# Patient Record
Sex: FEMALE | Race: WHITE | NOT HISPANIC OR LATINO | Employment: OTHER | URBAN - METROPOLITAN AREA
[De-identification: names, ages, dates, MRNs, and addresses within clinical notes are randomized per-mention and may not be internally consistent; named-entity substitution may affect disease eponyms.]

---

## 2017-01-10 RX ORDER — HYDROCHLOROTHIAZIDE 25 MG/1
TABLET ORAL
Qty: 30 TABLET | Refills: 5 | Status: SHIPPED | OUTPATIENT
Start: 2017-01-10 | End: 2018-01-11 | Stop reason: SDUPTHER

## 2017-01-23 RX ORDER — SIMVASTATIN 40 MG
TABLET ORAL
Qty: 30 TABLET | Refills: 5 | Status: SHIPPED | OUTPATIENT
Start: 2017-01-23 | End: 2017-07-24 | Stop reason: SDUPTHER

## 2017-02-14 ENCOUNTER — HOSPITAL ENCOUNTER (OUTPATIENT)
Dept: GENERAL RADIOLOGY | Facility: HOSPITAL | Age: 72
Discharge: HOME | End: 2017-02-14
Attending: INTERNAL MEDICINE | Admitting: INTERNAL MEDICINE

## 2017-02-14 ENCOUNTER — TELEPHONE (OUTPATIENT)
Dept: GASTROENTEROLOGY | Facility: CLINIC | Age: 72
End: 2017-02-14

## 2017-02-14 ENCOUNTER — TRANSCRIBE ORDERS (OUTPATIENT)
Dept: GASTROENTEROLOGY | Facility: CLINIC | Age: 72
End: 2017-02-14

## 2017-02-14 ENCOUNTER — OUTSIDE FACILITY SERVICE (OUTPATIENT)
Dept: GASTROENTEROLOGY | Facility: CLINIC | Age: 72
End: 2017-02-14

## 2017-02-14 DIAGNOSIS — Z12.11 SCREENING FOR COLON CANCER: Primary | ICD-10-CM

## 2017-02-14 DIAGNOSIS — Z85.42 HISTORY OF ENDOMETRIAL CANCER: ICD-10-CM

## 2017-02-14 DIAGNOSIS — Z12.11 SCREENING FOR COLON CANCER: ICD-10-CM

## 2017-02-14 PROCEDURE — 74270 X-RAY XM COLON 1CNTRST STD: CPT

## 2017-02-14 PROCEDURE — G0105 COLORECTAL SCRN; HI RISK IND: HCPCS | Performed by: INTERNAL MEDICINE

## 2017-02-14 NOTE — TELEPHONE ENCOUNTER
I spoke with Mrs. Sage about the results of the barium enema. There was evidence for diverticulosis. The patient likely has some adhesions in the pelvis that precluded complete exam with the colonoscope. I discussed this with Mrs. Sage.

## 2017-02-14 NOTE — TELEPHONE ENCOUNTER
Patient had incomplete colon, needs Barium Enema. Could only get to the sigmoid due to strictures.

## 2017-03-06 ENCOUNTER — CLINICAL SUPPORT (OUTPATIENT)
Dept: GYNECOLOGIC ONCOLOGY | Facility: CLINIC | Age: 72
End: 2017-03-06

## 2017-03-06 VITALS
WEIGHT: 127 LBS | RESPIRATION RATE: 16 BRPM | BODY MASS INDEX: 21.8 KG/M2 | HEART RATE: 58 BPM | OXYGEN SATURATION: 90 % | TEMPERATURE: 97.6 F | DIASTOLIC BLOOD PRESSURE: 67 MMHG | SYSTOLIC BLOOD PRESSURE: 145 MMHG

## 2017-03-06 DIAGNOSIS — C54.1 ENDOMETRIAL CANCER (HCC): Primary | ICD-10-CM

## 2017-03-06 PROCEDURE — 99214 OFFICE O/P EST MOD 30 MIN: CPT | Performed by: NURSE PRACTITIONER

## 2017-03-06 NOTE — PROGRESS NOTES
GYN ONCOLOGY CANCER SURVEILLANCE FOLLOW-UP    Cindy Sage  [id number]  1945    Chief Complaint: Follow-up (survivorship endometrial cancer)        History of present illness:  Cindy Sage is a 72 y.o. year old female who is here today for ongoing surveillance of Endometrial Cancer, see Cancer History, and review of her Survivorship Care Plan. She reports she is feeling very well today and has no complaints. She denies vaginal bleeding, pelvic pain, and changes in bowel or bladder function. She reports her fatigue is improving and only has mild residual neuropathy in bilateral toes that is tolerable. She is using her vaginal dilator occasionally without any problems. She is feeling well recovered from her treatments, pleased with her outcomes at this time, and reports she has been very pleased with all of her care here.           Cancer History:      Endometrial cancer    3/26/2016 Imaging    CT in ER for acute onset postmenopausal bleeding revealed mass in the lower uterine segment. Gyn Oncology called to evaluate.      3/29/2016 Biopsy    Endometrial biopsy in office, pathology revealed complex atypical hyperplasia with stromal breakdown.         4/1/2016 Initial Diagnosis    Endometrial cancer      4/19/2016 Surgery    RATLH, BSO, LND to several millimeters of residual disease.       5/20/2016 - 11/15/2016 Chemotherapy    Carboplatin AUC 6, Paclitaxel 175 mg/m2 x 6 cycles in sandwich fashion with radiation in between cycles #3 and #4       - 9/2/2016 Radiation    Radiation completed. Pelvis and periaortic nodes received a total of 45 Gy in 25 fractions during sandwich therapy. This was followed by vaginal brachytherapy: upper vagina treated utilizing cylinder and high dose rate iridium-192 to 18 Gy in 3 fractions.       11/21/2016 Imaging    Post-treatment PET/CT negative for disease         Past Medical History   Diagnosis Date   • Elevated cholesterol    • Endometrial cancer 04/2016     Stage  IIIC2    • GERD (gastroesophageal reflux disease)    • HTN (hypertension)    • Stroke 02/2016     No residual deficits.  Takes .       Past Surgical History   Procedure Laterality Date   • Appendectomy       Ex lap   • Foot surgery     • Tonsillectomy     • Total laparoscopic hysterectomy salpingo oophorectomy Bilateral 04/19/2016     RATLH, BSO, LND       MEDICATIONS: The current medication list was reviewed and reconciled.     Allergies:  is allergic to strawberry extract.    Family History   Problem Relation Age of Onset   • Arthritis Mother    • Breast cancer Mother    • Osteoporosis Mother    • Ovarian cancer Neg Hx        Last imaging study was negative post-treatment PET on 11/21/2016.   Tumor marker:  Component      Latest Ref Rng 5/20/2016 6/10/2016 6/29/2016 9/14/2016 10/5/2016         0.0 - 30.2 U/mL 37.7 18.9 14.0 11.1 9.3     Component      Latest Ref Rng 10/26/2016 11/30/2016         0.0 - 30.2 U/mL 11.3 11.6       Review of Systems   Constitutional: Negative for appetite change, chills, fatigue, fever and unexpected weight change.   Respiratory: Negative for cough, shortness of breath and wheezing.    Cardiovascular: Negative for chest pain, palpitations and leg swelling.   Gastrointestinal: Negative for abdominal distention, abdominal pain, blood in stool, constipation, diarrhea, nausea and vomiting.   Endocrine: Negative.    Genitourinary: Negative for dyspareunia, dysuria, frequency, genital sores, hematuria, pelvic pain, urgency, vaginal bleeding, vaginal discharge and vaginal pain.   Musculoskeletal: Negative for arthralgias, gait problem and joint swelling.   Neurological: Negative for dizziness, seizures, syncope, weakness, light-headedness, numbness and headaches.   Hematological: Negative for adenopathy.   Psychiatric/Behavioral: Negative.        Physical Exam  General Appearance:  alert, cooperative, no apparent distress, appears stated age and normal weight    Neurologic/Psychiatric: A&O x 3, gait steady, appropriate affect   HEENT:  Normocephalic, without obvious abnormality, mucous membranes moist   Neck: Supple, symmetrical, trachea midline, no adenopathy;  No thyromegaly, masses, or tenderness   Back:   Symmetric, no curvature, ROM normal, no CVA tenderness   Lungs:   Clear to auscultation bilaterally; respirations regular, even, and unlabored bilaterally   Heart:  Regular rate and rhythm, no murmurs appreciated   Breasts:  deferred   Abdomen:   Soft, non-tender, non-distended and no organomegaly   Lymph nodes: No cervical, supraclavicular, inguinal or axillary adenopathy noted   Extremities: Normal, atraumatic; no clubbing, cyanosis, or edema    Pelvic: External Genitalia  atrophic, without lesions  Vagina  is pale, atrophic.  and radiation change noted  Vaginal Cuff  Female Vaginal Cuff: smooth, intact, without visible lesions and radiation change visible, mildly friable  Uterus  surgically absent and no palpable masses  Ovaries  surgically absent bilaterallly  Parametria  smooth  Rectovaginal  Female rectovaginal: confirms no masses or bleeding     ECOG Performance Status: 0 - Asymptomatic    Procedure Note:  No notes on file      Assessment and Plan:  Cindy was seen today for follow-up.    Diagnoses and all orders for this visit:    Endometrial cancer      There is no evidence of disease upon today's exam. She is understanding to call with any changes in pelvic symptoms or general GYN concerns.     The patient and I have reviewed her Survivorship Care Plan in detail. We discussed her diagnosis, pathology, histology, all treatments, and ongoing surveillance recommendations. All questions were answered to her satisfaction. She is in agreement with our plan for ongoing surveillance as outlined in her plan. This includes, but is not limited to Ca-125 levels every 6 months, q3 month surveillance visits in clinic with Dr. Dominguez for the first 2 years, and repeat  imaging studies for any suspected recurrences. A copy of this document was provided to her at the completion of our visit. A copy has also been sent to her PCP.     This was a 30 minute visit with 20 minutes spent in direct face-to-face discussion of her Survivorship Care Plan.     Return in about 3 months (around 6/6/2017) for ongoing cancer surveillance with Dr. Dominguez.      CHAPARRO Hand        Note: Speech recognition transcription software was used to dictate portions of this document.  An attempt at proofreading has been made though minor errors in transcription may still be present.  Please do not hesitate to call our office with any questions.

## 2017-05-22 RX ORDER — ERGOCALCIFEROL 1.25 MG/1
CAPSULE ORAL
Qty: 30 CAPSULE | Refills: 0 | Status: SHIPPED | OUTPATIENT
Start: 2017-05-22 | End: 2018-04-12 | Stop reason: SDUPTHER

## 2017-05-24 ENCOUNTER — LAB (OUTPATIENT)
Dept: LAB | Facility: HOSPITAL | Age: 72
End: 2017-05-24

## 2017-05-24 ENCOUNTER — OFFICE VISIT (OUTPATIENT)
Dept: GYNECOLOGIC ONCOLOGY | Facility: CLINIC | Age: 72
End: 2017-05-24

## 2017-05-24 VITALS
WEIGHT: 133 LBS | HEART RATE: 62 BPM | OXYGEN SATURATION: 99 % | SYSTOLIC BLOOD PRESSURE: 140 MMHG | DIASTOLIC BLOOD PRESSURE: 67 MMHG | RESPIRATION RATE: 20 BRPM | TEMPERATURE: 97.6 F | BODY MASS INDEX: 22.83 KG/M2

## 2017-05-24 DIAGNOSIS — C54.1 ENDOMETRIAL CANCER (HCC): Primary | ICD-10-CM

## 2017-05-24 DIAGNOSIS — C54.1 ENDOMETRIAL CANCER (HCC): ICD-10-CM

## 2017-05-24 LAB — CANCER AG125 SERPL QL: 8.3 U/ML (ref 0–30.2)

## 2017-05-24 PROCEDURE — 99213 OFFICE O/P EST LOW 20 MIN: CPT | Performed by: OBSTETRICS & GYNECOLOGY

## 2017-05-24 PROCEDURE — 36415 COLL VENOUS BLD VENIPUNCTURE: CPT

## 2017-05-24 PROCEDURE — 86304 IMMUNOASSAY TUMOR CA 125: CPT | Performed by: OBSTETRICS & GYNECOLOGY

## 2017-07-24 RX ORDER — SIMVASTATIN 40 MG
TABLET ORAL
Qty: 30 TABLET | Refills: 5 | Status: SHIPPED | OUTPATIENT
Start: 2017-07-24 | End: 2018-01-22 | Stop reason: SDUPTHER

## 2017-08-25 ENCOUNTER — OFFICE VISIT (OUTPATIENT)
Dept: GYNECOLOGIC ONCOLOGY | Facility: CLINIC | Age: 72
End: 2017-08-25

## 2017-08-25 VITALS
DIASTOLIC BLOOD PRESSURE: 67 MMHG | SYSTOLIC BLOOD PRESSURE: 156 MMHG | OXYGEN SATURATION: 97 % | HEART RATE: 68 BPM | RESPIRATION RATE: 14 BRPM | WEIGHT: 137 LBS | BODY MASS INDEX: 23.52 KG/M2 | TEMPERATURE: 98.2 F

## 2017-08-25 DIAGNOSIS — C54.1 ENDOMETRIAL CANCER (HCC): Primary | ICD-10-CM

## 2017-08-25 PROCEDURE — 99213 OFFICE O/P EST LOW 20 MIN: CPT | Performed by: OBSTETRICS & GYNECOLOGY

## 2017-08-25 NOTE — PROGRESS NOTES
Cindy HOSKINS Chu  [id number]  1945  Subjective     Reason for visit:  Endometrial cancer surveillance    History of present illness:  The patient is a 72 y.o. 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  Her treatment history is outlined below.  She presents today for surveillance visit.    Overall she has done well since her last visit.  She denies abdominal pain and bloating.  No vaginal bleeding or discharge.  Denies nausea and vomiting.  Denies chest pain and SOB.  No fevers or chills.  She denies bowel or bladder dysfunction.  Her weight and appetite have been stable.  Her energy is stable.  No neuropathy.  No changes in her skin.       She is having a small issue with depth perception when she walks down her porch steps.  This can trigger her small amount of dizziness.  There is no railing and she takes care to not have falls.    Treatment History:   1.  After pre-operative clearance with Neurology due to history of stroke, in April 2016 she underwent a robotic-assisted total laparoscopic hysterectomy, bilateral salpingo-oophrectomy, bilateral pelvic lymph node dissection, bilateral periaortic lymph node dissection, and tumor debulking.    2.  Intraoperatively her uterus was replaced by tumor and the uterine fundus actually fairly easily detached from the cervix and the lower uterine segment secondary to replacement of the endometrium by fragmented tumor.  There was also visible tumor at the right uterine artery pedicle, right parametria and right paravesical tissues to an extent that even a radical dissection in this area would not result in full resection.  As a result there was at least several millimeters of visible residual plaque in this area.  Additionally, at the time of surgery, during the left pelvic lymph node dissection, there was noted to be a moderate amount of dense tissue encircling the left obturator nerve and a pad like fashion which covered the nerve to an  extent that it could not be visualized.    3.  Final pathology showed a mixed epithelial neoplasm with the predominant component being grade 2 endometrioid adenocarcinoma, with less than 1% papillary serous carcinoma (this was a change the original report stated that the majority of her tumor was high-grade).  Nonetheless, the myometrium was completely infiltrated by tumor and the tumor extended contiguously into the parasalpingeal soft tissues.  Tumor was present on the serosal surface of the ovary.  Furthermore the endocervix and endocervical lymphatics were extensively involved.  There were 2/6 right pelvic lymph nodes, 1/4 left pelvic lymph nodes, 1/1 right periaortic lymph nodes, and 0/1 left periaortic lymph nodes sampled in this thin patient.  Washings were positive for malignancy.    4.  We met for postoperative pathology discussion and I recommended sandwich therapy.  Her and her family desired a second opinion by Dr. Rojas at the [facility].  He concurred with my recommendation and therefore the patient returned to me for treatment.    5.  Prior to surgery she had not had any imaging of the chest performed and therefore had a PET/CT performed in May 2016 to assess her disease distribution.  In short, there were multiple enlarged lymph nodes in the left and right pelvic sidewalls however these had very minimal SUV uptake in this recently postoperative patient status post lymph node dissection.  Additionally there was a small focus of soft tissue in the dependent pelvis that was concerning for residual disease however nothing was apparent grossly at the time of surgery and this was likely due to the setting of a postoperative abdomen.    6.  She had her first cycle of carboplatin dosed at an AUC of 6 and paclitaxel dosed at 175 mg/m2 on 5/20/16.  CA-125 at the start of therapy was 37 however this was in the postoperative setting.  She then completed external beam radiation and  vaginal cylinder placement with Dr. Kerr completing in September 2016.  Her final cycle of chemo was on 10/26/16.  7.  Treatment was complicated by neutropenia requiring neulasta, minimal grade 2 anemia requiring transfusion, and minimal steroid induced hyperglycemia.  8. Post-treatment PET in November 2016 showed her to be completely without evidence of disease  9.  Completed survivorship counseling in 3/2017    OBGYN History: NSVDx2    Past Medical History:   Diagnosis Date   • Elevated cholesterol    • Endometrial cancer 04/2016    Stage IIIC2    • GERD (gastroesophageal reflux disease)    • HTN (hypertension)    • Stroke 02/2016    No residual deficits.  Takes .     Past Surgical History:   Procedure Laterality Date   • APPENDECTOMY      Ex lap   • FOOT SURGERY     • TONSILLECTOMY     • TOTAL LAPAROSCOPIC HYSTERECTOMY SALPINGO OOPHORECTOMY Bilateral 04/19/2016    RATLH, BSO, LND     MEDICATIONS: The current medication list was reviewed with the patient and updated in the EMR this date per the Medical Assistant. Medication dosages and frequencies were confirmed to be accurate.      Allergies:  is allergic to strawberry extract.    Social History: She lives in [place] with her .  They care for their 21-year-old grandson who is affected by Down syndrome.  She denies use of tobacco, alcohol, and illicit drugs.     Family History:  Her mother suffered from breast cancer in her mid-60s and about 5 years later this became bilateral.  Denies a history of colon, endometrial, ovarian, and prostate cancer.  No history of melanoma.    Health Maintenance:    1. Mammogram - 12/2016  2. Colonoscopy - 2017    Review of Systems:  CONSTITUTIONAL:  Weight has been stable, no excessive fatigue.  No fever, chills, night sweats.  PSYCHIATRIC: No history of anxiety, depression. No bipolar disorder or insomnia.  EYES: Vision is unchanged.  She wears glasses.  No photophobia.  EARS, NOSE, MOUTH, AND THROAT: Hearing  normal. No swallowing difficulties.  No sore throat.  ENDOCRINE: No history of diabetes or thyroid disease.  LYMPHATIC: No swelling.  No enlarged lymph nodes  RESPIRATORY: No shortness of breath, cough, asthma or wheezing.  No hemoptysis.  CARDIOVASCULAR: No angina, orthopnea, or edema.  +HTN.  +hyperlipidemia.  GASTROINTESTINAL: No constipation, diarrhea, or melena.  +Reflux.  No nausea, vomiting.  GENITOURINARY: No dysuria, hematuria, urgency, or frequency.  NEUROLOGIC: +h/o stroke, no current numbness, weakness, motor disturbance, syncope, seizure, or headaches.  MUSCULOSKELETAL: No joint pain.  No muscle weakness.  INTEGUMENTARY: No new skin lesions.  GYNECOLOGIC: Per history of present illness.  HEMATOLOGIC: No bleeding problems.  Denies easy bruising.    Objective      Physical Exam  Vitals:    08/25/17 1035   BP: 156/67   Pulse: 68   Resp: 14   Temp: 98.2 °F (36.8 °C)   SpO2: 97%     Body mass index is 23.52 kg/(m^2).    Wt Readings from Last 3 Encounters:   08/25/17 137 lb (62.1 kg)   05/24/17 133 lb (60.3 kg)   03/06/17 127 lb (57.6 kg)     ECOG PS 0    GENERAL: Alert, well-appearing female in no apparent distress.  She appears her stated age.  She is here by herself today.  HEENT: Sclera anicteric, normal eyelids. Head normocephalic, atraumatic. Mucus membranes moist.  Normal dentition.    GASTROINTESTINAL:  Soft, non-tender, non-distended, no rebound or guarding, no masses, or hernias.  No HSM.  Incisions - well-healed laparoscopic incisions  MUSCULOSKELETAL:  Normal gait and station.  FROMx4.  No digital cyanosis.    SKIN:  Warm, dry, well-perfused.  All visible areas intact.  No rashes, lesions, ulcers.  PSYCHIATRIC: AO x3, with appropriate affect, normal thought processes  LYMPHATICS:  No cervical or inguinal adenopathy noted.  PELVIC exam:  Normal external female genitalia without lesions or skin changes.  Urethra midline.  Vagina without lesions and is very minimally foreshortened.  No visible  lesions.  On bimanual exam the vagina is smooth.  No palpable abdominal masses. Rectovaginal exam confirmatory. The pelvic sidewalls are smooth, the cul de sac is clear, the rectovaginal septum is supple.    Diagnostic Data:    Lab Results   Component Value Date     8.3 05/24/2017     11.6 11/30/2016     11.3 10/26/2016     9.3 10/05/2016     11.1 09/14/2016         Assessment/Plan  This is a 72 y.o. woman with stage IIIC2 grade 2 mixed histology endometrial cancer s/p adjuvant therapy and here for surveillance    1.  Endometrial cancer:   -No evidence of disease  -She understands the typical recommendation for high risk endometrial cancer surveillance (stage III/IV, serous or clear cell) are visits every 3 months for the first two years, every 6 months up until 5 years, and annually thereafter.  -We again discussed need to monitor symptoms including any vaginal bleeding or discharge, abdominal pain, bloating, changes in bowel or bladder habits.  She knows to be particularly aware of symptoms that occur several times a week for at least several weeks.    - today at her next visit (not today).  Aim for every 6 months initially    2.  Follow-up:    -RTC in 3 months  -Offered ACP today         Electronically Signed by: Jess Dominguez MD  Date: 8/25/2017      Time:  11:08 AM

## 2017-11-29 ENCOUNTER — OFFICE VISIT (OUTPATIENT)
Dept: GYNECOLOGIC ONCOLOGY | Facility: CLINIC | Age: 72
End: 2017-11-29

## 2017-11-29 VITALS
HEART RATE: 55 BPM | DIASTOLIC BLOOD PRESSURE: 70 MMHG | SYSTOLIC BLOOD PRESSURE: 166 MMHG | OXYGEN SATURATION: 94 % | RESPIRATION RATE: 16 BRPM | TEMPERATURE: 96.3 F | WEIGHT: 141 LBS | BODY MASS INDEX: 24.2 KG/M2

## 2017-11-29 DIAGNOSIS — C54.1 ENDOMETRIAL CANCER (HCC): Primary | ICD-10-CM

## 2017-11-29 PROCEDURE — 99213 OFFICE O/P EST LOW 20 MIN: CPT | Performed by: OBSTETRICS & GYNECOLOGY

## 2017-11-29 NOTE — PROGRESS NOTES
Cindy HOSKINS [place]  [id number]  1945  Subjective     Reason for visit:  Endometrial cancer surveillance    History of present illness:  The patient is a 72 y.o. 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  Her treatment history is outlined below.  She presents today for surveillance visit.    Overall she has done well since her last visit.  She denies abdominal pain and bloating.  No vaginal bleeding or discharge.  Denies nausea and vomiting.  Denies chest pain and SOB.  No fevers or chills.  She denies bowel or bladder dysfunction.  Her appetite is good.  Her energy is stable.  No neuropathy.  No changes in her skin.   The dizziness resolved.    Treatment History:   1.  After pre-operative clearance with Neurology due to history of stroke, in April 2016 she underwent a robotic-assisted total laparoscopic hysterectomy, bilateral salpingo-oophrectomy, bilateral pelvic lymph node dissection, bilateral periaortic lymph node dissection, and tumor debulking.  2.  Intraoperatively her uterus was replaced by tumor and the uterine fundus actually fairly easily detached from the cervix and the lower uterine segment secondary to replacement of the endometrium by fragmented tumor.  There was also visible tumor at the right uterine artery pedicle, right parametria and right paravesical tissues to an extent that even a radical dissection in this area would not result in full resection.  As a result there was at least several millimeters of visible residual plaque in this area.  Additionally, at the time of surgery, during the left pelvic lymph node dissection, there was noted to be a moderate amount of dense tissue encircling the left obturator nerve and a pad like fashion which covered the nerve to an extent that it could not be visualized.    3.  Final pathology showed a mixed epithelial neoplasm with the predominant component being grade 2 endometrioid adenocarcinoma, with less than 1% papillary  serous carcinoma (this was a change the original report stated that the majority of her tumor was high-grade).  Nonetheless, the myometrium was completely infiltrated by tumor and the tumor extended contiguously into the parasalpingeal soft tissues.  Tumor was present on the serosal surface of the ovary.  Furthermore the endocervix and endocervical lymphatics were extensively involved.  There were 2/6 right pelvic lymph nodes, 1/4 left pelvic lymph nodes, 1/1 right periaortic lymph nodes, and 0/1 left periaortic lymph nodes sampled in this thin patient.  Washings were positive for malignancy.    4.  We met for postoperative pathology discussion and I recommended sandwich therapy.  Her and her family desired a second opinion by Dr. Rojas at the [facility].  He concurred with my recommendation and therefore the patient returned to me for treatment.    5.  Prior to surgery she had not had any imaging of the chest performed and therefore had a PET/CT performed in May 2016 to assess her disease distribution.  In short, there were multiple enlarged lymph nodes in the left and right pelvic sidewalls however these had very minimal SUV uptake in this recently postoperative patient status post lymph node dissection.  Additionally there was a small focus of soft tissue in the dependent pelvis that was concerning for residual disease however nothing was apparent grossly at the time of surgery and this was likely due to the setting of a postoperative abdomen.    6.  She had her first cycle of carboplatin dosed at an AUC of 6 and paclitaxel dosed at 175 mg/m2 on 5/20/16.  CA-125 at the start of therapy was 37 however this was in the postoperative setting.  She then completed external beam radiation and vaginal cylinder placement with Dr. Kerr completing in September 2016.  Her final cycle of chemo was on 10/26/16.  7.  Treatment was complicated by neutropenia requiring neulasta, minimal grade 2 anemia  requiring transfusion, and minimal steroid induced hyperglycemia.  8. Post-treatment PET in November 2016 showed her to be completely without evidence of disease  9.  Completed survivorship counseling in 3/2017    OBGYN History: NSVDx2    Past Medical History:   Diagnosis Date   • Elevated cholesterol    • Endometrial cancer 04/2016    Stage IIIC2    • GERD (gastroesophageal reflux disease)    • HTN (hypertension)    • Stroke 02/2016    No residual deficits.  Takes .     Past Surgical History:   Procedure Laterality Date   • APPENDECTOMY      Ex lap   • FOOT SURGERY     • TONSILLECTOMY     • TOTAL LAPAROSCOPIC HYSTERECTOMY SALPINGO OOPHORECTOMY Bilateral 04/19/2016    RATLH, BSO, LND     MEDICATIONS: The current medication list was reviewed with the patient and updated in the EMR this date per the Medical Assistant. Medication dosages and frequencies were confirmed to be accurate.      Allergies:  is allergic to strawberry extract.    Social History: She lives in [place] with her .  They care for their 21-year-old grandson who is affected by Down syndrome.  She denies use of tobacco, alcohol, and illicit drugs.     Family History:  Her mother suffered from breast cancer in her mid-60s and about 5 years later this became bilateral.  Denies a history of colon, endometrial, ovarian, and prostate cancer.  No history of melanoma.    Health Maintenance:    1. Mammogram - 12/2016  2. Colonoscopy - 2017    Review of Systems:  CONSTITUTIONAL:  Weight has been stable, no excessive fatigue.  No fever, chills, night sweats.  PSYCHIATRIC: No history of anxiety, depression. No bipolar disorder or insomnia.  EYES: Vision is unchanged.  She wears glasses.  No photophobia.  EARS, NOSE, MOUTH, AND THROAT: Hearing normal. No swallowing difficulties.  No sore throat.  ENDOCRINE: No history of diabetes or thyroid disease.  LYMPHATIC: No swelling.  No enlarged lymph nodes  RESPIRATORY: No shortness of breath, cough,  asthma or wheezing.  No hemoptysis.  CARDIOVASCULAR: No angina, orthopnea, or edema.  +HTN.  +hyperlipidemia.  GASTROINTESTINAL: No constipation, diarrhea, or melena.  +Reflux.  No nausea, vomiting.  GENITOURINARY: No dysuria, hematuria, urgency, or frequency.  NEUROLOGIC: +h/o stroke, no current numbness, weakness, motor disturbance, syncope, seizure, or headaches.  MUSCULOSKELETAL: No joint pain.  No muscle weakness.  INTEGUMENTARY: No new skin lesions.  GYNECOLOGIC: Per history of present illness.  HEMATOLOGIC: No bleeding problems.  Denies easy bruising.    Objective      Physical Exam  Vitals:    11/29/17 0851   BP: 166/70   Pulse: 55   Resp: 16   Temp: 96.3 °F (35.7 °C)   SpO2: 94%     Body mass index is 24.2 kg/(m^2).    Wt Readings from Last 3 Encounters:   11/29/17 141 lb (64 kg)   08/25/17 137 lb (62.1 kg)   05/24/17 133 lb (60.3 kg)     ECOG PS 0    GENERAL: Alert, well-appearing female in no apparent distress.  She appears her stated age.  She is here by herself today.  HEENT: Sclera anicteric, normal eyelids. Head normocephalic, atraumatic. Mucus membranes moist.  Normal dentition.    CARDIOVASCULAR: Normal rate, regular rhythm, no murmurs, rubs, or gallops.  Extremities symmetric.  No peripheral edema.  RESPIRATORY: Clear to auscultation bilaterally, normal respiratory effort  GASTROINTESTINAL:  Soft, non-tender, non-distended, no rebound or guarding, no masses, or hernias.  No HSM.  Incisions - well-healed laparoscopic incisions  MUSCULOSKELETAL:  Normal gait and station.  FROMx4.  No digital cyanosis.    SKIN:  Warm, dry, well-perfused.  All visible areas intact.  No rashes, lesions, ulcers.  PSYCHIATRIC: AO x3, with appropriate affect, normal thought processes  LYMPHATICS:  No cervical or inguinal adenopathy noted.  PELVIC exam:  Normal external female genitalia without lesions or skin changes.  Urethra midline.  Vagina without lesions and is very minimally foreshortened.  Moderately atrophic and  friable.  No visible lesions.  On bimanual exam the vagina is smooth.  No palpable abdominal masses. Rectovaginal exam confirmatory. The pelvic sidewalls are smooth, the cul de sac is clear, the rectovaginal septum is supple.      Diagnostic Data:    Lab Results   Component Value Date     8.3 05/24/2017         Assessment/Plan  This is a 72 y.o. woman with stage IIIC2 grade 2 mixed histology endometrial cancer s/p adjuvant therapy and here for surveillance    1.  Endometrial cancer:   -No evidence of disease  -She understands the typical recommendation for high risk endometrial cancer surveillance (stage III/IV, serous or clear cell) are visits every 3 months for the first two years, every 6 months up until 5 years, and annually thereafter.  -We again discussed need to monitor symptoms including any vaginal bleeding or discharge, abdominal pain, bloating, changes in bowel or bladder habits.  She knows to be particularly aware of symptoms that occur several times a week for at least several weeks.    -No symptoms, normal exam.  Consider  annually.    2.    -Will schedule mammogram    3.  Follow-up:    -RTC in 3 months to see APRN         Electronically Signed by: Jess Dominguez MD  Date: 11/29/2017      Time:  9:10 AM

## 2017-12-26 ENCOUNTER — TRANSCRIBE ORDERS (OUTPATIENT)
Dept: ADMINISTRATIVE | Facility: HOSPITAL | Age: 72
End: 2017-12-26

## 2017-12-26 DIAGNOSIS — Z12.31 VISIT FOR SCREENING MAMMOGRAM: Primary | ICD-10-CM

## 2018-01-11 RX ORDER — HYDROCHLOROTHIAZIDE 25 MG/1
TABLET ORAL
Qty: 30 TABLET | Refills: 5 | Status: SHIPPED | OUTPATIENT
Start: 2018-01-11 | End: 2019-01-21 | Stop reason: SDUPTHER

## 2018-01-22 RX ORDER — SIMVASTATIN 40 MG
TABLET ORAL
Qty: 30 TABLET | Refills: 5 | Status: SHIPPED | OUTPATIENT
Start: 2018-01-22 | End: 2018-07-25 | Stop reason: SDUPTHER

## 2018-02-27 ENCOUNTER — OFFICE VISIT (OUTPATIENT)
Dept: GYNECOLOGIC ONCOLOGY | Facility: CLINIC | Age: 73
End: 2018-02-27

## 2018-02-27 VITALS
TEMPERATURE: 98.6 F | SYSTOLIC BLOOD PRESSURE: 167 MMHG | HEART RATE: 70 BPM | DIASTOLIC BLOOD PRESSURE: 69 MMHG | OXYGEN SATURATION: 97 % | RESPIRATION RATE: 16 BRPM | WEIGHT: 139 LBS | BODY MASS INDEX: 23.86 KG/M2

## 2018-02-27 DIAGNOSIS — C54.1 ENDOMETRIAL CANCER (HCC): Primary | ICD-10-CM

## 2018-02-27 PROCEDURE — 99213 OFFICE O/P EST LOW 20 MIN: CPT | Performed by: NURSE PRACTITIONER

## 2018-02-27 NOTE — PROGRESS NOTES
GYN ONCOLOGY CANCER SURVEILLANCE FOLLOW-UP    Cindy Sage  [id number]  1945    Chief Complaint: Follow-up (no complaints)        History of present illness:  Cindy Sage is a 73 y.o. year old female who is here today for ongoing surveillance of Endometrial Cancer, see Cancer History. She reports she is feeling very well today and has no complaints. She denies vaginal bleeding, pelvic pain, and changes in bowel or bladder function. She is now 1.5 years from completion of treatment, very happy with her outcomes, and thankful for her care thus far with Dr. Doimnguez.           Cancer History:      Endometrial cancer    3/26/2016 Imaging     CT in ER for acute onset postmenopausal bleeding revealed mass in the lower uterine segment. Gyn Oncology called to evaluate.         3/29/2016 Biopsy     Endometrial biopsy in office, pathology revealed complex atypical hyperplasia with stromal breakdown.            4/1/2016 Initial Diagnosis     Endometrial cancer         4/19/2016 Surgery     RATLH, BSO, LND to several millimeters of residual disease.          5/20/2016 - 11/15/2016 Chemotherapy     Carboplatin AUC 6, Paclitaxel 175 mg/m2 x 6 cycles in sandwich fashion with radiation in between cycles #3 and #4          - 9/2/2016 Radiation     Radiation completed. Pelvis and periaortic nodes received a total of 45 Gy in 25 fractions during sandwich therapy. This was followed by vaginal brachytherapy: upper vagina treated utilizing cylinder and high dose rate iridium-192 to 18 Gy in 3 fractions.          11/21/2016 Imaging     Post-treatment PET/CT negative for disease            Past Medical History:   Diagnosis Date   • Elevated cholesterol    • Endometrial cancer 04/2016    Stage IIIC2    • GERD (gastroesophageal reflux disease)    • HTN (hypertension)    • Stroke 02/2016    No residual deficits.  Takes .       Past Surgical History:   Procedure Laterality Date   • APPENDECTOMY      Ex lap   • FOOT  SURGERY     • TONSILLECTOMY     • TOTAL LAPAROSCOPIC HYSTERECTOMY SALPINGO OOPHORECTOMY Bilateral 04/19/2016    RATLH, BSO, LND       MEDICATIONS: The current medication list was reviewed and reconciled.     Allergies:  is allergic to strawberry extract.    Family History   Problem Relation Age of Onset   • Arthritis Mother    • Breast cancer Mother    • Osteoporosis Mother    • Ovarian cancer Neg Hx        Last imaging study was post-treatment PET 11/21/2016  Tumor marker:  Lab Results   Component Value Date     8.3 05/24/2017       Review of Systems   Constitutional: Negative for appetite change, chills, fatigue, fever and unexpected weight change.   Respiratory: Negative for cough, shortness of breath and wheezing.    Cardiovascular: Negative for chest pain, palpitations and leg swelling.   Gastrointestinal: Negative for abdominal distention, abdominal pain, blood in stool, constipation, diarrhea, nausea and vomiting.   Endocrine: Negative.    Genitourinary: Negative for dyspareunia, dysuria, frequency, genital sores, hematuria, pelvic pain, urgency, vaginal bleeding, vaginal discharge and vaginal pain.   Musculoskeletal: Negative for arthralgias, gait problem and joint swelling.   Neurological: Negative for dizziness, seizures, syncope, weakness, light-headedness, numbness and headaches.   Hematological: Negative for adenopathy.   Psychiatric/Behavioral: Negative.        Physical Exam  Vital Signs: /69  Pulse 70  Temp 98.6 °F (37 °C) (Temporal Artery )   Resp 16  Wt 63 kg (139 lb)  SpO2 97%  BMI 23.86 kg/m2   General Appearance:  alert, cooperative, no apparent distress, appears stated age and normal weight   Neurologic/Psychiatric: A&O x 3, gait steady, appropriate affect   HEENT:  Normocephalic, without obvious abnormality, mucous membranes moist   Neck: Supple, symmetrical, trachea midline, no adenopathy;  No thyromegaly, masses, or tenderness   Back:   Symmetric, no curvature, ROM normal, no  CVA tenderness   Lungs:   Clear to auscultation bilaterally; respirations regular, even, and unlabored bilaterally   Heart:  Regular rate and rhythm, no murmurs appreciated   Breasts:  deferred   Abdomen:   Soft, non-tender, non-distended and no organomegaly   Lymph nodes: No cervical, supraclavicular, inguinal or axillary adenopathy noted   Extremities: Normal, atraumatic; no clubbing, cyanosis, or edema    Pelvic: External Genitalia  atrophic, without lesions  Vagina  is pale, atrophic.   Vaginal Cuff  Female Vaginal Cuff: smooth, intact and without visible lesions  Uterus  surgically absent and no palpable masses  Ovaries  surgically absent bilaterallly  Parametria  smooth  Rectovaginal  Female rectovaginal: confirms no masses or bleeding and Hemoccult negative     ECOG Performance Status: 0 - Asymptomatic    Procedure Note:  No notes on file      Assessment and Plan:  Cindy was seen today for follow-up.    Diagnoses and all orders for this visit:    Endometrial cancer        There is no evidence of disease upon today's exam.  She is understanding of her surveillance plan for q3 month visits until the 2 year melvina, then every 6 months if doing well. Per her previous plan with Dr. Dominguez, we will check CA-125 annually or PRN any new symptoms or concerns. This will be due with her next visit in 3 months. She is understanding to call with any changes in pelvic symptoms or general GYN concerns.      Return in about 3 months (around 5/27/2018) for ongoing cancer surveillance.      CHAPARRO Hand        Note: Speech recognition transcription software was used to dictate portions of this document.  An attempt at proofreading has been made though minor errors in transcription may still be present.  Please do not hesitate to call our office with any questions.

## 2018-03-08 ENCOUNTER — HOSPITAL ENCOUNTER (OUTPATIENT)
Dept: MAMMOGRAPHY | Facility: HOSPITAL | Age: 73
Discharge: HOME | End: 2018-03-08
Attending: OBSTETRICS & GYNECOLOGY | Admitting: OBSTETRICS & GYNECOLOGY

## 2018-03-08 DIAGNOSIS — Z12.31 VISIT FOR SCREENING MAMMOGRAM: ICD-10-CM

## 2018-03-08 PROCEDURE — 77067 SCR MAMMO BI INCL CAD: CPT | Performed by: RADIOLOGY

## 2018-03-08 PROCEDURE — 77063 BREAST TOMOSYNTHESIS BI: CPT | Performed by: RADIOLOGY

## 2018-03-08 PROCEDURE — 77067 SCR MAMMO BI INCL CAD: CPT

## 2018-03-08 PROCEDURE — 77063 BREAST TOMOSYNTHESIS BI: CPT

## 2018-04-02 ENCOUNTER — TRANSCRIBE ORDERS (OUTPATIENT)
Dept: MAMMOGRAPHY | Facility: HOSPITAL | Age: 73
End: 2018-04-02

## 2018-04-02 ENCOUNTER — HOSPITAL ENCOUNTER (OUTPATIENT)
Dept: MAMMOGRAPHY | Facility: HOSPITAL | Age: 73
Discharge: HOME | End: 2018-04-02
Admitting: OBSTETRICS & GYNECOLOGY

## 2018-04-02 DIAGNOSIS — R92.8 ABNORMAL MAMMOGRAM: ICD-10-CM

## 2018-04-02 DIAGNOSIS — R92.8 ABNORMAL MAMMOGRAM: Primary | ICD-10-CM

## 2018-04-02 PROCEDURE — 77065 DX MAMMO INCL CAD UNI: CPT | Performed by: RADIOLOGY

## 2018-04-02 PROCEDURE — 77065 DX MAMMO INCL CAD UNI: CPT

## 2018-04-02 PROCEDURE — G0279 TOMOSYNTHESIS, MAMMO: HCPCS

## 2018-04-02 PROCEDURE — G0279 TOMOSYNTHESIS, MAMMO: HCPCS | Performed by: RADIOLOGY

## 2018-04-12 RX ORDER — ERGOCALCIFEROL 1.25 MG/1
50000 CAPSULE ORAL WEEKLY
Qty: 12 CAPSULE | Refills: 1 | Status: SHIPPED | OUTPATIENT
Start: 2018-04-12 | End: 2020-06-24

## 2018-06-14 ENCOUNTER — OFFICE VISIT (OUTPATIENT)
Dept: GYNECOLOGIC ONCOLOGY | Facility: CLINIC | Age: 73
End: 2018-06-14

## 2018-06-14 ENCOUNTER — APPOINTMENT (OUTPATIENT)
Dept: LAB | Facility: HOSPITAL | Age: 73
End: 2018-06-14

## 2018-06-14 VITALS
DIASTOLIC BLOOD PRESSURE: 69 MMHG | HEART RATE: 69 BPM | BODY MASS INDEX: 23.52 KG/M2 | TEMPERATURE: 98.1 F | WEIGHT: 137 LBS | RESPIRATION RATE: 16 BRPM | SYSTOLIC BLOOD PRESSURE: 171 MMHG

## 2018-06-14 DIAGNOSIS — C54.1 ENDOMETRIAL CANCER (HCC): Primary | ICD-10-CM

## 2018-06-14 LAB — CANCER AG125 SERPL QL: 10.5 U/ML (ref 0–30.2)

## 2018-06-14 PROCEDURE — 99213 OFFICE O/P EST LOW 20 MIN: CPT | Performed by: NURSE PRACTITIONER

## 2018-06-14 PROCEDURE — 86304 IMMUNOASSAY TUMOR CA 125: CPT | Performed by: NURSE PRACTITIONER

## 2018-06-14 PROCEDURE — 36415 COLL VENOUS BLD VENIPUNCTURE: CPT | Performed by: NURSE PRACTITIONER

## 2018-06-14 NOTE — PROGRESS NOTES
GYN ONCOLOGY CANCER SURVEILLANCE FOLLOW-UP    Cindy Sage  [id number]  1945    Chief Complaint: Follow-up (no complaints )        History of present illness:  Cindy Sage is a 73 y.o. year old female who is here today for ongoing surveillance of Endometrial Cancer, see Cancer History. Her CA-125 has remained low/stable, due to be repeated today. She reports she is feeling very well today and has no complaints. She denies vaginal bleeding, pelvic pain, and changes in bowel or bladder function.          Cancer History:      Endometrial cancer    3/26/2016 Imaging     CT in ER for acute onset postmenopausal bleeding revealed mass in the lower uterine segment. Gyn Oncology called to evaluate.         3/29/2016 Biopsy     Endometrial biopsy in office, pathology revealed complex atypical hyperplasia with stromal breakdown.            4/1/2016 Initial Diagnosis     Endometrial cancer         4/19/2016 Surgery     RATLH, BSO, LND to several millimeters of residual disease.          5/20/2016 - 11/15/2016 Chemotherapy     Carboplatin AUC 6, Paclitaxel 175 mg/m2 x 6 cycles in sandwich fashion with radiation in between cycles #3 and #4          - 9/2/2016 Radiation     Radiation completed. Pelvis and periaortic nodes received a total of 45 Gy in 25 fractions during sandwich therapy. This was followed by vaginal brachytherapy: upper vagina treated utilizing cylinder and high dose rate iridium-192 to 18 Gy in 3 fractions.          11/21/2016 Imaging     Post-treatment PET/CT negative for disease            Past Medical History:   Diagnosis Date   • Drug therapy 2016    For endometrial CA in 2016   • Elevated cholesterol    • Endometrial cancer 04/2016    Stage IIIC2    • GERD (gastroesophageal reflux disease)    • HTN (hypertension)    • Hx of radiation therapy 2016    For endometrial CA 2016   • Stroke 02/2016    No residual deficits.  Takes .       Past Surgical History:   Procedure Laterality Date    • APPENDECTOMY      Ex lap   • FOOT SURGERY     • TONSILLECTOMY     • TOTAL LAPAROSCOPIC HYSTERECTOMY SALPINGO OOPHORECTOMY Bilateral 04/19/2016    RATLH, BSO, LND       MEDICATIONS: The current medication list was reviewed and reconciled.     Allergies:  is allergic to strawberry extract.    Family History   Problem Relation Age of Onset   • Arthritis Mother    • Breast cancer Mother         age unknown    • Osteoporosis Mother    • Ovarian cancer Neg Hx    • Endometrial cancer Neg Hx        Last imaging study was post-treatment PET 11/21/2016.   Tumor marker:     Date Value Ref Range Status   05/24/2017 8.3 0.0 - 30.2 U/mL Final   11/30/2016 11.6 0.0 - 30.2 U/mL Final   10/26/2016 11.3 0.0 - 30.2 U/mL Final   10/05/2016 9.3 0.0 - 30.2 U/mL Final       Review of Systems   Constitutional: Negative for appetite change, chills, fatigue, fever and unexpected weight change.   Respiratory: Negative for cough, shortness of breath and wheezing.    Cardiovascular: Negative for chest pain, palpitations and leg swelling.   Gastrointestinal: Negative for abdominal distention, abdominal pain, blood in stool, constipation, diarrhea, nausea and vomiting.   Endocrine: Negative.    Genitourinary: Negative for dyspareunia, dysuria, frequency, genital sores, hematuria, pelvic pain, urgency, vaginal bleeding, vaginal discharge and vaginal pain.   Musculoskeletal: Negative for arthralgias, gait problem and joint swelling.   Neurological: Negative for dizziness, seizures, syncope, weakness, light-headedness, numbness and headaches.   Hematological: Negative for adenopathy.   Psychiatric/Behavioral: Negative.        Physical Exam  Vital Signs: /69   Pulse 69   Temp 98.1 °F (36.7 °C) (Temporal Artery )   Resp 16   Wt 62.1 kg (137 lb)   BMI 23.52 kg/m²    General Appearance:  alert, cooperative, no apparent distress, appears stated age and normal weight   Neurologic/Psychiatric: A&O x 3, gait steady, appropriate affect    HEENT:  Normocephalic, without obvious abnormality, mucous membranes moist   Neck: Supple, symmetrical, trachea midline, no adenopathy;  No thyromegaly, masses, or tenderness   Back:   Symmetric, no curvature, ROM normal, no CVA tenderness   Lungs:   Clear to auscultation bilaterally; respirations regular, even, and unlabored bilaterally   Heart:  Regular rate and rhythm, no murmurs appreciated   Breasts:  deferred   Abdomen:   Soft, non-tender, non-distended and no organomegaly   Lymph nodes: No cervical, supraclavicular, inguinal or axillary adenopathy noted   Extremities: Normal, atraumatic; no clubbing, cyanosis, or edema    Pelvic: External Genitalia  atrophic, without lesions  Vagina  is pale, atrophic.   Vaginal Cuff  Female Vaginal Cuff: smooth, intact and without visible lesions  Uterus  surgically absent and no palpable masses  Ovaries  surgically absent bilaterallly  Parametria  smooth  Rectovaginal  Female rectovaginal: confirms no masses or bleeding and Hemoccult negative     ECOG Performance Status: 0 - Asymptomatic    Procedure Note:  No notes on file      Assessment and Plan:  Cindy was seen today for follow-up.    Diagnoses and all orders for this visit:    Endometrial cancer  -             There is no evidence of disease upon today's exam. She will be notified of CA-125 results upon their return. Continue every 3 month surveillance visits until the 2 year melvina, then if doing well may go to 6 months. She is understanding to call with any changes in pelvic symptoms or general GYN concerns at any time between regularly scheduled visits.       RTC in 3 months for ongoing cancer surveillance.       CHAPARRO Hand        Note: Speech recognition transcription software was used to dictate portions of this document.  An attempt at proofreading has been made though minor errors in transcription may still be present.  Please do not hesitate to call our office with any questions.

## 2018-06-15 ENCOUNTER — TELEPHONE (OUTPATIENT)
Dept: GYNECOLOGIC ONCOLOGY | Facility: CLINIC | Age: 73
End: 2018-06-15

## 2018-07-26 RX ORDER — SIMVASTATIN 40 MG
TABLET ORAL
Qty: 90 TABLET | Refills: 1 | Status: SHIPPED | OUTPATIENT
Start: 2018-07-26 | End: 2019-01-21 | Stop reason: SDUPTHER

## 2018-09-18 ENCOUNTER — OFFICE VISIT (OUTPATIENT)
Dept: GYNECOLOGIC ONCOLOGY | Facility: CLINIC | Age: 73
End: 2018-09-18

## 2018-09-18 ENCOUNTER — APPOINTMENT (OUTPATIENT)
Dept: LAB | Facility: HOSPITAL | Age: 73
End: 2018-09-18

## 2018-09-18 VITALS
SYSTOLIC BLOOD PRESSURE: 112 MMHG | OXYGEN SATURATION: 93 % | BODY MASS INDEX: 23.52 KG/M2 | DIASTOLIC BLOOD PRESSURE: 58 MMHG | TEMPERATURE: 97.1 F | HEART RATE: 70 BPM | WEIGHT: 137 LBS | RESPIRATION RATE: 20 BRPM

## 2018-09-18 DIAGNOSIS — C54.1 ENDOMETRIAL CANCER (HCC): Primary | ICD-10-CM

## 2018-09-18 LAB — CANCER AG125 SERPL QL: 11.2 U/ML (ref 0–30.2)

## 2018-09-18 PROCEDURE — 86304 IMMUNOASSAY TUMOR CA 125: CPT | Performed by: NURSE PRACTITIONER

## 2018-09-18 PROCEDURE — 36415 COLL VENOUS BLD VENIPUNCTURE: CPT | Performed by: NURSE PRACTITIONER

## 2018-09-18 PROCEDURE — 99213 OFFICE O/P EST LOW 20 MIN: CPT | Performed by: NURSE PRACTITIONER

## 2018-09-18 NOTE — PROGRESS NOTES
GYN ONCOLOGY CANCER SURVEILLANCE FOLLOW-UP    Cindy Sage  [id number]  1945    Chief Complaint: Follow-up (no complaints )        History of present illness:  Cindy Sage is a 73 y.o. year old female who is here today for ongoing surveillance of Endometrial Cancer, see Cancer History. She is celebrating 2 years since completion of treatment this month. Due to repeat CA-125 today. She reports she is feeling very well today and has no complaints. She denies vaginal bleeding, pelvic pain, and changes in bowel or bladder function. She is planning a trip to [place] with her family this fall and looking forward to the holidays.        Cancer History:      Endometrial cancer (CMS/HCC)    3/26/2016 Imaging     CT in ER for acute onset postmenopausal bleeding revealed mass in the lower uterine segment. Gyn Oncology called to evaluate.         3/29/2016 Biopsy     Endometrial biopsy in office, pathology revealed complex atypical hyperplasia with stromal breakdown.            4/1/2016 Initial Diagnosis     Endometrial cancer         4/19/2016 Surgery     RATLH, BSO, LND to several millimeters of residual disease.          5/20/2016 - 11/15/2016 Chemotherapy     Carboplatin AUC 6, Paclitaxel 175 mg/m2 x 6 cycles in sandwich fashion with radiation in between cycles #3 and #4          - 9/2/2016 Radiation     Radiation completed. Pelvis and periaortic nodes received a total of 45 Gy in 25 fractions during sandwich therapy. This was followed by vaginal brachytherapy: upper vagina treated utilizing cylinder and high dose rate iridium-192 to 18 Gy in 3 fractions.          11/21/2016 Imaging     Post-treatment PET/CT negative for disease            Past Medical History:   Diagnosis Date   • Drug therapy 2016    For endometrial CA in 2016   • Elevated cholesterol    • Endometrial cancer (CMS/HCC) 04/2016    Stage IIIC2    • GERD (gastroesophageal reflux disease)    • HTN (hypertension)    • Hx of radiation  therapy 2016    For endometrial CA 2016   • Stroke (CMS/HCC) 02/2016    No residual deficits.  Takes .       Past Surgical History:   Procedure Laterality Date   • APPENDECTOMY      Ex lap   • FOOT SURGERY     • TONSILLECTOMY     • TOTAL LAPAROSCOPIC HYSTERECTOMY SALPINGO OOPHORECTOMY Bilateral 04/19/2016    RATLH, BSO, LND       MEDICATIONS: The current medication list was reviewed and reconciled.     Allergies:  is allergic to strawberry extract.    Family History   Problem Relation Age of Onset   • Arthritis Mother    • Breast cancer Mother         age unknown    • Osteoporosis Mother    • Ovarian cancer Neg Hx    • Endometrial cancer Neg Hx        Last imaging study was post-treatment PET 11/21/2016.   Tumor marker:     Date Value Ref Range Status   06/14/2018 10.5 0.0 - 30.2 U/mL Final   05/24/2017 8.3 0.0 - 30.2 U/mL Final   11/30/2016 11.6 0.0 - 30.2 U/mL Final   10/26/2016 11.3 0.0 - 30.2 U/mL Final       Review of Systems   Constitutional: Negative for appetite change, chills, fatigue, fever and unexpected weight change.   Respiratory: Negative for cough, shortness of breath and wheezing.    Cardiovascular: Negative for chest pain, palpitations and leg swelling.   Gastrointestinal: Negative for abdominal distention, abdominal pain, blood in stool, constipation, diarrhea, nausea and vomiting.   Endocrine: Negative.    Genitourinary: Negative for dyspareunia, dysuria, frequency, genital sores, hematuria, pelvic pain, urgency, vaginal bleeding, vaginal discharge and vaginal pain.   Musculoskeletal: Negative for arthralgias, gait problem and joint swelling.   Neurological: Negative for dizziness, seizures, syncope, weakness, light-headedness, numbness and headaches.   Hematological: Negative for adenopathy.   Psychiatric/Behavioral: Negative.        Physical Exam  Vital Signs: /58   Pulse 70   Temp 97.1 °F (36.2 °C) (Temporal Artery )   Resp 20   Wt 62.1 kg (137 lb)   SpO2 93%   BMI  23.52 kg/m²    General Appearance:  alert, cooperative, no apparent distress, appears stated age and normal weight   Neurologic/Psychiatric: A&O x 3, gait steady, appropriate affect   HEENT:  Normocephalic, without obvious abnormality, mucous membranes moist   Neck: Supple, symmetrical, trachea midline, no adenopathy;  No thyromegaly, masses, or tenderness   Back:   Symmetric, no curvature, ROM normal, no CVA tenderness   Lungs:   Clear to auscultation bilaterally; respirations regular, even, and unlabored bilaterally   Heart:  Regular rate and rhythm, no murmurs appreciated   Breasts:  deferred   Abdomen:   Soft, non-tender, non-distended and no organomegaly   Lymph nodes: No cervical, supraclavicular, inguinal or axillary adenopathy noted   Extremities: Normal, atraumatic; no clubbing, cyanosis, or edema    Pelvic: External Genitalia  atrophic, without lesions  Vagina  is pale, atrophic.   Vaginal Cuff  Female Vaginal Cuff: smooth, intact, without visible lesions and changes consistent with previous radiation  Uterus  surgically absent and no palpable masses  Ovaries  surgically absent bilaterallly and without palpable masses or fullness  Parametria  smooth  Rectovaginal  Female rectovaginal: confirms no masses or bleeding and Hemoccult negative     ECOG Performance Status: 0 - Asymptomatic    Procedure Note:  No notes on file      Assessment and Plan:  Cindy was seen today for follow-up.    Diagnoses and all orders for this visit:    Endometrial cancer (CMS/HCC)  -             There is no evidence of disease upon today's exam. She will be notified of CA-125 results upon their return. She is now 2 years from completion of treatment and doing well. She may now go to every 6 month cancer surveillance visits, pending stable CA-125. She is understanding to call with any changes in pelvic symptoms or general GYN concerns at any time between regularly scheduled visits.       Return to clinic in 6 months for  ongoing cancer surveillance.      CHAPARRO Hand        Note: Speech recognition transcription software was used to dictate portions of this document.  An attempt at proofreading has been made though minor errors in transcription may still be present.  Please do not hesitate to call our office with any questions.

## 2018-09-19 ENCOUNTER — TELEPHONE (OUTPATIENT)
Dept: GYNECOLOGIC ONCOLOGY | Facility: CLINIC | Age: 73
End: 2018-09-19

## 2018-09-19 NOTE — TELEPHONE ENCOUNTER
----- Message from CHAPARRO Hand sent at 9/18/2018  3:06 PM EDT -----  Please notify CA-125 low! Thanks!

## 2018-10-04 ENCOUNTER — HOSPITAL ENCOUNTER (OUTPATIENT)
Dept: MAMMOGRAPHY | Facility: HOSPITAL | Age: 73
Discharge: HOME | End: 2018-10-04
Attending: OBSTETRICS & GYNECOLOGY | Admitting: OBSTETRICS & GYNECOLOGY

## 2018-10-04 DIAGNOSIS — R92.8 ABNORMAL MAMMOGRAM: ICD-10-CM

## 2018-10-04 PROCEDURE — 77065 DX MAMMO INCL CAD UNI: CPT | Performed by: RADIOLOGY

## 2018-10-04 PROCEDURE — 77065 DX MAMMO INCL CAD UNI: CPT

## 2018-10-04 PROCEDURE — G0279 TOMOSYNTHESIS, MAMMO: HCPCS | Performed by: RADIOLOGY

## 2018-10-04 PROCEDURE — G0279 TOMOSYNTHESIS, MAMMO: HCPCS

## 2018-10-17 ENCOUNTER — TELEPHONE (OUTPATIENT)
Dept: MAMMOGRAPHY | Facility: HOSPITAL | Age: 73
End: 2018-10-17

## 2018-10-24 ENCOUNTER — TELEPHONE (OUTPATIENT)
Dept: MAMMOGRAPHY | Facility: HOSPITAL | Age: 73
End: 2018-10-24

## 2018-10-26 ENCOUNTER — TRANSCRIBE ORDERS (OUTPATIENT)
Dept: MAMMOGRAPHY | Facility: HOSPITAL | Age: 73
End: 2018-10-26

## 2018-10-26 ENCOUNTER — TELEPHONE (OUTPATIENT)
Dept: MAMMOGRAPHY | Facility: HOSPITAL | Age: 73
End: 2018-10-26

## 2018-10-26 DIAGNOSIS — R92.8 ABNORMAL MAMMOGRAM: Primary | ICD-10-CM

## 2018-10-26 NOTE — TELEPHONE ENCOUNTER
Pt notified antithrombotic/blood thinner clearance received from pt's provider; medication instructions prior to & after procedure; BIS scheduling will contact pt to schedule procedure. Questions answered, support given. Pt is to contact prescribing provider with any medication questions. Pt verbalized understanding.

## 2018-11-14 ENCOUNTER — APPOINTMENT (OUTPATIENT)
Dept: MAMMOGRAPHY | Facility: HOSPITAL | Age: 73
End: 2018-11-14

## 2018-12-11 ENCOUNTER — HOSPITAL ENCOUNTER (OUTPATIENT)
Dept: MAMMOGRAPHY | Facility: HOSPITAL | Age: 73
Discharge: HOME | End: 2018-12-11
Admitting: RADIOLOGY

## 2018-12-11 ENCOUNTER — HOSPITAL ENCOUNTER (OUTPATIENT)
Dept: MAMMOGRAPHY | Facility: HOSPITAL | Age: 73
Discharge: HOME | End: 2018-12-11

## 2018-12-11 DIAGNOSIS — R92.8 ABNORMAL MAMMOGRAM: ICD-10-CM

## 2018-12-11 PROCEDURE — 77065 DX MAMMO INCL CAD UNI: CPT | Performed by: RADIOLOGY

## 2018-12-11 PROCEDURE — A4648 IMPLANTABLE TISSUE MARKER: HCPCS

## 2018-12-11 PROCEDURE — 19081 BX BREAST 1ST LESION STRTCTC: CPT | Performed by: RADIOLOGY

## 2018-12-11 PROCEDURE — 88305 TISSUE EXAM BY PATHOLOGIST: CPT | Performed by: OBSTETRICS & GYNECOLOGY

## 2018-12-11 RX ORDER — LIDOCAINE HYDROCHLORIDE AND EPINEPHRINE 10; 10 MG/ML; UG/ML
20 INJECTION, SOLUTION INFILTRATION; PERINEURAL ONCE
Status: COMPLETED | OUTPATIENT
Start: 2018-12-11 | End: 2018-12-11

## 2018-12-11 RX ORDER — LIDOCAINE HYDROCHLORIDE 10 MG/ML
5 INJECTION, SOLUTION INFILTRATION; PERINEURAL ONCE
Status: COMPLETED | OUTPATIENT
Start: 2018-12-11 | End: 2018-12-11

## 2018-12-11 RX ADMIN — LIDOCAINE HYDROCHLORIDE 5 ML: 10 INJECTION, SOLUTION INFILTRATION; PERINEURAL at 13:38

## 2018-12-11 RX ADMIN — LIDOCAINE HYDROCHLORIDE AND EPINEPHRINE 10 ML: 10; 10 INJECTION, SOLUTION INFILTRATION; PERINEURAL at 13:38

## 2018-12-12 LAB
CYTO UR: NORMAL
LAB AP CASE REPORT: NORMAL
LAB AP CLINICAL INFORMATION: NORMAL
LAB AP DIAGNOSIS COMMENT: NORMAL
PATH REPORT.FINAL DX SPEC: NORMAL
PATH REPORT.GROSS SPEC: NORMAL

## 2018-12-13 ENCOUNTER — TRANSCRIBE ORDERS (OUTPATIENT)
Dept: MAMMOGRAPHY | Facility: HOSPITAL | Age: 73
End: 2018-12-13

## 2018-12-13 ENCOUNTER — TELEPHONE (OUTPATIENT)
Dept: MAMMOGRAPHY | Facility: HOSPITAL | Age: 73
End: 2018-12-13

## 2018-12-13 DIAGNOSIS — R92.8 ABNORMAL MAMMOGRAM: Primary | ICD-10-CM

## 2019-01-21 RX ORDER — SIMVASTATIN 40 MG
TABLET ORAL
Qty: 90 TABLET | Refills: 1 | Status: SHIPPED | OUTPATIENT
Start: 2019-01-21 | End: 2019-07-24 | Stop reason: SDUPTHER

## 2019-01-21 RX ORDER — HYDROCHLOROTHIAZIDE 25 MG/1
TABLET ORAL
Qty: 90 TABLET | Refills: 1 | Status: SHIPPED | OUTPATIENT
Start: 2019-01-21 | End: 2019-08-06 | Stop reason: SDUPTHER

## 2019-02-28 ENCOUNTER — HOSPITAL ENCOUNTER (OUTPATIENT)
Facility: HOSPITAL | Age: 74
Setting detail: OBSERVATION
Discharge: HOME | End: 2019-03-01
Attending: EMERGENCY MEDICINE | Admitting: EMERGENCY MEDICINE

## 2019-02-28 ENCOUNTER — APPOINTMENT (OUTPATIENT)
Dept: MRI IMAGING | Facility: HOSPITAL | Age: 74
End: 2019-02-28

## 2019-02-28 ENCOUNTER — APPOINTMENT (OUTPATIENT)
Dept: CARDIOLOGY | Facility: HOSPITAL | Age: 74
End: 2019-02-28

## 2019-02-28 ENCOUNTER — APPOINTMENT (OUTPATIENT)
Dept: CT IMAGING | Facility: HOSPITAL | Age: 74
End: 2019-02-28

## 2019-02-28 ENCOUNTER — TELEPHONE (OUTPATIENT)
Dept: INTERNAL MEDICINE | Facility: CLINIC | Age: 74
End: 2019-02-28

## 2019-02-28 DIAGNOSIS — Z78.9 IMPAIRED MOBILITY AND ADLS: ICD-10-CM

## 2019-02-28 DIAGNOSIS — I10 ELEVATED BLOOD PRESSURE READING WITH DIAGNOSIS OF HYPERTENSION: ICD-10-CM

## 2019-02-28 DIAGNOSIS — Z74.09 IMPAIRED MOBILITY AND ADLS: ICD-10-CM

## 2019-02-28 DIAGNOSIS — R29.898 LEFT ARM WEAKNESS: ICD-10-CM

## 2019-02-28 DIAGNOSIS — Z74.09 IMPAIRED FUNCTIONAL MOBILITY, BALANCE, GAIT, AND ENDURANCE: ICD-10-CM

## 2019-02-28 DIAGNOSIS — H53.9 CHANGE IN VISION: ICD-10-CM

## 2019-02-28 DIAGNOSIS — G45.9 TIA (TRANSIENT ISCHEMIC ATTACK): Primary | ICD-10-CM

## 2019-02-28 PROBLEM — Z85.42 HISTORY OF ENDOMETRIAL CANCER: Status: ACTIVE | Noted: 2019-02-28

## 2019-02-28 LAB
ALBUMIN SERPL-MCNC: 4.66 G/DL (ref 3.2–4.8)
ALBUMIN/GLOB SERPL: 2.1 G/DL (ref 1.5–2.5)
ALP SERPL-CCNC: 123 U/L (ref 25–100)
ALT SERPL W P-5'-P-CCNC: 28 U/L (ref 7–40)
ANION GAP SERPL CALCULATED.3IONS-SCNC: 12 MMOL/L (ref 3–11)
AST SERPL-CCNC: 28 U/L (ref 0–33)
BACTERIA UR QL AUTO: ABNORMAL /HPF
BASOPHILS # BLD AUTO: 0.02 10*3/MM3 (ref 0–0.2)
BASOPHILS NFR BLD AUTO: 0.4 % (ref 0–1)
BILIRUB SERPL-MCNC: 0.8 MG/DL (ref 0.3–1.2)
BILIRUB UR QL STRIP: NEGATIVE
BNP SERPL-MCNC: 26 PG/ML (ref 0–100)
BUN BLD-MCNC: 15 MG/DL (ref 9–23)
BUN/CREAT SERPL: 13.2 (ref 7–25)
CALCIUM SPEC-SCNC: 9.2 MG/DL (ref 8.7–10.4)
CHLORIDE SERPL-SCNC: 101 MMOL/L (ref 99–109)
CLARITY UR: CLEAR
CO2 SERPL-SCNC: 26 MMOL/L (ref 20–31)
COLOR UR: YELLOW
CREAT BLD-MCNC: 1.14 MG/DL (ref 0.6–1.3)
DEPRECATED RDW RBC AUTO: 48.4 FL (ref 37–54)
EOSINOPHIL # BLD AUTO: 0.15 10*3/MM3 (ref 0–0.3)
EOSINOPHIL NFR BLD AUTO: 3.4 % (ref 0–3)
ERYTHROCYTE [DISTWIDTH] IN BLOOD BY AUTOMATED COUNT: 13.9 % (ref 11.3–14.5)
GFR SERPL CREATININE-BSD FRML MDRD: 46 ML/MIN/1.73
GLOBULIN UR ELPH-MCNC: 2.2 GM/DL
GLUCOSE BLD-MCNC: 94 MG/DL (ref 70–100)
GLUCOSE UR STRIP-MCNC: NEGATIVE MG/DL
HCT VFR BLD AUTO: 41 % (ref 34.5–44)
HGB BLD-MCNC: 13.1 G/DL (ref 11.5–15.5)
HGB UR QL STRIP.AUTO: NEGATIVE
HOLD SPECIMEN: NORMAL
HOLD SPECIMEN: NORMAL
HYALINE CASTS UR QL AUTO: ABNORMAL /LPF
IMM GRANULOCYTES # BLD AUTO: 0 10*3/MM3 (ref 0–0.05)
IMM GRANULOCYTES NFR BLD AUTO: 0 % (ref 0–0.6)
KETONES UR QL STRIP: NEGATIVE
LEUKOCYTE ESTERASE UR QL STRIP.AUTO: ABNORMAL
LYMPHOCYTES # BLD AUTO: 1.3 10*3/MM3 (ref 0.6–4.8)
LYMPHOCYTES NFR BLD AUTO: 29.1 % (ref 24–44)
MCH RBC QN AUTO: 30.5 PG (ref 27–31)
MCHC RBC AUTO-ENTMCNC: 32 G/DL (ref 32–36)
MCV RBC AUTO: 95.3 FL (ref 80–99)
MONOCYTES # BLD AUTO: 0.42 10*3/MM3 (ref 0–1)
MONOCYTES NFR BLD AUTO: 9.4 % (ref 0–12)
NEUTROPHILS # BLD AUTO: 2.58 10*3/MM3 (ref 1.5–8.3)
NEUTROPHILS NFR BLD AUTO: 57.7 % (ref 41–71)
NITRITE UR QL STRIP: NEGATIVE
PH UR STRIP.AUTO: 7.5 [PH] (ref 5–8)
PLATELET # BLD AUTO: 222 10*3/MM3 (ref 150–450)
PMV BLD AUTO: 9.3 FL (ref 6–12)
POTASSIUM BLD-SCNC: 3.3 MMOL/L (ref 3.5–5.5)
PROT SERPL-MCNC: 6.9 G/DL (ref 5.7–8.2)
PROT UR QL STRIP: NEGATIVE
RBC # BLD AUTO: 4.3 10*6/MM3 (ref 3.89–5.14)
RBC # UR: ABNORMAL /HPF
REF LAB TEST METHOD: ABNORMAL
SODIUM BLD-SCNC: 139 MMOL/L (ref 132–146)
SP GR UR STRIP: 1.01 (ref 1–1.03)
SQUAMOUS #/AREA URNS HPF: ABNORMAL /HPF
TROPONIN I SERPL-MCNC: 0 NG/ML (ref 0–0.07)
UROBILINOGEN UR QL STRIP: ABNORMAL
WBC NRBC COR # BLD: 4.47 10*3/MM3 (ref 3.5–10.8)
WBC UR QL AUTO: ABNORMAL /HPF
WHOLE BLOOD HOLD SPECIMEN: NORMAL
WHOLE BLOOD HOLD SPECIMEN: NORMAL

## 2019-02-28 PROCEDURE — 99285 EMERGENCY DEPT VISIT HI MDM: CPT

## 2019-02-28 PROCEDURE — 25810000003 SODIUM CHLORIDE 0.9 % WITH KCL 20 MEQ 20-0.9 MEQ/L-% SOLUTION: Performed by: INTERNAL MEDICINE

## 2019-02-28 PROCEDURE — 70544 MR ANGIOGRAPHY HEAD W/O DYE: CPT

## 2019-02-28 PROCEDURE — 70450 CT HEAD/BRAIN W/O DYE: CPT

## 2019-02-28 PROCEDURE — 99220 PR INITIAL OBSERVATION CARE/DAY 70 MINUTES: CPT | Performed by: INTERNAL MEDICINE

## 2019-02-28 PROCEDURE — G0378 HOSPITAL OBSERVATION PER HR: HCPCS

## 2019-02-28 PROCEDURE — A9577 INJ MULTIHANCE: HCPCS | Performed by: EMERGENCY MEDICINE

## 2019-02-28 PROCEDURE — 93306 TTE W/DOPPLER COMPLETE: CPT | Performed by: INTERNAL MEDICINE

## 2019-02-28 PROCEDURE — 85025 COMPLETE CBC W/AUTO DIFF WBC: CPT | Performed by: EMERGENCY MEDICINE

## 2019-02-28 PROCEDURE — 70548 MR ANGIOGRAPHY NECK W/DYE: CPT

## 2019-02-28 PROCEDURE — 96374 THER/PROPH/DIAG INJ IV PUSH: CPT

## 2019-02-28 PROCEDURE — 0 GADOBENATE DIMEGLUMINE 529 MG/ML SOLUTION: Performed by: EMERGENCY MEDICINE

## 2019-02-28 PROCEDURE — 25010000002 LORAZEPAM PER 2 MG: Performed by: EMERGENCY MEDICINE

## 2019-02-28 PROCEDURE — 84484 ASSAY OF TROPONIN QUANT: CPT

## 2019-02-28 PROCEDURE — 93306 TTE W/DOPPLER COMPLETE: CPT

## 2019-02-28 PROCEDURE — 70551 MRI BRAIN STEM W/O DYE: CPT

## 2019-02-28 PROCEDURE — 80053 COMPREHEN METABOLIC PANEL: CPT | Performed by: EMERGENCY MEDICINE

## 2019-02-28 PROCEDURE — 81001 URINALYSIS AUTO W/SCOPE: CPT | Performed by: EMERGENCY MEDICINE

## 2019-02-28 PROCEDURE — 83880 ASSAY OF NATRIURETIC PEPTIDE: CPT | Performed by: EMERGENCY MEDICINE

## 2019-02-28 PROCEDURE — 96361 HYDRATE IV INFUSION ADD-ON: CPT

## 2019-02-28 PROCEDURE — 93005 ELECTROCARDIOGRAM TRACING: CPT | Performed by: EMERGENCY MEDICINE

## 2019-02-28 RX ORDER — FAMOTIDINE 20 MG/1
20 TABLET, FILM COATED ORAL 2 TIMES DAILY
Status: DISCONTINUED | OUTPATIENT
Start: 2019-02-28 | End: 2019-03-01 | Stop reason: HOSPADM

## 2019-02-28 RX ORDER — ACETAMINOPHEN 650 MG/1
650 SUPPOSITORY RECTAL EVERY 4 HOURS PRN
Status: DISCONTINUED | OUTPATIENT
Start: 2019-02-28 | End: 2019-03-01 | Stop reason: HOSPADM

## 2019-02-28 RX ORDER — MAGNESIUM SULFATE HEPTAHYDRATE 40 MG/ML
4 INJECTION, SOLUTION INTRAVENOUS AS NEEDED
Status: DISCONTINUED | OUTPATIENT
Start: 2019-02-28 | End: 2019-02-28

## 2019-02-28 RX ORDER — ACETAMINOPHEN 325 MG/1
650 TABLET ORAL EVERY 4 HOURS PRN
Status: DISCONTINUED | OUTPATIENT
Start: 2019-02-28 | End: 2019-03-01 | Stop reason: HOSPADM

## 2019-02-28 RX ORDER — SODIUM CHLORIDE 0.9 % (FLUSH) 0.9 %
3 SYRINGE (ML) INJECTION EVERY 12 HOURS SCHEDULED
Status: DISCONTINUED | OUTPATIENT
Start: 2019-02-28 | End: 2019-03-01 | Stop reason: HOSPADM

## 2019-02-28 RX ORDER — LORAZEPAM 2 MG/ML
1 INJECTION INTRAMUSCULAR ONCE
Status: COMPLETED | OUTPATIENT
Start: 2019-02-28 | End: 2019-02-28

## 2019-02-28 RX ORDER — POTASSIUM CHLORIDE 7.45 MG/ML
10 INJECTION INTRAVENOUS
Status: DISCONTINUED | OUTPATIENT
Start: 2019-02-28 | End: 2019-02-28

## 2019-02-28 RX ORDER — ASPIRIN 81 MG/1
81 TABLET, CHEWABLE ORAL DAILY
Status: DISCONTINUED | OUTPATIENT
Start: 2019-02-28 | End: 2019-03-01 | Stop reason: HOSPADM

## 2019-02-28 RX ORDER — MAGNESIUM SULFATE HEPTAHYDRATE 40 MG/ML
2 INJECTION, SOLUTION INTRAVENOUS AS NEEDED
Status: DISCONTINUED | OUTPATIENT
Start: 2019-02-28 | End: 2019-02-28

## 2019-02-28 RX ORDER — ALUMINA, MAGNESIA, AND SIMETHICONE 2400; 2400; 240 MG/30ML; MG/30ML; MG/30ML
7.5 SUSPENSION ORAL EVERY 4 HOURS PRN
Status: DISCONTINUED | OUTPATIENT
Start: 2019-02-28 | End: 2019-03-01 | Stop reason: HOSPADM

## 2019-02-28 RX ORDER — ATORVASTATIN CALCIUM 40 MG/1
80 TABLET, FILM COATED ORAL NIGHTLY
Status: DISCONTINUED | OUTPATIENT
Start: 2019-02-28 | End: 2019-03-01 | Stop reason: HOSPADM

## 2019-02-28 RX ORDER — CLOPIDOGREL BISULFATE 75 MG/1
75 TABLET ORAL DAILY
Status: DISCONTINUED | OUTPATIENT
Start: 2019-02-28 | End: 2019-03-01 | Stop reason: HOSPADM

## 2019-02-28 RX ORDER — ONDANSETRON 2 MG/ML
4 INJECTION INTRAMUSCULAR; INTRAVENOUS EVERY 6 HOURS PRN
Status: DISCONTINUED | OUTPATIENT
Start: 2019-02-28 | End: 2019-02-28

## 2019-02-28 RX ORDER — POTASSIUM CHLORIDE 1.5 G/1.77G
40 POWDER, FOR SOLUTION ORAL AS NEEDED
Status: DISCONTINUED | OUTPATIENT
Start: 2019-02-28 | End: 2019-02-28

## 2019-02-28 RX ORDER — SODIUM CHLORIDE 0.9 % (FLUSH) 0.9 %
3-10 SYRINGE (ML) INJECTION AS NEEDED
Status: DISCONTINUED | OUTPATIENT
Start: 2019-02-28 | End: 2019-03-01 | Stop reason: HOSPADM

## 2019-02-28 RX ORDER — ASPIRIN 300 MG/1
300 SUPPOSITORY RECTAL DAILY
Status: DISCONTINUED | OUTPATIENT
Start: 2019-02-28 | End: 2019-03-01 | Stop reason: HOSPADM

## 2019-02-28 RX ORDER — SODIUM CHLORIDE AND POTASSIUM CHLORIDE 150; 900 MG/100ML; MG/100ML
100 INJECTION, SOLUTION INTRAVENOUS CONTINUOUS
Status: DISCONTINUED | OUTPATIENT
Start: 2019-02-28 | End: 2019-03-01

## 2019-02-28 RX ORDER — SODIUM CHLORIDE 0.9 % (FLUSH) 0.9 %
10 SYRINGE (ML) INJECTION AS NEEDED
Status: DISCONTINUED | OUTPATIENT
Start: 2019-02-28 | End: 2019-03-01 | Stop reason: HOSPADM

## 2019-02-28 RX ORDER — POTASSIUM CHLORIDE 750 MG/1
40 CAPSULE, EXTENDED RELEASE ORAL AS NEEDED
Status: DISCONTINUED | OUTPATIENT
Start: 2019-02-28 | End: 2019-02-28

## 2019-02-28 RX ADMIN — FAMOTIDINE 20 MG: 20 TABLET, FILM COATED ORAL at 21:35

## 2019-02-28 RX ADMIN — ASPIRIN 81 MG 81 MG: 81 TABLET ORAL at 18:14

## 2019-02-28 RX ADMIN — LORAZEPAM 1 MG: 2 INJECTION INTRAMUSCULAR; INTRAVENOUS at 14:29

## 2019-02-28 RX ADMIN — ATORVASTATIN CALCIUM 80 MG: 40 TABLET, FILM COATED ORAL at 21:35

## 2019-02-28 RX ADMIN — SODIUM CHLORIDE, PRESERVATIVE FREE 3 ML: 5 INJECTION INTRAVENOUS at 21:39

## 2019-02-28 RX ADMIN — POTASSIUM CHLORIDE AND SODIUM CHLORIDE 100 ML/HR: 900; 150 INJECTION, SOLUTION INTRAVENOUS at 23:13

## 2019-02-28 RX ADMIN — CLOPIDOGREL 75 MG: 75 TABLET, FILM COATED ORAL at 18:14

## 2019-02-28 RX ADMIN — POTASSIUM CHLORIDE 40 MEQ: 1.5 POWDER, FOR SOLUTION ORAL at 21:35

## 2019-02-28 RX ADMIN — GADOBENATE DIMEGLUMINE 10 ML: 529 INJECTION, SOLUTION INTRAVENOUS at 14:46

## 2019-02-28 NOTE — TELEPHONE ENCOUNTER
----- Message from Zainab Mccoy sent at 2/28/2019 10:56 AM EST -----  PATIENT CALLED AND STATED SHE WAS SEEING FLASHING LIGHTS OUT OF THE SIDE OF HER EYE AND THAT THE LAST TIME THIS HAPPENED THAT SHE HAD A STROKE. ADVISED PATIENT TO CALL AN AMBULANCE AND GO TO THE ER PER SUSANNE.

## 2019-03-01 VITALS
OXYGEN SATURATION: 97 % | WEIGHT: 141 LBS | BODY MASS INDEX: 24.07 KG/M2 | TEMPERATURE: 98.6 F | HEIGHT: 64 IN | DIASTOLIC BLOOD PRESSURE: 75 MMHG | SYSTOLIC BLOOD PRESSURE: 152 MMHG | HEART RATE: 58 BPM | RESPIRATION RATE: 16 BRPM

## 2019-03-01 PROBLEM — H53.9 VISION CHANGES: Status: RESOLVED | Noted: 2019-02-28 | Resolved: 2019-03-01

## 2019-03-01 LAB
ANION GAP SERPL CALCULATED.3IONS-SCNC: 5 MMOL/L (ref 3–11)
ARTICHOKE IGE QN: 58 MG/DL (ref 0–130)
BH CV ECHO MEAS - AO ROOT AREA (BSA CORRECTED): 1.6
BH CV ECHO MEAS - AO ROOT AREA: 5.9 CM^2
BH CV ECHO MEAS - AO ROOT DIAM: 2.8 CM
BH CV ECHO MEAS - BSA(HAYCOCK): 1.7 M^2
BH CV ECHO MEAS - BSA: 1.7 M^2
BH CV ECHO MEAS - BZI_BMI: 24.2 KILOGRAMS/M^2
BH CV ECHO MEAS - BZI_METRIC_HEIGHT: 162.6 CM
BH CV ECHO MEAS - BZI_METRIC_WEIGHT: 64 KG
BH CV ECHO MEAS - EDV(CUBED): 61.5 ML
BH CV ECHO MEAS - EDV(MOD-SP2): 32 ML
BH CV ECHO MEAS - EDV(MOD-SP4): 45 ML
BH CV ECHO MEAS - EDV(TEICH): 67.8 ML
BH CV ECHO MEAS - EF(CUBED): 76.5 %
BH CV ECHO MEAS - EF(MOD-BP): 68 %
BH CV ECHO MEAS - EF(MOD-SP2): 62.5 %
BH CV ECHO MEAS - EF(MOD-SP4): 71.1 %
BH CV ECHO MEAS - EF(TEICH): 69.2 %
BH CV ECHO MEAS - ESV(CUBED): 14.4 ML
BH CV ECHO MEAS - ESV(MOD-SP2): 12 ML
BH CV ECHO MEAS - ESV(MOD-SP4): 13 ML
BH CV ECHO MEAS - ESV(TEICH): 20.9 ML
BH CV ECHO MEAS - FS: 38.3 %
BH CV ECHO MEAS - IVS/LVPW: 1.1
BH CV ECHO MEAS - IVSD: 1.2 CM
BH CV ECHO MEAS - LA DIMENSION: 3.6 CM
BH CV ECHO MEAS - LA/AO: 1.3
BH CV ECHO MEAS - LAD MAJOR: 5.5 CM
BH CV ECHO MEAS - LAT PEAK E' VEL: 7.6 CM/SEC
BH CV ECHO MEAS - LATERAL E/E' RATIO: 9.6
BH CV ECHO MEAS - LV DIASTOLIC VOL/BSA (35-75): 26.7 ML/M^2
BH CV ECHO MEAS - LV MASS(C)D: 143.7 GRAMS
BH CV ECHO MEAS - LV MASS(C)DI: 85.2 GRAMS/M^2
BH CV ECHO MEAS - LV SYSTOLIC VOL/BSA (12-30): 7.7 ML/M^2
BH CV ECHO MEAS - LVIDD: 3.9 CM
BH CV ECHO MEAS - LVIDS: 2.4 CM
BH CV ECHO MEAS - LVLD AP2: 5.2 CM
BH CV ECHO MEAS - LVLD AP4: 5.9 CM
BH CV ECHO MEAS - LVLS AP2: 4.8 CM
BH CV ECHO MEAS - LVLS AP4: 5.1 CM
BH CV ECHO MEAS - LVPWD: 1 CM
BH CV ECHO MEAS - MED PEAK E' VEL: 4.4 CM/SEC
BH CV ECHO MEAS - MEDIAL E/E' RATIO: 16.3
BH CV ECHO MEAS - MV A MAX VEL: 86.9 CM/SEC
BH CV ECHO MEAS - MV DEC SLOPE: 286.1 CM/SEC^2
BH CV ECHO MEAS - MV DEC TIME: 0.21 SEC
BH CV ECHO MEAS - MV E MAX VEL: 74 CM/SEC
BH CV ECHO MEAS - MV E/A: 0.85
BH CV ECHO MEAS - MV P1/2T MAX VEL: 82.8 CM/SEC
BH CV ECHO MEAS - MV P1/2T: 84.8 MSEC
BH CV ECHO MEAS - MVA P1/2T LCG: 2.7 CM^2
BH CV ECHO MEAS - MVA(P1/2T): 2.6 CM^2
BH CV ECHO MEAS - PA ACC SLOPE: 617.4 CM/SEC^2
BH CV ECHO MEAS - PA ACC TIME: 0.15 SEC
BH CV ECHO MEAS - PA PR(ACCEL): 11.9 MMHG
BH CV ECHO MEAS - SI(CUBED): 27.9 ML/M^2
BH CV ECHO MEAS - SI(MOD-SP2): 11.9 ML/M^2
BH CV ECHO MEAS - SI(MOD-SP4): 19 ML/M^2
BH CV ECHO MEAS - SI(TEICH): 27.8 ML/M^2
BH CV ECHO MEAS - SV(CUBED): 47 ML
BH CV ECHO MEAS - SV(MOD-SP2): 20 ML
BH CV ECHO MEAS - SV(MOD-SP4): 32 ML
BH CV ECHO MEAS - SV(TEICH): 46.9 ML
BH CV ECHO MEAS - TAPSE (>1.6): 2 CM2
BH CV ECHO MEASUREMENTS AVERAGE E/E' RATIO: 12.33
BH CV VAS BP RIGHT ARM: NORMAL MMHG
BH CV XLRA - RV BASE: 2.9 CM
BH CV XLRA - RV LENGTH: 5.4 CM
BH CV XLRA - RV MID: 1.9 CM
BUN BLD-MCNC: 11 MG/DL (ref 9–23)
BUN/CREAT SERPL: 11.8 (ref 7–25)
CALCIUM SPEC-SCNC: 8.8 MG/DL (ref 8.7–10.4)
CHLORIDE SERPL-SCNC: 109 MMOL/L (ref 99–109)
CHOLEST SERPL-MCNC: 160 MG/DL (ref 0–200)
CO2 SERPL-SCNC: 26 MMOL/L (ref 20–31)
CREAT BLD-MCNC: 0.93 MG/DL (ref 0.6–1.3)
GFR SERPL CREATININE-BSD FRML MDRD: 59 ML/MIN/1.73
GLUCOSE BLD-MCNC: 110 MG/DL (ref 70–100)
GLUCOSE BLDC GLUCOMTR-MCNC: 94 MG/DL (ref 70–130)
HBA1C MFR BLD: 5.2 % (ref 4.8–5.6)
HDLC SERPL-MCNC: 73 MG/DL (ref 40–60)
LEFT ATRIUM VOLUME INDEX: 25.5 ML/M^2
LEFT ATRIUM VOLUME: 43 ML
LV EF 2D ECHO EST: 60 %
MAGNESIUM SERPL-MCNC: 2.3 MG/DL (ref 1.3–2.7)
MAXIMAL PREDICTED HEART RATE: 145 BPM
POTASSIUM BLD-SCNC: 4.7 MMOL/L (ref 3.5–5.5)
SODIUM BLD-SCNC: 140 MMOL/L (ref 132–146)
STRESS TARGET HR: 123 BPM
TRIGL SERPL-MCNC: 135 MG/DL (ref 0–150)

## 2019-03-01 PROCEDURE — 96361 HYDRATE IV INFUSION ADD-ON: CPT

## 2019-03-01 PROCEDURE — 99205 OFFICE O/P NEW HI 60 MIN: CPT | Performed by: PSYCHIATRY & NEUROLOGY

## 2019-03-01 PROCEDURE — 82962 GLUCOSE BLOOD TEST: CPT

## 2019-03-01 PROCEDURE — 83735 ASSAY OF MAGNESIUM: CPT | Performed by: NURSE PRACTITIONER

## 2019-03-01 PROCEDURE — G0378 HOSPITAL OBSERVATION PER HR: HCPCS

## 2019-03-01 PROCEDURE — 80061 LIPID PANEL: CPT | Performed by: NURSE PRACTITIONER

## 2019-03-01 PROCEDURE — 83036 HEMOGLOBIN GLYCOSYLATED A1C: CPT | Performed by: NURSE PRACTITIONER

## 2019-03-01 PROCEDURE — 99217 PR OBSERVATION CARE DISCHARGE MANAGEMENT: CPT | Performed by: HOSPITALIST

## 2019-03-01 PROCEDURE — 97165 OT EVAL LOW COMPLEX 30 MIN: CPT

## 2019-03-01 PROCEDURE — 97161 PT EVAL LOW COMPLEX 20 MIN: CPT

## 2019-03-01 PROCEDURE — 80048 BASIC METABOLIC PNL TOTAL CA: CPT | Performed by: NURSE PRACTITIONER

## 2019-03-01 RX ORDER — CLOPIDOGREL BISULFATE 75 MG/1
75 TABLET ORAL DAILY
Qty: 30 TABLET | Refills: 1 | Status: SHIPPED | OUTPATIENT
Start: 2019-03-02 | End: 2019-03-22 | Stop reason: SDUPTHER

## 2019-03-01 RX ORDER — ASPIRIN 81 MG/1
81 TABLET, CHEWABLE ORAL DAILY
Qty: 30 TABLET | Refills: 1 | Status: SHIPPED | OUTPATIENT
Start: 2019-03-02 | End: 2020-06-24

## 2019-03-01 RX ORDER — POTASSIUM CHLORIDE 750 MG/1
40 CAPSULE, EXTENDED RELEASE ORAL ONCE
Status: DISCONTINUED | OUTPATIENT
Start: 2019-03-01 | End: 2019-03-01

## 2019-03-01 RX ADMIN — FAMOTIDINE 20 MG: 20 TABLET, FILM COATED ORAL at 08:54

## 2019-03-01 RX ADMIN — ASPIRIN 81 MG 81 MG: 81 TABLET ORAL at 08:54

## 2019-03-01 RX ADMIN — CLOPIDOGREL 75 MG: 75 TABLET, FILM COATED ORAL at 08:54

## 2019-03-01 NOTE — THERAPY DISCHARGE NOTE
Acute Care - Occupational Therapy Initial Eval/Discharge  [facility]     Patient Name: Cindy Sage  : 1945  MRN: [id number]  Today's Date: 3/1/2019  Onset of Illness/Injury or Date of Surgery: 19  Date of Referral to OT: 19  Referring Physician: CHAPARRO Frazier      Admit Date: 2019       ICD-10-CM ICD-9-CM   1. TIA (transient ischemic attack) G45.9 435.9   2. Left arm weakness R29.898 729.89   3. Change in vision H53.9 368.9   4. Elevated blood pressure reading with diagnosis of hypertension I10 401.9   5. Impaired mobility and ADLs Z74.09 799.89     Patient Active Problem List   Diagnosis   • Hyperlipidemia   • Hypertension   • Ischemic stroke (CMS/HCC)   • GERD (gastroesophageal reflux disease)   • Hypokalemia   • Hx: UTI (urinary tract infection)   • Vision changes   • History of endometrial cancer   • TIA (transient ischemic attack)     Past Medical History:   Diagnosis Date   • Drug therapy 2016    For endometrial CA in    • Elevated cholesterol    • Endometrial cancer (CMS/HCC) 2016    Stage IIIC2    • GERD (gastroesophageal reflux disease)    • HTN (hypertension)    • Hx of radiation therapy     For endometrial CA    • Stroke (CMS/HCC) 2016    No residual deficits.  Takes .     Past Surgical History:   Procedure Laterality Date   • APPENDECTOMY      Ex lap   • FOOT SURGERY     • OOPHORECTOMY     • TONSILLECTOMY     • TOTAL LAPAROSCOPIC HYSTERECTOMY SALPINGO OOPHORECTOMY Bilateral 2016    RATLH, BSO, LND          OT ASSESSMENT FLOWSHEET (last 72 hours)      Occupational Therapy Evaluation     Row Name 19 0840                   OT Evaluation Time/Intention    Subjective Information  no complaints  -AN        Document Type  discharge evaluation/summary  -AN        Mode of Treatment  occupational therapy  -AN        Patient Effort  excellent  -AN        Symptoms Noted During/After Treatment  none  -AN        Comment  Pt reports a sensation back  of head that wasn't pain but she could not describe it  -AN           General Information    Patient Profile Reviewed?  yes  -AN        Onset of Illness/Injury or Date of Surgery  02/28/19  -AN        Referring Physician  CHAPARRO Frazier  -AN        Patient Observations  cooperative;alert;agree to therapy  -AN        Patient/Family Observations  family member present sleeping in room  -AN        General Observations of Patient  pt supine in bed, IV intact  -AN        Prior Level of Function  independent:;gait;transfer;all household mobility;community mobility;ADL's;bed mobility;home management;driving;using stairs  -AN        Equipment Currently Used at Home  none  -AN        Pertinent History of Current Functional Problem  Pt to ED with acute onset of decreased strength and coordination in L UE, flashes of light in L periphreal vision,HA. MRi shows old R infarcts in frontal and pariteal regions  -AN        Existing Precautions/Restrictions  no known precautions/restrictions  -AN        Risks Reviewed  patient:;LOB;dizziness;increased discomfort;change in vital signs  -AN        Benefits Reviewed  patient:;increase independence  -AN        Barriers to Rehab  none identified  -AN           Relationship/Environment    Primary Source of Support/Comfort  child(joby)  -AN        Lives With  alone  -AN           Resource/Environmental Concerns    Current Living Arrangements  home/apartment/condo  -AN           Home Main Entrance    Number of Stairs, Main Entrance  three  -AN           Stairs Within Home, Primary    Stairs, Within Home, Primary  14  -AN        Stair Railings, Within Home, Primary  railing on right side (ascending)  -AN           Stairs Within Home, Secondary    Stairs, Within Home, Secondary  14  -AN        Stair Railings, Within Home, Secondary  railing on right side (ascending)  -AN        Stairs Comment, Within Home, Secondary  to basement  -AN           Cognitive Assessment/Interventions    Additional  Documentation  Cognitive Assessment/Intervention (Group)  -AN           Cognitive Assessment/Intervention- PT/OT    Affect/Mental Status (Cognitive)  WFL  -AN        Orientation Status (Cognition)  oriented x 4  -AN        Follows Commands (Cognition)  WFL  -AN        Personal Safety Interventions  fall prevention program maintained  -AN           Bed Mobility Assessment/Treatment    Bed Mobility Assessment/Treatment  supine-sit;sit-supine  -AN        Supine-Sit [place] (Bed Mobility)  conditional independence  -AN        Sit-Supine [place] (Bed Mobility)  independent  -AN        Assistive Device (Bed Mobility)  head of bed elevated  -AN           Functional Mobility    Functional Mobility- Ind. Level  independent  -AN        Functional Mobility-Distance (Feet)  25  -AN           Transfer Assessment/Treatment    Transfer Assessment/Treatment  sit-stand transfer;stand-sit transfer;shower transfer  -AN           Sit-Stand Transfer    Sit-Stand [place] (Transfers)  independent  -AN           Stand-Sit Transfer    Stand-Sit [place] (Transfers)  independent  -AN           Shower Transfer    Type (Shower Transfer)  lateral  -AN        [place] Level (Shower Transfer)  conditional independence simulated  -AN           ADL Assessment/Intervention    BADL Assessment/Intervention  lower body dressing;feeding;bathing  -AN           Bathing Assessment/Intervention    Bathing [place] Level  bathing skills;conditional independence  -AN        Comment (Bathing)  simulated  -AN           Lower Body Dressing Assessment/Training    Lower Body Dressing [place] Level  doff;don;socks;independent  -AN           Self-Feeding Assessment/Training    [place] Level (Feeding)  feeding skills;liquids to mouth;independent  -AN           General ROM    GENERAL ROM COMMENTS  Rod UE WNL  -AN           MMT (Manual Muscle Testing)    General MMT Comments  Rod UE WFL grossly 4+/5  -AN           Motor  Assessment/Interventions    Additional Documentation  Balance (Group);Gross Motor Coordination (Group)  -AN           Gross Motor Coordination    Gross Motor Skill, Impairments Detail  slight R hand deficit finger to nose  -AN           Balance    Balance  dynamic sitting balance;dynamic standing balance  -AN           Dynamic Sitting Balance    Level of [place], Reaches Outside Midline (Sitting, Dynamic Balance)  independent  -AN        Sitting Position, Reaches Outside Midline (Sitting, Dynamic Balance)  sitting on edge of bed  -AN        Comment, Reaches Outside Midline (Sitting, Dynamic Balance)  anterior bending to floor  -AN           Dynamic Standing Balance    Level of [place], Reaches Outside Midline (Standing, Dynamic Balance)  independent  -AN        Time Able to Maintain Position, Reaches Outside Midline (Standing, Dynamic Balance)  30 to 45 seconds  -AN        Comment, Reaches Outside Midline (Standing, Dynamic Balance)  bending over to  item off floor  -AN           Sensory Assessment/Intervention    Sensory General Assessment  no sensation deficits identified  -AN        Additional Documentation  Vision Assessment/Intervention (Group)  -AN           Vision Assessment/Intervention    Visual Impairment/Limitations  WFL;corrective lenses full time  -AN           Positioning and Restraints    Pre-Treatment Position  in bed  -AN        Post Treatment Position  bed  -AN        In Bed  sitting EOB;call light within reach;encouraged to call for assist;with nsg  -AN           Pain Assessment    Additional Documentation  Pain Scale: Numbers Pre/Post-Treatment (Group)  -AN           Pain Scale: Numbers Pre/Post-Treatment    Pain Scale: Numbers, Pretreatment  0/10 - no pain  -AN        Pain Scale: Numbers, Post-Treatment  0/10 - no pain  -AN           Plan of Care Review    Plan of Care Reviewed With  patient  -AN           Clinical Impression (OT)    Date of Referral to OT  02/28/19  -AN         OT Diagnosis  No functional deficits found  -AN        Patient/Family Goals Statement (OT Eval)  agreeable to OT  -AN        Criteria for Skilled Therapeutic Interventions Met (OT Eval)  no problems identified which require skilled intervention  -AN        Therapy Frequency (OT Eval)  evaluation only  -AN        Care Plan Review (OT)  evaluation/treatment results reviewed;risks/benefits reviewed;care plan/treatment goals reviewed  -AN        Anticipated Discharge Disposition (OT)  home  -AN           Vital Signs    Pre Systolic BP Rehab  139  -AN        Pre Treatment Diastolic BP  59  -AN        Intra Treatment Diastolic BP  139  -AN        Post Systolic BP Rehab  75  -AN           Living Environment    Home Accessibility  stairs to enter home;stairs within home  -AN          User Key  (r) = Recorded By, (t) = Taken By, (c) = Cosigned By    Initials Name Effective Dates    AN Viri Choe, OT 06/22/15 -               OT Recommendation and Plan  Outcome Summary/Treatment Plan (OT)  Anticipated Discharge Disposition (OT): home  Therapy Frequency (OT Eval): evaluation only  Plan of Care Review  Plan of Care Reviewed With: patient  Plan of Care Reviewed With: patient  Outcome Summary: Pt does not currently exhibit any motor or sensory deficits that interfere with functional I. No further OT warranted at this time.  Pt to discharge home.         Outcome Measures     Row Name 03/01/19 0840             How much help from another is currently needed...    Putting on and taking off regular lower body clothing?  4  -AN      Bathing (including washing, rinsing, and drying)  4  -AN      Toileting (which includes using toilet bed pan or urinal)  4  -AN      Putting on and taking off regular upper body clothing  4  -AN      Taking care of personal grooming (such as brushing teeth)  4  -AN      Eating meals  4  -AN      Score  24  -AN         Modified [place] Scale    Modified [place] Scale  1 - No significant disability despite  symptoms.  Able to carry out all usual duties and activities.  -AN         Functional Assessment    Outcome Measure Options  AM-PAC 6 Clicks Daily Activity (OT);Modified [place]  -AN        User Key  (r) = Recorded By, (t) = Taken By, (c) = Cosigned By    Initials Name Provider Type    Viri Bell OT Occupational Therapist          Time Calculation:   Time Calculation- OT     Row Name 03/01/19 0939             Time Calculation- OT    OT Start Time  0840  -AN      Total Timed Code Minutes- OT  0 minute(s)  -AN      OT Received On  03/01/19  -AN        User Key  (r) = Recorded By, (t) = Taken By, (c) = Cosigned By    Initials Name Provider Type    Viri Bell OT Occupational Therapist        Therapy Suggested Charges     Code   Minutes Charges    None           Therapy Charges for Today     Code Description Service Date Service Provider Modifiers Qty    [id number]  OT EVAL LOW COMPLEXITY 4 3/1/2019 Viri Choe OT GO 1               OT Discharge Summary  Anticipated Discharge Disposition (OT): home  Reason for Discharge: At baseline function  Outcomes Achieved: Refer to plan of care for updates on goals achieved  Discharge Destination: Home    Viri Choe OT  3/1/2019

## 2019-03-01 NOTE — CONSULTS
Neurology    Referring Provider: CHAPARRO Boyer    Reason for Consultation: TIA/stroke      Chief complaint: Left hand weakness and vision changes    Subjective .     History of present illness:  Ms. Sage is a very pleasant 74-year-old female with past medical history significant for endometrial cancer, prior TIA/stroke, hypertension, hyperlipidemia who was admitted to the hospitalist service for transient left hand weakness and vision changes.  She reports that she was at the grocery store if she noticed problems with the left hand with impaired dexterity.  It also felt a bit heavy.  When she got home she noticed flashes of light in the periphery of her left temporal visual field in the left eye only.  Soon afterward she had a bifrontal headache that she described as pressure and throbbing that lasted for at least a few hours.  Today she feels back to baseline.    Review of Systems: Positive for vision changes and weakness.  Positive for headache.  Otherwise complete review of systems was discussed with the patient and found to be negative except for that mentioned in history of present illness.    History  Past Medical History:   Diagnosis Date   • Drug therapy 2016    For endometrial CA in 2016   • Elevated cholesterol    • Endometrial cancer (CMS/HCC) 04/2016    Stage IIIC2    • GERD (gastroesophageal reflux disease)    • HTN (hypertension)    • Hx of radiation therapy 2016    For endometrial CA 2016   • Stroke (CMS/HCC) 02/2016    No residual deficits.  Takes .   ,   Past Surgical History:   Procedure Laterality Date   • APPENDECTOMY      Ex lap   • FOOT SURGERY     • OOPHORECTOMY     • TONSILLECTOMY     • TOTAL LAPAROSCOPIC HYSTERECTOMY SALPINGO OOPHORECTOMY Bilateral 04/19/2016    RATLH, BSO, LND   ,   Family History   Problem Relation Age of Onset   • Arthritis Mother    • Breast cancer Mother         age unknown    • Osteoporosis Mother    • Ovarian cancer Neg Hx    • Endometrial cancer  "Neg Hx    ,   Social History     Tobacco Use   • Smoking status: Never Smoker   Substance Use Topics   • Alcohol use: Not on file   • Drug use: Not on file   ,   Medications Prior to Admission   Medication Sig Dispense Refill Last Dose   • aspirin 325 MG tablet Take 1 tablet by mouth daily.   2/28/2019 at Unknown time   • famotidine (PEPCID) 20 MG tablet Take 1 tablet by mouth 2 (Two) Times a Day. 20 tablet 0 Past Week at Unknown time   • simvastatin (ZOCOR) 40 MG tablet TAKE 1 TABLET BY MOUTH ONCE DAILY 90 tablet 1 2/27/2019 at Unknown time   • vitamin D (ERGOCALCIFEROL) 31413 units capsule capsule Take 1 capsule by mouth 1 (One) Time Per Week. 12 capsule 1 Past Week at Unknown time   • hydrochlorothiazide (HYDRODIURIL) 25 MG tablet TAKE ONE TABLET BY MOUTH ONCE DAILY 90 tablet 1 2/28/2019    and Allergies:  Strawberry extract    Objective     Vital Signs   Blood pressure 152/75, pulse 58, temperature 98.6 °F (37 °C), temperature source Oral, resp. rate 16, height 162 cm (63.78\"), weight 64 kg (141 lb), SpO2 97 %, not currently breastfeeding.    Physical Exam:      Gen: Sitting in bed with eyes open. In NAD. Appears stated age   Eyes: PERRL, conjuntivae/lids normal   ENT: External canals normal bilaterally. Dentition normal   Neck: Supple. No thyroid enlargement noted   Respiratory: CTA bilaterally. Respirations unlabored   CV: RRR, S1 and S2 nml. Radial pulses 2+ bilaterally.    Skin: No rashes noted on exposed skin. Normal turgor.   MSK: Normal bulk and tone. Nml ROM     Neurologic:   Mental status: Awake, alert, oriented x4. Follows commands. Speech fluent.    CN: PERRL, EOM intact, sensation intact in upper/mid/lower face bilaterally, facial movements symmetric, hearing intact to finger rub bilaterally, palate elevates symmetrically, tongue movements and SCMs strong bilaterally    Motor: Strength full (5/5) throughout in BUE and BLE    Reflexes: 2+ throughout   Sensory: Intact to LT throughout   Coordination: " No dysmetria noted   Gait: Not tested        Results Reviewed:     Labs reviewed.  A1c 5.2, LDL 58  MRI brain personally reviewed.  There are a few small subcortical white matter lacunar infarcts is seen which are chronic.  No acute process noted  MRA head and neck report reviewed  TTE report reviewed  EKG report reviewed                 Assessment/Plan     1.  TIA = no acute infarct seen on MRI but she does have evidence of chronic lacunar infarcts.  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  Likely TIA but migraine with aura also in the differential. No significant stenosis or occlusion seen on MRA neck.  Recommend dual antiplatelet therapy with aspirin and Plavix 75 mg daily.  Continue atorvastatin.    2.  Hypertension = continue meds    3.  Hyperlipidemia = LDL 58.  On atorvastatin    Okay from neuro standpoint for discharge when okay with primary team.  Follow-up in neurology clinic for evaluation of possible migraines, first available appointment.    Malinda Rodriguez MD  03/01/19  11:33 AM

## 2019-03-01 NOTE — PLAN OF CARE
Problem: Patient Care Overview  Goal: Discharge Needs Assessment   03/01/19 0407   Discharge Needs Assessment   Readmission Within the Last 30 Days no previous admission in last 30 days   Concerns to be Addressed no discharge needs identified   Concerns Comments concearns of vision changes   Patient/Family Anticipates Transition to home   Patient/Family Anticipated Services at Transition skilled nursing   Transportation Concerns other (see comments)  (family)   Transportation Anticipated family or friend will provide   Anticipated Changes Related to Illness none   Equipment Needed After Discharge none   Outpatient/Agency/Support Group Needs skilled nursing facility   Discharge Facility/Level of Care Needs home with home health   Offered/Gave Vendor List no   Current Discharge Risk other (see comments)   Discharge Coordination/Progress will dc at home w family assisting   Disability   Equipment Currently Used at Home none

## 2019-03-01 NOTE — PLAN OF CARE
Problem: Patient Care Overview  Goal: Plan of Care Review  Outcome: Outcome(s) achieved Date Met: 03/01/19 03/01/19 1407   Coping/Psychosocial   Plan of Care Reviewed With patient   OTHER   Outcome Summary GOALS NOT ESTABLISHED AS PT IS AT BASELINE WITH FUNCTIONAL MOBILITY. NO ONGOING SKILLED P.T. NEEDED AT THIS TIME. RECOMMEND D/C HOME.        Problem: Stroke (Ischemic) (Adult)  Goal: Signs and Symptoms of Listed Potential Problems Will be Absent, Minimized or Managed (Stroke)  Outcome: Outcome(s) achieved Date Met: 03/01/19 03/01/19 1407   Goal/Outcome Evaluation   Problems Assessed (Stroke (Ischemic)) cognitive impairment;motor/sensory impairment;muscle tone abnormal;communication impairment   Problems Assessed (Stroke (Ischemic)) none

## 2019-03-01 NOTE — THERAPY DISCHARGE NOTE
Acute Care - Physical Therapy Initial Eval/Discharge  [facility]     Patient Name: Cindy Sage  : 1945  MRN: [id number]  Today's Date: 3/1/2019   Onset of Illness/Injury or Date of Surgery: 19  Date of Referral to PT: 19  Referring Physician: CHAPARRO JACOB      Admit Date: 2019    Visit Dx:    ICD-10-CM ICD-9-CM   1. TIA (transient ischemic attack) G45.9 435.9   2. Left arm weakness R29.898 729.89   3. Change in vision H53.9 368.9   4. Elevated blood pressure reading with diagnosis of hypertension I10 401.9   5. Impaired mobility and ADLs Z74.09 799.89   6. Impaired functional mobility, balance, gait, and endurance Z74.09 V49.89     Patient Active Problem List   Diagnosis   • Hyperlipidemia   • Hypertension   • Ischemic stroke (CMS/HCC)   • GERD (gastroesophageal reflux disease)   • Hx: UTI (urinary tract infection)   • History of endometrial cancer   • TIA (transient ischemic attack)     Past Medical History:   Diagnosis Date   • Drug therapy 2016    For endometrial CA in    • Elevated cholesterol    • Endometrial cancer (CMS/HCC) 2016    Stage IIIC2    • GERD (gastroesophageal reflux disease)    • HTN (hypertension)    • Hx of radiation therapy     For endometrial CA    • Stroke (CMS/HCC) 2016    No residual deficits.  Takes .     Past Surgical History:   Procedure Laterality Date   • APPENDECTOMY      Ex lap   • FOOT SURGERY     • OOPHORECTOMY     • TONSILLECTOMY     • TOTAL LAPAROSCOPIC HYSTERECTOMY SALPINGO OOPHORECTOMY Bilateral 2016    RATLH, BSO, LND          PT ASSESSMENT (last 12 hours)      Physical Therapy Evaluation     Row Name 19 1125          PT Evaluation Time/Intention    Subjective Information  no complaints  -CD     Document Type  discharge evaluation/summary  -CD     Mode of Treatment  physical therapy  -CD     Patient Effort  excellent  -CD     Symptoms Noted During/After Treatment  none  -CD     Comment  PT REPORTS SYMPTOMS  HAVE RESOLVED AND SHE HOPES TO GO HOME TODAY.   -CD     Row Name 03/01/19 1125          General Information    Patient Profile Reviewed?  yes  -CD     Onset of Illness/Injury or Date of Surgery  02/28/19  -CD     Referring Physician  CHAPARRO JACOB  -CD     Patient Observations  alert;cooperative;agree to therapy  -CD     Patient/Family Observations  FAMILY PRESENT.   -CD     General Observations of Patient  PT IN FOWLERS UPON ARRIVAL ON RA.   -CD     Prior Level of Function  independent:;all household mobility;community mobility;ADL's;home management;driving;using stairs  -CD     Equipment Currently Used at Home  none  -CD     Pertinent History of Current Functional Problem  Pt to ED with acute onset of decreased strength and coordination in L UE, flashes of light in L periphreal vision,HA. MRi shows old R infarcts in frontal and pariteal regions  -CD     Existing Precautions/Restrictions  no known precautions/restrictions  -CD     Risks Reviewed  patient:;LOB;change in vital signs  -CD     Benefits Reviewed  patient:;improve function;increase knowledge  -CD     Row Name 03/01/19 1125          Relationship/Environment    Lives With  spouse;grandchild(joby)  -CD     Family Caregiver if Needed  spouse;grandchild(joby), adult  -CD     Row Name 03/01/19 1125          Home Main Entrance    Number of Stairs, Main Entrance  three  -CD     Row Name 03/01/19 1125          Stairs Within Home, Primary    Stairs, Within Home, Primary  14  -CD     Stair Railings, Within Home, Primary  railing on right side (ascending)  -CD     Row Name 03/01/19 1125          Cognitive Assessment/Intervention- PT/OT    Orientation Status (Cognition)  oriented x 4  -CD     Personal Safety Interventions  fall prevention program maintained  -CD     Row Name 03/01/19 1125          Bed Mobility Assessment/Treatment    Supine-Sit [place] (Bed Mobility)  independent  -CD     Row Name 03/01/19 1125          Transfer Assessment/Treatment    Transfer  Assessment/Treatment  sit-stand transfer;stand-sit transfer  -CD     Sit-Stand [place] (Transfers)  independent  -CD     Stand-Sit [place] (Transfers)  independent  -CD     Row Name 03/01/19 1125          General ROM    GENERAL ROM COMMENTS  B LE AROM WFL'S   -CD     Row Name 03/01/19 1125          MMT (Manual Muscle Testing)    General MMT Comments  GROSSLY 5/5 B LE'S   -CD     Row Name 03/01/19 1125          Motor Assessment/Intervention    Additional Documentation  Gross Motor Coordination (Group)  -CD     Row Name 03/01/19 1125          Gross Motor Coordination    Gross Motor Impairments  -- B HEEL TO SHIN INTACT.   -CD     Row Name 03/01/19 1125          Dynamic Standing Balance    Level of [place], Reaches Outside Midline (Standing, Dynamic Balance)  independent  -CD     Comment, Reaches Outside Midline (Standing, Dynamic Balance)  ABLE TO WALK BACKWARDS, TURN 360 DEGREES AND RETRIEVE ITEM FROM FLOOR WITHOUT LOB OR DIFFICULTY.   -CD     Row Name 03/01/19 1125          Sensory Assessment/Intervention    Sensory General Assessment  no sensation deficits identified B LE'S   -CD     Row Name 03/01/19 1125          Vision Assessment/Intervention    Visual Impairment/Limitations  WFL;corrective lenses full time  -CD     Row Name 03/01/19 1125          Pain Scale: Numbers Pre/Post-Treatment    Pain Scale: Numbers, Pretreatment  0/10 - no pain  -CD     Pain Scale: Numbers, Post-Treatment  0/10 - no pain  -CD     Row Name 03/01/19 1125          Plan of Care Review    Plan of Care Reviewed With  patient  -CD     Row Name 03/01/19 1125          Physical Therapy Clinical Impression    Date of Referral to PT  02/28/19  -CD     PT Diagnosis (PT Clinical Impression)  R/O CVA  -CD     Criteria for Skilled Interventions Met (PT Clinical Impression)  no;no problems identified which require skilled intervention  -CD     Care Plan Review (PT)  evaluation/treatment results reviewed;care plan/treatment goals  reviewed;risks/benefits reviewed;current/potential barriers reviewed;patient/other agree to care plan  -     Row Name 03/01/19 1125          Vital Signs    Pre Systolic BP Rehab  152  -CD     Pre Treatment Diastolic BP  75  -CD     Post Systolic BP Rehab  164  -CD     Post Treatment Diastolic BP  78  -CD     Pretreatment Heart Rate (beats/min)  61  -CD     Posttreatment Heart Rate (beats/min)  62  -CD     Row Name 03/01/19 1125          Physical Therapy Goals    Bed Mobility Goal Selection (PT)  --  -CD     Transfer Goal Selection (PT)  --  -CD     Gait Training Goal Selection (PT)  --  -     Row Name 03/01/19 1125          Positioning and Restraints    Pre-Treatment Position  sitting in chair/recliner  -CD     Post Treatment Position  bed  -CD     In Bed  sitting EOB NSG NOTIFIED PT IS SAFE TO BE UP AD WAYNE.   -     Row Name 03/01/19 1125          Physical Therapy Discharge Summary    Additional Documentation  Discharge Summary, PT Eval (Group)  -     Row Name 03/01/19 1125          Discharge Summary, PT Eval    Outcomes Achieved Upon Discharge (PT Discharge Summary)  evaluation only  -       User Key  (r) = Recorded By, (t) = Taken By, (c) = Cosigned By    Initials Name Provider Type    CD Amadna Casiano, PT Physical Therapist          Physical Therapy Education     Title: PT OT SLP Therapies (Done)     Topic: Physical Therapy (Done)     Point: Precautions (Done)     Learning Progress Summary           Patient Acceptance, E VU by  at 3/1/2019  2:07 PM    Comment:  S&S KARMA, F.A.S.T.                               User Key     Initials Effective Dates Name Provider Type Discipline     06/19/15 -  Amanda Casiano PT Physical Therapist PT                PT Recommendation and Plan  Anticipated Discharge Disposition (PT): home  Therapy Frequency (PT Clinical Impression): evaluation only  Outcome Summary/Treatment Plan (PT)  Anticipated Discharge Disposition (PT): home  Plan of Care Reviewed With:  patient  Outcome Summary: GOALS NOT ESTABLISHED AS PT IS AT BASELINE WITH FUNCTIONAL MOBILITY. NO ONGOING SKILLED P.T. NEEDED AT THIS TIME. RECOMMEND D/C HOME.     Outcome Measures     Row Name 03/01/19 1125 03/01/19 0840          How much help from another person do you currently need...    Turning from your back to your side while in flat bed without using bedrails?  4  -CD  --     Moving from lying on back to sitting on the side of a flat bed without bedrails?  4  -CD  --     Moving to and from a bed to a chair (including a wheelchair)?  4  -CD  --     Standing up from a chair using your arms (e.g., wheelchair, bedside chair)?  4  -CD  --     Climbing 3-5 steps with a railing?  4  -CD  --     To walk in hospital room?  4  -CD  --     AM-PAC 6 Clicks Score  24  -CD  --        How much help from another is currently needed...    Putting on and taking off regular lower body clothing?  --  4  -AN     Bathing (including washing, rinsing, and drying)  --  4  -AN     Toileting (which includes using toilet bed pan or urinal)  --  4  -AN     Putting on and taking off regular upper body clothing  --  4  -AN     Taking care of personal grooming (such as brushing teeth)  --  4  -AN     Eating meals  --  4  -AN     Score  --  24  -AN        Modified [place] Scale    Modified [place] Scale  0 - No Symptoms at all.  -CD  1 - No significant disability despite symptoms.  Able to carry out all usual duties and activities.  -AN        Functional Assessment    Outcome Measure Options  AM-PAC 6 Clicks Basic Mobility (PT);Modified [place]  -CD  AM-PAC 6 Clicks Daily Activity (OT);Modified [place]  -AN       User Key  (r) = Recorded By, (t) = Taken By, (c) = Cosigned By    Initials Name Provider Type    CD Amanda Casiano, PT Physical Therapist    Viri Bell, OT Occupational Therapist           Time Calculation:   PT Charges     Row Name 03/01/19 1125             Time Calculation    Start Time  1125  -CD      PT Received On  03/01/19   -CD        User Key  (r) = Recorded By, (t) = Taken By, (c) = Cosigned By    Initials Name Provider Type    CD Amanda Casiano, PT Physical Therapist        Therapy Suggested Charges     Code   Minutes Charges    None           Therapy Charges for Today     Code Description Service Date Service Provider Modifiers Qty    28320155152 HC PT EVAL LOW COMPLEXITY 4 3/1/2019 Amanda Casiano, PT GP 1          PT G-Codes  Outcome Measure Options: AM-PAC 6 Clicks Basic Mobility (PT), Modified [place]  AM-PAC 6 Clicks Score: 24  Score: 24  Modified [place] Scale: 0 - No Symptoms at all.    PT Discharge Summary  Anticipated Discharge Disposition (PT): home  Reason for Discharge: At baseline function  Discharge Destination: Home    Amanda Casiano, PT  3/1/2019

## 2019-03-01 NOTE — PLAN OF CARE
Problem: Patient Care Overview  Goal: Plan of Care Review  Outcome: Outcome(s) achieved Date Met: 03/01/19 03/01/19 0840   Coping/Psychosocial   Plan of Care Reviewed With patient   OTHER   Outcome Summary Pt does not currently exhibit any motor or sensory deficits that interfere with functional I. No further OT warranted at this time. Pt to discharge home.       Problem: Stroke (Ischemic) (Adult)  Goal: Signs and Symptoms of Listed Potential Problems Will be Absent, Minimized or Managed (Stroke)  Outcome: Outcome(s) achieved Date Met: 03/01/19 03/01/19 0840   Goal/Outcome Evaluation   Problems Assessed (Stroke (Ischemic)) cognitive impairment;communication impairment;motor/sensory impairment;muscle tone abnormal;acute neurologic deterioration   Problems Assessed (Stroke (Ischemic)) motor/sensory impairment  (slight def with R hand finger to nose)

## 2019-03-01 NOTE — PROGRESS NOTES
Discharge Planning Assessment  [facility]     Patient Name: Cindy Sage  MRN: [id number]  Today's Date: 3/1/2019    Admit Date: 2/28/2019    Discharge Needs Assessment     Row Name 03/01/19 1028       Living Environment    Lives With  spouse;grandchild(joby)    Name(s) of Who Lives With Patient  , Ty; 24-y.o. grandson    Current Living Arrangements  home/apartment/condo    Primary Care Provided by  self    Provides Primary Care For  no one    Family Caregiver if Needed  spouse;grandchild(joby), adult;child(joby), adult    Quality of Family Relationships  helpful;involved;supportive       Resource/Environmental Concerns    Resource/Environmental Concerns  none       Transition Planning    Patient/Family Anticipates Transition to  home with family    Patient/Family Anticipated Services at Transition  none    Transportation Anticipated  family or friend will provide       Discharge Needs Assessment    Readmission Within the Last 30 Days  no previous admission in last 30 days    Concerns to be Addressed  no discharge needs identified;denies needs/concerns at this time    Equipment Currently Used at Home  none    Anticipated Changes Related to Illness  none        Discharge Plan     Row Name 03/01/19 1029       Plan    Plan  Home    Patient/Family in Agreement with Plan  yes    Plan Comments  Met with patient and granddaughter in the room to initiate discharge planning. Patient lives with her  and 24-y.o. grandson in a home [place] [facility]. She is independent with ADLs and mobility and does not use any DME. Her goal is home at discharge and family will provide her ride. Patient does not anticipate any DC needs at this time. OT has discharged patient. PT eval is pending. CM will continue to follow.     Final Discharge Disposition Code  01 - home or self-care        Destination      No service coordination in this encounter.      Durable Medical Equipment      No service coordination in this  encounter.      Dialysis/Infusion      No service coordination in this encounter.      Home Medical Care      No service coordination in this encounter.      Community Resources      No service coordination in this encounter.        Expected Discharge Date and Time     Expected Discharge Date Expected Discharge Time    Mar 2, 2019         Demographic Summary     Row Name 03/01/19 1027       General Information    Admission Type  observation    Referral Source  admission list    Reason for Consult  discharge planning    General Information Comments  PCP is Jose Carrillo       Contact Information    Permission Granted to Share Info With  ;family/designee , Ty Sage; daughter, Chitra Diaz        Functional Status     Row Name 03/01/19 1027       Functional Status    Usual Activity Tolerance  good       Functional Status, IADL    Medications  independent    Meal Preparation  independent    Housekeeping  independent    Laundry  independent    Shopping  independent       Employment/    Employment/ Comments  Confirmed with patient that she has medical and rx coverage through [employer] and is able to afford medications.         Psychosocial    No documentation.       Abuse/Neglect    No documentation.       Legal    No documentation.       Substance Abuse    No documentation.       Patient Forms    No documentation.           Lanny Pulido

## 2019-03-01 NOTE — DISCHARGE SUMMARY
[facility] Medicine Services  DISCHARGE SUMMARY    Patient Name: Cindy Sage  : 1945  MRN: [id number]    Date of Admission: 2019  Date of Discharge:  19  Primary Care Physician: Jose Carrillo MD    Consults     Date and Time Order Name Status Description    2019 1704 Inpatient Neurology Consult Stroke Completed           Hospital Course     Presenting Problem:   TIA (transient ischemic attack) [G45.9]    Active Hospital Problems    Diagnosis Date Noted   • **TIA (transient ischemic attack) [G45.9] 2019   • History of endometrial cancer [Z85.42] 2019   • GERD (gastroesophageal reflux disease) [K21.9]    • Hypertension [I10] 2016   • Hyperlipidemia [E78.5] 2016      Resolved Hospital Problems    Diagnosis Date Noted Date Resolved   • Vision changes [H53.9] 2019   • Hypokalemia [E87.6] 10/05/2016 2019          Hospital Course:  Cindy Sage is a 74 y.o. 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 She was seeing flashing lights out of her left eye (similar to stroke symptoms she had in 2016) along with left hand heaviness. Her symptoms were accompanied by a right sided headache. Symptoms resolved upon presentation to ER. MRI brain negative for acute stroke. MRA head showed intracranial stenosis in the right posterior cerebral arteries, along with possible stenosis of M1-M2. MRA neck and Echo are unremarkable. Neurology was consulted. Plavix was added to her ASA. Dr. Rodriguez feels this was a TIA however migraine with aura remains in the differential. Recommends dual antiplatelet therapy and follow up in Neurology clinic, first available, for possible migraines. She is back to baseline. Discharge home today. Decrease 325 mg ASA to 81 mg to decrease risk of bleeding  with addition of Plavix.      Discharge Follow Up Recommendations for labs/diagnostics:  F/U with PCP in 1 week  F/U with Neurology in 1 week    Day of Discharge     HPI:   Patient seen this morning. No complaints. Back to baseline. Eating breakfast.    Review of Systems  Gen-no fevers, no chills  CV-no chest pain, no palpitations  Resp-no cough, no dyspnea  GI-no N/V/D, no abd pain    Otherwise ROS is negative except as mentioned in the HPI.    Vital Signs:   Temp:  [97.7 °F (36.5 °C)-98.6 °F (37 °C)] 98.6 °F (37 °C)  Heart Rate:  [] 58  Resp:  [14-18] 16  BP: (133-164)/(59-77) 152/75     Physical Exam:  Gen-no acute distress  HENT-NCAT, mucous membranes moist  CV-RRR, S1 S2 normal, no m/r/g  Resp-CTAB, no wheezes or rales  Abd-soft, NT, ND, +BS  Ext-no edema  Neuro-A&Ox3, no focal deficits  Skin-no rashes  Psych-appropriate mood      Pertinent  and/or Most Recent Results     Results from last 7 days   Lab Units 03/01/19  0933 02/28/19  1140   WBC 10*3/mm3  --  4.47   HEMOGLOBIN g/dL  --  13.1   HEMATOCRIT %  --  41.0   PLATELETS 10*3/mm3  --  222   SODIUM mmol/L 140 139   POTASSIUM mmol/L 4.7 3.3*   CHLORIDE mmol/L 109 101   CO2 mmol/L 26.0 26.0   BUN mg/dL 11 15   CREATININE mg/dL 0.93 1.14   GLUCOSE mg/dL 110* 94   CALCIUM mg/dL 8.8 9.2     Results from last 7 days   Lab Units 02/28/19  1140   BILIRUBIN mg/dL 0.8   ALK PHOS U/L 123*   ALT (SGPT) U/L 28   AST (SGOT) U/L 28     Results from last 7 days   Lab Units 03/01/19  0724   CHOLESTEROL mg/dL 160   TRIGLYCERIDES mg/dL 135   HDL CHOL mg/dL 73*     Results from last 7 days   Lab Units 03/01/19  0724 02/28/19  1206 02/28/19  1140   HEMOGLOBIN A1C % 5.20  --   --    BNP pg/mL  --   --  26.0   TROPONIN I ng/mL  --  0.00  --        Brief Urine Lab Results  (Last result in the past 365 days)      Color   Clarity   Blood   Leuk Est   Nitrite   Protein   CREAT   Urine HCG        02/28/19 1202 Yellow Clear Negative Trace Negative Negative                Microbiology Results Abnormal     None          Imaging Results (all)     Procedure Component Value Units Date/Time    MRI Angiogram Head Without Contrast [[id number]] Collected:  02/28/19 1558     Updated:  02/28/19 2319    Narrative:       EXAMINATION: MRI ANGIOGRAM HEAD WO CONTRAST-02/28/2019:     INDICATION: Transient vision change and left arm weakness.         TECHNIQUE: 3-D unenhanced images of the major intracranial arteries with  3-D angiographic reconstructions.     COMPARISON: No previous angiographic studies. The exam is compared with  the concurrent neck MRA.     FINDINGS: The distal vertebral arteries and basilar artery appear  normal. Posterior cerebral arteries appear markedly attenuated, with  only thready flow seen in the expected location of the right posterior  cerebral artery and only poor visualization of the left posterior  cerebral artery proximally. Axial source images likewise show small  vessel on the left, and only thready vasculature on the right in the  expected location of the posterior cerebral arteries.     Intracranial portions of the internal carotid arteries, anterior  cerebral arteries, left middle cerebral artery and branches appear  normal. There is an irregular appearance of the outline of the right  vertebral artery which appears to reflect motion artifact, similar to  but a little greater than seen on the contralateral side. There is  attenuation of activity in the proximal temporal branches, whether  artifactual or representing actual stenoses here. In view of the  patient's multiple small right-sided infarcts, actual stenosis is  favored. These areas cannot be evaluated by NASCET criteria.       Impression:       1.  Markedly attenuated flow signal in the right posterior cerebral  arteries, of uncertain significance. Potentially hemodynamically  significant stenosis could be present on both the right and left.  2. Attenuation of the right M2 branches, possibly  representing M1-M2  stenosis of both branches.  3. Brain MRA appears within normal limits elsewhere.     D:  02/28/2019  E:  02/28/2019            This report was finalized on 2/28/2019 11:17 PM by DR. Adrián Ramirez MD.       MRI Angiogram Neck With Contrast [[id number]] Collected:  02/28/19 1609     Updated:  02/28/19 2318    Narrative:       EXAMINATION: MRI ANGIOGRAM NECK WITH CONTRAST-02/28/2019:     INDICATION: Transient vision change and left arm weakness.         TECHNIQUE: 3-D time-of-flight post-Gadolinium contrast images of the  common internal and external carotid arteries and vertebral arteries  below the level of the skull base with 3-D angiographic reconstructions.     COMPARISON: NONE.     FINDINGS: Brachycephalic vessels appear to arise normally from the  aortic arch. Proximal subclavian arteries appear normal.     On the right, there is no evidence of significant common internal or  external carotid artery stenosis.     On the left, there is no evidence of significant common internal or  external carotid artery stenosis.     Vertebral arteries appear similar in size, left slightly dominant, both  normal in appearance.       Impression:       No evidence of hemodynamically significant carotid or  vertebral artery stenosis in the neck.     D:  02/28/2019  E:  02/28/2019            This report was finalized on 2/28/2019 11:16 PM by DR. Adrián Ramirez MD.       MRI Brain Without Contrast [[id number]] Collected:  02/28/19 1608     Updated:  02/28/19 2317    Narrative:       EXAMINATION: MRI BRAIN WO CONTRAST-02/28/2019:     INDICATION: Transient vision change and left arm weakness.         TECHNIQUE: Sagittal and axial T1 and axial T2 FLAIR diffusion-weighted  images of the brain.     COMPARISON: Head CT scan 02/28/2019.     FINDINGS: There is no evidence of restricted diffusion to suggest acute  infarction. The remaining imaging sequences show chronic appearing  central white matter changes, and a couple of old  white matter infarcts  in the right centrum semiovale. Trace edema or gliosis in the  subcortical white matter of the superior right frontal and superior  parietal lobes may represent old ischemic foci but no well-defined  infarcts are seen here. There is no signal abnormality suggestive of  hemorrhage.  No evidence of hydrocephalus, no evidence of mass, mass  effect, or abnormal extra-axial collection.       Impression:       A few small scattered old right-sided infarcts in the  central and subcortical white matter of the frontal and parietal lobes.  No evidence of acute infarction or other clearly acute intracranial  disease.     D:  02/28/2019  E:  02/28/2019            This report was finalized on 2/28/2019 11:15 PM by DR. Adrián Ramirez MD.       CT Head Without Contrast [[id number]] Collected:  02/28/19 1302     Updated:  02/28/19 2247    Narrative:       EXAMINATION: CT HEAD WO CONTRAST-02/28/2019:      INDICATION: TIA sxs.         TECHNIQUE: 5 mm unenhanced images through the brain.     The radiation dose reduction device was turned on for each scan per the  ALARA (As Low as Reasonably Achievable) protocol.     COMPARISON: Head CT scan 07/22/2016.     FINDINGS: The calvarium appears intact. Included paranasal sinuses and  mastoids appear clear. Soft tissue window images show expected degree of  generalized cerebral atrophy for the patient's age. There is no evidence  of acute or old infarct, no evidence of hemorrhage, hydrocephalus, mass  or mass effect, or abnormal extra-axial collection.       Impression:       Age-appropriate generalized cerebral atrophy. No evidence of  acute intracranial disease.     D:  02/28/2019  E:  02/28/2019           This report was finalized on 2/28/2019 10:45 PM by DR. Adrián Ramirez MD.                              Discharge Details        Discharge Medications      New Medications      Instructions Start Date   aspirin 81 MG chewable tablet  Replaces:  aspirin 325 MG tablet   81 mg,  Oral, Daily      clopidogrel 75 MG tablet  Commonly known as:  PLAVIX   75 mg, Oral, Daily         Continue These Medications      Instructions Start Date   famotidine 20 MG tablet  Commonly known as:  PEPCID   20 mg, Oral, 2 Times Daily      hydrochlorothiazide 25 MG tablet  Commonly known as:  HYDRODIURIL   TAKE ONE TABLET BY MOUTH ONCE DAILY      simvastatin 40 MG tablet  Commonly known as:  ZOCOR   TAKE 1 TABLET BY MOUTH ONCE DAILY      vitamin D 20443 units capsule capsule  Commonly known as:  ERGOCALCIFEROL   50,000 Units, Oral, Weekly         Stop These Medications    aspirin 325 MG tablet  Replaced by:  aspirin 81 MG chewable tablet            Allergies   Allergen Reactions   • Strawberry Extract Swelling         Discharge Disposition:  Home or Self Care    Discharge Diet:  Diet Order   Procedures   • Diet Regular; Cardiac         Discharge Activity:   Activity Instructions     Activity as Tolerated            CODE STATUS:    Code Status and Medical Interventions:   Ordered at: 02/28/19 1701     Level Of Support Discussed With:    Patient     Code Status:    CPR     Medical Interventions (Level of Support Prior to Arrest):    Full         Future Appointments   Date Time Provider Department Center   3/20/2019 10:30 AM Zayra Georges APRN [facility] None   3/29/2019  1:30 PM [facility] FOLLOW-UP  [facility]       Additional Instructions for the Follow-ups that You Need to Schedule     Discharge Follow-up with PCP   As directed       Currently Documented PCP:    Jose Carrillo MD    PCP Phone Number:    [phone]     Follow Up Details:  1 week         Discharge Follow-up with Specified Provider: Neurology clinic, first available: TIA and possible migraines; 1 Week   As directed      To:  Neurology clinic, first available: TIA and possible migraines    Follow Up:  1 Week               Time Spent on Discharge:  35 minutes    Electronically signed by Juliet Nice MD, 03/01/19, 12:37  PM

## 2019-03-02 ENCOUNTER — READMISSION MANAGEMENT (OUTPATIENT)
Dept: CALL CENTER | Facility: HOSPITAL | Age: 74
End: 2019-03-02

## 2019-03-02 NOTE — OUTREACH NOTE
Prep Survey      Responses   Facility patient discharged from?  [place]   Is patient eligible?  Yes   Discharge diagnosis  TIA (transient ischemic attack)    Does the patient have one of the following disease processes/diagnoses(primary or secondary)?  Stroke (TIA)   Does the patient have Home health ordered?  No   Is there a DME ordered?  No   Prep survey completed?  Yes          Monica Hassan RN

## 2019-03-04 ENCOUNTER — TRANSITIONAL CARE MANAGEMENT TELEPHONE ENCOUNTER (OUTPATIENT)
Dept: INTERNAL MEDICINE | Facility: CLINIC | Age: 74
End: 2019-03-04

## 2019-03-04 ENCOUNTER — READMISSION MANAGEMENT (OUTPATIENT)
Dept: CALL CENTER | Facility: HOSPITAL | Age: 74
End: 2019-03-04

## 2019-03-04 NOTE — OUTREACH NOTE
Stroke Week 1 Survey      Responses   Facility patient discharged from?  [place]   Does the patient have one of the following disease processes/diagnoses(primary or secondary)?  Stroke (TIA)   Is there a successful TCM telephone encounter documented?  Yes          Sandi Martinez RN

## 2019-03-05 DIAGNOSIS — I63.531 CEREBRAL INFARCTION DUE TO STENOSIS OF RIGHT POSTERIOR CEREBRAL ARTERY (HCC): ICD-10-CM

## 2019-03-05 DIAGNOSIS — G45.9 TIA (TRANSIENT ISCHEMIC ATTACK): Primary | ICD-10-CM

## 2019-03-05 NOTE — OUTREACH NOTE
DUTCH call completed. Please see flow sheet for additional details.  Patient needs neurology referral ASAP - pt was instructed to f/up w/ neurology w/in one wk of DC, but neuro referral on [facility] (KAYE Rodriguez MD) is for a hospitalist.  Neuro to pls call pt @  # [phone] ASAP to schedule f/up.    Pt reports she conts to have continued mild bilateral temporal HA, relieved by ibuprofen.  She states HA is not worsened since DC and she denies L hand heaviness, L eye visual disturbance and states she has no new sx. Pt confirms she has decreased ASA to 81 mg QD.  She confirms 3/8 DUTCH.  She awaits call from neuro.

## 2019-03-08 ENCOUNTER — OFFICE VISIT (OUTPATIENT)
Dept: INTERNAL MEDICINE | Facility: CLINIC | Age: 74
End: 2019-03-08

## 2019-03-08 VITALS
OXYGEN SATURATION: 98 % | TEMPERATURE: 98.7 F | HEART RATE: 64 BPM | RESPIRATION RATE: 15 BRPM | HEIGHT: 64 IN | BODY MASS INDEX: 23.83 KG/M2 | SYSTOLIC BLOOD PRESSURE: 120 MMHG | DIASTOLIC BLOOD PRESSURE: 72 MMHG | WEIGHT: 139.6 LBS

## 2019-03-08 DIAGNOSIS — Z86.73 HX OF TRANSIENT ISCHEMIC ATTACK (TIA): ICD-10-CM

## 2019-03-08 DIAGNOSIS — Z09 HOSPITAL DISCHARGE FOLLOW-UP: Primary | ICD-10-CM

## 2019-03-08 PROCEDURE — 99495 TRANSJ CARE MGMT MOD F2F 14D: CPT | Performed by: PHYSICIAN ASSISTANT

## 2019-03-08 NOTE — PROGRESS NOTES
Transitional Care Follow Up Visit  Subjective     Cindy Sage is a 74 y.o. female who presents for a transitional care management visit.    Within 48 business hours after discharge our office contacted her via telephone to coordinate her care and needs.      I reviewed and discussed the details of that call along with the discharge summary, hospital problems, inpatient lab results, inpatient diagnostic studies, and consultation reports with Cindy.     Current outpatient and discharge medications have been reconciled for the patient.  Current outpatient and discharge medications have been reconciled for the patient.  Reviewed by: Zayra Hummel PA-C      Date of TCM Phone Call 3/5/2019   [facility]   Date of Admission 2/28/2019   Date of Discharge 3/1/2019   Discharge Disposition Home or Self Care     Risk for Readmission (LACE) Score: 4 (3/1/2019  6:00 AM)      History of Present Illness   Course During Hospital Stay: Pt admitted to [facility] from 2/28/2019 to 3/1/2019 for TIA. Hospital course as follows:  Cindy Sage is a 74 y.o. 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 She was seeing flashing lights out of her left eye (similar to stroke symptoms she had in 2016) along with left hand heaviness. Her symptoms were accompanied by a right sided headache. Symptoms resolved upon presentation to ER. MRI brain negative for acute stroke. MRA head showed intracranial stenosis in the right posterior cerebral arteries, along with possible stenosis of M1-M2. MRA neck and Echo are unremarkable. Neurology was consulted. Plavix was added to her ASA. Dr. Rodriguez feels this was a TIA however migraine with aura remains in the differential. Recommends dual antiplatelet therapy and follow up in Neurology clinic, first available, for possible migraines. She  is back to baseline. Discharge home today. Decrease 325 mg ASA to 81 mg to decrease risk of bleeding with addition of Plavix.    Since discharge, pt reports she has been doing well. She feels somewhat stressed and has tightness in her neck which she attributes to stress. Also some congestion yesterday and today due to weather changes, per pt. She has no other concerns today. She denies HA, vision changes, blurred vision, photophobia, fever, chills, SOA, chest pain, extremity weakness, changes in BMs or urination. Taking Plavix as directed. ASA was decreased from 325mg to 81mg, now taking 81mg as directed.        The following portions of the patient's history were reviewed and updated as appropriate: allergies, current medications, past medical history, past social history, past surgical history and problem list.       Current Outpatient Medications:   •  aspirin 81 MG chewable tablet, Chew 1 tablet Daily., Disp: 30 tablet, Rfl: 1  •  clopidogrel (PLAVIX) 75 MG tablet, Take 1 tablet by mouth Daily., Disp: 30 tablet, Rfl: 1  •  hydrochlorothiazide (HYDRODIURIL) 25 MG tablet, TAKE ONE TABLET BY MOUTH ONCE DAILY, Disp: 90 tablet, Rfl: 1  •  simvastatin (ZOCOR) 40 MG tablet, TAKE 1 TABLET BY MOUTH ONCE DAILY, Disp: 90 tablet, Rfl: 1  •  vitamin D (ERGOCALCIFEROL) 07156 units capsule capsule, Take 1 capsule by mouth 1 (One) Time Per Week., Disp: 12 capsule, Rfl: 1  •  famotidine (PEPCID) 20 MG tablet, Take 1 tablet by mouth 2 (Two) Times a Day., Disp: 20 tablet, Rfl: 0      Review of Systems   Constitutional: Negative for chills, fatigue and fever.   HENT: Positive for congestion. Negative for ear pain, postnasal drip, sinus pressure, sneezing and sore throat.    Eyes: Negative for visual disturbance.   Respiratory: Negative for cough, shortness of breath and wheezing.    Cardiovascular: Negative for chest pain.   Gastrointestinal: Negative for abdominal pain, diarrhea, nausea and vomiting.   Genitourinary: Negative for  dysuria.   Musculoskeletal: Negative for gait problem.   Skin: Negative for rash.   Neurological: Negative for dizziness, facial asymmetry, weakness, numbness and headaches.   Psychiatric/Behavioral:        +stress       Objective    Vitals:    03/08/19 1058   BP: 120/72   Pulse: 64   Resp: 15   Temp: 98.7 °F (37.1 °C)   SpO2: 98%       Physical Exam   Constitutional: She appears well-developed and well-nourished.   HENT:   Head: Normocephalic and atraumatic.   Right Ear: Tympanic membrane, external ear and ear canal normal.   Left Ear: Tympanic membrane, external ear and ear canal normal.   Mouth/Throat: Oropharynx is clear and moist and mucous membranes are normal.   Eyes: Conjunctivae and EOM are normal. Pupils are equal, round, and reactive to light.   Fundoscopic exam:       The right eye shows no hemorrhage and no papilledema.        The left eye shows no hemorrhage and no papilledema.   Neck: Normal range of motion. Neck supple. Carotid bruit is not present.   Cardiovascular: Normal rate, regular rhythm and intact distal pulses.   No murmur heard.  Pulmonary/Chest: Effort normal and breath sounds normal. She has no wheezes. She has no rales.   Abdominal: Normal appearance.   Lymphadenopathy:     She has no cervical adenopathy.   Psychiatric: She has a normal mood and affect. Her behavior is normal.       Assessment/Plan   Cindy was seen today for transient ischemic attack.    Diagnoses and all orders for this visit:    Hospital discharge follow-up    Hx of transient ischemic attack (TIA)      Pt doing very well.   Continue f/u with neurology as scheduled.   Continue all current meds.    Return in about 3 months (around 6/8/2019) for Follow up- Dr. Sparks .       Transitional Care Management Certification  I certify that the following are true:  1. Communication was made within 2 business days of discharge.  2. Complexity of Medical Decision Making is low.  3. Face to face visit occurred within 7  days.    *Note: 51715 is for high complexity patients with a face to face visit within 7 days of discharge.  23302 is for high complexity patients with a face to face on days 8-14 post discharge or medium complexity with face to face visit within 14 days post discharge.

## 2019-03-11 ENCOUNTER — READMISSION MANAGEMENT (OUTPATIENT)
Dept: CALL CENTER | Facility: HOSPITAL | Age: 74
End: 2019-03-11

## 2019-03-11 NOTE — OUTREACH NOTE
Stroke Week 2 Survey      Responses   Facility patient discharged from?  [place]   Does the patient have one of the following disease processes/diagnoses(primary or secondary)?  Stroke (TIA)   Week 2 attempt successful?  Yes   Call start time  1408   Rescheduled  Revoked   Revoke  Decline to participate   Call end time  1409   Discharge diagnosis  TIA (transient ischemic attack)           Roslava Hassan RN

## 2019-03-20 ENCOUNTER — OFFICE VISIT (OUTPATIENT)
Dept: GYNECOLOGIC ONCOLOGY | Facility: CLINIC | Age: 74
End: 2019-03-20

## 2019-03-20 ENCOUNTER — HOSPITAL ENCOUNTER (OUTPATIENT)
Dept: GENERAL RADIOLOGY | Facility: HOSPITAL | Age: 74
Discharge: HOME | End: 2019-03-20
Admitting: NURSE PRACTITIONER

## 2019-03-20 ENCOUNTER — OFFICE VISIT (OUTPATIENT)
Dept: INTERNAL MEDICINE | Facility: CLINIC | Age: 74
End: 2019-03-20

## 2019-03-20 ENCOUNTER — TELEPHONE (OUTPATIENT)
Dept: GYNECOLOGIC ONCOLOGY | Facility: CLINIC | Age: 74
End: 2019-03-20

## 2019-03-20 ENCOUNTER — APPOINTMENT (OUTPATIENT)
Dept: LAB | Facility: HOSPITAL | Age: 74
End: 2019-03-20

## 2019-03-20 VITALS
RESPIRATION RATE: 14 BRPM | OXYGEN SATURATION: 99 % | BODY MASS INDEX: 24.37 KG/M2 | HEART RATE: 78 BPM | DIASTOLIC BLOOD PRESSURE: 60 MMHG | SYSTOLIC BLOOD PRESSURE: 131 MMHG | WEIGHT: 141 LBS

## 2019-03-20 VITALS
BODY MASS INDEX: 24.37 KG/M2 | TEMPERATURE: 99.4 F | WEIGHT: 141 LBS | HEART RATE: 82 BPM | SYSTOLIC BLOOD PRESSURE: 132 MMHG | DIASTOLIC BLOOD PRESSURE: 66 MMHG | RESPIRATION RATE: 19 BRPM

## 2019-03-20 DIAGNOSIS — M25.511 ACUTE PAIN OF RIGHT SHOULDER: ICD-10-CM

## 2019-03-20 DIAGNOSIS — M25.511 ACUTE PAIN OF RIGHT SHOULDER: Primary | ICD-10-CM

## 2019-03-20 DIAGNOSIS — C54.1 ENDOMETRIAL CANCER (HCC): Primary | ICD-10-CM

## 2019-03-20 DIAGNOSIS — M79.621 PAIN IN RIGHT UPPER ARM: ICD-10-CM

## 2019-03-20 LAB — CANCER AG125 SERPL QL: 11.2 U/ML (ref 0–30.2)

## 2019-03-20 PROCEDURE — 73030 X-RAY EXAM OF SHOULDER: CPT

## 2019-03-20 PROCEDURE — 36415 COLL VENOUS BLD VENIPUNCTURE: CPT | Performed by: NURSE PRACTITIONER

## 2019-03-20 PROCEDURE — 86304 IMMUNOASSAY TUMOR CA 125: CPT | Performed by: NURSE PRACTITIONER

## 2019-03-20 PROCEDURE — 99213 OFFICE O/P EST LOW 20 MIN: CPT | Performed by: NURSE PRACTITIONER

## 2019-03-20 PROCEDURE — 73060 X-RAY EXAM OF HUMERUS: CPT

## 2019-03-20 RX ORDER — METHYLPREDNISOLONE 4 MG/1
TABLET ORAL
Qty: 1 EACH | Refills: 0 | Status: SHIPPED | OUTPATIENT
Start: 2019-03-20 | End: 2019-06-13

## 2019-03-20 NOTE — TELEPHONE ENCOUNTER
Called to notify patient of her results. Family member answered and I let them know for her to call me. They said they would.

## 2019-03-20 NOTE — TELEPHONE ENCOUNTER
----- Message from CHAPARRO Hand sent at 3/20/2019  1:25 PM EDT -----  Please notify CA-125 low. Thanks!

## 2019-03-20 NOTE — PATIENT INSTRUCTIONS
Adhesive Capsulitis  Adhesive capsulitis is inflammation of the tendons and ligaments that surround the shoulder joint (shoulder capsule). This condition causes the shoulder to become stiff and painful to move. Adhesive capsulitis is also called frozen shoulder.  What are the causes?  This condition may be caused by:  · An injury to the shoulder joint.  · Straining the shoulder.  · Not moving the shoulder for a period of time. This can happen if your arm was injured or in a sling.  · Long-standing health problems, such as:  ? Diabetes.  ? Thyroid problems.  ? Heart disease.  ? Stroke.  ? Rheumatoid arthritis.  ? Lung disease.    In some cases, the cause may not be known.  What increases the risk?  This condition is more likely to develop in:  · Women.  · People who are older than 40 years of age.    What are the signs or symptoms?  Symptoms of this condition include:  · Pain in the shoulder when moving the arm. There may also be pain when parts of the shoulder are touched. The pain is worse at night or when at rest.  · Soreness or aching in the shoulder.  · Inability to move the shoulder normally.  · Muscle spasms.    How is this diagnosed?  This condition is diagnosed with a physical exam and imaging tests, such as an X-ray or MRI.  How is this treated?  This condition may be treated with:  · Treatment of the underlying cause or condition.  · Physical therapy. This involves performing exercises to get the shoulder moving again.  · Medicine. Medicine may be given to relieve pain, inflammation, or muscle spasms.  · Steroid injections into the shoulder joint.  · Shoulder manipulation. This is a procedure to move the shoulder into another position. It is done after you are given a medicine to make you fall asleep (general anesthetic). The joint may also be injected with salt water at high pressure to break down scarring.  · Surgery. This may be done in severe cases when other treatments have failed.    Although most  people recover completely from adhesive capsulitis, some may not regain the full movement of the shoulder.  Follow these instructions at home:  · Take over-the-counter and prescription medicines only as told by your health care provider.  · If you are being treated with physical therapy, follow instructions from your physical therapist.  · Avoid exercises that put a lot of demand on your shoulder, such as throwing. These exercises can make pain worse.  · If directed, apply ice to the injured area:  ? Put ice in a plastic bag.  ? Place a towel between your skin and the bag.  ? Leave the ice on for 20 minutes, 2-3 times per day.  Contact a health care provider if:  · You develop new symptoms.  · Your symptoms get worse.  This information is not intended to replace advice given to you by your health care provider. Make sure you discuss any questions you have with your health care provider.  Document Released: 10/15/2010 Document Revised: 05/25/2017 Document Reviewed: 04/11/2016  [employer] Interactive Patient Education © 2019 [employer]

## 2019-03-20 NOTE — PROGRESS NOTES
Chief Complaint   Patient presents with   • Right shoulder pain        Subjective     History of Present Illness   Patient is here today with complaints of right shoulder pain.Surya reports a 3 day history of pain in the R shoulder and upper arm.  She initially awakened and could not raise arm then over the next 24 hours she had most of her ROM back.  Today she finds she is limited again in motion.  No known trauma or overuse of the RUE.  No pmh of R shoulder pain.  Using biofreeze -does nto help.  Tylenol helps temporarily. At rest pain is a 3 and pain with moving is a 10.      The following portions of the patient's history were reviewed and updated as appropriate: allergies, current medications, past family history, past medical history, past social history, past surgical history and problem list.    Review of Systems   Constitutional: Negative.    Respiratory: Negative.    Cardiovascular: Negative.    Musculoskeletal:        Right shoulder pain   Skin: Negative.    Neurological: Negative.    Hematological: Negative.    Psychiatric/Behavioral: Negative.        Objective   Physical Exam   Constitutional: She is oriented to person, place, and time. She appears well-developed and well-nourished.   HENT:   Head: Normocephalic and atraumatic.   Cardiovascular: Normal rate and regular rhythm.   Pulmonary/Chest: Effort normal and breath sounds normal.   Musculoskeletal: She exhibits no edema.   Muscle strength 5 out of 5 in bilateral upper extremities however patient has decreased range of motion: 90 degrees with abduction decreased internal/external rotation and 90 degrees with extension.  She is tender to palpate over the anterior portion of the AC joint as well as medial aspect of the right humerus.  There is no swelling edema erythema or warmth appreciated.   Lymphadenopathy:     She has no cervical adenopathy.   Neurological: She is alert and oriented to person, place, and time. She displays normal reflexes. She  exhibits normal muscle tone.   Skin: Skin is warm and dry. Capillary refill takes 2 to 3 seconds.   Psychiatric: She has a normal mood and affect. Her behavior is normal.   Nursing note and vitals reviewed.        Assessment/Plan   Cindy was seen today for right shoulder pain.    Diagnoses and all orders for this visit:    Acute pain of right shoulder  -     XR Shoulder 2+ View Right; Future  -     methylPREDNISolone (MEDROL, AURE,) 4 MG tablet; Take as directed on package instructions.  -     Ambulatory Referral to Physical Therapy    Pain in right upper arm  -     XR Humerus Right (In Office)  -     methylPREDNISolone (MEDROL, AURE,) 4 MG tablet; Take as directed on package instructions.  -     Ambulatory Referral to Physical Therapy      X-rays reviewed no abnormalities noted will await formal reading to call the patient I have informed her it may be Friday before I have the formal reading back and I will call her as soon as the x-ray reading is available.  If physical therapy is not helpful may need further imaging/Ortho/consult    Warm moist heat tid with motrin 400mg q8prn with food and medrol pack.   Return if symptoms worsen or fail to improve.  RTC/call  If symptoms worsen  Meds MOA and SE's reviewed and pt v/u

## 2019-03-20 NOTE — PROGRESS NOTES
GYN ONCOLOGY CANCER SURVEILLANCE FOLLOW-UP    Cindy Sage  [id number]  1945    Chief Complaint: Follow-up (no GYN complaints)        History of present illness:  Cindy Sage is a 74 y.o. year old female who is here today for ongoing surveillance of Endometrial Cancer, see Cancer History. She reports that her right shoulder has been hurting since Sunday night. It is now limiting her ROM and she is planning to call her PCP for evaluation. She also notes that she suffered another TIA last month, no significant deficits, and she is seeing neurology outpatient later this week. Otherwise, she is feeling generally well and has no gyn complaints. She denies vaginal bleeding, pelvic pain, and changes in bowel or bladder function. She is now 2.5 years out from completion of treatment.      Cancer History:      Endometrial cancer (CMS/HCC)    3/26/2016 Imaging     CT in ER for acute onset postmenopausal bleeding revealed mass in the lower uterine segment. Gyn Oncology called to evaluate.         3/29/2016 Biopsy     Endometrial biopsy in office, pathology revealed complex atypical hyperplasia with stromal breakdown.            4/1/2016 Initial Diagnosis     Endometrial cancer         4/19/2016 Surgery     RATLH, BSO, LND to several millimeters of residual disease.          5/20/2016 - 11/15/2016 Chemotherapy     Carboplatin AUC 6, Paclitaxel 175 mg/m2 x 6 cycles in sandwich fashion with radiation in between cycles #3 and #4          - 9/2/2016 Radiation     Radiation completed. Pelvis and periaortic nodes received a total of 45 Gy in 25 fractions during sandwich therapy. This was followed by vaginal brachytherapy: upper vagina treated utilizing cylinder and high dose rate iridium-192 to 18 Gy in 3 fractions.          11/21/2016 Imaging     Post-treatment PET/CT negative for disease            Past Medical History:   Diagnosis Date   • Drug therapy 2016    For endometrial CA in 2016   • Elevated cholesterol     • Endometrial cancer (CMS/HCC) 04/2016    Stage IIIC2    • GERD (gastroesophageal reflux disease)    • HTN (hypertension)    • Hx of radiation therapy 2016    For endometrial CA 2016   • Stroke (CMS/HCC) 02/2016    No residual deficits.  Takes .       Past Surgical History:   Procedure Laterality Date   • APPENDECTOMY      Ex lap   • FOOT SURGERY     • OOPHORECTOMY     • TONSILLECTOMY     • TOTAL LAPAROSCOPIC HYSTERECTOMY SALPINGO OOPHORECTOMY Bilateral 04/19/2016    RATLH, BSO, LND       MEDICATIONS: The current medication list was reviewed and reconciled.     Allergies:  is allergic to strawberry extract.    Family History   Problem Relation Age of Onset   • Arthritis Mother    • Breast cancer Mother         age unknown    • Osteoporosis Mother    • Alcohol abuse Father    • Stroke Maternal Aunt    • Ovarian cancer Neg Hx    • Endometrial cancer Neg Hx        Last imaging study was PET 11/21/2016.      Tumor marker:     Date Value Ref Range Status   09/18/2018 11.2 0.0 - 30.2 U/mL Final   06/14/2018 10.5 0.0 - 30.2 U/mL Final   05/24/2017 8.3 0.0 - 30.2 U/mL Final       Review of Systems   Constitutional: Negative for appetite change, chills, fatigue, fever and unexpected weight change.   Respiratory: Negative for cough, shortness of breath and wheezing.    Cardiovascular: Negative for chest pain, palpitations and leg swelling.   Gastrointestinal: Negative for abdominal distention, abdominal pain, blood in stool, constipation, diarrhea, nausea and vomiting.   Endocrine: Negative.    Genitourinary: Negative for dyspareunia, dysuria, frequency, genital sores, hematuria, pelvic pain, urgency, vaginal bleeding, vaginal discharge and vaginal pain.   Musculoskeletal: Positive for arthralgias (right shoulder pain x 4 days). Negative for gait problem and joint swelling.   Neurological: Negative for dizziness, seizures, syncope, weakness, light-headedness, numbness and headaches.   Hematological: Negative  for adenopathy.   Psychiatric/Behavioral: Negative.        Physical Exam  Vital Signs: /60   Pulse 78   Resp 14   Wt 64 kg (141 lb)   SpO2 99%   BMI 24.37 kg/m²    General Appearance:  alert, cooperative, no apparent distress, appears stated age and normal weight   Neurologic/Psychiatric: A&O x 3, gait steady, appropriate affect   HEENT:  Normocephalic, without obvious abnormality, mucous membranes moist   Neck: Supple, symmetrical, trachea midline, no adenopathy;  No thyromegaly, masses, or tenderness   Back:   Symmetric, no curvature, ROM normal, no CVA tenderness   Lungs:   Clear to auscultation bilaterally; respirations regular, even, and unlabored bilaterally   Heart:  Regular rate and rhythm, no murmurs appreciated   Breasts:  deferred   Abdomen:   Soft, non-tender, non-distended and no organomegaly   Lymph nodes: No cervical, supraclavicular, inguinal or axillary adenopathy noted   Extremities: Normal, atraumatic; no clubbing, cyanosis, or edema    Pelvic: External Genitalia  atrophic, without lesions  Vagina  is pale, atrophic.   Vaginal Cuff  Female Vaginal Cuff: smooth, intact, without visible lesions and changes consistent with previous radiation  Uterus  surgically absent and no palpable masses  Ovaries  surgically absent bilaterallly  Parametria  smooth  Rectovaginal  Female rectovaginal: deferred     ECOG Performance Status: 0 - Asymptomatic    Procedure Note:  No notes on file      Assessment and Plan:  Cindy was seen today for follow-up.    Diagnoses and all orders for this visit:    Endometrial cancer (CMS/HCC)  -             There is no evidence of disease upon today's exam. She will be notified of CA-125 results upon their return. She is now over 2 years from completion of treatment and doing well. Continue every 6 month cancer surveillance. She is understanding to call with any changes in pelvic symptoms or general GYN concerns at any time between regularly scheduled visits.      Keep upcoming appointment with neurology for TIAs and call PCP for right shoulder evaluation.       Return to clinic in 6 months for ongoing cancer surveillance.      CHAPARRO Hand        Note: Speech recognition transcription software was used to dictate portions of this document.  An attempt at proofreading has been made though minor errors in transcription may still be present.  Please do not hesitate to call our office with any questions.

## 2019-03-22 ENCOUNTER — OFFICE VISIT (OUTPATIENT)
Dept: NEUROLOGY | Facility: CLINIC | Age: 74
End: 2019-03-22

## 2019-03-22 VITALS
HEIGHT: 64 IN | BODY MASS INDEX: 23.73 KG/M2 | OXYGEN SATURATION: 96 % | WEIGHT: 139 LBS | HEART RATE: 56 BPM | SYSTOLIC BLOOD PRESSURE: 126 MMHG | DIASTOLIC BLOOD PRESSURE: 70 MMHG

## 2019-03-22 DIAGNOSIS — M54.2 MUSCULOSKELETAL NECK PAIN: ICD-10-CM

## 2019-03-22 DIAGNOSIS — I66.01 STENOSIS OF RIGHT MIDDLE CEREBRAL ARTERY: ICD-10-CM

## 2019-03-22 DIAGNOSIS — R42 DIZZINESS AND GIDDINESS: ICD-10-CM

## 2019-03-22 DIAGNOSIS — G45.9 TIA (TRANSIENT ISCHEMIC ATTACK): Primary | ICD-10-CM

## 2019-03-22 PROCEDURE — 99214 OFFICE O/P EST MOD 30 MIN: CPT | Performed by: NURSE PRACTITIONER

## 2019-03-22 RX ORDER — CLOPIDOGREL BISULFATE 75 MG/1
75 TABLET ORAL DAILY
Qty: 90 TABLET | Refills: 3 | Status: SHIPPED | OUTPATIENT
Start: 2019-03-22 | End: 2020-04-30

## 2019-03-22 NOTE — PROGRESS NOTES
Subjective:     Patient ID: Cindy Sage is a 74 y.o. female.    CC:   Chief Complaint   Patient presents with   • Transient Ischemic Attack       HPI:   History of Present Illness   This is a very pleasant 74-year-old female who presents for [facility] follow-up.  She is accompanied by her  today.  On 2/28/2019 she started having acute vision changes with flashes of lights around her left eye as well as left hand heaviness.  She had a very minimal right-sided headache.  She felt like her symptoms were similar to her stroke she had in 2016.  She presented to the ER for further evaluation.  MRI of the brain was negative for any acute intracranial abnormality and did show a few small scattered old right sided lacunar infarcts in the frontal and parietal region and this was reviewed today in office and does have significant atrophy slightly advanced for her age.  She denies any memory issues and her  concurs..  She also had an MRA of the neck which was unremarkable.  MRA of the head did show markedly attenuated flow signal in the right posterior cerebral arteries as well as significant stenosis in the right and left.  She also had stenosis in the right M2 branches.  The remainder of the MRI of the brain was normal.  Aspirin 325 mg daily as well as simvastatin 40 mg nightly.  Neurology was consulted and recommended decreasing her aspirin 81 mg and adding the addition of Plavix 75 mg daily.  She tells me that her symptoms did completely resolve.  She has no long-term effects of recent TIA or prior CVA.  There was a question of migraine with aura but she denies any nausea, vomiting, photophobia or phonophobia with the headache she did experience.  She says she has rare headaches but no migraine symptoms.  No history of migraines.  She did have an echocardiogram as well with no evidence of PFO or ASD.  Showed some trace to mild mitral regurgitation and trace tricuspid  regurgitation with a normal ejection fraction of 60%.  She has never had a cardiac evaluation and has no known A. fib.  She does occasionally experience dizziness but no palpitations to her knowledge.  She has not had a heart monitor.  She denies any falls, she denies any difficulty with ambulation and denies any neck pain at the spine region.    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  She also notes some tightness around the left shoulder and neck region with tension there.  She is wondering about completing some physical therapy for this.  She tells me she actually had a very tight and frozen shoulder a few days ago and was referred for this but has not seen them yet and her shoulder is completely better but she wants to go for her neck now.    The following portions of the patient's history were reviewed and updated as appropriate: allergies, current medications, past family history, past medical history, past social history, past surgical history and problem list.    Past Medical History:   Diagnosis Date   • Drug therapy 2016    For endometrial CA in 2016   • Elevated cholesterol    • Endometrial cancer (CMS/[facility]) 04/2016    Stage IIIC2    • GERD (gastroesophageal reflux disease)    • HTN (hypertension)    • Hx of radiation therapy 2016    For endometrial CA 2016   • Stroke (CMS/[facility]) 02/2016    No residual deficits.  Takes .       Past Surgical History:   Procedure Laterality Date   • APPENDECTOMY      Ex lap   • FOOT SURGERY     • OOPHORECTOMY     • TONSILLECTOMY     • TOTAL LAPAROSCOPIC HYSTERECTOMY SALPINGO OOPHORECTOMY Bilateral 04/19/2016    RATLH, BSO, LND       Social History     Socioeconomic History   • Marital status:      Spouse name: Not on file   • Number of children: Not on file   • Years of education: Not on file   • Highest education level: Not on file   Tobacco Use   • Smoking status:  Never Smoker   • Smokeless tobacco: Never Used   Substance and Sexual Activity   • Alcohol use: Defer   • Sexual activity: Defer   Social History Narrative    Lives at home in [place] with . Completes all ADLs with no residual deficits from prior stroke       Family History   Problem Relation Age of Onset   • Arthritis Mother    • Breast cancer Mother         age unknown    • Osteoporosis Mother    • Alcohol abuse Father    • Stroke Maternal Aunt    • Ovarian cancer Neg Hx    • Endometrial cancer Neg Hx         Review of Systems   All other systems reviewed and are negative.       Objective:    Neurologic Exam     Mental Status   Oriented to person, place, and time.   Registration: recalls 3 of 3 objects. Recall at 5 minutes: recalls 3 of 3 objects. Follows 3 step commands.   Attention: normal. Concentration: normal.   Speech: speech is normal   Level of consciousness: alert  Knowledge: good and consistent with education. Able to perform simple calculations.   Able to name object. Able to read. Able to repeat. Able to write. Normal comprehension.     Cranial Nerves   Cranial nerves II through XII intact.     Motor Exam   Muscle bulk: normal  Overall muscle tone: normal    Strength   Strength 5/5 throughout.     Sensory Exam   Light touch normal.   Right arm vibration: normal  Left arm vibration: normal  Right leg vibration: decreased from ankle  Left leg vibration: decreased from ankle  Proprioception normal.   Pinprick normal.     Gait, Coordination, and Reflexes     Gait  Gait: normal    Coordination   Romberg: negative  Finger to nose coordination: normal  Heel to shin coordination: normal  Tandem walking coordination: normal    Tremor   Resting tremor: absent  Intention tremor: absent  Action tremor: absent    Reflexes   Right brachioradialis: 3+  Left brachioradialis: 3+  Right biceps: 3+  Left biceps: 3+  Right triceps: 3+  Left triceps: 3+  Right patellar: 3+  Left patellar: 3+  Right achilles:  3+  Left achilles: 3+  Right : 2+  Left : 2+  Right plantar: normal  Left plantar: normal  Right Edwards: absent  Left Edwards: absent  Right ankle clonus: absent  Left ankle clonus: absent  Generalized hyperreflexia which is likely physiologic with no other abnormalities on physical exam.       Physical Exam   Constitutional: She is oriented to person, place, and time. She appears well-developed and well-nourished.   Eyes:   Fundoscopic exam:       The right eye shows red reflex.        The left eye shows red reflex.   Neck: Muscular tenderness present. No spinous process tenderness present. Carotid bruit is not present. No neck rigidity. Decreased range of motion present. No edema and no erythema present.   Cardiovascular: Normal rate, regular rhythm, S1 normal, S2 normal and normal heart sounds.   Pulmonary/Chest: Effort normal and breath sounds normal.   Neurological: She is oriented to person, place, and time. She has normal strength. She has a normal Finger-Nose-Finger Test, a normal Heel to Shin Test, a normal Romberg Test and a normal Tandem Gait Test. Gait normal.   Reflex Scores:       Tricep reflexes are 3+ on the right side and 3+ on the left side.       Bicep reflexes are 3+ on the right side and 3+ on the left side.       Brachioradialis reflexes are 3+ on the right side and 3+ on the left side.       Patellar reflexes are 3+ on the right side and 3+ on the left side.       Achilles reflexes are 3+ on the right side and 3+ on the left side.  Psychiatric: She has a normal mood and affect. Her speech is normal and behavior is normal. Judgment and thought content normal. Cognition and memory are normal.       Assessment/Plan:       Cindy was seen today for transient ischemic attack.    Diagnoses and all orders for this visit:    TIA (transient ischemic attack)  -     Holter Monitor - 72 Hour Up To 21 Days; Future  -     clopidogrel (PLAVIX) 75 MG tablet; Take 1 tablet by mouth  Daily.    Dizziness and giddiness   -     Holter Monitor - 72 Hour Up To 21 Days; Future    Stenosis of right middle cerebral artery  -     clopidogrel (PLAVIX) 75 MG tablet; Take 1 tablet by mouth Daily.    Musculoskeletal neck pain  -     Ambulatory Referral to Physical Therapy Evaluate and treat         I have recommended with dizziness and the TIA with prior strokes that we complete a 21-day Holter monitor to rule out paroxysmal A. fib.  She will continue the aspirin, Plavix and simvastatin for now for stroke prevention.  Referred to physical therapy for musculoskeletal neck pain.  She does have some mild hyperreflexia in all 4 extremities but no other abnormalities on physical exam today.  I do believe this is likely her baseline and physiologic.  I would recommend completing a baseline mental status exam in the future.  She will follow-up in 3 months or sooner as needed. Reviewed medications, potential side effects and signs and symptoms to report. Discussed risk versus benefits of treatment plan with patient and/or family-including medications, labs and radiology that may be ordered. Addressed questions and concerns during visit. Patient and/or family verbalized understanding and agree with plan.    During this visit the following were done:  Labs Reviewed [x]    Labs Ordered []    Radiology Reports Reviewed [x]    Radiology Ordered []    PCP Records Reviewed [x]    Referring Provider Records Reviewed [x]    ER Records Reviewed [x]    Hospital Records Reviewed []    History Obtained From Family []    Radiology Images Reviewed [x]    Other Reviewed [x]    Records Requested []      Discussed signs and symptoms of stroke and when to call 911. Instructed to follow a low fat diet including the Mediterranean diet. Instructed to take all medications daily as prescribed. Encouraged 30 minutes of exercise 3-4 times a week as tolerated. Stay well hydrated. Discussed potential side effects of new medications and signs  and symptoms to report. If patient is currently using tobacco products, smoking cessation was encouraged. Reviewed stroke risk factors and stroke prevention plan. Patient and/or family verbalizes understanding and agrees with plan.     EMR Dragon/Transcription Disclaimer:  Much of this encounter note is an electronic transcription of spoken language to printed text. Electronic transcription of spoken language may permit erroneous words or phrases to be inadvertently transcribed. Although I have reviewed the note for such errors, some may still exist in this documentation.      Andra Pat, APRN  3/22/2019

## 2019-03-29 ENCOUNTER — HOSPITAL ENCOUNTER (OUTPATIENT)
Dept: MAMMOGRAPHY | Facility: HOSPITAL | Age: 74
Discharge: HOME | End: 2019-03-29
Attending: OBSTETRICS & GYNECOLOGY | Admitting: OBSTETRICS & GYNECOLOGY

## 2019-03-29 ENCOUNTER — TRANSCRIBE ORDERS (OUTPATIENT)
Dept: MAMMOGRAPHY | Facility: HOSPITAL | Age: 74
End: 2019-03-29

## 2019-03-29 DIAGNOSIS — R92.8 ABNORMAL MAMMOGRAM: Primary | ICD-10-CM

## 2019-03-29 DIAGNOSIS — R92.8 ABNORMAL MAMMOGRAM: ICD-10-CM

## 2019-03-29 PROCEDURE — G0279 TOMOSYNTHESIS, MAMMO: HCPCS

## 2019-03-29 PROCEDURE — G0279 TOMOSYNTHESIS, MAMMO: HCPCS | Performed by: RADIOLOGY

## 2019-03-29 PROCEDURE — 77066 DX MAMMO INCL CAD BI: CPT

## 2019-03-29 PROCEDURE — 77066 DX MAMMO INCL CAD BI: CPT | Performed by: RADIOLOGY

## 2019-06-13 ENCOUNTER — OFFICE VISIT (OUTPATIENT)
Dept: INTERNAL MEDICINE | Facility: CLINIC | Age: 74
End: 2019-06-13

## 2019-06-13 ENCOUNTER — TELEPHONE (OUTPATIENT)
Dept: INTERNAL MEDICINE | Facility: CLINIC | Age: 74
End: 2019-06-13

## 2019-06-13 VITALS
WEIGHT: 140 LBS | RESPIRATION RATE: 16 BRPM | SYSTOLIC BLOOD PRESSURE: 136 MMHG | DIASTOLIC BLOOD PRESSURE: 58 MMHG | HEART RATE: 68 BPM | BODY MASS INDEX: 24.03 KG/M2 | TEMPERATURE: 99.4 F

## 2019-06-13 DIAGNOSIS — I10 ESSENTIAL HYPERTENSION: ICD-10-CM

## 2019-06-13 DIAGNOSIS — Z23 NEED FOR PNEUMOCOCCAL VACCINE: ICD-10-CM

## 2019-06-13 DIAGNOSIS — E78.5 HYPERLIPIDEMIA, UNSPECIFIED HYPERLIPIDEMIA TYPE: Primary | ICD-10-CM

## 2019-06-13 DIAGNOSIS — E55.9 VITAMIN D DEFICIENCY: ICD-10-CM

## 2019-06-13 DIAGNOSIS — K21.9 GASTROESOPHAGEAL REFLUX DISEASE WITHOUT ESOPHAGITIS: ICD-10-CM

## 2019-06-13 PROCEDURE — 90670 PCV13 VACCINE IM: CPT | Performed by: INTERNAL MEDICINE

## 2019-06-13 PROCEDURE — 99214 OFFICE O/P EST MOD 30 MIN: CPT | Performed by: INTERNAL MEDICINE

## 2019-06-13 PROCEDURE — G0009 ADMIN PNEUMOCOCCAL VACCINE: HCPCS | Performed by: INTERNAL MEDICINE

## 2019-06-13 PROCEDURE — 36415 COLL VENOUS BLD VENIPUNCTURE: CPT | Performed by: INTERNAL MEDICINE

## 2019-06-13 NOTE — PROGRESS NOTES
Chief Complaint   Patient presents with   • Follow-up     3 month follow up chronic medical problems       History of Present Illness    The patient presents for a follow-up related to hyperlipidemia. She is following a low fat diet. She reports that she is exercising. She is taking simvastatin. The patient is taking her medication as prescribed. She reports no medication side effects, including muscle cramps, abdominal pain, headaches or weakness. She denies chest pain, shortness of breath, orthopnea, paroxysmal nocturnal dyspnea, dyspnea on exertion, edema, palpitations or syncope.    The patient presents for a follow-up related to hypertension. The patient reports that she has had no headaches or blurred vision. She states that she is taking her medication as prescribed. She is not having medication side effects.    The patient presents for a follow-up related to GERD. The patient is on famotidine for her gastroesophageal reflux. The medication is taken on a regular basis and gives complete relief of the symptoms. She reports no abdominal pain, belching, diarrhea, dysphagia, early satiety, heartburn, hoarseness, nausea, odynophagia, rectal bleeding, vomiting or weight loss. The GERD has no known aggravating factors.    Review of Systems    CONSTITUTIONAL- Denies Fever, Chills, Sweats, Fatigue, Weakness or Malaise.    HENT- Denies Nasal Discharge, Sore Throat, Ear Pain, Ear Drainage, Sinus Pain, Nasal Congestion, Decreased Hearing or Tinnitus.    GASTROINTESTINAL- Denies Constipation.    PULMONARY- Denies Wheezing, Sputum Production, Cough, Hemoptysis or Pleuritic Chest Pain.    CARDIOVASCULAR- Denies Claudication or Irregular Heart Beat.    Medications      Current Outpatient Medications:   •  aspirin 81 MG chewable tablet, Chew 1 tablet Daily., Disp: 30 tablet, Rfl: 1  •  clopidogrel (PLAVIX) 75 MG tablet, Take 1 tablet by mouth Daily., Disp: 90 tablet, Rfl: 3  •  famotidine (PEPCID) 20 MG tablet, Take 1 tablet  by mouth 2 (Two) Times a Day., Disp: 20 tablet, Rfl: 0  •  hydrochlorothiazide (HYDRODIURIL) 25 MG tablet, TAKE ONE TABLET BY MOUTH ONCE DAILY, Disp: 90 tablet, Rfl: 1  •  simvastatin (ZOCOR) 40 MG tablet, TAKE 1 TABLET BY MOUTH ONCE DAILY, Disp: 90 tablet, Rfl: 1  •  vitamin D (ERGOCALCIFEROL) 26209 units capsule capsule, Take 1 capsule by mouth 1 (One) Time Per Week., Disp: 12 capsule, Rfl: 1     Allergies    Allergies   Allergen Reactions   • Strawberry Extract Swelling       Problem List    Patient Active Problem List   Diagnosis   • Hyperlipidemia   • Hypertension   • Ischemic stroke (CMS/HCC)   • Endometrial cancer (CMS/HCC)   • GERD (gastroesophageal reflux disease)   • Hx: UTI (urinary tract infection)   • History of endometrial cancer   • TIA (transient ischemic attack)   • Stenosis of right middle cerebral artery   • Musculoskeletal neck pain       Medications, Allergies, Problems List and Past History were reviewed and updated.    Physical Examination    /58   Pulse 68   Temp 99.4 °F (37.4 °C) (Temporal)   Resp 16   Wt 63.5 kg (140 lb)   LMP  (LMP Unknown)   Breastfeeding? No   BMI 24.03 kg/m²     HEENT: Facies- Within normal limits. Lids- Normal bilaterally. Conjunctiva- Clear bilaterally. Sclera- Anicteric bilaterally.    Neck: Thyroid- non enlarged, symmetric and has no nodules. No bruits are detected. ROM- Normal Range of Motion with no rigidity.    Lungs: Auscultation- Clear to auscultation bilaterally. There are no retractions, clubbing or cyanosis. The Expiratory to Inspiratory ratio is equal.    Cardiovascular: There are no carotid bruits. Heart- Normal Rate with Regular rhythm and no murmurs. There are no gallops. There are no rubs. In the lower extremities there is no edema. The upper extremities do not have edema.    Abdomen: Soft, benign, non-tender with no masses, hernias, organomegaly or scars.    Impression and Assessment    Vitamin D Deficiency.    Encounter for Immunization  Administration.    Hyperlipidemia.    Essential Hypertension.    Gastroesophageal Reflux Disease.    Plan    Gastroesophageal Reflux Disease Plan: The current plan was continued.    Hyperlipidemia Plan: The patient was instructed to exercise daily, eat a low fat diet and continue her medications.    Essential Hypertension Plan: The current plan was continued.    Vitamin D Deficiency Plan: Further plans will be formulated after test results are reviewed.    Counseled regarding immunizations and applicable VIS given.    Immunizations Ordered and Administered: Prevnar 13.    Cindy was seen today for follow-up.    Diagnoses and all orders for this visit:    Hyperlipidemia, unspecified hyperlipidemia type  -     Comprehensive Metabolic Panel  -     Lipid Panel    Essential hypertension  -     Comprehensive Metabolic Panel    Gastroesophageal reflux disease without esophagitis    Vitamin D deficiency  -     Vitamin D 25 Hydroxy    Need for pneumococcal vaccine  -     Pneumococcal Conjugate Vaccine 13-Valent All (PCV13)        Return to Office    The patient was instructed to return for follow-up in 6 months. Next Visit: Physical.    The patient was instructed to return sooner if the condition changes, worsens, or does not resolve.

## 2019-06-13 NOTE — TELEPHONE ENCOUNTER
----- Message from Cesilia Ortiz sent at 6/13/2019 10:59 AM EDT -----  PT REFUSED MEDICARE WELLNESS

## 2019-06-14 LAB
25(OH)D3+25(OH)D2 SERPL-MCNC: 27.3 NG/ML (ref 30–100)
ALBUMIN SERPL-MCNC: 4.2 G/DL (ref 3.5–5.2)
ALBUMIN/GLOB SERPL: 1.9 G/DL
ALP SERPL-CCNC: 80 U/L (ref 39–117)
ALT SERPL-CCNC: 14 U/L (ref 1–33)
AST SERPL-CCNC: 17 U/L (ref 1–32)
BILIRUB SERPL-MCNC: 0.8 MG/DL (ref 0.2–1.2)
BUN SERPL-MCNC: 10 MG/DL (ref 8–23)
BUN/CREAT SERPL: 11.1 (ref 7–25)
CALCIUM SERPL-MCNC: 9.2 MG/DL (ref 8.6–10.5)
CHLORIDE SERPL-SCNC: 100 MMOL/L (ref 98–107)
CHOLEST SERPL-MCNC: 172 MG/DL (ref 0–200)
CO2 SERPL-SCNC: 28.1 MMOL/L (ref 22–29)
CREAT SERPL-MCNC: 0.9 MG/DL (ref 0.57–1)
GLOBULIN SER CALC-MCNC: 2.2 GM/DL
GLUCOSE SERPL-MCNC: 103 MG/DL (ref 65–99)
HDLC SERPL-MCNC: 79 MG/DL (ref 40–60)
LDLC SERPL CALC-MCNC: 64 MG/DL (ref 0–100)
POTASSIUM SERPL-SCNC: 3.7 MMOL/L (ref 3.5–5.2)
PROT SERPL-MCNC: 6.4 G/DL (ref 6–8.5)
SODIUM SERPL-SCNC: 140 MMOL/L (ref 136–145)
TRIGL SERPL-MCNC: 145 MG/DL (ref 0–150)
VLDLC SERPL CALC-MCNC: 29 MG/DL

## 2019-06-21 ENCOUNTER — TELEPHONE (OUTPATIENT)
Dept: NEUROLOGY | Facility: CLINIC | Age: 74
End: 2019-06-21

## 2019-06-21 NOTE — TELEPHONE ENCOUNTER
----- Message from CHAPARRO Landon sent at 6/20/2019  3:29 PM EDT -----  Holter Monitor shows no significant arrhythmia. 1 time short spell of abnormal rhythm was seen without any symptoms noted on holter monitor by patient. If she were to develop any persistent or recurrent heart palpitations, would recommend consultation with cardiology. Thanks, CHAPARRO Morgan Dr.-1 short brief SVT but otherwise WNL. Could consult cardiology if symptoms persist. Patient was asymptomatic.

## 2019-06-26 ENCOUNTER — OFFICE VISIT (OUTPATIENT)
Dept: NEUROLOGY | Facility: CLINIC | Age: 74
End: 2019-06-26

## 2019-06-26 VITALS
WEIGHT: 141 LBS | SYSTOLIC BLOOD PRESSURE: 140 MMHG | HEIGHT: 64 IN | DIASTOLIC BLOOD PRESSURE: 64 MMHG | BODY MASS INDEX: 24.07 KG/M2

## 2019-06-26 DIAGNOSIS — I66.01 STENOSIS OF RIGHT MIDDLE CEREBRAL ARTERY: Primary | ICD-10-CM

## 2019-06-26 DIAGNOSIS — I63.9 ISCHEMIC STROKE (HCC): ICD-10-CM

## 2019-06-26 DIAGNOSIS — G31.9 CEREBRAL ATROPHY (HCC): ICD-10-CM

## 2019-06-26 PROCEDURE — 99213 OFFICE O/P EST LOW 20 MIN: CPT | Performed by: NURSE PRACTITIONER

## 2019-06-26 NOTE — PROGRESS NOTES
Subjective:     Patient ID: Cindy Sage is a 74 y.o. female.    CC:   Chief Complaint   Patient presents with   • Transient Ischemic Attack       HPI:   History of Present Illness   This is a very pleasant 74-year-old female who presents for 3 month follow up on TIA.  She is accompanied by her  and grandson today. No recurrent stroke symptoms. Currently taking Aspirin 81mg, Plavix 75mg and Zocor 40mg daily for 2nd stroke prevention with known Right PCA/MCA stenosis. She also had Holter Monitor which was essentially normal with a very brief run of SVT-she tells me she had an episode of heart racing while wearing the monitor the last day when her  fell off their porch. Denies palpitations. Dizziness resolved. She and  feel her memory is good. She completed High school and took a few college classes. She worked as a  before retiring. They care for their grandson with down syndrome. Sleeps well. Eats well. Drinks flavored water. Does not exercise.    Stroke History: On 2/28/2019 she started having acute vision changes with flashes of lights around her left eye as well as left hand heaviness.  She had a very minimal right-sided headache.  She felt like her symptoms were similar to her stroke she had in 2016.  She presented to the ER for further evaluation.  MRI of the brain was negative for any acute intracranial abnormality and did show a few small scattered old right sided lacunar infarcts in the frontal and parietal region and this was reviewed today in office and does have significant atrophy slightly advanced for her age.  She denies any memory issues and her  concurs..  She also had an MRA of the neck which was unremarkable.  MRA of the head did show markedly attenuated flow signal in the right posterior cerebral arteries as well as significant stenosis in the right and left.  She also had stenosis in the right M2 branches.  The remainder of the MRI of the brain was  normal. She did have an echocardiogram as well with no evidence of PFO or ASD.  Showed some trace to mild mitral regurgitation and trace tricuspid regurgitation with a normal ejection fraction of 60%.      The following portions of the patient's history were reviewed and updated as appropriate: allergies, current medications, past family history, past medical history, past social history, past surgical history and problem list.    Past Medical History:   Diagnosis Date   • Drug therapy 2016    For endometrial CA in 2016   • Elevated cholesterol    • Endometrial cancer (CMS/HCC) 04/2016    Stage IIIC2    • GERD (gastroesophageal reflux disease)    • HTN (hypertension)    • Hx of radiation therapy 2016    For endometrial CA 2016   • Stroke (CMS/HCC) 02/2016    No residual deficits.  Takes .       Past Surgical History:   Procedure Laterality Date   • APPENDECTOMY      Ex lap   • FOOT SURGERY     • OOPHORECTOMY     • TONSILLECTOMY     • TOTAL LAPAROSCOPIC HYSTERECTOMY SALPINGO OOPHORECTOMY Bilateral 04/19/2016    RATLH, BSO, LND       Social History     Socioeconomic History   • Marital status:      Spouse name: Not on file   • Number of children: Not on file   • Years of education: Not on file   • Highest education level: Not on file   Tobacco Use   • Smoking status: Never Smoker   • Smokeless tobacco: Never Used   Substance and Sexual Activity   • Alcohol use: Defer   • Sexual activity: Defer   Social History Narrative    Lives at home in [place] with . Completes all ADLs with no residual deficits from prior stroke       Family History   Problem Relation Age of Onset   • Arthritis Mother    • Breast cancer Mother         age unknown    • Osteoporosis Mother    • Alcohol abuse Father    • Stroke Maternal Aunt    • Ovarian cancer Neg Hx    • Endometrial cancer Neg Hx         Review of Systems   Constitutional: Negative.    HENT: Negative.    Eyes: Negative.    Respiratory: Negative.     Cardiovascular: Negative.    Gastrointestinal: Negative.    Endocrine: Negative.    Genitourinary: Negative.    Musculoskeletal: Negative.    Skin: Negative.    Neurological: Negative.    Hematological: Negative.    Psychiatric/Behavioral: Negative.         Objective:    Neurologic Exam     Mental Status   Oriented to person, place, and time.   Level of consciousness: alert    Cranial Nerves   Cranial nerves II through XII intact.     Motor Exam   Muscle bulk: normal  Overall muscle tone: normal    Strength   Strength 5/5 throughout.     Gait, Coordination, and Reflexes     Gait  Gait: normal    Coordination   Finger to nose coordination: normal      Physical Exam   Constitutional: She is oriented to person, place, and time.   Neurological: She is oriented to person, place, and time. She has normal strength. She has a normal Finger-Nose-Finger Test. Gait normal.       Assessment/Plan:       Cindy was seen today for transient ischemic attack.    Diagnoses and all orders for this visit:    Stenosis of right middle cerebral artery  Comments:  continue plavix, aspirin and statin lifelong    Ischemic stroke (CMS/[facility])  Comments:  continue plavix, aspirin and statin lifelong    Cerebral atrophy  Comments:  MMSE baseline at follow up           Continue current meds. F/U in 9 months for refills and baseline MMSE. Encouraged her to drink plenty of water, exercise 30 min 5 days a week and continue to get good rest. Reviewed medications, potential side effects and signs and symptoms to report. Discussed risk versus benefits of treatment plan with patient and/or family-including medications, labs and radiology that may be ordered. Addressed questions and concerns during visit. Patient and/or family verbalized understanding and agree with plan.    Discussed signs and symptoms of stroke and when to call 911. Instructed to follow a low fat diet including the Mediterranean diet. Instructed to take all medications daily as  prescribed. Encouraged 30 minutes of exercise 3-4 times a week as tolerated. Stay well hydrated. Discussed potential side effects of new medications and signs and symptoms to report. If patient is currently using tobacco products, smoking cessation was encouraged. Reviewed stroke risk factors and stroke prevention plan. Patient and/or family verbalizes understanding and agrees with plan.     During this visit the following were done:  Labs Reviewed []    Labs Ordered []    Radiology Reports Reviewed []    Radiology Ordered []    PCP Records Reviewed []    Referring Provider Records Reviewed []    ER Records Reviewed []    Hospital Records Reviewed []    History Obtained From Family []    Radiology Images Reviewed []    Other Reviewed [x]  Holter  Records Requested []      EMR Dragon/Transcription Disclaimer:  Much of this encounter note is an electronic transcription of spoken language to printed text. Electronic transcription of spoken language may permit erroneous words or phrases to be inadvertently transcribed. Although I have reviewed the note for such errors, some may still exist in this documentation.    Andra Pat, APRN  6/26/2019

## 2019-07-24 RX ORDER — SIMVASTATIN 40 MG
TABLET ORAL
Qty: 90 TABLET | Refills: 1 | Status: SHIPPED | OUTPATIENT
Start: 2019-07-24 | End: 2020-01-21

## 2019-08-06 RX ORDER — HYDROCHLOROTHIAZIDE 25 MG/1
TABLET ORAL
Qty: 90 TABLET | Refills: 1 | Status: SHIPPED | OUTPATIENT
Start: 2019-08-06 | End: 2020-02-10

## 2019-09-26 ENCOUNTER — OFFICE VISIT (OUTPATIENT)
Dept: GYNECOLOGIC ONCOLOGY | Facility: CLINIC | Age: 74
End: 2019-09-26

## 2019-09-26 ENCOUNTER — LAB (OUTPATIENT)
Dept: LAB | Facility: HOSPITAL | Age: 74
End: 2019-09-26

## 2019-09-26 VITALS
OXYGEN SATURATION: 98 % | HEART RATE: 81 BPM | DIASTOLIC BLOOD PRESSURE: 63 MMHG | RESPIRATION RATE: 18 BRPM | SYSTOLIC BLOOD PRESSURE: 131 MMHG | BODY MASS INDEX: 24.37 KG/M2 | WEIGHT: 142 LBS

## 2019-09-26 DIAGNOSIS — C54.1 ENDOMETRIAL CANCER (HCC): Primary | ICD-10-CM

## 2019-09-26 DIAGNOSIS — C54.1 ENDOMETRIAL CANCER (HCC): ICD-10-CM

## 2019-09-26 LAB — CANCER AG125 SERPL QL: 16.4 U/ML (ref 0–38.1)

## 2019-09-26 PROCEDURE — 36415 COLL VENOUS BLD VENIPUNCTURE: CPT

## 2019-09-26 PROCEDURE — 99213 OFFICE O/P EST LOW 20 MIN: CPT | Performed by: NURSE PRACTITIONER

## 2019-09-26 PROCEDURE — 86304 IMMUNOASSAY TUMOR CA 125: CPT

## 2019-09-26 NOTE — PROGRESS NOTES
GYN ONCOLOGY CANCER SURVEILLANCE FOLLOW-UP    Cindy Sage  [id number]  1945    Chief Complaint: Follow-up (NO COMPLAINTS)        History of present illness:  Cindy Sage is a 74 y.o. year old female who is here today for ongoing surveillance of Endometrial Cancer, see Cancer History. She is now 3 years out from completion of treatment. CA-125 has remained low/stable, due to repeat now. She reports she is feeling very well today and has no complaints. She denies vaginal bleeding, pelvic pain, and changes in bowel or bladder function. Cindy and her  put in an in-ground pool this summer and have enjoyed it every day.           Cancer History:      Endometrial cancer (CMS/HCC)    3/26/2016 Imaging     CT in ER for acute onset postmenopausal bleeding revealed mass in the lower uterine segment. Gyn Oncology called to evaluate.         3/29/2016 Biopsy     Endometrial biopsy in office, pathology revealed complex atypical hyperplasia with stromal breakdown.            4/1/2016 Initial Diagnosis     Endometrial cancer         4/19/2016 Surgery     RATLH, BSO, LND to several millimeters of residual disease.          5/20/2016 - 11/15/2016 Chemotherapy     Carboplatin AUC 6, Paclitaxel 175 mg/m2 x 6 cycles in sandwich fashion with radiation in between cycles #3 and #4          - 9/2/2016 Radiation     Radiation completed. Pelvis and periaortic nodes received a total of 45 Gy in 25 fractions during sandwich therapy. This was followed by vaginal brachytherapy: upper vagina treated utilizing cylinder and high dose rate iridium-192 to 18 Gy in 3 fractions.          11/21/2016 Imaging     Post-treatment PET/CT negative for disease            Past Medical History:   Diagnosis Date   • Drug therapy 2016    For endometrial CA in 2016   • Elevated cholesterol    • Endometrial cancer (CMS/HCC) 04/2016    Stage IIIC2    • GERD (gastroesophageal reflux disease)    • HTN (hypertension)    • Hx of radiation  therapy 2016    For endometrial CA 2016   • Stroke (CMS/HCC) 02/2016    No residual deficits.  Takes .       Past Surgical History:   Procedure Laterality Date   • APPENDECTOMY      Ex lap   • FOOT SURGERY     • OOPHORECTOMY     • TONSILLECTOMY     • TOTAL LAPAROSCOPIC HYSTERECTOMY SALPINGO OOPHORECTOMY Bilateral 04/19/2016    RATLH, BSO, LND       MEDICATIONS: The current medication list was reviewed and reconciled.     Allergies:  is allergic to strawberry extract.    Family History   Problem Relation Age of Onset   • Arthritis Mother    • Breast cancer Mother         age unknown    • Osteoporosis Mother    • Alcohol abuse Father    • Stroke Maternal Aunt    • Ovarian cancer Neg Hx    • Endometrial cancer Neg Hx        Last imaging study was post-treatment PET 11/21/2016.   Tumor marker:     Date Value Ref Range Status   03/20/2019 11.2 0.0 - 30.2 U/mL Final   09/18/2018 11.2 0.0 - 30.2 U/mL Final   06/14/2018 10.5 0.0 - 30.2 U/mL Final   05/24/2017 8.3 0.0 - 30.2 U/mL Final       Review of Systems   Constitutional: Negative for fatigue, fever and unexpected weight change.   HENT: Negative for congestion, ear pain, hearing loss, sinus pressure and trouble swallowing.    Eyes: Negative for visual disturbance.   Respiratory: Negative for cough, chest tightness, shortness of breath and wheezing.    Cardiovascular: Negative for chest pain, palpitations and leg swelling.   Gastrointestinal: Negative for abdominal distention, abdominal pain, constipation, diarrhea, nausea and vomiting.   Endocrine: Negative for cold intolerance, heat intolerance, polydipsia, polyphagia and polyuria.   Genitourinary: Negative for difficulty urinating, dyspareunia, dysuria, frequency, hematuria, pelvic pain, urgency, vaginal bleeding, vaginal discharge and vaginal pain.   Musculoskeletal: Negative for arthralgias, gait problem, joint swelling and myalgias.   Skin: Negative for color change, pallor and rash.   Neurological:  Negative for dizziness, seizures, syncope, weakness, light-headedness, numbness and headaches.   Hematological: Negative for adenopathy. Does not bruise/bleed easily.   Psychiatric/Behavioral: Negative for agitation, confusion, sleep disturbance and suicidal ideas. The patient is not nervous/anxious.        Physical Exam  Vital Signs: /63   Pulse 81   Resp 18   Wt 64.4 kg (142 lb)   LMP  (LMP Unknown)   SpO2 98%   BMI 24.37 kg/m²   Pain Score    09/26/19 0932   PainSc: 0-No pain      General Appearance:  alert, cooperative, no apparent distress, appears stated age and normal weight   Neurologic/Psychiatric: A&O x 3, gait steady, appropriate affect   HEENT:  Normocephalic, without obvious abnormality, mucous membranes moist   Neck: Supple, symmetrical, trachea midline, no adenopathy;  No thyromegaly, masses, or tenderness   Back:   Symmetric, no curvature, ROM normal, no CVA tenderness   Lungs:   Clear to auscultation bilaterally; respirations regular, even, and unlabored bilaterally   Heart:  Regular rate and rhythm, no murmurs appreciated   Breasts:  deferred   Abdomen:   Soft, non-tender, non-distended and no organomegaly   Lymph nodes: No cervical, supraclavicular, inguinal adenopathy noted   Extremities: Normal, atraumatic; no clubbing, cyanosis, or edema    Pelvic: External Genitalia  atrophic, without lesions  Vagina  is pale, atrophic.  and shortened vaginal vault.  Vaginal Cuff  Female Vaginal Cuff: smooth, intact, without visible lesions and changes consistent with previous radiation  Uterus  surgically absent and no palpable masses  Ovaries  surgically absent bilaterallly  Parametria  smooth  Rectovaginal  Female rectovaginal: deferred     ECOG Performance Status: 0 - Asymptomatic    Procedure Note:  No notes on file      Assessment and Plan:  Cindy was seen today for follow-up.    Diagnoses and all orders for this visit:    Endometrial cancer (CMS/HCC)        There is no evidence of disease  upon today's exam. She will be notified of CA-125 results upon their return. Continue every 6 month cancer surveillance until the 5 year melvina. She is understanding to call with any changes in pelvic symptoms or general GYN concerns at any time between regularly scheduled visits.       Return to clinic in 6 months for ongoing cancer surveillance.      CHAPARRO Hand

## 2019-09-27 ENCOUNTER — TELEPHONE (OUTPATIENT)
Dept: GYNECOLOGIC ONCOLOGY | Facility: CLINIC | Age: 74
End: 2019-09-27

## 2019-09-27 NOTE — TELEPHONE ENCOUNTER
----- Message from CHAPARRO Hand sent at 9/26/2019  4:21 PM EDT -----  Please notify CA-125 in baseline since treatment completion. Thanks!

## 2019-10-02 ENCOUNTER — HOSPITAL ENCOUNTER (OUTPATIENT)
Dept: ULTRASOUND IMAGING | Facility: HOSPITAL | Age: 74
Discharge: HOME | End: 2019-10-02

## 2019-10-02 ENCOUNTER — TRANSCRIBE ORDERS (OUTPATIENT)
Dept: MAMMOGRAPHY | Facility: HOSPITAL | Age: 74
End: 2019-10-02

## 2019-10-02 ENCOUNTER — HOSPITAL ENCOUNTER (OUTPATIENT)
Dept: MAMMOGRAPHY | Facility: HOSPITAL | Age: 74
Discharge: HOME | End: 2019-10-02
Admitting: OBSTETRICS & GYNECOLOGY

## 2019-10-02 DIAGNOSIS — R92.8 ABNORMAL MAMMOGRAM: ICD-10-CM

## 2019-10-02 DIAGNOSIS — Z12.31 VISIT FOR SCREENING MAMMOGRAM: Primary | ICD-10-CM

## 2019-10-02 PROCEDURE — 77065 DX MAMMO INCL CAD UNI: CPT

## 2019-10-02 PROCEDURE — G0279 TOMOSYNTHESIS, MAMMO: HCPCS

## 2019-10-02 PROCEDURE — G0279 TOMOSYNTHESIS, MAMMO: HCPCS | Performed by: RADIOLOGY

## 2019-10-02 PROCEDURE — 76642 ULTRASOUND BREAST LIMITED: CPT

## 2019-10-02 PROCEDURE — 76642 ULTRASOUND BREAST LIMITED: CPT | Performed by: RADIOLOGY

## 2019-10-02 PROCEDURE — 77065 DX MAMMO INCL CAD UNI: CPT | Performed by: RADIOLOGY

## 2019-12-11 ENCOUNTER — OFFICE VISIT (OUTPATIENT)
Dept: INTERNAL MEDICINE | Facility: CLINIC | Age: 74
End: 2019-12-11

## 2019-12-11 VITALS
HEIGHT: 64 IN | DIASTOLIC BLOOD PRESSURE: 78 MMHG | HEART RATE: 64 BPM | BODY MASS INDEX: 24.59 KG/M2 | TEMPERATURE: 99.2 F | WEIGHT: 144 LBS | RESPIRATION RATE: 16 BRPM | SYSTOLIC BLOOD PRESSURE: 136 MMHG

## 2019-12-11 DIAGNOSIS — Z00.00 PHYSICAL EXAM: Primary | ICD-10-CM

## 2019-12-11 DIAGNOSIS — K21.9 GASTROESOPHAGEAL REFLUX DISEASE WITHOUT ESOPHAGITIS: ICD-10-CM

## 2019-12-11 DIAGNOSIS — R82.998 LEUKOCYTES IN URINE: ICD-10-CM

## 2019-12-11 DIAGNOSIS — E78.5 HYPERLIPIDEMIA, UNSPECIFIED HYPERLIPIDEMIA TYPE: ICD-10-CM

## 2019-12-11 DIAGNOSIS — I10 ESSENTIAL HYPERTENSION: ICD-10-CM

## 2019-12-11 LAB
BILIRUB BLD-MCNC: NEGATIVE MG/DL
CLARITY, POC: ABNORMAL
COLOR UR: YELLOW
EXPIRATION DATE: ABNORMAL
GLUCOSE UR STRIP-MCNC: NEGATIVE MG/DL
KETONES UR QL: ABNORMAL
LEUKOCYTE EST, POC: ABNORMAL
Lab: ABNORMAL
NITRITE UR-MCNC: NEGATIVE MG/ML
PH UR: 8 [PH] (ref 5–8)
PROT UR STRIP-MCNC: NEGATIVE MG/DL
RBC # UR STRIP: ABNORMAL /UL
SP GR UR: 1.01 (ref 1–1.03)
UROBILINOGEN UR QL: NORMAL

## 2019-12-11 PROCEDURE — 99397 PER PM REEVAL EST PAT 65+ YR: CPT | Performed by: INTERNAL MEDICINE

## 2019-12-11 PROCEDURE — 36415 COLL VENOUS BLD VENIPUNCTURE: CPT | Performed by: INTERNAL MEDICINE

## 2019-12-11 PROCEDURE — 81003 URINALYSIS AUTO W/O SCOPE: CPT | Performed by: INTERNAL MEDICINE

## 2019-12-11 NOTE — PROGRESS NOTES
Chief Complaint   Patient presents with   • Annual Exam       History of Present Illness    The patient presents for an established patient physical examination and health maintenance visit.    The patient presents for a follow-up related to hyperlipidemia. She is following a low fat diet. She reports that she is exercising. She is taking simvastatin. The patient is taking her medication as prescribed. She reports no medication side effects, including muscle cramps, abdominal pain, headaches or weakness. She denies orthopnea, paroxysmal nocturnal dyspnea or dyspnea on exertion.    The patient presents for a follow-up related to hypertension. She states that she is taking her medication as prescribed. She is not having medication side effects.    The patient presents for a follow-up related to GERD. The patient is on famotidine for her gastroesophageal reflux. The medication is taken on a regular basis and gives complete relief of the symptoms. She reports no belching, dysphagia, early satiety, hoarseness or odynophagia. The GERD has no known aggravating factors.    Review of Systems    CONSTITUTIONAL- Denies Unexplained Weight Loss, Fever, Chills, Sweats, Fatigue, Weakness or Malaise.    NECK- Denies Decreased Range of Motion, Stiffness, Thyroid Nodules, Enlarged Lymph Nodes or Goiter.    EYES- Denies Eye Pain, Eye Drainage, Eye Redness, Eye Discharge, Decreased Vision, Visual Disturbance, Diplopia, Visual Loss or Swollen Eyelid.    HENT- Denies Nasal Discharge, Sore Throat, Ear Pain, Ear Drainage, Headache, Sinus Pain, Nasal Congestion, Decreased Hearing or Tinnitus.    PULMONARY- Denies Wheezing, Dyspnea, Sputum Production, Cough, Hemoptysis or Pleuritic Chest Pain.    GASTROINTESTINAL- Denies Abdominal Pain, Nausea, Vomiting, Diarrhea, Blood per Rectum, Constipation or Heartburn.    CARDIOVASCULAR- Denies Chest Pain, Claudication, Edema, Syncope, Palpitations or Irregular Heart Beat.    GENITOURINARY- Denies  Dysuria, Hematuria, Urinary Frequency, Urinary Leakage, Pelvic Pain, Vaginal Prolapse, Vaginal Discharge, Dyspareunia or Abnormal Menses.    ENDOCRINE- Denies Cold Intolerance, Depression, Hair Loss, Heat Intolerance, Memory Loss, Polydypsia, Polyphagia, Polyuria, Sleep Disturbance or Weight Gain.    NEUROLOGIC- Denies Seizures, Visual Changes, Confusion or Excessive Daytime Sleepiness.    MUSCULOSKELETAL- Denies Joint Pain, Joint Stiffness, Decreased Range of Motion, Joint Swelling or Erythema of Joints.    INTEGUMENTARY- Denies Rash, Lumps, Sores, Itching, Dryness, Color Change, Changes in Hair, Brittle Nails, Discolored Nails, Thickened Nails, Growths or Alopecia.    Medications      Current Outpatient Medications:   •  aspirin 81 MG chewable tablet, Chew 1 tablet Daily., Disp: 30 tablet, Rfl: 1  •  clopidogrel (PLAVIX) 75 MG tablet, Take 1 tablet by mouth Daily., Disp: 90 tablet, Rfl: 3  •  famotidine (PEPCID) 20 MG tablet, Take 1 tablet by mouth 2 (Two) Times a Day. (Patient taking differently: Take 20 mg by mouth At Night As Needed.), Disp: 20 tablet, Rfl: 0  •  hydrochlorothiazide (HYDRODIURIL) 25 MG tablet, TAKE 1 TABLET BY MOUTH ONCE DAILY, Disp: 90 tablet, Rfl: 1  •  simvastatin (ZOCOR) 40 MG tablet, TAKE 1 TABLET BY MOUTH ONCE DAILY, Disp: 90 tablet, Rfl: 1  •  vitamin D (ERGOCALCIFEROL) 95592 units capsule capsule, Take 1 capsule by mouth 1 (One) Time Per Week., Disp: 12 capsule, Rfl: 1     Allergies    Allergies   Allergen Reactions   • Strawberry Extract Swelling       Problem List    Patient Active Problem List   Diagnosis   • Hyperlipidemia   • Hypertension   • Ischemic stroke (CMS/HCC)   • Endometrial cancer (CMS/HCC)   • GERD (gastroesophageal reflux disease)   • Hx: UTI (urinary tract infection)   • History of endometrial cancer   • TIA (transient ischemic attack)   • Stenosis of right middle cerebral artery   • Musculoskeletal neck pain       Medications, Allergies, Problems List and Past History  "were reviewed and updated.    Physical Examination    /78   Pulse 64   Temp 99.2 °F (37.3 °C) (Temporal)   Resp 16   Ht 163.2 cm (64.25\")   Wt 65.3 kg (144 lb)   LMP  (LMP Unknown)   Breastfeeding No   BMI 24.53 kg/m²     HEENT: Head- Normocephalic Atraumatic. Facies- Within normal limits. Pinnas- Normal texture and shape bilaterally. Canals- Normal bilaterally. TMs- Normal bilaterally. Nares- Patent bilaterally. Nasal Septum- is normal. There is no tenderness to palpation over the frontal or maxillary sinuses. Lids- Normal bilaterally. Conjunctiva- Clear bilaterally. Sclera- Anicteric bilaterally. Oropharynx- Moist with no lesions. Tonsils- No enlargement, erythema or exudate.    Neck: Thyroid- non enlarged, symmetric and has no nodules. No bruits are detected. ROM- Normal Range of Motion with no rigidity.    Lungs: Auscultation- Clear to auscultation bilaterally. There are no retractions, clubbing or cyanosis. The Expiratory to Inspiratory ratio is equal.    Lymph Nodes: Cervical- no enlarged lymph nodes noted. Clavicular- no enlarged supraclavicular lymph nodes noted. Axillary- no enlarged axillary lymph nodes noted. Inguinal- no enlarged inguinal lymph nodes noted.    Cardiovascular: There are no carotid bruits. Heart- Normal Rate with Regular rhythm and no murmurs. There are no gallops. There are no rubs. In the lower extremities there is no edema. The upper extremities do not have edema.    Abdomen: Soft, benign, non-tender with no masses, hernias, organomegaly or scars.    Breast: Normal contours bilaterally with no masses, discharge, skin changes or lumps. There are no scars noted.    Dermatologic: The patient has no worrisome or suspicious skin lesions noted.    Impression and Assessment    Normal Physical Examination.    Hyperlipidemia.    Essential Hypertension.    Gastroesophageal Reflux Disease.    Plan    Gastroesophageal Reflux Disease Plan: The current plan was " continued.    Hyperlipidemia Plan: The patient was instructed to exercise daily, eat a low fat diet and continue her medications.    Essential Hypertension Plan: The current plan was continued.    Counseling was provided regarding: Adequate Aerobic Exercise and Heart Healthy Diet.    The following was ordered for screening and health maintenance: CBC w Automated Diff, CMP, Lipid Profile, TSH and U/A.       Immunizations Ordered and Administered: None.    Cindy was seen today for annual exam.    Diagnoses and all orders for this visit:    Physical exam  -     Comprehensive Metabolic Panel  -     Lipid Panel  -     CBC & Differential  -     TSH  -     POC Urinalysis Dipstick, Automated    Hyperlipidemia, unspecified hyperlipidemia type  -     Comprehensive Metabolic Panel  -     Lipid Panel    Essential hypertension  -     Comprehensive Metabolic Panel    Gastroesophageal reflux disease without esophagitis        Return to Office    The patient was instructed to return for follow-up in 6 months. Next Visit: Follow-up.    The patient was instructed to return sooner if the condition changes, worsens, or does not resolve.

## 2019-12-12 LAB
ALBUMIN SERPL-MCNC: 4.3 G/DL (ref 3.5–5.2)
ALBUMIN/GLOB SERPL: 2 G/DL
ALP SERPL-CCNC: 99 U/L (ref 39–117)
ALT SERPL-CCNC: 15 U/L (ref 1–33)
AST SERPL-CCNC: 23 U/L (ref 1–32)
BASOPHILS # BLD AUTO: 0.02 10*3/MM3 (ref 0–0.2)
BASOPHILS NFR BLD AUTO: 0.6 % (ref 0–1.5)
BILIRUB SERPL-MCNC: 0.6 MG/DL (ref 0.2–1.2)
BUN SERPL-MCNC: 10 MG/DL (ref 8–23)
BUN/CREAT SERPL: 10.8 (ref 7–25)
CALCIUM SERPL-MCNC: 9.1 MG/DL (ref 8.6–10.5)
CHLORIDE SERPL-SCNC: 97 MMOL/L (ref 98–107)
CHOLEST SERPL-MCNC: 179 MG/DL (ref 0–200)
CO2 SERPL-SCNC: 30.5 MMOL/L (ref 22–29)
CREAT SERPL-MCNC: 0.93 MG/DL (ref 0.57–1)
EOSINOPHIL # BLD AUTO: 0.1 10*3/MM3 (ref 0–0.4)
EOSINOPHIL NFR BLD AUTO: 2.9 % (ref 0.3–6.2)
ERYTHROCYTE [DISTWIDTH] IN BLOOD BY AUTOMATED COUNT: 13.8 % (ref 12.3–15.4)
GLOBULIN SER CALC-MCNC: 2.2 GM/DL
GLUCOSE SERPL-MCNC: 87 MG/DL (ref 65–99)
HCT VFR BLD AUTO: 36 % (ref 34–46.6)
HDLC SERPL-MCNC: 85 MG/DL (ref 40–60)
HGB BLD-MCNC: 12 G/DL (ref 12–15.9)
IMM GRANULOCYTES # BLD AUTO: 0.01 10*3/MM3 (ref 0–0.05)
IMM GRANULOCYTES NFR BLD AUTO: 0.3 % (ref 0–0.5)
LDLC SERPL CALC-MCNC: 69 MG/DL (ref 0–100)
LYMPHOCYTES # BLD AUTO: 0.59 10*3/MM3 (ref 0.7–3.1)
LYMPHOCYTES NFR BLD AUTO: 17.2 % (ref 19.6–45.3)
MCH RBC QN AUTO: 29.3 PG (ref 26.6–33)
MCHC RBC AUTO-ENTMCNC: 33.3 G/DL (ref 31.5–35.7)
MCV RBC AUTO: 87.8 FL (ref 79–97)
MONOCYTES # BLD AUTO: 0.39 10*3/MM3 (ref 0.1–0.9)
MONOCYTES NFR BLD AUTO: 11.3 % (ref 5–12)
NEUTROPHILS # BLD AUTO: 2.33 10*3/MM3 (ref 1.7–7)
NEUTROPHILS NFR BLD AUTO: 67.7 % (ref 42.7–76)
NRBC BLD AUTO-RTO: 0 /100 WBC (ref 0–0.2)
PLATELET # BLD AUTO: 226 10*3/MM3 (ref 140–450)
POTASSIUM SERPL-SCNC: 3.8 MMOL/L (ref 3.5–5.2)
PROT SERPL-MCNC: 6.5 G/DL (ref 6–8.5)
RBC # BLD AUTO: 4.1 10*6/MM3 (ref 3.77–5.28)
SODIUM SERPL-SCNC: 140 MMOL/L (ref 136–145)
TRIGL SERPL-MCNC: 125 MG/DL (ref 0–150)
TSH SERPL DL<=0.005 MIU/L-ACNC: 2.66 UIU/ML (ref 0.27–4.2)
VLDLC SERPL CALC-MCNC: 25 MG/DL
WBC # BLD AUTO: 3.44 10*3/MM3 (ref 3.4–10.8)

## 2019-12-14 LAB
BACTERIA UR CULT: ABNORMAL
BACTERIA UR CULT: ABNORMAL
OTHER ANTIBIOTIC SUSC ISLT: ABNORMAL

## 2019-12-17 RX ORDER — CEFDINIR 300 MG/1
300 CAPSULE ORAL 2 TIMES DAILY
Qty: 14 CAPSULE | Refills: 0 | Status: SHIPPED | OUTPATIENT
Start: 2019-12-17 | End: 2019-12-24

## 2020-01-21 RX ORDER — SIMVASTATIN 40 MG
TABLET ORAL
Qty: 90 TABLET | Refills: 0 | Status: SHIPPED | OUTPATIENT
Start: 2020-01-21 | End: 2020-04-27

## 2020-02-10 RX ORDER — HYDROCHLOROTHIAZIDE 25 MG/1
TABLET ORAL
Qty: 90 TABLET | Refills: 0 | Status: SHIPPED | OUTPATIENT
Start: 2020-02-10 | End: 2020-04-30

## 2020-04-03 ENCOUNTER — APPOINTMENT (OUTPATIENT)
Dept: MAMMOGRAPHY | Facility: HOSPITAL | Age: 75
End: 2020-04-03

## 2020-04-27 RX ORDER — SIMVASTATIN 40 MG
TABLET ORAL
Qty: 90 TABLET | Refills: 0 | Status: SHIPPED | OUTPATIENT
Start: 2020-04-27 | End: 2020-06-24

## 2020-04-30 DIAGNOSIS — I66.01 STENOSIS OF RIGHT MIDDLE CEREBRAL ARTERY: ICD-10-CM

## 2020-04-30 DIAGNOSIS — G45.9 TIA (TRANSIENT ISCHEMIC ATTACK): ICD-10-CM

## 2020-04-30 RX ORDER — HYDROCHLOROTHIAZIDE 25 MG/1
TABLET ORAL
Qty: 90 TABLET | Refills: 0 | Status: SHIPPED | OUTPATIENT
Start: 2020-04-30 | End: 2020-08-07

## 2020-04-30 RX ORDER — CLOPIDOGREL BISULFATE 75 MG/1
TABLET ORAL
Qty: 90 TABLET | Refills: 3 | Status: SHIPPED | OUTPATIENT
Start: 2020-04-30 | End: 2020-06-24

## 2020-05-12 ENCOUNTER — LAB (OUTPATIENT)
Dept: LAB | Facility: HOSPITAL | Age: 75
End: 2020-05-12

## 2020-05-12 ENCOUNTER — OFFICE VISIT (OUTPATIENT)
Dept: GYNECOLOGIC ONCOLOGY | Facility: CLINIC | Age: 75
End: 2020-05-12

## 2020-05-12 VITALS
OXYGEN SATURATION: 93 % | TEMPERATURE: 97.8 F | RESPIRATION RATE: 14 BRPM | BODY MASS INDEX: 24.36 KG/M2 | HEART RATE: 69 BPM | SYSTOLIC BLOOD PRESSURE: 166 MMHG | WEIGHT: 143 LBS | DIASTOLIC BLOOD PRESSURE: 69 MMHG

## 2020-05-12 DIAGNOSIS — C54.1 ENDOMETRIAL CANCER (HCC): ICD-10-CM

## 2020-05-12 DIAGNOSIS — C54.1 ENDOMETRIAL CANCER (HCC): Primary | ICD-10-CM

## 2020-05-12 LAB — CANCER AG125 SERPL QL: 16.3 U/ML (ref 0–38.1)

## 2020-05-12 PROCEDURE — 36415 COLL VENOUS BLD VENIPUNCTURE: CPT

## 2020-05-12 PROCEDURE — 86304 IMMUNOASSAY TUMOR CA 125: CPT

## 2020-05-12 PROCEDURE — 99213 OFFICE O/P EST LOW 20 MIN: CPT | Performed by: NURSE PRACTITIONER

## 2020-05-12 NOTE — PROGRESS NOTES
GYN ONCOLOGY CANCER SURVEILLANCE FOLLOW-UP    Cindy Sage  [id number]  1945    Chief Complaint: Follow-up (no gyn complaints)        History of present illness:  Cindy Sage is a 75 y.o. year old female who is here today for ongoing surveillance of Endometrial Cancer, see Cancer History. She is now 3 years out from completion of treatment. CA-125 has remained low/stable, due to repeat now. She reports she is feeling very well today and has no complaints. She denies vaginal bleeding, pelvic pain, and changes in bowel or bladder function.      She denies any symptoms of coronavirus or any known exposures. She has been staying safe at home.      Cancer History:      Endometrial cancer (CMS/HCC)    3/26/2016 Imaging     CT in ER for acute onset postmenopausal bleeding revealed mass in the lower uterine segment. Gyn Oncology called to evaluate.      3/29/2016 Biopsy     Endometrial biopsy in office, pathology revealed complex atypical hyperplasia with stromal breakdown.         4/1/2016 Initial Diagnosis     Endometrial cancer      4/19/2016 Surgery     RATLH, BSO, LND to several millimeters of residual disease.       5/20/2016 - 11/15/2016 Chemotherapy     Carboplatin AUC 6, Paclitaxel 175 mg/m2 x 6 cycles in sandwich fashion with radiation in between cycles #3 and #4       - 9/2/2016 Radiation     Radiation completed. Pelvis and periaortic nodes received a total of 45 Gy in 25 fractions during sandwich therapy. This was followed by vaginal brachytherapy: upper vagina treated utilizing cylinder and high dose rate iridium-192 to 18 Gy in 3 fractions.       11/21/2016 Imaging     Post-treatment PET/CT negative for disease         Past Medical History:   Diagnosis Date   • Drug therapy 2016    For endometrial CA in 2016   • Elevated cholesterol    • Endometrial cancer (CMS/HCC) 04/2016    Stage IIIC2    • GERD (gastroesophageal reflux disease)    • HTN (hypertension)    • Hx of radiation therapy 2016     For endometrial CA 2016   • Stroke (CMS/HCC) 02/2016    No residual deficits.  Takes .       Past Surgical History:   Procedure Laterality Date   • APPENDECTOMY      Ex lap   • FOOT SURGERY     • OOPHORECTOMY     • TONSILLECTOMY     • TOTAL LAPAROSCOPIC HYSTERECTOMY SALPINGO OOPHORECTOMY Bilateral 04/19/2016    RATLH, BSO, LND       MEDICATIONS: The current medication list was reviewed and reconciled.     Allergies:  is allergic to strawberry extract.    Family History   Problem Relation Age of Onset   • Arthritis Mother    • Breast cancer Mother         age unknown    • Osteoporosis Mother    • Alcohol abuse Father    • Stroke Maternal Aunt    • Ovarian cancer Neg Hx    • Endometrial cancer Neg Hx        Last imaging study was post-treatment PET 11/21/2016.   Tumor marker:     Date Value Ref Range Status   09/26/2019 16.4 0.0 - 38.1 U/mL Final   03/20/2019 11.2 0.0 - 30.2 U/mL Final   09/18/2018 11.2 0.0 - 30.2 U/mL Final   06/14/2018 10.5 0.0 - 30.2 U/mL Final       Review of Systems   Constitutional: Negative for fatigue, fever and unexpected weight change.   HENT: Negative for congestion, ear pain, hearing loss, sinus pressure and trouble swallowing.    Eyes: Negative for visual disturbance.   Respiratory: Negative for cough, chest tightness, shortness of breath and wheezing.    Cardiovascular: Negative for chest pain, palpitations and leg swelling.   Gastrointestinal: Negative for abdominal distention, abdominal pain, constipation, diarrhea, nausea and vomiting.   Endocrine: Negative for cold intolerance, heat intolerance, polydipsia, polyphagia and polyuria.   Genitourinary: Negative for difficulty urinating, dyspareunia, dysuria, frequency, hematuria, pelvic pain, urgency, vaginal bleeding, vaginal discharge and vaginal pain.   Musculoskeletal: Negative for arthralgias, gait problem, joint swelling and myalgias.   Skin: Negative for color change, pallor and rash.   Neurological: Negative for  dizziness, seizures, syncope, weakness, light-headedness, numbness and headaches.   Hematological: Negative for adenopathy. Does not bruise/bleed easily.   Psychiatric/Behavioral: Negative for agitation, confusion, sleep disturbance and suicidal ideas. The patient is not nervous/anxious.        Physical Exam  Vital Signs: /69   Pulse 69   Temp 97.8 °F (36.6 °C) (Temporal)   Resp 14   Wt 64.9 kg (143 lb)   LMP  (LMP Unknown)   SpO2 93%   BMI 24.36 kg/m²   Pain Score    05/12/20 1257   PainSc: 0-No pain      General Appearance:  alert, cooperative, no apparent distress, appears stated age and normal weight   Neurologic/Psychiatric: A&O x 3, gait steady, appropriate affect   HEENT:  Normocephalic, without obvious abnormality, mucous membranes moist   Neck: Supple, symmetrical, trachea midline, no adenopathy;  No thyromegaly, masses, or tenderness   Back:   Symmetric, no curvature, ROM normal, no CVA tenderness   Lungs:   Clear to auscultation bilaterally; respirations regular, even, and unlabored bilaterally   Heart:  Regular rate and rhythm, no murmurs appreciated   Breasts:  deferred   Abdomen:   Soft, non-tender, non-distended and no organomegaly   Lymph nodes: No cervical, supraclavicular, inguinal adenopathy noted   Extremities: Normal, atraumatic; no clubbing, cyanosis, or edema    Pelvic: External Genitalia  atrophic, without lesions  Vagina  is pale, atrophic.  and shortened vaginal vault.  Vaginal Cuff  Female Vaginal Cuff: smooth, intact, without visible lesions and changes consistent with previous radiation  Uterus  surgically absent and no palpable masses  Ovaries  surgically absent bilaterallly  Parametria  smooth  Rectovaginal  Female rectovaginal: deferred     ECOG Performance Status: 0 - Asymptomatic    Procedure Note:  No notes on file      Assessment and Plan:  Cindy was seen today for follow-up.    Diagnoses and all orders for this visit:    Endometrial cancer (CMS/HCC)  -     ;  Future        There is no evidence of disease upon today's exam. She will be notified of CA-125 results upon their return. She is now 3.5 years from completion of treatment. Continue every 6 month cancer surveillance until the 5 year melvina. She is understanding to call with any changes in pelvic symptoms or general GYN concerns at any time between regularly scheduled visits.       Return to clinic in 6 months for ongoing cancer surveillance.      Zayra Georges APRN

## 2020-05-13 ENCOUNTER — TELEPHONE (OUTPATIENT)
Dept: GYNECOLOGIC ONCOLOGY | Facility: CLINIC | Age: 75
End: 2020-05-13

## 2020-05-13 NOTE — TELEPHONE ENCOUNTER
----- Message from CHAPARRO Hand sent at 5/13/2020  8:54 AM EDT -----  Please notify CA-125 low! Thanks!

## 2020-06-04 ENCOUNTER — TELEPHONE (OUTPATIENT)
Dept: INTERNAL MEDICINE | Facility: CLINIC | Age: 75
End: 2020-06-04

## 2020-06-04 DIAGNOSIS — S92.909S CLOSED FRACTURE OF FOOT, UNSPECIFIED LATERALITY, SEQUELA: Primary | ICD-10-CM

## 2020-06-04 NOTE — TELEPHONE ENCOUNTER
Order entered.  Please let her know and get this scheduled asap.    She will need a disc with the x-rays.    Jose Carrillo MD  14:29  06/04/20

## 2020-06-04 NOTE — TELEPHONE ENCOUNTER
PATIENT IS CALLING BACK TO CHECK ON STATUS OF REFERRAL TO A [facility] SPECIALIST BECAUSE SHE HAS BROKEN HER FOOT AND ANKLE.    PLEASE CALL PATIENT -[phone]

## 2020-06-04 NOTE — TELEPHONE ENCOUNTER
PATIENTS GRANDDAUGHTER CALLED AND PATIENT FELL AND BROKE ANKLE AND FOOT LAST WEEK. [facility] DID SET UP A APPT FOR ANKLE AND FOOT SPECIALIST BUT THEY SET HER UP WITH A [facility]. THEY WERE PREFER A [facility] SPECIALIST BUT WOULD NEED A REFERRAL.    PLEASE CALL PATIENT BACK PH: [phone]

## 2020-06-11 ENCOUNTER — OFFICE VISIT (OUTPATIENT)
Dept: ORTHOPEDIC SURGERY | Facility: CLINIC | Age: 75
End: 2020-06-11

## 2020-06-11 DIAGNOSIS — S82.839A AVULSION FRACTURE OF DISTAL FIBULA: ICD-10-CM

## 2020-06-11 DIAGNOSIS — M79.672 LEFT FOOT PAIN: Primary | ICD-10-CM

## 2020-06-11 DIAGNOSIS — M25.572 ACUTE LEFT ANKLE PAIN: ICD-10-CM

## 2020-06-11 DIAGNOSIS — S92.355A CLOSED NONDISPLACED FRACTURE OF FIFTH METATARSAL BONE OF LEFT FOOT, INITIAL ENCOUNTER: ICD-10-CM

## 2020-06-11 PROCEDURE — 99214 OFFICE O/P EST MOD 30 MIN: CPT | Performed by: PHYSICIAN ASSISTANT

## 2020-06-11 PROCEDURE — 28470 CLTX METATARSAL FX WO MNP EA: CPT | Performed by: PHYSICIAN ASSISTANT

## 2020-06-11 RX ORDER — IBUPROFEN 400 MG/1
400 TABLET ORAL NIGHTLY
COMMUNITY
End: 2021-10-07

## 2020-06-11 NOTE — PROGRESS NOTES
[facility] [place] [facility] [place]    Subjective     Chief Complaint   Patient presents with   • Left Foot - Pain   • Left Ankle - Pain   DOI: 5/27/2020    HPI  Cindy Sage is a 75 y.o. female.  Patient presents today for evaluation of her left foot and ankle.  On 5/27/2020 she fell resulting in injury to her lateral left foot and ankle.  Following the injury, patient was seen at [facility] ED and referred to orthopedics.  Patient was seen on 5/29/2020 at [facility].  Patient has been wearing a short boot as well as a Unna boot type wrap for swelling control.  She has been using her crutches/scooter to remain nonweightbearing.    Currently she only notes minimal pain with 1/10.  She describes the pain as a dull ache.  She has no numbness or tingling.  She is been using Tylenol and ibuprofen for pain control as needed.    No reported fever, chills, night sweats or other constitutional symptoms.    Past Medical History:   Diagnosis Date   • Drug therapy 2016    For endometrial CA in 2016   • Elevated cholesterol    • Endometrial cancer (CMS/HCC) 04/2016    Stage IIIC2    • GERD (gastroesophageal reflux disease)    • HTN (hypertension)    • Hx of radiation therapy 2016    For endometrial CA 2016   • Stroke (CMS/HCC) 02/2016    No residual deficits.  Takes .      Past Surgical History:   Procedure Laterality Date   • APPENDECTOMY      Ex lap   • FOOT SURGERY     • OOPHORECTOMY     • TONSILLECTOMY     • TOTAL LAPAROSCOPIC HYSTERECTOMY SALPINGO OOPHORECTOMY Bilateral 04/19/2016    RATLH, BSO, LND      Family History   Problem Relation Age of Onset   • Arthritis Mother    • Breast cancer Mother         age unknown    • Osteoporosis Mother    • Cancer Mother    • Alcohol abuse Father    • Stroke Maternal Aunt    • Ovarian cancer Neg Hx    • Endometrial cancer Neg Hx      Social History     Socioeconomic History   • Marital status:      Spouse name: Not on file   • Number of  children: Not on file   • Years of education: Not on file   • Highest education level: Not on file   Tobacco Use   • Smoking status: Never Smoker   • Smokeless tobacco: Never Used   Substance and Sexual Activity   • Alcohol use: Yes     Frequency: 2-4 times a month   • Drug use: Never   • Sexual activity: Defer   Social History Narrative    Lives at home in [place] with . Completes all ADLs with no residual deficits from prior stroke      Current Outpatient Medications on File Prior to Visit   Medication Sig Dispense Refill   • aspirin 81 MG chewable tablet Chew 1 tablet Daily. 30 tablet 1   • clopidogrel (PLAVIX) 75 MG tablet Take 1 tablet by mouth once daily 90 tablet 3   • famotidine (PEPCID) 20 MG tablet Take 1 tablet by mouth 2 (Two) Times a Day. (Patient taking differently: Take 20 mg by mouth At Night As Needed.) 20 tablet 0   • hydroCHLOROthiazide (HYDRODIURIL) 25 MG tablet Take 1 tablet by mouth once daily 90 tablet 0   • ibuprofen (ADVIL,MOTRIN) 400 MG tablet Take 400 mg by mouth Every Night.     • simvastatin (ZOCOR) 40 MG tablet Take 1 tablet by mouth once daily 90 tablet 0   • vitamin D (ERGOCALCIFEROL) 91269 units capsule capsule Take 1 capsule by mouth 1 (One) Time Per Week. 12 capsule 1     No current facility-administered medications on file prior to visit.       Allergies   Allergen Reactions   • Strawberry Extract Swelling        The following portions of the patient's history were reviewed and updated as appropriate: allergies, current medications, past family history, past medical history, past social history, past surgical history and problem list.    Review of Systems   Constitutional: Negative.    HENT: Negative.    Eyes: Negative.    Respiratory: Negative.    Cardiovascular: Negative.    Gastrointestinal: Negative.    Endocrine: Negative.    Genitourinary: Negative.    Musculoskeletal: Positive for arthralgias.   Skin: Negative.    Allergic/Immunologic: Negative.    Neurological:  Negative.    Hematological: Negative.    Psychiatric/Behavioral: Negative.         Objective      Physical Exam  LMP  (LMP Unknown)     There is no height or weight on file to calculate BMI.    GENERAL APPEARANCE: awake, alert & oriented x 3, in no acute distress and well developed, well nourished  PSYCH: normal mood and affect  LUNGS:  breathing nonlabored, no wheezing  EYES: sclera anicteric, pupils equal  CARDIOVASCULAR: palpable pulses dorsalis pedis, palpable posterior tibial bilaterally. Capillary refill less than 2 seconds  INTEGUMENTARY: skin intact, no clubbing, cyanosis  NEUROLOGIC:  Normal Sensation         Ortho Exam  V:  Dorsalis Pedis:  Right: 2+; Left:2+    Posterior Tibial: Right:2+; Left:2+    Capillary Refill:  Brisk  MSK:  Hand:right handed      Tibia:  Right:  non tender and normal motor function; Left:  non tender and normal motor function      Ankle:  Right: non tender, ROM  normal and motor function  normal; Left:  tender tip lateral malleolus and base 5MT, no medial ankle pain, ROM  limited secondary to pain and motor function  painful      Foot:  Right:  non tender, ROM  normal and motor function  normal; Left:  tender base 5MT, ROM  limited secondary to pain and motor function  painful      NEURO: Heel Walking:  Right:  unable to test; Left:  unable to test    Toe Walking:  Right:  unable to test; Left:  unable to test     Osprey-Zen 5.07 monofilament test: not evaluated    Lower extremity sensation: intact     Calf Atrophy:none    Motor Function: all 5/5      Imaging/Studies  Dr. Barnes and I reviewed plain film imaging brought in by the patient from 5/29/2020 which showed a base fifth metatarsal fracture as well as tip avulsion fracture off the lateral malleolus.  Images will be downloaded into our system.      Assessment/Plan        ICD-10-CM ICD-9-CM   1. Left foot pain M79.672 729.5   2. Closed nondisplaced fracture of fifth metatarsal bone of left foot, initial encounter  S92.355A 825.25   3. Acute left ankle pain M25.572 719.47   4. Avulsion fracture of distal fibula S82.839A 824.8       No orders of the defined types were placed in this encounter.       -Left foot/ankle pain due to fifth metatarsal base fracture and avulsion tip of the distal fibula.  -Patient may continue using short fracture boot for stability and support.  No casting required at this time.  -We will also allow her to begin weightbearing as tolerated with crutches while in the boot.  -Recommend she continue use of Tylenol as needed for pain control.  -Patient may also remove the boot and do gentle range of motion of plantarflexion and dorsiflexion.  -Follow-up 2 weeks for repeat evaluation which include pre-clinic imaging of the foot and ankle.  -Questions and concerns answered.    History, exam and imaging all discussed with Dr. Barnes who agrees with the above assessment and plan.      Medical Decision Making  Management Options : over-the-counter medicine and close treatment of fracture or dislocation  Data/Risk: radiology tests       Misa Fountain PA-C  06/12/20  11:56         EMR Dragon/Transcription disclaimer:  Much of this encounter note is an electronic transcription of spoken language to printed text. Electronic transcription of spoken language may permit erroneous, or at times, nonsensical words or phrases to be inadvertently transcribed. Although I have reviewed the note for such errors, some may still exist.

## 2020-06-12 ENCOUNTER — TELEPHONE (OUTPATIENT)
Dept: ORTHOPEDIC SURGERY | Facility: CLINIC | Age: 75
End: 2020-06-12

## 2020-06-12 NOTE — TELEPHONE ENCOUNTER
PT'S DAUGHTER CALLED. PT IS HAVING NUMBNESS AND TINGLING IN HER FOOT/ANKLE. SHE WAS SEEN YESTERDAY AND PLACED IN A BOOT.

## 2020-06-12 NOTE — TELEPHONE ENCOUNTER
I spoke with the patient and asked her about elevation.  She said that she is elevating her foot.  I explained to her that swelling can cause some numbness and tingling.   She stated that it is not really swollen.  I also went over what CRD had told her to do.  She said that she has not been removing the boot to do the gentle range of motion.  I suggested that she try that.  She also confirmed that she is wearing the sleeve that we gave her.  Joanna

## 2020-06-17 ENCOUNTER — APPOINTMENT (OUTPATIENT)
Dept: MAMMOGRAPHY | Facility: HOSPITAL | Age: 75
End: 2020-06-17

## 2020-06-24 ENCOUNTER — OFFICE VISIT (OUTPATIENT)
Dept: NEUROLOGY | Facility: CLINIC | Age: 75
End: 2020-06-24

## 2020-06-24 ENCOUNTER — TELEPHONE (OUTPATIENT)
Dept: NEUROLOGY | Facility: CLINIC | Age: 75
End: 2020-06-24

## 2020-06-24 DIAGNOSIS — I66.01 STENOSIS OF RIGHT MIDDLE CEREBRAL ARTERY: ICD-10-CM

## 2020-06-24 DIAGNOSIS — G45.9 TIA (TRANSIENT ISCHEMIC ATTACK): ICD-10-CM

## 2020-06-24 PROCEDURE — 99441 PR PHYS/QHP TELEPHONE EVALUATION 5-10 MIN: CPT | Performed by: NURSE PRACTITIONER

## 2020-06-24 RX ORDER — SIMVASTATIN 40 MG
40 TABLET ORAL DAILY
Qty: 90 TABLET | Refills: 1 | Status: SHIPPED | OUTPATIENT
Start: 2020-06-24 | End: 2021-01-27

## 2020-06-24 RX ORDER — CLOPIDOGREL BISULFATE 75 MG/1
75 TABLET ORAL DAILY
Qty: 90 TABLET | Refills: 3 | Status: SHIPPED | OUTPATIENT
Start: 2020-06-24 | End: 2021-08-13

## 2020-06-24 RX ORDER — ASPIRIN 81 MG/1
81 TABLET, CHEWABLE ORAL DAILY
Qty: 90 TABLET | Refills: 3 | Status: SHIPPED | OUTPATIENT
Start: 2020-06-24 | End: 2022-11-11 | Stop reason: HOSPADM

## 2020-06-24 NOTE — PROGRESS NOTES
Subjective:     Patient ID: Cindy Sage is a 75 y.o. female.    CC:   Chief Complaint   Patient presents with   • Stroke       HPI:   History of Present Illness     Due to COVID-19 Pandemic, this visit was completed via telephone with verbal consent to treat obtained from patient.      You have chosen to receive care through a telephone visit. Do you consent to use a telephone visit for your medical care today? Yes    This is a very pleasant 75-year-old female who presents for 1 year follow up on TIA with Right MCA/PCA stenosis.  She has had no recurrent stroke symptoms. Currently taking Aspirin 81mg, Plavix 75mg and Zocor 40mg daily for 2nd stroke prevention with known Right PCA/MCA stenosis. No new concerns from a neurological standpoint. Needs refills.    She did recently have a fall on 5/27/2020 at her house while she was folding laundry and moving a blanket and unfortunately she tells me she fell and broke her left foot and ankle.  She is following with orthopedics and wearing a boot.  She is hopeful she will not need surgery.    She reports her memory is very good. She completed High school and took a few college classes. She worked as a  before retiring. She helps care for her grandson with down syndrome. Sleeps well. Eats well. Drinks flavored water. Does not exercise. She does not drive. Her  drives. Independent in ADLs. Was going to complete MMSE, but since this is a telephone visit, will complete at next in person follow up.     Stroke History: On 2/28/2019 she started having acute vision changes with flashes of lights around her left eye as well as left hand heaviness.  She had a very minimal right-sided headache.  She felt like her symptoms were similar to her stroke she had in 2016.  She presented to the ER for further evaluation.  MRI of the brain was negative for any acute intracranial abnormality and did show a few small scattered old right sided lacunar infarcts in the  frontal and parietal region and this was reviewed today in office and does have significant atrophy slightly advanced for her age.  She denies any memory issues and her  concurs..  She also had an MRA of the neck which was unremarkable.  MRA of the head did show markedly attenuated flow signal in the right posterior cerebral arteries as well as significant stenosis in the right and left.  She also had stenosis in the right M2 branches.  The remainder of the MRI of the brain was normal. She did have an echocardiogram as well with no evidence of PFO or ASD.  Showed some trace to mild mitral regurgitation and trace tricuspid regurgitation with a normal ejection fraction of 60%.  he also had Holter Monitor which was essentially normal with a very brief run of SVT.     The following portions of the patient's history were reviewed and updated as appropriate: allergies, current medications, past family history, past medical history, past social history, past surgical history and problem list.    Past Medical History:   Diagnosis Date   • Drug therapy 2016    For endometrial CA in 2016   • Elevated cholesterol    • Endometrial cancer (CMS/HCC) 04/2016    Stage IIIC2    • GERD (gastroesophageal reflux disease)    • HTN (hypertension)    • Hx of radiation therapy 2016    For endometrial CA 2016   • Stroke (CMS/HCC) 02/2016    No residual deficits.  Takes .       Past Surgical History:   Procedure Laterality Date   • APPENDECTOMY      Ex lap   • FOOT SURGERY     • OOPHORECTOMY     • TONSILLECTOMY     • TOTAL LAPAROSCOPIC HYSTERECTOMY SALPINGO OOPHORECTOMY Bilateral 04/19/2016    RATLANGEL, BSO, LND       Social History     Socioeconomic History   • Marital status:      Spouse name: Not on file   • Number of children: Not on file   • Years of education: Not on file   • Highest education level: Not on file   Tobacco Use   • Smoking status: Never Smoker   • Smokeless tobacco: Never Used   Substance and Sexual  Activity   • Alcohol use: Yes     Frequency: 2-4 times a month   • Drug use: Never   • Sexual activity: Defer   Social History Narrative    Lives at home in [place] with . Completes all ADLs with no residual deficits from prior stroke       Family History   Problem Relation Age of Onset   • Arthritis Mother    • Breast cancer Mother         age unknown    • Osteoporosis Mother    • Cancer Mother    • Alcohol abuse Father    • Stroke Maternal Aunt    • Ovarian cancer Neg Hx    • Endometrial cancer Neg Hx         Review of Systems   Constitutional: Negative.    HENT: Negative.    Eyes: Negative.    Respiratory: Negative.    Cardiovascular: Negative.    Gastrointestinal: Negative.    Endocrine: Negative.    Genitourinary: Negative.    Musculoskeletal: Positive for arthralgias.        Fell on 5/27/2020 and broke her left foot and ankle-seeing ortho, in a boot   Skin: Negative.    Neurological: Negative for seizures, speech difficulty, numbness and headaches.        No new or recurrent stroke symptoms. Feels her memory is great. She does not drive but her  drives. Independent in all ADLs.   Hematological: Negative.    Psychiatric/Behavioral: Negative.         Objective:  LMP  (LMP Unknown)   OLI D/T VIDEO VISIT     Neurologic Exam  OLI D/T VIDEO VISIT     Physical Exam  OLI D/T VIDEO VISIT     Assessment/Plan:       Cindy was seen today for stroke.    Diagnoses and all orders for this visit:    TIA (transient ischemic attack)  -     aspirin 81 MG chewable tablet; Chew 1 tablet Daily.  -     clopidogrel (PLAVIX) 75 MG tablet; Take 1 tablet by mouth Daily.  -     simvastatin (ZOCOR) 40 MG tablet; Take 1 tablet by mouth Daily.    Stenosis of right middle cerebral artery  -     aspirin 81 MG chewable tablet; Chew 1 tablet Daily.  -     clopidogrel (PLAVIX) 75 MG tablet; Take 1 tablet by mouth Daily.  -     simvastatin (ZOCOR) 40 MG tablet; Take 1 tablet by mouth Daily.           Total time of telephone  visit 5 minutes. F/U in 1 year with baseline MMSE or sooner if needed. Continue with PCP and Ortho as scheduled.  Reviewed medications, potential side effects and signs and symptoms to report. Discussed risk versus benefits of treatment plan with patient and/or family-including medications, labs and radiology that may be ordered. Addressed questions and concerns during visit. Patient and/or family verbalized understanding and agree with plan.    Andra Pat, APRN  6/24/2020

## 2020-06-24 NOTE — TELEPHONE ENCOUNTER
----- Message from CHAPARRO Landon sent at 6/24/2020 10:49 AM EDT -----  Please call and schedule 1 year follow up in office with baseline MMSE

## 2020-06-24 NOTE — PROGRESS NOTES
Cindy Sage is a 75 y.o. female {Specialty - why patient here?:[id number]}    History of Present Illness  History of Present Illness   {Common H&P Review Areas:17797}    Past Medical History:   Diagnosis Date   • Drug therapy 2016    For endometrial CA in 2016   • Elevated cholesterol    • Endometrial cancer (CMS/HCC) 04/2016    Stage IIIC2    • GERD (gastroesophageal reflux disease)    • HTN (hypertension)    • Hx of radiation therapy 2016    For endometrial CA 2016   • Stroke (CMS/HCC) 02/2016    No residual deficits.  Takes .       Past Surgical History:   Procedure Laterality Date   • APPENDECTOMY      Ex lap   • FOOT SURGERY     • OOPHORECTOMY     • TONSILLECTOMY     • TOTAL LAPAROSCOPIC HYSTERECTOMY SALPINGO OOPHORECTOMY Bilateral 04/19/2016    RATLH, BSO, LND       Social History     Socioeconomic History   • Marital status:      Spouse name: Not on file   • Number of children: Not on file   • Years of education: Not on file   • Highest education level: Not on file   Tobacco Use   • Smoking status: Never Smoker   • Smokeless tobacco: Never Used   Substance and Sexual Activity   • Alcohol use: Yes     Frequency: 2-4 times a month   • Drug use: Never   • Sexual activity: Defer   Social History Narrative    Lives at home in [place] with . Completes all ADLs with no residual deficits from prior stroke       Family History   Problem Relation Age of Onset   • Arthritis Mother    • Breast cancer Mother         age unknown    • Osteoporosis Mother    • Cancer Mother    • Alcohol abuse Father    • Stroke Maternal Aunt    • Ovarian cancer Neg Hx    • Endometrial cancer Neg Hx         Review of Systems  @ROS@        Neurologic Exam          {Assess/PlanSmartLinks:71737}      Andra Pat, CHAPARRO  6/24/2020

## 2020-06-29 ENCOUNTER — OFFICE VISIT (OUTPATIENT)
Dept: ORTHOPEDIC SURGERY | Facility: CLINIC | Age: 75
End: 2020-06-29

## 2020-06-29 VITALS — HEART RATE: 85 BPM | BODY MASS INDEX: 24.41 KG/M2 | OXYGEN SATURATION: 99 % | HEIGHT: 64 IN | WEIGHT: 143 LBS

## 2020-06-29 DIAGNOSIS — S82.839A AVULSION FRACTURE OF DISTAL FIBULA: ICD-10-CM

## 2020-06-29 DIAGNOSIS — Z09 FRACTURE FOLLOW-UP: Primary | ICD-10-CM

## 2020-06-29 DIAGNOSIS — S92.355D CLOSED NONDISPLACED FRACTURE OF FIFTH METATARSAL BONE OF LEFT FOOT WITH ROUTINE HEALING, SUBSEQUENT ENCOUNTER: ICD-10-CM

## 2020-06-29 PROCEDURE — 99024 POSTOP FOLLOW-UP VISIT: CPT | Performed by: PHYSICIAN ASSISTANT

## 2020-06-29 NOTE — PROGRESS NOTES
"    [facility] [place] [facility]opa[facility] Clinic Note        Subjective     Follow-up (2 week follow up; Closed nondisplaced fracture of fifth metatarsal bone of left foot,  Avulsion fracture of distal fibula (DOI:5/27/20))       TROY Sage is a 75 y.o. female.  Patient returns today for follow-up left ankle and foot.  She is approximately a month out from date of injury.  She has been wearing a short boot and weightbearing as tolerated in the boot.  She has no new complaints or issues.  At this time she denies pain but only notes some mild discomfort lateral aspect of the foot.  No reported numbness or tingling.    Patient denies any fever, chills, night sweats or other constitutional symptoms.        Objective      Physical Exam  Pulse 85   Ht 163.2 cm (64.25\")   Wt 64.9 kg (143 lb)   LMP  (LMP Unknown)   SpO2 99%   BMI 24.35 kg/m²     Body mass index is 24.35 kg/m².        Ortho Exam  Left ankle/foot  Skin: Grossly intact without any redness warmth or swelling.  No rashes or lesions.  Tenderness: Only some mild discomfort noted at the base of the fifth.  No tenderness noted to the ankle.  Calf remains soft and nontender as well.  Motion: Plantarflexion 40 degrees, dorsiflexion 10 degrees.  Instability: Anterior drawer, talar tilt negative.  Squeeze test the metatarsal with mild discomfort base of the fifth metatarsal.  Motor/sensory: Grossly intact L2-S1.      Imaging/Studies  Ordered left ankle and foot plain films.  Imaging read by Dr. Galloawy.    Imaging Results (Last 24 Hours)     Procedure Component Value Units Date/Time    XR Ankle 3+ View Left [[id number]] Resulted:  06/29/20 1210     Updated:  06/29/20 1211    Narrative:       Left Ankle Radiographs  Indication: left ankle pain  Views: AP, mortise and lateral of the left ankle    Comparison: 5/28/2020    Findings:   Stable fracture at the tip of the lateral malleolus, with no unusual bony   features.    XR Foot 3+ View Left [[id number]] Resulted:  " 06/29/20 1208     Updated:  06/29/20 1210    Narrative:       Left Foot Radiographs  Indication: left foot pain  Views: Weight-bearing AP and lateral, with oblique views of the left foot    Comparison: 5/20/2020    Findings:  Healing fifth metatarsal base fracture, with no other acute bony   abnormalities.          Assessment:  1. Fracture follow-up    2. Closed nondisplaced fracture of fifth metatarsal bone of left foot with routine healing, subsequent encounter    3. Avulsion fracture of distal fibula        Plan:  1. Follow-up nondisplaced fifth metatarsal fracture left foot and avulsion left distal fibula--stable and healing.  2. Patient to continue wearing short boot for an additional 2 weeks and then may gradually transition into a regular shoe.  3. Instead of using one crutch to assist with ambulation as needed would recommend a walker or cane for better stability and support.  4. Follow-up in 4 weeks for repeat evaluation which will include pre-clinic imaging of the left foot.  No further imaging required of the ankle.  5. Questions and concerns answered.      Misa Fountain PA-C  06/29/20  13:15

## 2020-07-27 ENCOUNTER — OFFICE VISIT (OUTPATIENT)
Dept: ORTHOPEDIC SURGERY | Facility: CLINIC | Age: 75
End: 2020-07-27

## 2020-07-27 VITALS — HEIGHT: 64 IN | HEART RATE: 79 BPM | OXYGEN SATURATION: 97 % | BODY MASS INDEX: 24.41 KG/M2 | WEIGHT: 143 LBS

## 2020-07-27 DIAGNOSIS — Z09 FRACTURE FOLLOW-UP: ICD-10-CM

## 2020-07-27 DIAGNOSIS — M76.62 ACHILLES TENDINITIS OF LEFT LOWER EXTREMITY: Primary | ICD-10-CM

## 2020-07-27 DIAGNOSIS — S92.355D CLOSED NONDISPLACED FRACTURE OF FIFTH METATARSAL BONE OF LEFT FOOT WITH ROUTINE HEALING, SUBSEQUENT ENCOUNTER: ICD-10-CM

## 2020-07-27 PROCEDURE — 99213 OFFICE O/P EST LOW 20 MIN: CPT | Performed by: PHYSICIAN ASSISTANT

## 2020-07-27 PROCEDURE — 99024 POSTOP FOLLOW-UP VISIT: CPT | Performed by: PHYSICIAN ASSISTANT

## 2020-07-27 NOTE — PROGRESS NOTES
"    [facility] [facility] Clinic Note        Subjective     Follow-up (4 week follow up; Closed nondisplaced fracture of fifth metatarsal bone of left foot,  Avulsion fracture of distal fibula (DOI:5/27/20))       TROY Sage is a 75 y.o. female.  Patient returns for follow-up evaluation of her left foot.  At this time she reports that the left foot is doing well but she has noted significant increase in pain along the Achilles (tendon to insertion).  She has been wearing a boot for fifth metatarsal fracture.  She did not wear the boot yesterday so the pain has improved.  She reports it is worse first thing in the morning when she gets up or after sitting for prolonged periods of time.    Her pain along the Achilles is endorsed as 7/10.  Severity the pain moderate.  Associated symptoms swelling and stiffness.  Worse with walking and weightbearing activities.    No reported fever, chills, night sweats or other constitutional symptoms        Objective      Physical Exam  Pulse 79   Ht 163.2 cm (64.25\")   Wt 64.9 kg (143 lb)   LMP  (LMP Unknown)   SpO2 97%   BMI 24.35 kg/m²     Body mass index is 24.35 kg/m².        Ortho Exam  Left ankle/foot  Skin: Grossly intact without any redness, warmth.  No rashes or lesions.  Patient does have some swelling noted along the Achilles.  Tenderness: No tenderness to the fracture site; however, she does have tenderness along the Achilles tendon without nodules or thickening noted.  Motion: Plantarflexion 40 degrees, dorsiflexion 10 degrees.  Patient does have increasing pain at end ranges of motion of dorsiflexion and plantarflexion along the insertion of the Achilles and its tendon.  Instability: Anterior drawer, talar tilt negative.  Squeeze test the metatarsal with mild discomfort base of the fifth metatarsal.  Vitale test is negative.  Motor/sensory: Grossly intact L2-S1.      Imaging/Studies  Ordered left foot plain films.  Imaging read by Dr." Nilesh.    Imaging Results (Last 24 Hours)     Procedure Component Value Units Date/Time    XR Foot 3+ View Left [[id number]] Resulted:  07/27/20 1150     Updated:  07/27/20 1151    Narrative:       Left Foot Radiographs  Indication: Follow-up fifth metatarsal base fracture  Views: Weight-bearing AP and lateral, with oblique views of the left foot    Comparison: 6/29/2020    Findings:  Healing fracture, fifth metatarsal base, with diffuse osteopenia, no   change in alignment compared to the previous films.          Assessment:  1. Achilles tendinitis of left lower extremity    2. Fracture follow-up    3. Closed nondisplaced fracture of fifth metatarsal bone of left foot with routine healing, subsequent encounter        Plan:  1. Left Achilles tendinitis--patient was provided with Achilles/plantar fascia stretching as well as given a handout on where to purchase a night splint.  2. Recommend over-the-counter pain medication as needed for inflammation and pain control.  3. Offered to send the patient to formal PT but she declined at this time stating she will do the stretching exercises that were provided today.  4. She understands that if she fails to improve, some individuals require casting as treatment.  5. Nnondisplaced fifth metatarsal fracture, that is healing and stable.  6. Follow-up 6 weeks for repeat evaluation, sooner if issues arise or symptoms worsen/change.  She can also contact clinic if she changes her mind regarding formal PT.  7. Questions and concerns answered.      Misa Fountain PA-C  07/27/20  13:17

## 2020-08-07 RX ORDER — HYDROCHLOROTHIAZIDE 25 MG/1
TABLET ORAL
Qty: 90 TABLET | Refills: 0 | Status: SHIPPED | OUTPATIENT
Start: 2020-08-07 | End: 2020-10-30

## 2020-09-08 ENCOUNTER — TELEPHONE (OUTPATIENT)
Dept: INTERNAL MEDICINE | Facility: CLINIC | Age: 75
End: 2020-09-08

## 2020-09-08 DIAGNOSIS — Z78.0 POSTMENOPAUSAL: Primary | ICD-10-CM

## 2020-09-08 NOTE — TELEPHONE ENCOUNTER
[employer] CALLED AND STATED THAT THEY FAXED PAPERWORK ( OSTEOPOROSIS)  OVER ON AUG 5TH AND THEN AGAIN ON AUG 25. WANTED TO KNOW IF THE PROVIDER HAS ORDER A BONE DENSITY SCAN.     PLEASE CALL MELCHOR ARANDA [phone]

## 2020-09-14 ENCOUNTER — OFFICE VISIT (OUTPATIENT)
Dept: ORTHOPEDIC SURGERY | Facility: CLINIC | Age: 75
End: 2020-09-14

## 2020-09-14 VITALS — OXYGEN SATURATION: 98 % | BODY MASS INDEX: 23.56 KG/M2 | HEART RATE: 72 BPM | WEIGHT: 138 LBS | HEIGHT: 64 IN

## 2020-09-14 DIAGNOSIS — M76.62 ACHILLES TENDINITIS OF LEFT LOWER EXTREMITY: ICD-10-CM

## 2020-09-14 DIAGNOSIS — S92.355D CLOSED NONDISPLACED FRACTURE OF FIFTH METATARSAL BONE OF LEFT FOOT WITH ROUTINE HEALING, SUBSEQUENT ENCOUNTER: ICD-10-CM

## 2020-09-14 DIAGNOSIS — R60.0 EDEMA OF LEFT LOWER LEG: Primary | ICD-10-CM

## 2020-09-14 PROCEDURE — 99212 OFFICE O/P EST SF 10 MIN: CPT | Performed by: PHYSICIAN ASSISTANT

## 2020-09-14 NOTE — PROGRESS NOTES
"    [facility] [place] [facility]opa[facility] Surgery Clinic Note        Subjective     CC: Follow-up (7 week f/u; Achilles tendinitis of left lower extremity )      HPI    Cindy Sage is a 75 y.o. female.  Patient returns for follow-up left lower leg.  She is been treated for left foot fifth metatarsal fracture as well as Achilles tendinitis.  She is doing well.  She does not have pain to the ankle or foot any longer.  What she does have is persistent swelling with some skin changes.  No reported pain, numbness or tingling.      ROS:    Constiutional:Pt denies fever, chills, nausea, or vomiting.  MSK:as above        Objective      Past Medical History  Past Medical History:   Diagnosis Date   • Drug therapy 2016    For endometrial CA in 2016   • Elevated cholesterol    • Endometrial cancer (CMS/HCC) 04/2016    Stage IIIC2    • GERD (gastroesophageal reflux disease)    • HTN (hypertension)    • Hx of radiation therapy 2016    For endometrial CA 2016   • Stroke (CMS/HCC) 02/2016    No residual deficits.  Takes .         Physical Exam  Pulse 72   Ht 163.2 cm (64.25\")   Wt 62.6 kg (138 lb)   LMP  (LMP Unknown)   SpO2 98%   BMI 23.50 kg/m²     Body mass index is 23.5 kg/m².    Patient is well nourished and well developed.        Ortho Exam  Left ankle/foot  Skin: Grossly intact without any redness, warmth.  Positive lower leg edema less than 1+.  Slight brawny color to the skin.  No rashes or lesions.  Tenderness:  No tenderness noted to the foot or along the Achilles tendon.  No tenderness noted throughout the rest of the lower leg foot or ankle.  All compartments of the lower leg are soft and compressible.  Motion: Plantarflexion 40 degrees, dorsiflexion 10 degrees.    Instability: Anterior drawer, talar tilt negative.    Homans test negative  Motor/sensory: Grossly intact L2-S1.  Vascular: 1+ DP/PT      Imaging/Labs/EMG Reviewed:  No new imaging today.      Assessment:  1. Edema of left lower leg    2. Achilles tendinitis " of left lower extremity    3. Closed nondisplaced fracture of fifth metatarsal bone of left foot with routine healing, subsequent encounter        Plan:  1. Edema left lower leg--recommend compression socks and elevation for now.  No evidence of DVT.  Patient was concerned so I did offer venous Doppler study which she declined.  2. Achilles tendinitis--improved/resolved.  3. Nondisplaced fifth metatarsal fracture--stable.  Patient returns for follow-up evaluation recommend taking final imaging.  4. Continue with over-the-counter pain medication as needed.  5. Continue with home exercises.  6. Follow-up 4 weeks for repeat evaluation, sooner if issues arise or symptoms worsen/change.  7. Questions and concerns answered.      Misa Fountain PA-C  09/17/20  08:27 EDT      Dragon disclaimer:  Much of this encounter note is an electronic transcription/translation of spoken language to printed text. The electronic translation of spoken language may permit erroneous, or at times, nonsensical words or phrases to be inadvertently transcribed; Although I have reviewed the note for such errors, some may still exist.

## 2020-09-15 NOTE — TELEPHONE ENCOUNTER
VINCENT for Ronald at [facility] that I was returning his call again and to please call back.  Ofc. # given.       Just need to inform that Dr Sparks has placed the order.

## 2020-09-18 ENCOUNTER — TRANSCRIBE ORDERS (OUTPATIENT)
Dept: ADMINISTRATIVE | Facility: HOSPITAL | Age: 75
End: 2020-09-18

## 2020-09-18 DIAGNOSIS — Z12.31 VISIT FOR SCREENING MAMMOGRAM: Primary | ICD-10-CM

## 2020-10-12 ENCOUNTER — OFFICE VISIT (OUTPATIENT)
Dept: ORTHOPEDIC SURGERY | Facility: CLINIC | Age: 75
End: 2020-10-12

## 2020-10-12 VITALS — OXYGEN SATURATION: 97 % | WEIGHT: 134.6 LBS | HEART RATE: 83 BPM | HEIGHT: 64 IN | BODY MASS INDEX: 22.98 KG/M2

## 2020-10-12 DIAGNOSIS — S92.355D CLOSED NONDISPLACED FRACTURE OF FIFTH METATARSAL BONE OF LEFT FOOT WITH ROUTINE HEALING, SUBSEQUENT ENCOUNTER: Primary | ICD-10-CM

## 2020-10-12 DIAGNOSIS — R60.0 EDEMA OF LEFT LOWER LEG: ICD-10-CM

## 2020-10-12 PROCEDURE — 99212 OFFICE O/P EST SF 10 MIN: CPT | Performed by: PHYSICIAN ASSISTANT

## 2020-10-12 NOTE — PROGRESS NOTES
"    [facility] [place] [facility] Clinic Note        Subjective     CC: Follow-up (4 week f/u; Nondisplaced fifth metatarsal fracture Left foot (DOI:5/27/20))      TRYO Sage is a 75 y.o. female.  Patient returns today for follow-up of her left lower leg edema as well as her fifth metatarsal fracture.  She reports she is doing much better since her last visit.  Swelling is almost completely resolved and she has no pain to the left foot.    Overall, patient's symptoms are improved.    ROS:    Constiutional:Pt denies fever, chills, nausea, or vomiting.  MSK:as above        Objective      Past Medical History  Past Medical History:   Diagnosis Date   • Drug therapy 2016    For endometrial CA in 2016   • Elevated cholesterol    • Endometrial cancer (CMS/HCC) 04/2016    Stage IIIC2    • GERD (gastroesophageal reflux disease)    • HTN (hypertension)    • Hx of radiation therapy 2016    For endometrial CA 2016   • Stroke (CMS/HCC) 02/2016    No residual deficits.  Takes .         Physical Exam  Pulse 83   Ht 163.2 cm (64.25\")   Wt 61.1 kg (134 lb 9.6 oz)   LMP  (LMP Unknown)   SpO2 97%   BMI 22.92 kg/m²     Body mass index is 22.92 kg/m².    Patient is well nourished and well developed.        Ortho Exam  Left lower leg and foot  Skin: Grossly intact without any redness, warmth.  Previously lower leg swelling has improved.  Tenderness:   No tenderness throughout the lower leg or foot.  No tenderness to palpation base of the fifth where she has known fracture that was healing.  Motion: Plantarflexion 40 degrees, dorsiflexion 10 degrees.    Instability: Anterior drawer, talar tilt negative.    Motor/sensory: Grossly intact L2-S1.  Vascular: 1+ DP/PT      Imaging/Labs/EMG Reviewed:  Ordered left foot plain films.  Imaging read by Dr. Galloway.    Left Foot Radiographs  Indication: left foot pain  Views: Weight-bearing AP and lateral, with oblique views of the left foot     Comparison: " 7/27/2020     Findings:  Base of fifth metatarsal fracture appears to be well-healed, with good alignment, with disuse osteopenia.      Assessment:  1. Closed nondisplaced fracture of fifth metatarsal bone of left foot with routine healing, subsequent encounter    2. Edema of left lower leg        Plan:  1. Fifth metatarsal fracture healed.    2. Previously noted soft tissue swelling to the lower leg improved.  Still recommend use of compression socks.  3. Patient may advance activity as tolerated.  4. Follow-up orthopedic clinic as needed.  5. Questions and concerns answered.      Misa Fountain PA-C  10/13/20  15:18 EDT      Dragon disclaimer:  Much of this encounter note is an electronic transcription/translation of spoken language to printed text. The electronic translation of spoken language may permit erroneous, or at times, nonsensical words or phrases to be inadvertently transcribed; Although I have reviewed the note for such errors, some may still exist.

## 2020-10-14 ENCOUNTER — TELEPHONE (OUTPATIENT)
Dept: INTERNAL MEDICINE | Facility: CLINIC | Age: 75
End: 2020-10-14

## 2020-10-14 NOTE — TELEPHONE ENCOUNTER
Pt is scheduled for DEXA scan on 12/28/2020.    LM for MARK Cardenas with [facility] that I was returning her call and to please call back.  Ofc # given.       HUB  Please inform that test is scheduled for 12/28/2020.

## 2020-10-14 NOTE — TELEPHONE ENCOUNTER
PEPPER FROM [facility] CALLED TO SEE STATUS OF BONE DENSITY TEST ORDERED ON 09/14/20. PEPPER HAD INITIATED THE REQUEST TO DR. JACOBO AND IS FOLLOWING UP.    [facility] NURSE LINE - PEPPER [phone]

## 2020-10-16 NOTE — TELEPHONE ENCOUNTER
Tried to reach Ronald no answer left message on VM and advised to call back with any other questions or concerns.

## 2020-10-27 NOTE — TELEPHONE ENCOUNTER
Last OV 12-11-19  Next OV Not scheduled    Attempted to reach the patient but her phone is not accepting calls at this time.    HUB please inform patient    Patient is due for a physical/wellness visit.  Please schedule with Dr. Carrillo before the end of the year.  Dr. Carrillo wanted to see the patient 6 months past her last visit in December of last year.  Advise patient she needs to keep her appointment to continue to receive refills in the future.  If she is unable to keep her appointment we will not be able to provider further refills.  Once appointment has been scheduled please update message with date and time so we can process the request.

## 2020-10-29 NOTE — TELEPHONE ENCOUNTER
Attempted to reach the patient but her phone is not accepting calls at this time.     HUB please inform patient     Patient is due for a physical/wellness visit.  Please schedule with Dr. Carrillo before the end of the year.  Dr. Carrillo wanted to see the patient 6 months past her last visit in December of last year.  Advise patient she needs to keep her appointment to continue to receive refills in the future.  If she is unable to keep her appointment we will not be able to provider further refills.  Once appointment has been scheduled please update message with date and time so we can process the request.

## 2020-10-30 RX ORDER — HYDROCHLOROTHIAZIDE 25 MG/1
TABLET ORAL
Qty: 90 TABLET | Refills: 0 | Status: SHIPPED | OUTPATIENT
Start: 2020-10-30 | End: 2020-11-06

## 2020-10-30 NOTE — TELEPHONE ENCOUNTER
Attempted to reach the patient x 3 this week.  Voicemail is not picking up today.  How would you like to proceed?

## 2020-11-06 RX ORDER — HYDROCHLOROTHIAZIDE 25 MG/1
TABLET ORAL
Qty: 90 TABLET | Refills: 0 | Status: SHIPPED | OUTPATIENT
Start: 2020-11-06 | End: 2021-05-18

## 2020-11-12 ENCOUNTER — LAB (OUTPATIENT)
Dept: LAB | Facility: HOSPITAL | Age: 75
End: 2020-11-12

## 2020-11-12 ENCOUNTER — OFFICE VISIT (OUTPATIENT)
Dept: GYNECOLOGIC ONCOLOGY | Facility: CLINIC | Age: 75
End: 2020-11-12

## 2020-11-12 VITALS
TEMPERATURE: 97.7 F | RESPIRATION RATE: 17 BRPM | BODY MASS INDEX: 23.68 KG/M2 | HEIGHT: 64 IN | DIASTOLIC BLOOD PRESSURE: 65 MMHG | WEIGHT: 138.7 LBS | SYSTOLIC BLOOD PRESSURE: 146 MMHG | HEART RATE: 83 BPM

## 2020-11-12 DIAGNOSIS — C54.1 ENDOMETRIAL CANCER (HCC): Primary | ICD-10-CM

## 2020-11-12 PROCEDURE — 99213 OFFICE O/P EST LOW 20 MIN: CPT | Performed by: NURSE PRACTITIONER

## 2020-11-12 PROCEDURE — 36415 COLL VENOUS BLD VENIPUNCTURE: CPT | Performed by: NURSE PRACTITIONER

## 2020-11-12 PROCEDURE — 86304 IMMUNOASSAY TUMOR CA 125: CPT | Performed by: NURSE PRACTITIONER

## 2020-11-12 NOTE — PROGRESS NOTES
GYN ONCOLOGY CANCER SURVEILLANCE FOLLOW-UP    Cindy Sage  [id number]  1945    Chief Complaint: Follow-up        History of present illness:  Cindy Sage is a 75 y.o. year old female who is here today for ongoing surveillance of Endometrial Cancer, see Cancer History. She is now 4 years out from completion of treatment. CA-125 has remained low/stable, due to repeat. She reports she is feeling very well today and has no complaints. She denies vaginal bleeding, pelvic pain, and changes in bowel or bladder function.      She denies any symptoms of coronavirus or any known exposures. She has been staying safe at home.      Cancer History:   Oncology/Hematology History   Endometrial cancer (CMS/HCC)   3/26/2016 Imaging    CT in ER for acute onset postmenopausal bleeding revealed mass in the lower uterine segment. Gyn Oncology called to evaluate.     3/29/2016 Biopsy    Endometrial biopsy in office, pathology revealed complex atypical hyperplasia with stromal breakdown.        4/1/2016 Initial Diagnosis    Endometrial cancer     4/19/2016 Surgery    RATLH, BSO, LND to several millimeters of residual disease.      5/20/2016 - 11/15/2016 Chemotherapy    Carboplatin AUC 6, Paclitaxel 175 mg/m2 x 6 cycles in sandwich fashion with radiation in between cycles #3 and #4      - 9/2/2016 Radiation    Radiation completed. Pelvis and periaortic nodes received a total of 45 Gy in 25 fractions during sandwich therapy. This was followed by vaginal brachytherapy: upper vagina treated utilizing cylinder and high dose rate iridium-192 to 18 Gy in 3 fractions.      11/21/2016 Imaging    Post-treatment PET/CT negative for disease         Past Medical History:   Diagnosis Date   • Drug therapy 2016    For endometrial CA in 2016   • Elevated cholesterol    • Endometrial cancer (CMS/HCC) 04/2016    Stage IIIC2    • GERD (gastroesophageal reflux disease)    • HTN (hypertension)    • Hx of radiation therapy 2016    For  endometrial CA 2016   • Stroke (CMS/HCC) 02/2016    No residual deficits.  Takes .       Past Surgical History:   Procedure Laterality Date   • APPENDECTOMY      Ex lap   • FOOT SURGERY     • OOPHORECTOMY     • TONSILLECTOMY     • TOTAL LAPAROSCOPIC HYSTERECTOMY SALPINGO OOPHORECTOMY Bilateral 04/19/2016    RATLH, BSO, LND       MEDICATIONS: The current medication list was reviewed and reconciled.     Allergies:  is allergic to strawberry extract.    Family History   Problem Relation Age of Onset   • Arthritis Mother    • Breast cancer Mother         age unknown    • Osteoporosis Mother    • Cancer Mother    • Alcohol abuse Father    • Stroke Maternal Aunt    • Ovarian cancer Neg Hx    • Endometrial cancer Neg Hx        Last imaging study was post-treatment PET 11/21/2016.   Tumor marker:     Date Value Ref Range Status   05/12/2020 16.3 0.0 - 38.1 U/mL Final   09/26/2019 16.4 0.0 - 38.1 U/mL Final   03/20/2019 11.2 0.0 - 30.2 U/mL Final   09/18/2018 11.2 0.0 - 30.2 U/mL Final       Review of Systems   Constitutional: Negative for fatigue, fever and unexpected weight change.   HENT: Negative for congestion, ear pain, hearing loss, sinus pressure and trouble swallowing.    Eyes: Negative for visual disturbance.   Respiratory: Negative for cough, chest tightness, shortness of breath and wheezing.    Cardiovascular: Negative for chest pain, palpitations and leg swelling.   Gastrointestinal: Negative for abdominal distention, abdominal pain, constipation, diarrhea, nausea and vomiting.   Endocrine: Negative for cold intolerance, heat intolerance, polydipsia, polyphagia and polyuria.   Genitourinary: Negative for difficulty urinating, dyspareunia, dysuria, frequency, hematuria, pelvic pain, urgency, vaginal bleeding, vaginal discharge and vaginal pain.   Musculoskeletal: Negative for arthralgias, gait problem, joint swelling and myalgias.   Skin: Negative for color change, pallor and rash.   Neurological:  "Negative for dizziness, seizures, syncope, weakness, light-headedness, numbness and headaches.   Hematological: Negative for adenopathy. Does not bruise/bleed easily.   Psychiatric/Behavioral: Negative for agitation, confusion, sleep disturbance and suicidal ideas. The patient is not nervous/anxious.        Physical Exam  Vital Signs: /65   Pulse 83   Temp 97.7 °F (36.5 °C) (Temporal)   Resp 17   Ht 163.2 cm (64.25\")   Wt 62.9 kg (138 lb 11.2 oz)   LMP  (LMP Unknown)   BMI 23.62 kg/m²   Pain Score    11/12/20 1326   PainSc: 0-No pain      General Appearance:  alert, cooperative, no apparent distress, appears stated age and normal weight   Neurologic/Psychiatric: A&O x 3, gait steady, appropriate affect   HEENT:  Normocephalic, without obvious abnormality, mucous membranes moist   Neck: Supple, symmetrical, trachea midline, no adenopathy;  No thyromegaly, masses, or tenderness   Back:   Symmetric, no curvature, ROM normal, no CVA tenderness   Lungs:   Clear to auscultation bilaterally; respirations regular, even, and unlabored bilaterally   Heart:  Regular rate and rhythm, no murmurs appreciated   Breasts:  deferred   Abdomen:   Soft, non-tender, non-distended and no organomegaly   Lymph nodes: No cervical, supraclavicular, inguinal adenopathy noted   Extremities: Normal, atraumatic; no clubbing, cyanosis, or edema    Pelvic: External Genitalia  atrophic, without lesions  Vagina  is pale, atrophic.  and shortened vaginal vault.  Vaginal Cuff  Female Vaginal Cuff: smooth, intact, without visible lesions and changes consistent with previous radiation  Uterus  surgically absent and no palpable masses  Ovaries  surgically absent bilaterallly  Parametria  smooth  Rectovaginal  Female rectovaginal: deferred     ECOG Performance Status: 0 - Asymptomatic    Procedure Note:  No notes on file      Assessment and Plan:  Diagnoses and all orders for this visit:    1. Endometrial cancer (CMS/HCC) " (Primary)        There is no evidence of disease upon today's exam. She will be notified of CA-125 results upon their return. She is now 4 years from completion of treatment. Continue every 6 month cancer surveillance until the 5 year melvina. She is understanding to call with any changes in pelvic symptoms or general GYN concerns at any time between regularly scheduled visits.       Return to clinic in 6 months for ongoing cancer surveillance.      Zayra Georges APRN

## 2020-11-13 ENCOUNTER — TELEPHONE (OUTPATIENT)
Dept: GYNECOLOGIC ONCOLOGY | Facility: CLINIC | Age: 75
End: 2020-11-13

## 2020-11-13 LAB — CANCER AG125 SERPL QL: 14.4 U/ML (ref 0–38.1)

## 2020-11-13 NOTE — TELEPHONE ENCOUNTER
----- Message from CHAPARRO Hand sent at 11/13/2020  8:49 AM EST -----  Please notify CA-125 low! Thanks!

## 2020-12-28 ENCOUNTER — HOSPITAL ENCOUNTER (OUTPATIENT)
Dept: MAMMOGRAPHY | Facility: HOSPITAL | Age: 75
Discharge: HOME | End: 2020-12-28

## 2020-12-28 ENCOUNTER — HOSPITAL ENCOUNTER (OUTPATIENT)
Dept: BONE DENSITY | Facility: HOSPITAL | Age: 75
Discharge: HOME | End: 2020-12-28

## 2020-12-28 DIAGNOSIS — M81.0 AGE-RELATED OSTEOPOROSIS WITHOUT CURRENT PATHOLOGICAL FRACTURE: Primary | ICD-10-CM

## 2020-12-28 DIAGNOSIS — Z78.0 POSTMENOPAUSAL: ICD-10-CM

## 2020-12-28 DIAGNOSIS — Z12.31 VISIT FOR SCREENING MAMMOGRAM: ICD-10-CM

## 2020-12-28 PROCEDURE — 77063 BREAST TOMOSYNTHESIS BI: CPT

## 2020-12-28 PROCEDURE — 77067 SCR MAMMO BI INCL CAD: CPT

## 2020-12-28 PROCEDURE — 77080 DXA BONE DENSITY AXIAL: CPT

## 2020-12-28 PROCEDURE — 77063 BREAST TOMOSYNTHESIS BI: CPT | Performed by: RADIOLOGY

## 2020-12-28 PROCEDURE — 77067 SCR MAMMO BI INCL CAD: CPT | Performed by: RADIOLOGY

## 2020-12-28 RX ORDER — ALENDRONATE SODIUM 70 MG/1
70 TABLET ORAL
Qty: 4 TABLET | Refills: 11 | Status: SHIPPED | OUTPATIENT
Start: 2020-12-28 | End: 2021-10-07

## 2021-01-14 ENCOUNTER — HOSPITAL ENCOUNTER (OUTPATIENT)
Dept: MAMMOGRAPHY | Facility: HOSPITAL | Age: 76
Discharge: HOME | End: 2021-01-14
Admitting: RADIOLOGY

## 2021-01-14 DIAGNOSIS — R92.8 ABNORMAL MAMMOGRAM: ICD-10-CM

## 2021-01-14 PROCEDURE — G0279 TOMOSYNTHESIS, MAMMO: HCPCS

## 2021-01-14 PROCEDURE — G0279 TOMOSYNTHESIS, MAMMO: HCPCS | Performed by: RADIOLOGY

## 2021-01-14 PROCEDURE — 77065 DX MAMMO INCL CAD UNI: CPT | Performed by: RADIOLOGY

## 2021-01-14 PROCEDURE — 77065 DX MAMMO INCL CAD UNI: CPT

## 2021-01-26 DIAGNOSIS — G45.9 TIA (TRANSIENT ISCHEMIC ATTACK): ICD-10-CM

## 2021-01-26 DIAGNOSIS — I66.01 STENOSIS OF RIGHT MIDDLE CEREBRAL ARTERY: ICD-10-CM

## 2021-01-27 RX ORDER — SIMVASTATIN 40 MG
TABLET ORAL
Qty: 90 TABLET | Refills: 1 | Status: SHIPPED | OUTPATIENT
Start: 2021-01-27 | End: 2021-07-26

## 2021-02-17 NOTE — TELEPHONE ENCOUNTER
----- Message from CHAPARRO Hand sent at 6/14/2018  3:35 PM EDT -----  Please notify CA-125 low! Thanks!      CALLED AND NOTIFIED PATIENT. SHE V/U.    Condition:: Acne Please Describe Your Condition:: is here to complete Ipledge paperwork and urine hcg.\\nBirth control: OCP & condoms\\nWeight: 197 lbs (trying to lose weight, will notify us if significant loss during isotretinoin treatment)

## 2021-05-13 ENCOUNTER — OFFICE VISIT (OUTPATIENT)
Dept: GYNECOLOGIC ONCOLOGY | Facility: CLINIC | Age: 76
End: 2021-05-13

## 2021-05-13 ENCOUNTER — LAB (OUTPATIENT)
Dept: LAB | Facility: HOSPITAL | Age: 76
End: 2021-05-13

## 2021-05-13 VITALS
SYSTOLIC BLOOD PRESSURE: 146 MMHG | BODY MASS INDEX: 23.82 KG/M2 | RESPIRATION RATE: 17 BRPM | WEIGHT: 139.5 LBS | TEMPERATURE: 97.3 F | HEIGHT: 64 IN | DIASTOLIC BLOOD PRESSURE: 82 MMHG

## 2021-05-13 DIAGNOSIS — C54.1 ENDOMETRIAL CANCER (HCC): Primary | ICD-10-CM

## 2021-05-13 LAB — CANCER AG125 SERPL QL: 16.6 U/ML (ref 0–38.1)

## 2021-05-13 PROCEDURE — 99212 OFFICE O/P EST SF 10 MIN: CPT | Performed by: NURSE PRACTITIONER

## 2021-05-13 PROCEDURE — 86304 IMMUNOASSAY TUMOR CA 125: CPT | Performed by: NURSE PRACTITIONER

## 2021-05-13 PROCEDURE — 36415 COLL VENOUS BLD VENIPUNCTURE: CPT | Performed by: NURSE PRACTITIONER

## 2021-05-13 NOTE — PROGRESS NOTES
GYN ONCOLOGY CANCER SURVEILLANCE FOLLOW-UP    Cindy Sage  [id number]  1945    Subjective   Chief Complaint: Follow-up (no complaints)        History of present illness:     Cindy Sage is a 76 y.o. year old female who is here today for ongoing surveillance of Endometrial Cancer, see Cancer History. She will reach 5 years from completion of treatment this fall. CA-125 has remained low/stable, due to repeat now. She reports she is feeling very well today and has no complaints. She denies vaginal bleeding, pelvic pain, and changes in bowel or bladder function.            Cancer History:   Oncology/Hematology History   Endometrial cancer (CMS/HCC)   3/26/2016 Imaging    CT in ER for acute onset postmenopausal bleeding revealed mass in the lower uterine segment. Gyn Oncology called to evaluate.     3/29/2016 Biopsy    Endometrial biopsy in office, pathology revealed complex atypical hyperplasia with stromal breakdown.        4/1/2016 Initial Diagnosis    Endometrial cancer     4/19/2016 Surgery    RATLH, BSO, LND to several millimeters of residual disease.   Stage IIIC2     5/20/2016 - 11/15/2016 Chemotherapy    Carboplatin AUC 6, Paclitaxel 175 mg/m2 x 6 cycles in sandwich fashion with radiation in between cycles #3 and #4      - 9/2/2016 Radiation    Radiation completed. Pelvis and periaortic nodes received a total of 45 Gy in 25 fractions during sandwich therapy. This was followed by vaginal brachytherapy: upper vagina treated utilizing cylinder and high dose rate iridium-192 to 18 Gy in 3 fractions.      11/21/2016 Imaging    Post-treatment PET/CT negative for disease           The current medication list and allergy list were reviewed and reconciled.     Past Medical History, Past Surgical History, Social History, Family History have been reviewed and are without significant changes except as mentioned.      Review of Systems   Constitutional: Negative.    Gastrointestinal: Negative.   "  Genitourinary: Negative.          Objective   Physical Exam  Vital Signs: /82   Temp 97.3 °F (36.3 °C) (Temporal)   Resp 17   Ht 163.2 cm (64.25\")   Wt 63.3 kg (139 lb 8 oz)   LMP  (LMP Unknown)   BMI 23.76 kg/m²   Vitals:    05/13/21 1059   PainSc: 0-No pain           General Appearance:  alert, cooperative, no apparent distress, appears stated age and normal weight   Neurologic/Psychiatric: A&O x 3, gait steady, appropriate affect   HEENT:  Normocephalic, without obvious abnormality, mucous membranes moist   Abdomen:   Soft, non-tender, non-distended and no organomegaly   Lymph nodes: No cervical, supraclavicular, inguinal adenopathy noted   Pelvic: External Genitalia  atrophic, without lesions  Vagina  is pale, atrophic.  and shortened vaginal vault.  Vaginal Cuff  Female Vaginal Cuff: smooth, intact, without visible lesions and changes consistent with previous radiation  Uterus  surgically absent and no palpable masses  Ovaries  without palpable masses or fullness  Parametria  smooth  Rectovaginal  Female rectovaginal: deferred     ECOG score: 0     Karnofsky score: 100       Result Review :   Last imaging study was PET 11/21/2016.   Tumor marker:     Date Value Ref Range Status   11/12/2020 14.4 0.0 - 38.1 U/mL Final   05/12/2020 16.3 0.0 - 38.1 U/mL Final   09/26/2019 16.4 0.0 - 38.1 U/mL Final   03/20/2019 11.2 0.0 - 30.2 U/mL Final       PHQ-9 Total Score: 0    Procedure Note:  No notes on file         Assessment and Plan:    Diagnoses and all orders for this visit:    1. Endometrial cancer (CMS/HCC) (Primary)  -             There is no evidence of disease upon today's exam. She will be notified of CA-125 results upon their return. She will reach 5 years from completion of treatment this fall. Plan for one more 6 month surveillance visit, then yearly, pending stable exams and labs. She is understanding to call with any changes in pelvic symptoms or general GYN concerns at any time " between regularly scheduled visits.     Pain assessment was performed today as a part of patient’s care.  For patients with pain related to surgery, gynecologic malignancy or cancer treatment, the plan is as noted in the assessment/plan.  For patients with pain not related to these issues, they are to seek any further needed care from a more appropriate provider, such as PCP.      Follow-up:     Return to clinic in 6 months for ongoing cancer surveillance.      Electronically signed by CHAPARRO Hand on 05/13/21 at 11:32 EDT

## 2021-05-14 ENCOUNTER — TELEPHONE (OUTPATIENT)
Dept: GYNECOLOGIC ONCOLOGY | Facility: CLINIC | Age: 76
End: 2021-05-14

## 2021-05-14 NOTE — TELEPHONE ENCOUNTER
----- Message from CHAPARRO Hand sent at 5/14/2021  8:16 AM EDT -----  Please notify CA-125 low/stable. Thanks!

## 2021-05-18 RX ORDER — HYDROCHLOROTHIAZIDE 25 MG/1
TABLET ORAL
Qty: 90 TABLET | Refills: 0 | Status: SHIPPED | OUTPATIENT
Start: 2021-05-18 | End: 2021-08-19

## 2021-07-26 DIAGNOSIS — G45.9 TIA (TRANSIENT ISCHEMIC ATTACK): ICD-10-CM

## 2021-07-26 DIAGNOSIS — I66.01 STENOSIS OF RIGHT MIDDLE CEREBRAL ARTERY: ICD-10-CM

## 2021-07-26 RX ORDER — SIMVASTATIN 40 MG
TABLET ORAL
Qty: 90 TABLET | Refills: 0 | Status: SHIPPED | OUTPATIENT
Start: 2021-07-26 | End: 2021-10-27 | Stop reason: SDUPTHER

## 2021-08-13 DIAGNOSIS — I66.01 STENOSIS OF RIGHT MIDDLE CEREBRAL ARTERY: ICD-10-CM

## 2021-08-13 DIAGNOSIS — G45.9 TIA (TRANSIENT ISCHEMIC ATTACK): ICD-10-CM

## 2021-08-13 RX ORDER — CLOPIDOGREL BISULFATE 75 MG/1
TABLET ORAL
Qty: 90 TABLET | Refills: 0 | Status: SHIPPED | OUTPATIENT
Start: 2021-08-13 | End: 2021-10-27 | Stop reason: SDUPTHER

## 2021-08-13 NOTE — TELEPHONE ENCOUNTER
Rx Refill Note  Requested Prescriptions     Pending Prescriptions Disp Refills   • clopidogrel (PLAVIX) 75 MG tablet [Pharmacy Med Name: Clopidogrel Bisulfate 75 MG Oral Tablet] 90 tablet 0     Sig: Take 1 tablet by mouth once daily      Last office visit with prescribing clinician: 6/24/2020      Next office visit with prescribing clinician: 9/17/2021            Anaid Hu CMA  08/13/21, 12:08 EDT

## 2021-08-19 RX ORDER — HYDROCHLOROTHIAZIDE 25 MG/1
TABLET ORAL
Qty: 90 TABLET | Refills: 0 | Status: SHIPPED | OUTPATIENT
Start: 2021-08-19 | End: 2021-11-23

## 2021-10-07 ENCOUNTER — LAB (OUTPATIENT)
Dept: LAB | Facility: HOSPITAL | Age: 76
End: 2021-10-07

## 2021-10-07 ENCOUNTER — OFFICE VISIT (OUTPATIENT)
Dept: INTERNAL MEDICINE | Facility: CLINIC | Age: 76
End: 2021-10-07

## 2021-10-07 VITALS
SYSTOLIC BLOOD PRESSURE: 116 MMHG | WEIGHT: 131 LBS | DIASTOLIC BLOOD PRESSURE: 70 MMHG | BODY MASS INDEX: 22.31 KG/M2 | HEART RATE: 60 BPM | TEMPERATURE: 97.8 F | RESPIRATION RATE: 18 BRPM

## 2021-10-07 DIAGNOSIS — R82.998 LEUKOCYTES IN URINE: ICD-10-CM

## 2021-10-07 DIAGNOSIS — I10 ESSENTIAL HYPERTENSION: ICD-10-CM

## 2021-10-07 DIAGNOSIS — K21.9 GASTROESOPHAGEAL REFLUX DISEASE WITHOUT ESOPHAGITIS: ICD-10-CM

## 2021-10-07 DIAGNOSIS — R31.9 HEMATURIA, UNSPECIFIED TYPE: Primary | ICD-10-CM

## 2021-10-07 DIAGNOSIS — R31.9 HEMATURIA, UNSPECIFIED TYPE: ICD-10-CM

## 2021-10-07 DIAGNOSIS — E78.5 HYPERLIPIDEMIA, UNSPECIFIED HYPERLIPIDEMIA TYPE: Primary | ICD-10-CM

## 2021-10-07 LAB
BILIRUB BLD-MCNC: ABNORMAL MG/DL
CLARITY, POC: ABNORMAL
COLOR UR: ABNORMAL
EXPIRATION DATE: ABNORMAL
GLUCOSE UR STRIP-MCNC: NEGATIVE MG/DL
KETONES UR QL: ABNORMAL
LEUKOCYTE EST, POC: ABNORMAL
Lab: ABNORMAL
NITRITE UR-MCNC: NEGATIVE MG/ML
PH UR: 7 [PH] (ref 5–8)
PROT UR STRIP-MCNC: ABNORMAL MG/DL
RBC # UR STRIP: ABNORMAL /UL
SP GR UR: 1.01 (ref 1–1.03)
UROBILINOGEN UR QL: ABNORMAL

## 2021-10-07 PROCEDURE — 99214 OFFICE O/P EST MOD 30 MIN: CPT | Performed by: INTERNAL MEDICINE

## 2021-10-07 PROCEDURE — 36415 COLL VENOUS BLD VENIPUNCTURE: CPT | Performed by: INTERNAL MEDICINE

## 2021-10-07 PROCEDURE — 87086 URINE CULTURE/COLONY COUNT: CPT | Performed by: INTERNAL MEDICINE

## 2021-10-07 PROCEDURE — 81003 URINALYSIS AUTO W/O SCOPE: CPT | Performed by: INTERNAL MEDICINE

## 2021-10-07 PROCEDURE — 81015 MICROSCOPIC EXAM OF URINE: CPT | Performed by: INTERNAL MEDICINE

## 2021-10-07 RX ORDER — VIT C/B6/B5/MAGNESIUM/HERB 173 50-5-6-5MG
2 CAPSULE ORAL DAILY
COMMUNITY
End: 2021-12-29

## 2021-10-07 RX ORDER — OMEPRAZOLE 20 MG/1
20 CAPSULE, DELAYED RELEASE ORAL DAILY PRN
COMMUNITY
End: 2021-12-29

## 2021-10-07 NOTE — PROGRESS NOTES
Chief Complaint   Patient presents with   • Follow-up     FOLLOW UP CHRONIC MEDICAL PROBLEMS       History of Present Illness      The patient presents for a follow-up related to hyperlipidemia. She is following a low fat diet. She reports that she is exercising. She is taking simvastatin. The patient is taking her medication as prescribed. She reports no medication side effects, including muscle cramps, abdominal pain, headaches or weakness. She denies chest pain, shortness of breath, orthopnea, paroxysmal nocturnal dyspnea, dyspnea on exertion, edema, palpitations or syncope.    The patient presents for a follow-up related to hypertension. The patient reports that she has had no headaches or blurred vision. She states that she is taking her medication as prescribed. She is not having medication side effects.    The patient presents for a follow-up related to GERD. The patient is on omeprazole for her gastroesophageal reflux. The medication is taken on a regular basis and gives complete relief of the symptoms. She reports no abdominal pain, belching, diarrhea, dysphagia, early satiety, heartburn, hoarseness, nausea, odynophagia, rectal bleeding, vomiting or weight loss. The GERD has no known aggravating factors.    Review of Systems    CONSTITUTIONAL- Denies Fever, Chills, Sweats, Fatigue, Weakness or Malaise.    PULMONARY- Denies Wheezing, Sputum Production, Cough, Hemoptysis or Pleuritic Chest Pain.    CARDIOVASCULAR- Denies Claudication or Irregular Heart Beat.    Medications      Current Outpatient Medications:   •  aspirin 81 MG chewable tablet, Chew 1 tablet Daily., Disp: 90 tablet, Rfl: 3  •  Cholecalciferol (VITAMIN D3 GUMMIES ADULT PO), Take  by mouth., Disp: , Rfl:   •  clopidogrel (PLAVIX) 75 MG tablet, Take 1 tablet by mouth once daily, Disp: 90 tablet, Rfl: 0  •  hydroCHLOROthiazide (HYDRODIURIL) 25 MG tablet, Take 1 tablet by mouth once daily, Disp: 90 tablet, Rfl: 0  •  omeprazole (priLOSEC) 20 MG  capsule, Take 20 mg by mouth Daily As Needed., Disp: , Rfl:   •  Turmeric 500 MG capsule, Take 2 capsules by mouth Daily., Disp: , Rfl:   •  simvastatin (ZOCOR) 40 MG tablet, Take 1 tablet by mouth once daily, Disp: 90 tablet, Rfl: 0     Allergies    Allergies   Allergen Reactions   • Strawberry Extract Swelling       Problem List    Patient Active Problem List   Diagnosis   • Hyperlipidemia   • Hypertension   • Ischemic stroke (HCC)   • Endometrial cancer (HCC)   • GERD (gastroesophageal reflux disease)   • Hx: UTI (urinary tract infection)   • History of endometrial cancer   • TIA (transient ischemic attack)   • Stenosis of right middle cerebral artery   • Musculoskeletal neck pain       Medications, Allergies, Problems List and Past History were reviewed and updated.    Physical Examination    /70 (BP Location: Right arm, Patient Position: Sitting, Cuff Size: Adult)   Pulse 60   Temp 97.8 °F (36.6 °C) (Infrared)   Resp 18   Wt 59.4 kg (131 lb)   LMP  (LMP Unknown)   Breastfeeding No   BMI 22.31 kg/m²     HEENT: Facies- Within normal limits. Lids- Normal bilaterally. Conjunctiva- Clear bilaterally. Sclera- Anicteric bilaterally.    Neck: Thyroid- non enlarged, symmetric and has no nodules. No bruits are detected.    Lungs: Auscultation- Clear to auscultation bilaterally. There are no retractions, clubbing or cyanosis. The Expiratory to Inspiratory ratio is equal.    Cardiovascular: There are no carotid bruits. Heart- Normal Rate with Regular rhythm and no murmurs. There are no gallops. There are no rubs. In the lower extremities there is no edema. The upper extremities do not have edema.    Abdomen: Soft, benign, non-tender with no masses, hernias, organomegaly or scars.    Impression and Assessment    Hyperlipidemia.    Essential Hypertension.    Gastroesophageal Reflux Disease.    Plan    Gastroesophageal Reflux Disease Plan: The patient was instructed to continue the current  medications.    Hyperlipidemia Plan: The patient was instructed to exercise daily, eat a low fat diet and continue her medications.    Essential Hypertension Plan: The patient was instructed to continue the current medications.    Diagnoses and all orders for this visit:    1. Hyperlipidemia, unspecified hyperlipidemia type (Primary)  -     Comprehensive Metabolic Panel; Future  -     Lipid Panel; Future  -     Lipid Panel  -     Comprehensive Metabolic Panel    2. Essential hypertension  -     Comprehensive Metabolic Panel; Future  -     POC Urinalysis Dipstick, Automated  -     Comprehensive Metabolic Panel    3. Gastroesophageal reflux disease without esophagitis  -     Ambulatory Referral For Screening Colonoscopy        Return to Office    The patient was instructed to return for follow-up in 6 months. The patient was instructed to return sooner if the condition changes, worsens, or does not resolve.

## 2021-10-08 LAB
ALBUMIN SERPL-MCNC: 4 G/DL (ref 3.5–5.2)
ALBUMIN/GLOB SERPL: 1.7 G/DL
ALP SERPL-CCNC: 67 U/L (ref 39–117)
ALT SERPL-CCNC: 6 U/L (ref 1–33)
AST SERPL-CCNC: 14 U/L (ref 1–32)
BACTERIA SPEC AEROBE CULT: NORMAL
BACTERIA UR QL AUTO: ABNORMAL /HPF
BILIRUB SERPL-MCNC: 0.6 MG/DL (ref 0–1.2)
BUN SERPL-MCNC: 8 MG/DL (ref 8–23)
BUN/CREAT SERPL: 9.2 (ref 7–25)
CALCIUM SERPL-MCNC: 8.9 MG/DL (ref 8.6–10.5)
CHLORIDE SERPL-SCNC: 96 MMOL/L (ref 98–107)
CHOLEST SERPL-MCNC: 146 MG/DL (ref 0–200)
CO2 SERPL-SCNC: 26.6 MMOL/L (ref 22–29)
CREAT SERPL-MCNC: 0.87 MG/DL (ref 0.57–1)
GLOBULIN SER CALC-MCNC: 2.3 GM/DL
GLUCOSE SERPL-MCNC: 98 MG/DL (ref 65–99)
HDLC SERPL-MCNC: 82 MG/DL (ref 40–60)
HYALINE CASTS UR QL AUTO: ABNORMAL /LPF
LDLC SERPL CALC-MCNC: 47 MG/DL (ref 0–100)
POTASSIUM SERPL-SCNC: 2.9 MMOL/L (ref 3.5–5.2)
PROT SERPL-MCNC: 6.3 G/DL (ref 6–8.5)
RBC # UR: ABNORMAL /HPF
REF LAB TEST METHOD: ABNORMAL
SODIUM SERPL-SCNC: 137 MMOL/L (ref 136–145)
SQUAMOUS #/AREA URNS HPF: ABNORMAL /HPF
TRIGL SERPL-MCNC: 93 MG/DL (ref 0–150)
VLDLC SERPL CALC-MCNC: 17 MG/DL (ref 5–40)
WBC UR QL AUTO: ABNORMAL /HPF

## 2021-10-15 DIAGNOSIS — Z12.11 SCREENING FOR COLON CANCER: Primary | ICD-10-CM

## 2021-10-15 RX ORDER — SODIUM, POTASSIUM,MAG SULFATES 17.5-3.13G
1 SOLUTION, RECONSTITUTED, ORAL ORAL TAKE AS DIRECTED
Qty: 354 ML | Refills: 0 | Status: SHIPPED | OUTPATIENT
Start: 2021-10-15 | End: 2021-11-17

## 2021-10-27 DIAGNOSIS — G45.9 TIA (TRANSIENT ISCHEMIC ATTACK): ICD-10-CM

## 2021-10-27 DIAGNOSIS — I66.01 STENOSIS OF RIGHT MIDDLE CEREBRAL ARTERY: ICD-10-CM

## 2021-10-27 RX ORDER — POTASSIUM CHLORIDE 20 MEQ/1
20 TABLET, EXTENDED RELEASE ORAL DAILY
Qty: 30 TABLET | Refills: 5 | Status: SHIPPED | OUTPATIENT
Start: 2021-10-27 | End: 2021-11-17

## 2021-10-27 RX ORDER — CLOPIDOGREL BISULFATE 75 MG/1
75 TABLET ORAL DAILY
Qty: 30 TABLET | Refills: 5 | Status: ON HOLD | OUTPATIENT
Start: 2021-10-27 | End: 2021-12-15 | Stop reason: SDUPTHER

## 2021-10-27 RX ORDER — SIMVASTATIN 40 MG
40 TABLET ORAL DAILY
Qty: 30 TABLET | Refills: 5 | Status: SHIPPED | OUTPATIENT
Start: 2021-10-27 | End: 2022-05-02

## 2021-11-17 ENCOUNTER — OFFICE VISIT (OUTPATIENT)
Dept: GYNECOLOGIC ONCOLOGY | Facility: CLINIC | Age: 76
End: 2021-11-17

## 2021-11-17 ENCOUNTER — LAB (OUTPATIENT)
Dept: LAB | Facility: HOSPITAL | Age: 76
End: 2021-11-17

## 2021-11-17 VITALS
WEIGHT: 129 LBS | HEART RATE: 66 BPM | RESPIRATION RATE: 20 BRPM | DIASTOLIC BLOOD PRESSURE: 61 MMHG | HEIGHT: 64 IN | TEMPERATURE: 97.3 F | SYSTOLIC BLOOD PRESSURE: 131 MMHG | BODY MASS INDEX: 22.02 KG/M2 | OXYGEN SATURATION: 99 %

## 2021-11-17 DIAGNOSIS — C54.1 ENDOMETRIAL CANCER (HCC): Primary | ICD-10-CM

## 2021-11-17 LAB — CANCER AG125 SERPL QL: 27.4 U/ML (ref 0–38.1)

## 2021-11-17 PROCEDURE — 36415 COLL VENOUS BLD VENIPUNCTURE: CPT | Performed by: NURSE PRACTITIONER

## 2021-11-17 PROCEDURE — 86304 IMMUNOASSAY TUMOR CA 125: CPT | Performed by: NURSE PRACTITIONER

## 2021-11-17 PROCEDURE — 99213 OFFICE O/P EST LOW 20 MIN: CPT | Performed by: NURSE PRACTITIONER

## 2021-11-17 NOTE — PROGRESS NOTES
GYN ONCOLOGY CANCER SURVEILLANCE FOLLOW-UP    Cindy Sage  [id number]  1945    Subjective   Chief Complaint: Follow-up (No Complaints)        History of present illness:     Cindy Sage is a 76 y.o. year old female who is here today for ongoing surveillance of Endometrial Cancer, see Cancer History. She is now 5 years out from completion of treatment. CA-125 has remained low/stable, due to repeat now. She reports she is feeling very well today and has no complaints. She denies vaginal bleeding, pelvic pain, and changes in bowel or bladder function.  All well woman exams are up-to-date, followed by her PCP office. She has completed her COVID-19 vaccinations including booster, plans flu shot in the near future.             Cancer History:   Oncology/Hematology History   Endometrial cancer (HCC)   3/26/2016 Imaging    CT in ER for acute onset postmenopausal bleeding revealed mass in the lower uterine segment. Gyn Oncology called to evaluate.     3/29/2016 Biopsy    Endometrial biopsy in office, pathology revealed complex atypical hyperplasia with stromal breakdown.        4/1/2016 Initial Diagnosis    Endometrial cancer     4/19/2016 Surgery    RATLH, BSO, LND to several millimeters of residual disease.   Stage IIIC2     5/20/2016 - 11/15/2016 Chemotherapy    Carboplatin AUC 6, Paclitaxel 175 mg/m2 x 6 cycles in sandwich fashion with radiation in between cycles #3 and #4      - 9/2/2016 Radiation    Radiation completed. Pelvis and periaortic nodes received a total of 45 Gy in 25 fractions during sandwich therapy. This was followed by vaginal brachytherapy: upper vagina treated utilizing cylinder and high dose rate iridium-192 to 18 Gy in 3 fractions.      11/21/2016 Imaging    Post-treatment PET/CT negative for disease           The current medication list and allergy list were reviewed and reconciled.     Past Medical History, Past Surgical History, Social History, Family History have been reviewed  "and are without significant changes except as mentioned.      Review of Systems   Constitutional: Negative.    Gastrointestinal: Negative.    Genitourinary: Negative.          Objective   Physical Exam  Vital Signs: /61 Comment: RUE  Pulse 66   Temp 97.3 °F (36.3 °C) (Infrared)   Resp 20   Ht 163.2 cm (64.25\")   Wt 58.5 kg (129 lb)   LMP  (LMP Unknown)   SpO2 99% Comment: RA  BMI 21.97 kg/m²   Vitals:    11/17/21 1034   PainSc: 0-No pain           General Appearance:  alert, cooperative, no apparent distress, appears stated age and normal weight   Neurologic/Psychiatric: A&O x 3, gait steady, appropriate affect   HEENT:  Normocephalic, without obvious abnormality, mucous membranes moist   Abdomen:   Soft, non-tender, non-distended and no organomegaly   Lymph nodes: No cervical, supraclavicular, inguinal adenopathy noted   Pelvic: External Genitalia  atrophic, without lesions  Vagina  is pale, atrophic.  and shortened vaginal vault.  Vaginal Cuff  Female Vaginal Cuff: smooth, intact, without visible lesions and changes consistent with previous radiation  Uterus  surgically absent and no palpable masses  Ovaries  without palpable masses or fullness  Parametria  smooth  Rectovaginal  Female rectovaginal: deferred     ECOG score: 0             Result Review :   Last imaging study was PET 11/21/2016.   Tumor marker:     Date Value Ref Range Status   05/13/2021 16.6 0.0 - 38.1 U/mL Final   11/12/2020 14.4 0.0 - 38.1 U/mL Final   05/12/2020 16.3 0.0 - 38.1 U/mL Final   09/26/2019 16.4 0.0 - 38.1 U/mL Final       PHQ-9 Total Score:      Procedure Note:  No notes on file         Assessment and Plan:    Diagnoses and all orders for this visit:    1. Endometrial cancer (HCC) (Primary)  -             There is no evidence of disease upon today's exam. She will be notified of CA-125 results upon their return.  She is now 5 years out from completion of treatment and doing well.  She may go to yearly " visits, pending stable tumor marker.  She is understanding to call with any changes in pelvic symptoms or general GYN concerns at any time between regularly scheduled visits.     Pain assessment was performed today as a part of patient’s care.  For patients with pain related to surgery, gynecologic malignancy or cancer treatment, the plan is as noted in the assessment/plan.  For patients with pain not related to these issues, they are to seek any further needed care from a more appropriate provider, such as PCP.      Follow-up:     Return to clinic in 1 year for ongoing cancer surveillance.      Electronically signed by CHAPARRO Hand on 11/17/21 at 11:11 EST

## 2021-11-19 ENCOUNTER — TELEPHONE (OUTPATIENT)
Dept: GYNECOLOGIC ONCOLOGY | Facility: CLINIC | Age: 76
End: 2021-11-19

## 2021-11-19 DIAGNOSIS — C54.1 ENDOMETRIAL CANCER (HCC): Primary | ICD-10-CM

## 2021-11-19 NOTE — TELEPHONE ENCOUNTER
Called patient and discussed CA-125 results, up to 27 from 16.  This is the highest her CA-125 has been thus far.  I explained this is not diagnostic, but a screening and warrants further evaluation.  I am recommending CT imaging now.  Order placed.  She will be called to schedule and we will notify her of all results upon their return.  All questions answered to her satisfaction.  Patient verbalized understanding and agrees with plan.

## 2021-11-23 RX ORDER — HYDROCHLOROTHIAZIDE 25 MG/1
TABLET ORAL
Qty: 90 TABLET | Refills: 1 | Status: SHIPPED | OUTPATIENT
Start: 2021-11-23 | End: 2021-12-15 | Stop reason: HOSPADM

## 2021-12-08 ENCOUNTER — APPOINTMENT (OUTPATIENT)
Dept: CT IMAGING | Facility: HOSPITAL | Age: 76
End: 2021-12-08

## 2021-12-08 ENCOUNTER — HOSPITAL ENCOUNTER (INPATIENT)
Facility: HOSPITAL | Age: 76
LOS: 7 days | Discharge: HOME | End: 2021-12-15
Attending: EMERGENCY MEDICINE | Admitting: INTERNAL MEDICINE

## 2021-12-08 DIAGNOSIS — G45.9 TIA (TRANSIENT ISCHEMIC ATTACK): ICD-10-CM

## 2021-12-08 DIAGNOSIS — Z74.09 IMPAIRED FUNCTIONAL MOBILITY, BALANCE, GAIT, AND ENDURANCE: ICD-10-CM

## 2021-12-08 DIAGNOSIS — R10.10 PAIN OF UPPER ABDOMEN: ICD-10-CM

## 2021-12-08 DIAGNOSIS — K92.2 GASTROINTESTINAL HEMORRHAGE, UNSPECIFIED GASTROINTESTINAL HEMORRHAGE TYPE: ICD-10-CM

## 2021-12-08 DIAGNOSIS — M54.2 MUSCULOSKELETAL NECK PAIN: Primary | ICD-10-CM

## 2021-12-08 DIAGNOSIS — R19.7 DIARRHEA, UNSPECIFIED TYPE: ICD-10-CM

## 2021-12-08 DIAGNOSIS — K56.609 LARGE BOWEL OBSTRUCTION (HCC): ICD-10-CM

## 2021-12-08 DIAGNOSIS — R97.1 ELEVATED CA-125: ICD-10-CM

## 2021-12-08 DIAGNOSIS — E87.6 HYPOKALEMIA: ICD-10-CM

## 2021-12-08 DIAGNOSIS — I66.01 STENOSIS OF RIGHT MIDDLE CEREBRAL ARTERY: ICD-10-CM

## 2021-12-08 DIAGNOSIS — K59.00 OBSTIPATION: ICD-10-CM

## 2021-12-08 PROBLEM — K92.1 MELENA: Status: ACTIVE | Noted: 2021-12-08

## 2021-12-08 LAB
ABO GROUP BLD: NORMAL
ALBUMIN SERPL-MCNC: 3.5 G/DL (ref 3.5–5.2)
ALBUMIN/GLOB SERPL: 1.5 G/DL
ALP SERPL-CCNC: 78 U/L (ref 39–117)
ALT SERPL W P-5'-P-CCNC: 7 U/L (ref 1–33)
ANION GAP SERPL CALCULATED.3IONS-SCNC: 13 MMOL/L (ref 5–15)
AST SERPL-CCNC: 13 U/L (ref 1–32)
B PARAPERT DNA SPEC QL NAA+PROBE: NOT DETECTED
B PERT DNA SPEC QL NAA+PROBE: NOT DETECTED
BACTERIA UR QL AUTO: ABNORMAL /HPF
BASOPHILS # BLD AUTO: 0.02 10*3/MM3 (ref 0–0.2)
BASOPHILS NFR BLD AUTO: 0.4 % (ref 0–1.5)
BILIRUB SERPL-MCNC: 0.6 MG/DL (ref 0–1.2)
BILIRUB UR QL STRIP: ABNORMAL
BLD GP AB SCN SERPL QL: NEGATIVE
BUN SERPL-MCNC: 26 MG/DL (ref 8–23)
BUN/CREAT SERPL: 34.7 (ref 7–25)
C PNEUM DNA NPH QL NAA+NON-PROBE: NOT DETECTED
CALCIUM SPEC-SCNC: 8.3 MG/DL (ref 8.6–10.5)
CHLORIDE SERPL-SCNC: 93 MMOL/L (ref 98–107)
CLARITY UR: ABNORMAL
CO2 SERPL-SCNC: 26 MMOL/L (ref 22–29)
COLOR UR: ABNORMAL
CREAT SERPL-MCNC: 0.75 MG/DL (ref 0.57–1)
D-LACTATE SERPL-SCNC: 2.2 MMOL/L (ref 0.5–2)
D-LACTATE SERPL-SCNC: 2.2 MMOL/L (ref 0.5–2)
DEPRECATED RDW RBC AUTO: 57.5 FL (ref 37–54)
DEVELOPER EXPIRATION DATE: ABNORMAL
DEVELOPER LOT NUMBER: ABNORMAL
EOSINOPHIL # BLD AUTO: 0.02 10*3/MM3 (ref 0–0.4)
EOSINOPHIL NFR BLD AUTO: 0.4 % (ref 0.3–6.2)
ERYTHROCYTE [DISTWIDTH] IN BLOOD BY AUTOMATED COUNT: 17.7 % (ref 12.3–15.4)
EXPIRATION DATE: 524
FECAL OCCULT BLOOD SCREEN, POC: POSITIVE
FLUAV SUBTYP SPEC NAA+PROBE: NOT DETECTED
FLUBV RNA ISLT QL NAA+PROBE: NOT DETECTED
GFR SERPL CREATININE-BSD FRML MDRD: 75 ML/MIN/1.73
GLOBULIN UR ELPH-MCNC: 2.4 GM/DL
GLUCOSE SERPL-MCNC: 92 MG/DL (ref 65–99)
GLUCOSE UR STRIP-MCNC: NEGATIVE MG/DL
HADV DNA SPEC NAA+PROBE: NOT DETECTED
HCOV 229E RNA SPEC QL NAA+PROBE: NOT DETECTED
HCOV HKU1 RNA SPEC QL NAA+PROBE: NOT DETECTED
HCOV NL63 RNA SPEC QL NAA+PROBE: NOT DETECTED
HCOV OC43 RNA SPEC QL NAA+PROBE: NOT DETECTED
HCT VFR BLD AUTO: 37.5 % (ref 34–46.6)
HCT VFR BLD AUTO: 38.9 % (ref 34–46.6)
HGB BLD-MCNC: 12.2 G/DL (ref 12–15.9)
HGB BLD-MCNC: 12.7 G/DL (ref 12–15.9)
HGB UR QL STRIP.AUTO: ABNORMAL
HMPV RNA NPH QL NAA+NON-PROBE: NOT DETECTED
HOLD SPECIMEN: NORMAL
HPIV1 RNA ISLT QL NAA+PROBE: NOT DETECTED
HPIV2 RNA SPEC QL NAA+PROBE: NOT DETECTED
HPIV3 RNA NPH QL NAA+PROBE: NOT DETECTED
HPIV4 P GENE NPH QL NAA+PROBE: NOT DETECTED
IMM GRANULOCYTES # BLD AUTO: 0.02 10*3/MM3 (ref 0–0.05)
IMM GRANULOCYTES NFR BLD AUTO: 0.4 % (ref 0–0.5)
KETONES UR QL STRIP: ABNORMAL
LEUKOCYTE ESTERASE UR QL STRIP.AUTO: ABNORMAL
LIPASE SERPL-CCNC: 26 U/L (ref 13–60)
LYMPHOCYTES # BLD AUTO: 0.64 10*3/MM3 (ref 0.7–3.1)
LYMPHOCYTES NFR BLD AUTO: 13.6 % (ref 19.6–45.3)
Lab: ABNORMAL
M PNEUMO IGG SER IA-ACNC: NOT DETECTED
MAGNESIUM SERPL-MCNC: 1.9 MG/DL (ref 1.6–2.4)
MCH RBC QN AUTO: 29.3 PG (ref 26.6–33)
MCHC RBC AUTO-ENTMCNC: 32.6 G/DL (ref 31.5–35.7)
MCV RBC AUTO: 89.8 FL (ref 79–97)
MONOCYTES # BLD AUTO: 0.48 10*3/MM3 (ref 0.1–0.9)
MONOCYTES NFR BLD AUTO: 10.2 % (ref 5–12)
NEGATIVE CONTROL: NEGATIVE
NEUTROPHILS NFR BLD AUTO: 3.52 10*3/MM3 (ref 1.7–7)
NEUTROPHILS NFR BLD AUTO: 75 % (ref 42.7–76)
NITRITE UR QL STRIP: NEGATIVE
NRBC BLD AUTO-RTO: 0 /100 WBC (ref 0–0.2)
PH UR STRIP.AUTO: 5.5 [PH] (ref 5–8)
PLATELET # BLD AUTO: 339 10*3/MM3 (ref 140–450)
PMV BLD AUTO: 8.3 FL (ref 6–12)
POSITIVE CONTROL: POSITIVE
POTASSIUM SERPL-SCNC: 2.6 MMOL/L (ref 3.5–5.2)
PROT SERPL-MCNC: 5.9 G/DL (ref 6–8.5)
PROT UR QL STRIP: ABNORMAL
RBC # BLD AUTO: 4.33 10*6/MM3 (ref 3.77–5.28)
RBC # UR STRIP: ABNORMAL /HPF
REF LAB TEST METHOD: ABNORMAL
RH BLD: POSITIVE
RHINOVIRUS RNA SPEC NAA+PROBE: NOT DETECTED
RSV RNA NPH QL NAA+NON-PROBE: NOT DETECTED
SARS-COV-2 RNA NPH QL NAA+NON-PROBE: NOT DETECTED
SODIUM SERPL-SCNC: 132 MMOL/L (ref 136–145)
SP GR UR STRIP: 1.03 (ref 1–1.03)
SQUAMOUS #/AREA URNS HPF: ABNORMAL /HPF
T&S EXPIRATION DATE: NORMAL
UROBILINOGEN UR QL STRIP: ABNORMAL
WBC # UR STRIP: ABNORMAL /HPF
WBC NRBC COR # BLD: 4.7 10*3/MM3 (ref 3.4–10.8)
WHOLE BLOOD HOLD SPECIMEN: NORMAL
WHOLE BLOOD HOLD SPECIMEN: NORMAL

## 2021-12-08 PROCEDURE — 82270 OCCULT BLOOD FECES: CPT | Performed by: EMERGENCY MEDICINE

## 2021-12-08 PROCEDURE — 25010000002 ONDANSETRON PER 1 MG: Performed by: OBSTETRICS & GYNECOLOGY

## 2021-12-08 PROCEDURE — 86901 BLOOD TYPING SEROLOGIC RH(D): CPT | Performed by: EMERGENCY MEDICINE

## 2021-12-08 PROCEDURE — 25010000002 ONDANSETRON PER 1 MG: Performed by: INTERNAL MEDICINE

## 2021-12-08 PROCEDURE — 99223 1ST HOSP IP/OBS HIGH 75: CPT | Performed by: INTERNAL MEDICINE

## 2021-12-08 PROCEDURE — 81001 URINALYSIS AUTO W/SCOPE: CPT | Performed by: EMERGENCY MEDICINE

## 2021-12-08 PROCEDURE — 0 IOPAMIDOL PER 1 ML: Performed by: EMERGENCY MEDICINE

## 2021-12-08 PROCEDURE — 74177 CT ABD & PELVIS W/CONTRAST: CPT

## 2021-12-08 PROCEDURE — 83690 ASSAY OF LIPASE: CPT | Performed by: EMERGENCY MEDICINE

## 2021-12-08 PROCEDURE — 83605 ASSAY OF LACTIC ACID: CPT | Performed by: EMERGENCY MEDICINE

## 2021-12-08 PROCEDURE — 86850 RBC ANTIBODY SCREEN: CPT | Performed by: EMERGENCY MEDICINE

## 2021-12-08 PROCEDURE — G0378 HOSPITAL OBSERVATION PER HR: HCPCS

## 2021-12-08 PROCEDURE — 85018 HEMOGLOBIN: CPT | Performed by: INTERNAL MEDICINE

## 2021-12-08 PROCEDURE — 99284 EMERGENCY DEPT VISIT MOD MDM: CPT

## 2021-12-08 PROCEDURE — 85025 COMPLETE CBC W/AUTO DIFF WBC: CPT | Performed by: EMERGENCY MEDICINE

## 2021-12-08 PROCEDURE — 0202U NFCT DS 22 TRGT SARS-COV-2: CPT | Performed by: INTERNAL MEDICINE

## 2021-12-08 PROCEDURE — 0 DIATRIZOATE MEGLUMINE & SODIUM PER 1 ML: Performed by: EMERGENCY MEDICINE

## 2021-12-08 PROCEDURE — 86900 BLOOD TYPING SEROLOGIC ABO: CPT | Performed by: EMERGENCY MEDICINE

## 2021-12-08 PROCEDURE — 80053 COMPREHEN METABOLIC PANEL: CPT | Performed by: EMERGENCY MEDICINE

## 2021-12-08 PROCEDURE — 71260 CT THORAX DX C+: CPT

## 2021-12-08 PROCEDURE — 83735 ASSAY OF MAGNESIUM: CPT | Performed by: EMERGENCY MEDICINE

## 2021-12-08 PROCEDURE — 85014 HEMATOCRIT: CPT | Performed by: INTERNAL MEDICINE

## 2021-12-08 RX ORDER — ONDANSETRON 2 MG/ML
4 INJECTION INTRAMUSCULAR; INTRAVENOUS EVERY 6 HOURS PRN
Status: DISCONTINUED | OUTPATIENT
Start: 2021-12-08 | End: 2021-12-15 | Stop reason: HOSPADM

## 2021-12-08 RX ORDER — ATORVASTATIN CALCIUM 20 MG/1
20 TABLET, FILM COATED ORAL DAILY
Status: DISCONTINUED | OUTPATIENT
Start: 2021-12-08 | End: 2021-12-08

## 2021-12-08 RX ORDER — PANTOPRAZOLE SODIUM 40 MG/10ML
80 INJECTION, POWDER, LYOPHILIZED, FOR SOLUTION INTRAVENOUS ONCE
Status: COMPLETED | OUTPATIENT
Start: 2021-12-08 | End: 2021-12-08

## 2021-12-08 RX ORDER — ATORVASTATIN CALCIUM 20 MG/1
20 TABLET, FILM COATED ORAL DAILY
Status: DISCONTINUED | OUTPATIENT
Start: 2021-12-08 | End: 2021-12-10

## 2021-12-08 RX ORDER — ACETAMINOPHEN 160 MG/5ML
650 SOLUTION ORAL EVERY 4 HOURS PRN
Status: DISCONTINUED | OUTPATIENT
Start: 2021-12-08 | End: 2021-12-10

## 2021-12-08 RX ORDER — HYDROCODONE BITARTRATE AND ACETAMINOPHEN 5; 325 MG/1; MG/1
1 TABLET ORAL EVERY 4 HOURS PRN
Status: DISCONTINUED | OUTPATIENT
Start: 2021-12-08 | End: 2021-12-10

## 2021-12-08 RX ORDER — SODIUM CHLORIDE 9 MG/ML
125 INJECTION, SOLUTION INTRAVENOUS CONTINUOUS
Status: DISCONTINUED | OUTPATIENT
Start: 2021-12-08 | End: 2021-12-10

## 2021-12-08 RX ORDER — SODIUM CHLORIDE 0.9 % (FLUSH) 0.9 %
1-10 SYRINGE (ML) INJECTION AS NEEDED
Status: DISCONTINUED | OUTPATIENT
Start: 2021-12-08 | End: 2021-12-15 | Stop reason: HOSPADM

## 2021-12-08 RX ORDER — ONDANSETRON 4 MG/1
4 TABLET, FILM COATED ORAL EVERY 6 HOURS PRN
Status: DISCONTINUED | OUTPATIENT
Start: 2021-12-08 | End: 2021-12-15 | Stop reason: HOSPADM

## 2021-12-08 RX ORDER — POTASSIUM CHLORIDE 7.45 MG/ML
10 INJECTION INTRAVENOUS
Status: DISCONTINUED | OUTPATIENT
Start: 2021-12-08 | End: 2021-12-15 | Stop reason: HOSPADM

## 2021-12-08 RX ORDER — SODIUM CHLORIDE 9 MG/ML
10 INJECTION INTRAVENOUS AS NEEDED
Status: DISCONTINUED | OUTPATIENT
Start: 2021-12-08 | End: 2021-12-15 | Stop reason: HOSPADM

## 2021-12-08 RX ORDER — ACETAMINOPHEN 325 MG/1
650 TABLET ORAL EVERY 4 HOURS PRN
Status: DISCONTINUED | OUTPATIENT
Start: 2021-12-08 | End: 2021-12-10

## 2021-12-08 RX ORDER — POTASSIUM CHLORIDE 1.5 G/1.77G
40 POWDER, FOR SOLUTION ORAL AS NEEDED
Status: DISCONTINUED | OUTPATIENT
Start: 2021-12-08 | End: 2021-12-15 | Stop reason: HOSPADM

## 2021-12-08 RX ORDER — ACETAMINOPHEN 650 MG/1
650 SUPPOSITORY RECTAL EVERY 4 HOURS PRN
Status: DISCONTINUED | OUTPATIENT
Start: 2021-12-08 | End: 2021-12-10

## 2021-12-08 RX ORDER — SODIUM CHLORIDE 0.9 % (FLUSH) 0.9 %
10 SYRINGE (ML) INJECTION EVERY 12 HOURS SCHEDULED
Status: DISCONTINUED | OUTPATIENT
Start: 2021-12-08 | End: 2021-12-15 | Stop reason: HOSPADM

## 2021-12-08 RX ORDER — SODIUM CHLORIDE 0.9 % (FLUSH) 0.9 %
10 SYRINGE (ML) INJECTION AS NEEDED
Status: DISCONTINUED | OUTPATIENT
Start: 2021-12-08 | End: 2021-12-10

## 2021-12-08 RX ORDER — POTASSIUM CHLORIDE 750 MG/1
40 CAPSULE, EXTENDED RELEASE ORAL AS NEEDED
Status: DISCONTINUED | OUTPATIENT
Start: 2021-12-08 | End: 2021-12-15 | Stop reason: HOSPADM

## 2021-12-08 RX ORDER — PANTOPRAZOLE SODIUM 40 MG/10ML
40 INJECTION, POWDER, LYOPHILIZED, FOR SOLUTION INTRAVENOUS EVERY 12 HOURS SCHEDULED
Status: DISCONTINUED | OUTPATIENT
Start: 2021-12-08 | End: 2021-12-14

## 2021-12-08 RX ORDER — POTASSIUM CHLORIDE 750 MG/1
40 CAPSULE, EXTENDED RELEASE ORAL ONCE
Status: DISCONTINUED | OUTPATIENT
Start: 2021-12-08 | End: 2021-12-10

## 2021-12-08 RX ADMIN — IOPAMIDOL 79 ML: 755 INJECTION, SOLUTION INTRAVENOUS at 12:55

## 2021-12-08 RX ADMIN — POTASSIUM CHLORIDE 40 MEQ: 1.5 POWDER, FOR SOLUTION ORAL at 21:35

## 2021-12-08 RX ADMIN — POTASSIUM CHLORIDE 40 MEQ: 1.5 POWDER, FOR SOLUTION ORAL at 13:13

## 2021-12-08 RX ADMIN — PANTOPRAZOLE SODIUM 80 MG: 40 INJECTION, POWDER, FOR SOLUTION INTRAVENOUS at 10:59

## 2021-12-08 RX ADMIN — ATORVASTATIN CALCIUM 20 MG: 20 TABLET, FILM COATED ORAL at 20:20

## 2021-12-08 RX ADMIN — SODIUM CHLORIDE 125 ML/HR: 9 INJECTION, SOLUTION INTRAVENOUS at 11:09

## 2021-12-08 RX ADMIN — Medication 15 ML: at 11:02

## 2021-12-08 RX ADMIN — SODIUM CHLORIDE 500 ML: 9 INJECTION, SOLUTION INTRAVENOUS at 11:09

## 2021-12-08 RX ADMIN — PANTOPRAZOLE SODIUM 40 MG: 40 INJECTION, POWDER, FOR SOLUTION INTRAVENOUS at 20:19

## 2021-12-08 RX ADMIN — SODIUM CHLORIDE 125 ML/HR: 9 INJECTION, SOLUTION INTRAVENOUS at 20:20

## 2021-12-08 RX ADMIN — ONDANSETRON 4 MG: 2 INJECTION INTRAMUSCULAR; INTRAVENOUS at 21:35

## 2021-12-08 RX ADMIN — SODIUM CHLORIDE, PRESERVATIVE FREE 10 ML: 5 INJECTION INTRAVENOUS at 20:19

## 2021-12-08 RX ADMIN — POTASSIUM CHLORIDE 40 MEQ: 1.5 POWDER, FOR SOLUTION ORAL at 17:10

## 2021-12-08 NOTE — ED PROVIDER NOTES
Subjective   76-year-old female presents to the emergency department with abdominal pain obstipation newly elevated endometrial cancer markers and GI bleed    Patient reports a history of abdominal pain associated with obstipation for about 3 months.  She reports waxing and waning symptoms with good days and bad days.  She has poorly localized upper abdominal discomfort on her bad days with nausea and diarrhea alternating with constipation.  Today is one of her good days associated with constipation and without discomfort but she reports severe discomfort on her bad days.  She has had black stools for the last several days but has been using Pepto-Bismol.  She does not take iron.  She reports nausea with occasional emesis.  She reports she is thrown up about 3 times over the last month but not in the last several days.  She denies any bloody stools.    She denies dysuria frequency urgency or hematuria.  She has no chest pain palpitations.  She denies any fevers chills cough congestion shortness of breath sore throat rhinorrhea or coronavirus symptoms or coronavirus exposure.  She denies loss of taste or smell.  She is fully immunized against coronavirus    She denies any trauma or other etiology for abdominal discomfort    She was seen by her gynecologist team a month ago.  She had her markers drawn and her markers were elevated.  They ordered a CT on 1119 but is scheduled for next week.  With her persistent symptoms she is convinced today to come to the ED for evaluation          Review of Systems   Constitutional: Negative.  Negative for chills and fever.   HENT: Negative.  Negative for congestion.    Eyes: Negative.    Respiratory: Negative.  Negative for cough and shortness of breath.    Cardiovascular: Negative.  Negative for chest pain.   Gastrointestinal: Positive for abdominal pain, constipation, diarrhea and vomiting. Negative for anal bleeding and blood in stool.   Genitourinary: Negative.  Negative for  dysuria, frequency and urgency.   Skin: Negative.  Negative for rash.   Neurological: Negative.    All other systems reviewed and are negative.      Past Medical History:   Diagnosis Date   • Drug therapy 2016    For endometrial CA in 2016   • Elevated cholesterol    • Endometrial cancer (HCC) 04/2016    Stage IIIC2    • GERD (gastroesophageal reflux disease)    • HTN (hypertension)    • Hx of radiation therapy 2016    For endometrial CA 2016   • Stroke (HCC) 02/2016    No residual deficits.  Takes .       Allergies   Allergen Reactions   • Strawberry Extract Swelling       Past Surgical History:   Procedure Laterality Date   • APPENDECTOMY      Ex lap   • BREAST BIOPSY Left 2016   • FOOT SURGERY     • OOPHORECTOMY     • TONSILLECTOMY     • TOTAL LAPAROSCOPIC HYSTERECTOMY SALPINGO OOPHORECTOMY Bilateral 04/19/2016    RATLH, BSO, LND       Family History   Problem Relation Age of Onset   • Arthritis Mother    • Breast cancer Mother         age unknown    • Osteoporosis Mother    • Cancer Mother    • Alcohol abuse Father    • Stroke Maternal Aunt    • Ovarian cancer Neg Hx    • Endometrial cancer Neg Hx        Social History     Socioeconomic History   • Marital status:    Tobacco Use   • Smoking status: Never Smoker   • Smokeless tobacco: Never Used   Vaping Use   • Vaping Use: Never used   Substance and Sexual Activity   • Alcohol use: Yes   • Drug use: Never   • Sexual activity: Defer           Objective   Physical Exam  Vitals and nursing note reviewed.   Constitutional:       General: She is not in acute distress.  HENT:      Head: Normocephalic and atraumatic.      Nose: Nose normal.   Eyes:      Conjunctiva/sclera: Conjunctivae normal.   Cardiovascular:      Rate and Rhythm: Normal rate and regular rhythm.      Heart sounds: Normal heart sounds.   Pulmonary:      Effort: Pulmonary effort is normal. No respiratory distress.      Breath sounds: Normal breath sounds. No wheezing or rales.   Chest:       Chest wall: No tenderness.   Abdominal:      General: Bowel sounds are normal. There is no distension.      Palpations: Abdomen is soft.      Tenderness: There is no abdominal tenderness. There is no guarding or rebound. Negative signs include Xie's sign, McBurney's sign and obturator sign.      Comments: Soft and nontender even to deep palpation.  No palpable mass.  No guarding rebound hepatosplenomegaly.  Bowel sounds are intact.  Abdomen is nondistended.  Negative Xie sign.  No McBurney tenderness.   Genitourinary:     Rectum: Guaiac result positive.      Comments: External hemorrhoids with tight sphincter.  Black stool which is densely guaiac positive  Musculoskeletal:         General: Normal range of motion.      Cervical back: Normal range of motion.   Skin:     General: Skin is warm and dry.   Neurological:      General: No focal deficit present.      Mental Status: She is alert and oriented to person, place, and time.         Procedures           ED Course  ED Course as of 12/08/21 1257   Wed Dec 08, 2021   1041 Discussed findings with the patient.  Have ordered for CT of the chest and abdomen which was due to be done next week and will perform today.  She is treated with Protonix.  Laboratory evaluation is pending [HH]   1044 I discussed case with Dr. Calvin, gynecologic oncology.  We discussed imaging which is pending but she has not had her CTs yet.  I discussed the evaluation to date otherwise.  She will check on the patient once admitted and her scans were done. [HH]   1151 I paged our hospitalist for admission.  The patient is treated with Protonix [HH]   1207 Discussed with Dr. Cotton who will admit for definitive inpatient care []      ED Course User Index  [HH] Pedro Luis Walsh MD                                                 [facility]    Final diagnoses:   Pain of upper abdomen   Gastrointestinal hemorrhage, unspecified gastrointestinal hemorrhage type   Elevated CA-125   Diarrhea,  unspecified type   Obstipation   Hypokalemia       ED Disposition  ED Disposition     ED Disposition Condition Comment    Decision to Admit  Level of Care: Telemetry [5]   Diagnosis: Pain of upper abdomen [801161]   Admitting Physician: NANETTE HOWE [[id number]]   Attending Physician: NANETTE HOWE [[id number]]            No follow-up provider specified.       Medication List      No changes were made to your prescriptions during this visit.          Pedro Luis Walsh MD  12/08/21 1209       Pedro Luis Walsh MD  12/08/21 1257

## 2021-12-08 NOTE — H&P
"    [facility] Medicine Services  HISTORY AND PHYSICAL    Patient Name: Cindy Sage  : 1945  MRN: [id number]  Primary Care Physician: Jose Carrillo MD  Date of admission: 2021      Subjective   Subjective     Chief Complaint:  melena    HPI:  Cindy Sage is a 76 y.o. female with hx of HTN, HLD, prior TIA/CVA in 2016 with no residual deficits, and endometrial cancer s/p radiation and chemotherapy 5 years ago who follows with Dr. Ma, presented to the ED with complaints of melena. Pt reports months of intermittent constipation and diarrhea.  She reports that, when she has the diarrhea, it is multiple times a day and associated with severe cramping pains (similar to contractions is how she describes it).  She notes that today is a \"constipation\" day which is a \"good day\" for her, so she came in to the ED for evaluation.  Pt also reports weight loss but is unsure of how much.       COVID Details:    Symptoms:    [x] NONE [] Fever []  Cough [] Shortness of breath [] Change in taste/smell      The patient has a COVID HM Topic on their chart, and they are fully vaccinated.      Review of Systems   General- no F/C, no weight loss or gain, no fatigue  HEENT- no blurry vision, no nasal congestion, no hearing loss, no sore throat, no dysphagia, no oral ulcers, no dental caries, no bleeding gums  CVS- no chest pain, no palpitations  Pulm- no SOA, no cough, no orthopnea, no PND  GI-  no BRBPR, no nausea, no vomiting  MSK- no joint pain, no swelling, no erythema  - no dysuria, no hematuria  Neuro- no headaches, no focal motor deficits  Psych- no SI/ HI, no depression/anxiety  All other systems reviewed and are negative.     Personal History     Past Medical History:   Diagnosis Date   • Drug therapy     For endometrial CA in 2016   • Elevated cholesterol    • Endometrial cancer (HCC) 2016    Stage IIIC2    • GERD (gastroesophageal reflux disease)    • HTN " (hypertension)    • Hx of radiation therapy 2016    For endometrial CA 2016   • Stroke (HCC) 02/2016    No residual deficits.  Takes .       Past Surgical History:   Procedure Laterality Date   • APPENDECTOMY      Ex lap   • BREAST BIOPSY Left 2016   • FOOT SURGERY     • OOPHORECTOMY     • TONSILLECTOMY     • TOTAL LAPAROSCOPIC HYSTERECTOMY SALPINGO OOPHORECTOMY Bilateral 04/19/2016    RATLH, BSO, LND       Family History:  family history includes Alcohol abuse in her father; Arthritis in her mother; Breast cancer in her mother; Cancer in her mother; Osteoporosis in her mother; Stroke in her maternal aunt. Otherwise pertinent FHx was reviewed and unremarkable.     Social History:  reports that she has never smoked. She has never used smokeless tobacco. She reports current alcohol use. She reports that she does not use drugs.  Social History     Social History Narrative    Lives at home in [place] with . Completes all ADLs with no residual deficits from prior stroke       Medications:  Available home medication information reviewed.  (Not in a hospital admission)    No current facility-administered medications on file prior to encounter.     Current Outpatient Medications on File Prior to Encounter   Medication Sig Dispense Refill   • aspirin 81 MG chewable tablet Chew 1 tablet Daily. 90 tablet 3   • Cholecalciferol (VITAMIN D3 GUMMIES ADULT PO) Take  by mouth.     • clopidogrel (PLAVIX) 75 MG tablet Take 1 tablet by mouth Daily. 30 tablet 5   • hydroCHLOROthiazide (HYDRODIURIL) 25 MG tablet Take 1 tablet by mouth once daily 90 tablet 1   • omeprazole (priLOSEC) 20 MG capsule Take 20 mg by mouth Daily As Needed.     • simvastatin (ZOCOR) 40 MG tablet Take 1 tablet by mouth Daily. 30 tablet 5   • Turmeric 500 MG capsule Take 2 capsules by mouth Daily.           Allergies   Allergen Reactions   • Strawberry Extract Swelling       Objective   Objective     Vital Signs:   Temp:  [97.1 °F (36.2 °C)] 97.1  °F (36.2 °C)  Heart Rate:  [98] 98  Resp:  [18] 18  BP: (122-132)/(65-86) 132/65       Physical Exam   Constitutional: Awake, alert  Eyes: PERRLA, sclerae anicteric, no conjunctival injection  HENT: NCAT, mucous membranes moist  Neck: Supple, no thyromegaly, no lymphadenopathy, trachea midline  Respiratory: Clear to auscultation bilaterally, nonlabored respirations   Cardiovascular: RRR, no murmurs, rubs, or gallops, palpable pedal pulses bilaterally  Gastrointestinal: Positive bowel sounds, soft, nontender, nondistended  Musculoskeletal: No bilateral ankle edema, no clubbing or cyanosis to extremities  Psychiatric: Appropriate affect, cooperative  Neurologic: Oriented x 3, strength symmetric in all extremities, Cranial Nerves grossly intact to confrontation, speech clear  Skin: No rashes    Result Review:  I have personally reviewed the results from the time of this admission to 12/8/2021 12:51 EST and agree with these findings:  [x]  Laboratory  []  Microbiology  []  Radiology  []  EKG/Telemetry   []  Cardiology/Vascular   []  Pathology  []  Old records  []  Other:  Most notable findings include: elevated lactate, hypokalemia, mild hyponatremia    LAB RESULTS:      Lab 12/08/21  1055   WBC 4.70   HEMOGLOBIN 12.7   HEMATOCRIT 38.9   PLATELETS 339   NEUTROS ABS 3.52   IMMATURE GRANS (ABS) 0.02   LYMPHS ABS 0.64*   MONOS ABS 0.48   EOS ABS 0.02   MCV 89.8   LACTATE 2.2*         Lab 12/08/21  1158   SODIUM 132*   POTASSIUM 2.6*   CHLORIDE 93*   CO2 26.0   ANION GAP 13.0   BUN 26*   CREATININE 0.75   GLUCOSE 92   CALCIUM 8.3*         Lab 12/08/21  1158   TOTAL PROTEIN 5.9*   ALBUMIN 3.50   GLOBULIN 2.4   ALT (SGPT) 7   AST (SGOT) 13   BILIRUBIN 0.6   ALK PHOS 78   LIPASE 26                     UA    Urinalysis 10/7/21 10/7/21 12/8/21 12/8/21    1318 1342 1059 1059   Squamous Epithelial Cells, UA 3-6 (A)   7-12 (A)   Specific Filley, UA   1.033 (A)    Ketones, UA  15 mg/dL (A) 15 mg/dL (1+) (A)    Blood, UA   Trace (A)     Leukocytes, UA  500 Aung/ul (A) Small (1+) (A)    Nitrite, UA   Negative    RBC, UA 0-2   7-12 (A)   WBC, UA 6-12 (A)   13-20 (A)   Bacteria, UA 2+ (A)   Trace   (A) Abnormal value              Microbiology Results (last 10 days)     ** No results found for the last 240 hours. **          No radiology results from the last 24 hrs    Results for orders placed during the hospital encounter of 02/28/19    Adult Transthoracic Echo Complete W/ Cont if Necessary Per Protocol (With Agitated Saline)    Interpretation Summary  · Estimated EF = 60%.  · Left ventricular systolic function is normal.  · Trace to mild mitral regurgitation  · Trace tricuspid regurgitation  · No evidence of PFO or ASD.      Assessment/Plan   Assessment & Plan     Active Hospital Problems    Diagnosis  POA   • Pain of upper abdomen [R10.10]  Yes     Priority: High   • Melena [K92.1]  Unknown     Priority: High   • History of endometrial cancer [Z85.42]  Not Applicable     Priority: Low   • GERD (gastroesophageal reflux disease) [K21.9]  Yes     Priority: Low   • Hyperlipidemia [E78.5]  Yes     Priority: Low   • Hypertension [I10]  Yes     Priority: Low       Ms. Sage is a 75 yo WF with hx of HTN, HLD, prior TIA/CVA in 2016 with no residual deficits, and endometrial cancer s/p radiation and chemotherapy 5 years ago who follows with Dr. Ma, presented to the ED with complaints of melena.        Plan:    Melena/GIB  Intermittent constipation/diarrhea x months  Unintentional weight loss  --H&H currently stable  -- continue to monitor Q8 hours  --consult GI for possible scope  -- previously had c-scope with Dr. Verdugo and was supposed to have follow up c-scope last month but this was apparently canceled per family  -- start BID PPI    H/o endometrial cancer  --s/p chemo/XRT five years ago  -- follows with Dr. Ma. ER already called her and she has agreed to see pt while she is here  -- apparently recent increase in  from 16 to 23,  so she was scheduled for outpatient CT A/P. Will go ahead and get this done while here.     HTN  -- continue home meds with hold parameters    HLD  -- continue statin    H/o TIA/CVA  --with no residual symptoms  -- on ASA/Plavix at home, will hold for now    Hypokalemia  -- replace per protocol    Hyponatremia  --mild, monitor        DVT prophylaxis:  Mechanical       CODE STATUS:  Full Code  Code Status and Medical Interventions:   Ordered at: 12/08/21 1248     Level Of Support Discussed With:    Patient     Code Status (Patient has no pulse and is not breathing):    CPR (Attempt to Resuscitate)     Medical Interventions (Patient has pulse or is breathing):    Full Support       Admission Status:  I believe this patient meets OBSERVATION status, however if further evaluation or treatment plans warrant, status may change.  Based upon current information, I predict patient's care encounter to be less than or equal to 2 midnights.    Jasmyn Emmanuel MD  12/08/21

## 2021-12-08 NOTE — PLAN OF CARE
Goal Outcome Evaluation:      Pt resting in bed without complaint, verb understand npo after midnight for GI consult, vss cont to monitor

## 2021-12-09 LAB
ANION GAP SERPL CALCULATED.3IONS-SCNC: 8 MMOL/L (ref 5–15)
BUN SERPL-MCNC: 18 MG/DL (ref 8–23)
BUN/CREAT SERPL: 24.3 (ref 7–25)
CALCIUM SPEC-SCNC: 7 MG/DL (ref 8.6–10.5)
CHLORIDE SERPL-SCNC: 100 MMOL/L (ref 98–107)
CHOLEST SERPL-MCNC: 74 MG/DL (ref 0–200)
CO2 SERPL-SCNC: 23 MMOL/L (ref 22–29)
CREAT SERPL-MCNC: 0.74 MG/DL (ref 0.57–1)
DEPRECATED RDW RBC AUTO: 57.4 FL (ref 37–54)
ERYTHROCYTE [DISTWIDTH] IN BLOOD BY AUTOMATED COUNT: 17.8 % (ref 12.3–15.4)
GFR SERPL CREATININE-BSD FRML MDRD: 76 ML/MIN/1.73
GLUCOSE SERPL-MCNC: 76 MG/DL (ref 65–99)
HBA1C MFR BLD: 5.4 % (ref 4.8–5.6)
HCT VFR BLD AUTO: 31.3 % (ref 34–46.6)
HCT VFR BLD AUTO: 31.3 % (ref 34–46.6)
HCT VFR BLD AUTO: 33.2 % (ref 34–46.6)
HCT VFR BLD AUTO: 35 % (ref 34–46.6)
HDLC SERPL-MCNC: 46 MG/DL (ref 40–60)
HGB BLD-MCNC: 10.4 G/DL (ref 12–15.9)
HGB BLD-MCNC: 10.4 G/DL (ref 12–15.9)
HGB BLD-MCNC: 10.7 G/DL (ref 12–15.9)
HGB BLD-MCNC: 11.6 G/DL (ref 12–15.9)
LDLC SERPL CALC-MCNC: 14 MG/DL (ref 0–100)
LDLC/HDLC SERPL: 0.34 {RATIO}
MAGNESIUM SERPL-MCNC: 1.8 MG/DL (ref 1.6–2.4)
MCH RBC QN AUTO: 29.1 PG (ref 26.6–33)
MCHC RBC AUTO-ENTMCNC: 33.2 G/DL (ref 31.5–35.7)
MCV RBC AUTO: 87.7 FL (ref 79–97)
PLATELET # BLD AUTO: 248 10*3/MM3 (ref 140–450)
PMV BLD AUTO: 8.3 FL (ref 6–12)
POTASSIUM SERPL-SCNC: 3.6 MMOL/L (ref 3.5–5.2)
POTASSIUM SERPL-SCNC: 3.6 MMOL/L (ref 3.5–5.2)
RBC # BLD AUTO: 3.57 10*6/MM3 (ref 3.77–5.28)
SODIUM SERPL-SCNC: 131 MMOL/L (ref 136–145)
TRIGL SERPL-MCNC: 61 MG/DL (ref 0–150)
TSH SERPL DL<=0.05 MIU/L-ACNC: 2.44 UIU/ML (ref 0.27–4.2)
VLDLC SERPL-MCNC: 14 MG/DL (ref 5–40)
WBC NRBC COR # BLD: 3.97 10*3/MM3 (ref 3.4–10.8)

## 2021-12-09 PROCEDURE — 85014 HEMATOCRIT: CPT | Performed by: INTERNAL MEDICINE

## 2021-12-09 PROCEDURE — 83036 HEMOGLOBIN GLYCOSYLATED A1C: CPT | Performed by: INTERNAL MEDICINE

## 2021-12-09 PROCEDURE — 83735 ASSAY OF MAGNESIUM: CPT | Performed by: INTERNAL MEDICINE

## 2021-12-09 PROCEDURE — 99223 1ST HOSP IP/OBS HIGH 75: CPT | Performed by: NURSE PRACTITIONER

## 2021-12-09 PROCEDURE — 85018 HEMOGLOBIN: CPT | Performed by: INTERNAL MEDICINE

## 2021-12-09 PROCEDURE — G0378 HOSPITAL OBSERVATION PER HR: HCPCS

## 2021-12-09 PROCEDURE — 0 MAGNESIUM SULFATE 4 GM/100ML SOLUTION: Performed by: INTERNAL MEDICINE

## 2021-12-09 PROCEDURE — 84132 ASSAY OF SERUM POTASSIUM: CPT | Performed by: INTERNAL MEDICINE

## 2021-12-09 PROCEDURE — 80061 LIPID PANEL: CPT | Performed by: INTERNAL MEDICINE

## 2021-12-09 PROCEDURE — 0 POTASSIUM CHLORIDE 10 MEQ/100ML SOLUTION: Performed by: INTERNAL MEDICINE

## 2021-12-09 PROCEDURE — 0 MAGNESIUM SULFATE 4 GM/100ML SOLUTION: Performed by: OBSTETRICS & GYNECOLOGY

## 2021-12-09 PROCEDURE — 84443 ASSAY THYROID STIM HORMONE: CPT | Performed by: INTERNAL MEDICINE

## 2021-12-09 PROCEDURE — 85027 COMPLETE CBC AUTOMATED: CPT | Performed by: INTERNAL MEDICINE

## 2021-12-09 PROCEDURE — 99233 SBSQ HOSP IP/OBS HIGH 50: CPT | Performed by: INTERNAL MEDICINE

## 2021-12-09 PROCEDURE — 0 POTASSIUM CHLORIDE 10 MEQ/100ML SOLUTION: Performed by: OBSTETRICS & GYNECOLOGY

## 2021-12-09 PROCEDURE — 80048 BASIC METABOLIC PNL TOTAL CA: CPT | Performed by: INTERNAL MEDICINE

## 2021-12-09 RX ORDER — MAGNESIUM SULFATE HEPTAHYDRATE 40 MG/ML
2 INJECTION, SOLUTION INTRAVENOUS AS NEEDED
Status: DISCONTINUED | OUTPATIENT
Start: 2021-12-09 | End: 2021-12-15 | Stop reason: HOSPADM

## 2021-12-09 RX ORDER — LOPERAMIDE HYDROCHLORIDE 2 MG/1
2 CAPSULE ORAL ONCE
Status: COMPLETED | OUTPATIENT
Start: 2021-12-09 | End: 2021-12-09

## 2021-12-09 RX ORDER — MAGNESIUM SULFATE HEPTAHYDRATE 40 MG/ML
4 INJECTION, SOLUTION INTRAVENOUS AS NEEDED
Status: DISCONTINUED | OUTPATIENT
Start: 2021-12-09 | End: 2021-12-15 | Stop reason: HOSPADM

## 2021-12-09 RX ADMIN — SODIUM CHLORIDE 125 ML/HR: 9 INJECTION, SOLUTION INTRAVENOUS at 22:36

## 2021-12-09 RX ADMIN — SODIUM CHLORIDE, PRESERVATIVE FREE 10 ML: 5 INJECTION INTRAVENOUS at 22:37

## 2021-12-09 RX ADMIN — ATORVASTATIN CALCIUM 20 MG: 20 TABLET, FILM COATED ORAL at 20:29

## 2021-12-09 RX ADMIN — PANTOPRAZOLE SODIUM 40 MG: 40 INJECTION, POWDER, FOR SOLUTION INTRAVENOUS at 09:00

## 2021-12-09 RX ADMIN — POTASSIUM CHLORIDE 10 MEQ: 7.46 INJECTION, SOLUTION INTRAVENOUS at 09:08

## 2021-12-09 RX ADMIN — POTASSIUM CHLORIDE 10 MEQ: 7.46 INJECTION, SOLUTION INTRAVENOUS at 13:56

## 2021-12-09 RX ADMIN — POTASSIUM CHLORIDE 10 MEQ: 7.46 INJECTION, SOLUTION INTRAVENOUS at 17:25

## 2021-12-09 RX ADMIN — LOPERAMIDE HYDROCHLORIDE 2 MG: 2 CAPSULE ORAL at 00:52

## 2021-12-09 RX ADMIN — PANTOPRAZOLE SODIUM 40 MG: 40 INJECTION, POWDER, FOR SOLUTION INTRAVENOUS at 20:29

## 2021-12-09 RX ADMIN — MAGNESIUM SULFATE HEPTAHYDRATE 4 G: 40 INJECTION, SOLUTION INTRAVENOUS at 20:29

## 2021-12-09 RX ADMIN — SODIUM CHLORIDE 125 ML/HR: 9 INJECTION, SOLUTION INTRAVENOUS at 13:55

## 2021-12-09 RX ADMIN — POTASSIUM CHLORIDE 10 MEQ: 7.46 INJECTION, SOLUTION INTRAVENOUS at 05:47

## 2021-12-09 NOTE — CASE MANAGEMENT/SOCIAL WORK
Discharge Planning Assessment  [facility]     Patient Name: Cindy Sage  MRN: [id number]  Today's Date: 12/9/2021    Admit Date: 12/8/2021     Discharge Needs Assessment     Row Name 12/09/21 1232       Living Environment    Lives With spouse; grandchild(joby)    Name(s) of Who Lives With Patient  Ty [phone]    Current Living Arrangements home/apartment/condo    Primary Care Provided by self    Provides Primary Care For no one    Family Caregiver if Needed grandchild(joby), adult; child(joby), adult    Family Caregiver Names diana Buenrostro [phone]    Able to Return to Prior Arrangements yes       Transition Planning    Transportation Anticipated family or friend will provide  diana Buenrostro [phone]       Discharge Needs Assessment    Readmission Within the Last 30 Days no previous admission in last 30 days    Equipment Currently Used at Home none    Provided Post Acute Provider List? N/A    Provided Post Acute Provider Quality & Resource List? N/A               Discharge Plan    No documentation.               Continued Care and Services - Admitted Since 12/8/2021    Coordination has not been started for this encounter.       Expected Discharge Date and Time     Expected Discharge Date Expected Discharge Time    Dec 11, 2021          Demographic Summary     Row Name 12/09/21 1231       General Information    Admission Type observation    Arrived From emergency department    Referral Source admission list    Reason for Consult discharge planning               Functional Status     Row Name 12/09/21 1231       Functional Status    Usual Activity Tolerance good    Current Activity Tolerance good       Functional Status, IADL    Medications independent    Meal Preparation independent    Housekeeping independent    Laundry independent    Shopping independent       Employment/    Employment/ Comments Patient has [employer] Medicare and can afford medications.                Psychosocial    No documentation.                Abuse/Neglect    No documentation.                Legal    No documentation.                Substance Abuse    No documentation.                Patient Forms    No documentation.                   SIMI Carrington (Kay)

## 2021-12-09 NOTE — PROGRESS NOTES
"                    Clinical Nutrition       Patient Name: Cindy Sage  YOB: 1945  MRN: [id number]  Date of Encounter: 12/09/21 15:27 EST  Admission date: 12/8/2021      Reason for Visit   Identified at risk by screening criteria      EMR  Reviewed   Yes; RD reviewed dx list this adm. Pt in isolation room d/t R/o Cdiff.    Ht=64in, ez=004zj per stated wt by pt to RD (no actual wt avail this adm), which she states was her wt at home 2d ago.  Rec obtain current wt this adm. BMI based on stated wt=21.46 (wnl)     Reported/Observed/Food/Nutrition Related - Comments     RD spoke with pt on phone and pt states she weighs regularly at home and perceives she has had some recent wt loss but says she \"couldn't tell you how much\" wt has been lost. RD reviewed wts avail in EMR, and noted 5/13/2139=309qz at outpt visit, and 11/17/21 gc=246et; this data does not suggest signif wt loss. Pt's Stated wt vs wt in May 2021 suggests 14lb wt loss/10% change over the past 7 months, which is not significant in this time frame;  BMI wnl. Need to obtain current wt to more accurately assess reported wt changes.  RD requested that nsg obtain pt's current wt.     Current Nutrition Prescription     Diet Clear Liquid      Average Intake from Charting: insuffic data      Actions     Follow treatment progress, Supplement provided  Will send CL ONS Boost breeze 3x/d.   Monitor per current protocol.      Annmarie Diaz, MS,RD,LD,   Time Spent: 15 mins        "

## 2021-12-09 NOTE — PLAN OF CARE
Goal Outcome Evaluation:  Plan of Care Reviewed With: patient        Progress: improving   VSS. No bowel movements today. No abd pain either. H&H is stable. Clear liquid diet tolerated. GI following. Waiting for stool sample.     Problem: Adult Inpatient Plan of Care  Goal: Optimal Comfort and Wellbeing  Outcome: Ongoing, Progressing  Intervention: Provide Person-Centered Care  Recent Flowsheet Documentation  Taken 12/9/2021 1607 by Alise Abad RN  Trust Relationship/Rapport:   choices provided   care explained     Problem: Adult Inpatient Plan of Care  Goal: Absence of Hospital-Acquired Illness or Injury  Outcome: Ongoing, Progressing     Problem: Fall Injury Risk  Goal: Absence of Fall and Fall-Related Injury  Outcome: Ongoing, Progressing  Intervention: Identify and Manage Contributors to Fall Injury Risk  Recent Flowsheet Documentation  Taken 12/9/2021 1800 by Alise Abad RN  Medication Review/Management: medications reviewed  Taken 12/9/2021 1607 by Alise Abad RN  Medication Review/Management: medications reviewed  Taken 12/9/2021 1230 by Alise Abad RN  Medication Review/Management: medications reviewed  Taken 12/9/2021 1000 by Alise Abad RN  Medication Review/Management: medications reviewed  Intervention: Promote Injury-Free Environment  Recent Flowsheet Documentation  Taken 12/9/2021 1800 by Alise Abad RN  Safety Promotion/Fall Prevention:   activity supervised   assistive device/personal items within reach   clutter free environment maintained   fall prevention program maintained   lighting adjusted   nonskid shoes/slippers when out of bed   muscle strengthening facilitated   safety round/check completed   room organization consistent  Taken 12/9/2021 1607 by Alise Abad RN  Safety Promotion/Fall Prevention:   activity supervised   assistive device/personal items within reach   clutter free environment maintained   fall prevention program maintained   lighting  adjusted   muscle strengthening facilitated   nonskid shoes/slippers when out of bed   room organization consistent   safety round/check completed  Taken 12/9/2021 1415 by Alise Abad RN  Safety Promotion/Fall Prevention:   activity supervised   assistive device/personal items within reach   clutter free environment maintained   fall prevention program maintained   lighting adjusted   muscle strengthening facilitated   nonskid shoes/slippers when out of bed   room organization consistent   safety round/check completed  Taken 12/9/2021 1230 by Alise Abad RN  Safety Promotion/Fall Prevention:   activity supervised   assistive device/personal items within reach   clutter free environment maintained   fall prevention program maintained   lighting adjusted   muscle strengthening facilitated   nonskid shoes/slippers when out of bed   room organization consistent   safety round/check completed  Taken 12/9/2021 1000 by Alise Abad RN  Safety Promotion/Fall Prevention:   activity supervised   assistive device/personal items within [facility]   clutter free environment maintained   fall prevention program maintained   nonskid shoes/slippers when out of bed   muscle strengthening facilitated   safety round/check completed   room organization consistent  Taken 12/9/2021 0855 by Alise Abad RN  Safety Promotion/Fall Prevention:   activity supervised   clutter free environment maintained   assistive device/personal items within reach   fall prevention program maintained   lighting adjusted   muscle strengthening facilitated   nonskid shoes/slippers when out of bed   room organization consistent   safety round/check completed

## 2021-12-09 NOTE — PROGRESS NOTES
"    [facility] Medicine Services  PROGRESS NOTE    Patient Name: Cindy Sage  : 1945  MRN: [id number]    Date of Admission: 2021  Primary Care Physician: Jose Carrillo MD    Subjective   Subjective     CC:  melena    HPI:  Reports that she's been having diarrhea x 2 mos and that attempted to have a colonoscopy with GI but procedure was aborted d/t \"scar tissue\" per patient.      ROS:  Gen- No fevers, chills  CV- No chest pain, palpitations  Resp- No cough, dyspnea  GI- No N/V/D, +diffuse abd pain        Objective   Objective     Vital Signs:   Temp:  [97 °F (36.1 °C)-98 °F (36.7 °C)] 97.4 °F (36.3 °C)  Heart Rate:  [] 72  Resp:  [15-18] 16  BP: (108-132)/(55-86) 110/56     Physical Exam:  Constitutional: No acute distress, awake, alert, sitting up in chair, granddaughter at bedside  HENT: NCAT, mucous membranes moist  Respiratory: Clear to auscultation bilaterally, respiratory effort normal   Cardiovascular: RRR, no murmurs, rubs, or gallops  Gastrointestinal: Positive bowel sounds, soft, \"uncomfortable\" on palpation but denies tenderness, nondistended  Musculoskeletal: No bilateral ankle edema  Psychiatric: Appropriate affect, cooperative  Neurologic: Oriented x 3, strength symmetric in all extremities, Cranial Nerves grossly intact to confrontation, speech clear  Skin: No rashes      Results Reviewed:  LAB RESULTS:      Lab 21  0340 21  0045 21  1756 21  1346 21  1055   WBC 3.97  --   --   --  4.70   HEMOGLOBIN 10.4*  10.4* 11.6* 12.2  --  12.7   HEMATOCRIT 31.3*  31.3* 35.0 37.5  --  38.9   PLATELETS 248  --   --   --  339   NEUTROS ABS  --   --   --   --  3.52   IMMATURE GRANS (ABS)  --   --   --   --  0.02   LYMPHS ABS  --   --   --   --  0.64*   MONOS ABS  --   --   --   --  0.48   EOS ABS  --   --   --   --  0.02   MCV 87.7  --   --   --  89.8   LACTATE  --   --   --  2.2* 2.2*         Lab 21  0340 21  0045 " 12/08/21  1158   SODIUM 131*  --  132*   POTASSIUM 3.6 3.6 2.6*   CHLORIDE 100  --  93*   CO2 23.0  --  26.0   ANION GAP 8.0  --  13.0   BUN 18  --  26*   CREATININE 0.74  --  0.75   GLUCOSE 76  --  92   CALCIUM 7.0*  --  8.3*   MAGNESIUM 1.8  --  1.9   HEMOGLOBIN A1C 5.40  --   --    TSH 2.440  --   --          Lab 12/08/21  1158   TOTAL PROTEIN 5.9*   ALBUMIN 3.50   GLOBULIN 2.4   ALT (SGPT) 7   AST (SGOT) 13   BILIRUBIN 0.6   ALK PHOS 78   LIPASE 26             Lab 12/09/21  0340   CHOLESTEROL 74   LDL CHOL 14   HDL CHOL 46   TRIGLYCERIDES 61         Lab 12/08/21  1158   ABO TYPING B   RH TYPING Positive   ANTIBODY SCREEN Negative         Brief Urine Lab Results  (Last result in the past 365 days)      Color   Clarity   Blood   Leuk Est   Nitrite   Protein   CREAT   Urine HCG        12/08/21 1059 Dark Yellow   Cloudy   Trace   Small (1+)   Negative   30 mg/dL (1+)                 Microbiology Results Abnormal     Procedure Component Value - Date/Time    COVID PRE-OP / PRE-PROCEDURE SCREENING ORDER (NO ISOLATION) - Swab, Nasopharynx [[id number]]  (Normal) Collected: 12/08/21 1216    Lab Status: Final result Specimen: Swab from Nasopharynx Updated: 12/08/21 1311    Narrative:      The following orders were created for panel order COVID PRE-OP / PRE-PROCEDURE SCREENING ORDER (NO ISOLATION) - Swab, Nasopharynx.  Procedure                               Abnormality         Status                     ---------                               -----------         ------                     Respiratory Panel PCR w/...[[id number]]  Normal              Final result                 Please view results for these tests on the individual orders.    Respiratory Panel PCR w/COVID-19(SARS-CoV-2) DYLAN/DANN/SARMAD/PAD/COR/MAD/ANSELMO In-House, NP Swab in [facility], 3-4 HR TAT - Swab, Nasopharynx [[id number]]  (Normal) Collected: 12/08/21 1216    Lab Status: Final result Specimen: Swab from Nasopharynx Updated: 12/08/21 1311     ADENOVIRUS, PCR Not  Detected     Coronavirus 229E Not Detected     Coronavirus HKU1 Not Detected     Coronavirus NL63 Not Detected     Coronavirus OC43 Not Detected     COVID19 Not Detected     Human Metapneumovirus Not Detected     Human Rhinovirus/Enterovirus Not Detected     Influenza A PCR Not Detected     Influenza B PCR Not Detected     Parainfluenza Virus 1 Not Detected     Parainfluenza Virus 2 Not Detected     Parainfluenza Virus 3 Not Detected     Parainfluenza Virus 4 Not Detected     RSV, PCR Not Detected     Bordetella pertussis pcr Not Detected     Bordetella parapertussis PCR Not Detected     Chlamydophila pneumoniae PCR Not Detected     Mycoplasma pneumo by PCR Not Detected    Narrative:      In the setting of a positive respiratory panel with a viral infection PLUS a negative procalcitonin without other underlying concern for bacterial infection, consider observing off antibiotics or discontinuation of antibiotics and continue supportive care. If the respiratory panel is positive for atypical bacterial infection (Bordetella pertussis, Chlamydophila pneumoniae, or Mycoplasma pneumoniae), consider antibiotic de-escalation to target atypical bacterial infection.          CT Chest With Contrast Diagnostic    Result Date: 12/8/2021  EXAMINATION: CT CHEST W CONTRAST, DIAGNOSTIC-, CT ABDOMEN AND PELVIS W CONTRAST-12/08/2021:  INDICATION: History of endometrial cancer with newly elevated markers; R10.10-Upper abdominal pain, unspecified; K92.2-Gastrointestinal hemorrhage, unspecified; R97.1-Elevated cancer antigen 125 (), history of endometrial malignancy.  TECHNIQUE: Multiple axial CT imaging was obtained of the abdomen and pelvis following the administration of intravenous contrast.  The radiation dose reduction device was turned on for each scan per the ALARA (As Low as Reasonably Achievable) protocol.  COMPARISON: NONE.  FINDINGS:  CHEST: The thyroid is homogeneous. No mediastinal mass or adenopathy. Cardiac chambers  are within normal limits. Small hiatal hernia. Atelectatic changes seen at the right lung base. No pleural effusion or pneumothorax. The visualized upper lung fields are clear. There is no pericardial effusion. Small-to-moderate sized hiatal hernia. There is a subcutaneous nodule identified along the medial aspect of the left breast measuring 1.2 cm. Soft tissue implant cannot be completely excluded. The findings may suggest a borderline enlarged lymph node.  ABDOMEN: The liver is homogeneous in appearance. The spleen is unremarkable. Tiny amount of fluid seen surrounding the liver. Small stone seen in the gallbladder. There is abnormal wall thickening identified of the colon to suggest a colitis. The distal descending colon and sigmoid colon reveal diverticulosis with no evidence of diverticulitis. There are some dilated loops of small bowel seen distally suggesting an enteritis. Some minimal free fluid seen within the pelvis. No significant wall thickening.  PELVIS: The distal small bowel is mildly dilated and fluid-filled with no transition point identified. Small amount of pneumatosis identified in the proximal colon cannot be completely excluded. Findings suggesting a proximal colitis and distal enteritis. There is minimal free fluid. The bony structures reveal degenerative changes seen throughout the spine. No pelvic adenopathy. No abnormal mass or fluid collection is identified.  Delayed imaging reveals contrast seen in the renal collecting system bilaterally. Contrast seen within both ureters and bladder. No evidence of obvious obstruction.      Impression: Abnormal dilated loops of distal small bowel and proximal colon suggesting possible enteritis and colitis. There is surrounding inflammatory change identified and some wall thickening. A small amount of pneumatosis identified in the proximal colon cannot be completely excluded.  D:  12/08/2021 E:  12/08/2021       CT Abdomen Pelvis With Contrast    Result  Date: 12/8/2021  EXAMINATION: CT CHEST W CONTRAST, DIAGNOSTIC-, CT ABDOMEN AND PELVIS W CONTRAST-12/08/2021:  INDICATION: History of endometrial cancer with newly elevated markers; R10.10-Upper abdominal pain, unspecified; K92.2-Gastrointestinal hemorrhage, unspecified; R97.1-Elevated cancer antigen 125 (), history of endometrial malignancy.  TECHNIQUE: Multiple axial CT imaging was obtained of the abdomen and pelvis following the administration of intravenous contrast.  The radiation dose reduction device was turned on for each scan per the ALARA (As Low as Reasonably Achievable) protocol.  COMPARISON: NONE.  FINDINGS:  CHEST: The thyroid is homogeneous. No mediastinal mass or adenopathy. Cardiac chambers are within normal limits. Small hiatal hernia. Atelectatic changes seen at the right lung base. No pleural effusion or pneumothorax. The visualized upper lung fields are clear. There is no pericardial effusion. Small-to-moderate sized hiatal hernia. There is a subcutaneous nodule identified along the medial aspect of the left breast measuring 1.2 cm. Soft tissue implant cannot be completely excluded. The findings may suggest a borderline enlarged lymph node.  ABDOMEN: The liver is homogeneous in appearance. The spleen is unremarkable. Tiny amount of fluid seen surrounding the liver. Small stone seen in the gallbladder. There is abnormal wall thickening identified of the colon to suggest a colitis. The distal descending colon and sigmoid colon reveal diverticulosis with no evidence of diverticulitis. There are some dilated loops of small bowel seen distally suggesting an enteritis. Some minimal free fluid seen within the pelvis. No significant wall thickening.  PELVIS: The distal small bowel is mildly dilated and fluid-filled with no transition point identified. Small amount of pneumatosis identified in the proximal colon cannot be completely excluded. Findings suggesting a proximal colitis and distal  enteritis. There is minimal free fluid. The bony structures reveal degenerative changes seen throughout the spine. No pelvic adenopathy. No abnormal mass or fluid collection is identified.  Delayed imaging reveals contrast seen in the renal collecting system bilaterally. Contrast seen within both ureters and bladder. No evidence of obvious obstruction.      Impression: Abnormal dilated loops of distal small bowel and proximal colon suggesting possible enteritis and colitis. There is surrounding inflammatory change identified and some wall thickening. A small amount of pneumatosis identified in the proximal colon cannot be completely excluded.  D:  12/08/2021 E:  12/08/2021         Results for orders placed during the hospital encounter of 02/28/19    Adult Transthoracic Echo Complete W/ Cont if Necessary Per Protocol (With Agitated Saline)    Interpretation Summary  · Estimated EF = 60%.  · Left ventricular systolic function is normal.  · Trace to mild mitral regurgitation  · Trace tricuspid regurgitation  · No evidence of PFO or ASD.      I have reviewed the medications:  Scheduled Meds:atorvastatin, 20 mg, Oral, Daily  pantoprazole, 40 mg, Intravenous, Q12H  potassium chloride, 40 mEq, Oral, Once  sodium chloride, 10 mL, Intravenous, Q12H      Continuous Infusions:sodium chloride, 125 mL/hr, Last Rate: 125 mL/hr (12/09/21 0400)      PRN Meds:.•  acetaminophen **OR** acetaminophen **OR** acetaminophen  •  HYDROcodone-acetaminophen  •  influenza vaccine  •  ondansetron **OR** ondansetron  •  potassium chloride **OR** potassium chloride **OR** potassium chloride  •  Sodium Chloride (PF)  •  sodium chloride  •  [COMPLETED] Insert peripheral IV **AND** sodium chloride    Assessment/Plan   Assessment & Plan     Active Hospital Problems    Diagnosis  POA   • Pain of upper abdomen [R10.10]  Yes   • Melena [K92.1]  Unknown   • History of endometrial cancer [Z85.42]  Not Applicable   • GERD (gastroesophageal reflux disease)  [K21.9]  Yes   • Hyperlipidemia [E78.5]  Yes   • Hypertension [I10]  Yes      Resolved Hospital Problems   No resolved problems to display.        Brief Hospital Course to date:  Cindy Sage is a 76 y.o. female with hx of HTN, HLD, prior TIA/CVA in 2016 with no residual deficits, and endometrial cancer s/p radiation and chemotherapy 5 years ago who follows with Dr. Ma, presented to the ED with complaints of melena.          Plan:     Melena/GIB  Intermittent constipation/diarrhea x months  Unintentional weight loss  --H&H currently stable  -- continue to monitor Q8 hours  -- previously had c-scope with Dr. Verdugo and was supposed to have follow up c-scope last month but was cancelled  -- start BID PPI  --GI following.  Note that failed previous colonoscopy attempt.  CT shows area of stenosis at splenic flexure.  GI suspects that becomes intermittently obstructed at area of stenosis leading to periods of pain and distension.  Recs Stool studies, plans colonoscopy.  Clear liquid diet     H/o endometrial cancer  --s/p chemo/XRT five years ago  -- follows with Dr. Ma.   -- apparently recent increase in  from 16 to 23, so she was scheduled for outpatient CT A/P done 12/8 and showed abnormal dilated loops of distal small bowel and proximal colon suggesting possible enteritis and colitis     HTN  -- continue home meds with hold parameters     HLD  -- continue statin     H/o TIA/CVA  --with no residual symptoms  -- on ASA/Plavix at home, holding for now     Hypokalemia  Hypomagnesmia  -- replace per protocol     Hyponatremia  --mild, monitor      d/w granddaughter at bedside        DVT prophylaxis:  Mechanical DVT prophylaxis orders are present. Mechanical      AM-PAC 6 Clicks Score (PT): 22 (12/08/21 2000)    Disposition: I expect the patient to be discharged TBD    CODE STATUS:   Code Status and Medical Interventions:   Ordered at: 12/08/21 1248     Level Of Support Discussed With:    Patient      Code Status (Patient has no pulse and is not breathing):    CPR (Attempt to Resuscitate)     Medical Interventions (Patient has pulse or is breathing):    Full Support       Beau Diaz MD  12/09/21

## 2021-12-09 NOTE — CONSULTS
[facility] Consult    Referring Provider: No ref. provider found    PCP: Jose Carrillo MD    Reason for Consultation: GI bleed    Chief complaint: Abdominal pain and dark stools    History of present illness:    Cindy Sage is a 76 y.o. female who is admitted with a several month onset of change in bowel habits with associated abdominal pain.  Patient notes that for the last few months she has been experiencing periods of constipation followed by abdominal pain that eventually relieves in form of diarrhea.  Notes she had a colonoscopy with Dr. Verdugo and notes he was unable to complete the scope due to the nature of her colon.  Also reports decreased appetite and poor p.o. intake with unintentional weight loss.  Also reports occasional dark, tarry stools.  No NSAID or anticoagulant use reported.  No family history of inflammatory bowel diseases or colon cancer.  Patient does have a history of endometrial cancer that is treated with external beam radiation and chemotherapy 5 years ago. Past medical, surgical, social, and family histories are reviewed for accuracy.  No documented alleviating or exacerbating factors.  Does not endorse pain at time of exam.    Allergies:  Strawberry extract    Scheduled Meds:  atorvastatin, 20 mg, Oral, Daily  pantoprazole, 40 mg, Intravenous, Q12H  potassium chloride, 40 mEq, Oral, Once  sodium chloride, 10 mL, Intravenous, Q12H         Infusions:  sodium chloride, 125 mL/hr, Last Rate: 125 mL/hr (12/09/21 1355)        PRN Meds:  •  acetaminophen **OR** acetaminophen **OR** acetaminophen  •  HYDROcodone-acetaminophen  •  influenza vaccine  •  magnesium sulfate **OR** magnesium sulfate **OR** magnesium sulfate  •  ondansetron **OR** ondansetron  •  potassium chloride **OR** potassium chloride **OR** potassium chloride  •  Sodium Chloride (PF)  •  sodium chloride  •  [COMPLETED] Insert peripheral IV **AND** sodium chloride    Home Meds:  Medications Prior to  Admission   Medication Sig Dispense Refill Last Dose   • aspirin 81 MG chewable tablet Chew 1 tablet Daily. 90 tablet 3 12/8/2021 at Unknown time   • Cholecalciferol (VITAMIN D3 GUMMIES ADULT PO) Take  by mouth.   12/8/2021 at Unknown time   • clopidogrel (PLAVIX) 75 MG tablet Take 1 tablet by mouth Daily. 30 tablet 5 12/8/2021 at Unknown time   • hydroCHLOROthiazide (HYDRODIURIL) 25 MG tablet Take 1 tablet by mouth once daily 90 tablet 1 12/8/2021 at Unknown time   • omeprazole (priLOSEC) 20 MG capsule Take 20 mg by mouth Daily As Needed.   12/8/2021 at Unknown time   • simvastatin (ZOCOR) 40 MG tablet Take 1 tablet by mouth Daily. 30 tablet 5 12/7/2021 at Unknown time   • Turmeric 500 MG capsule Take 2 capsules by mouth Daily.   12/8/2021 at Unknown time       ROS: Review of Systems   Constitutional: Positive for activity change, appetite change and fatigue. Negative for chills, diaphoresis, fever and unexpected weight change.   HENT: Negative for sore throat, trouble swallowing and voice change.    Eyes: Negative.    Respiratory: Negative for apnea, cough, choking, chest tightness, shortness of breath, wheezing and stridor.    Cardiovascular: Negative for chest pain, palpitations and leg swelling.   Gastrointestinal: Positive for abdominal distention, abdominal pain, diarrhea and nausea. Negative for anal bleeding, blood in stool, constipation, rectal pain and vomiting.   Endocrine: Negative.    Genitourinary: Negative.    Musculoskeletal: Negative.    Skin: Positive for pallor. Negative for color change, rash and wound.   Allergic/Immunologic: Negative.    Neurological: Negative.    Hematological: Negative.    Psychiatric/Behavioral: Negative.    All other systems reviewed and are negative.      PAST MED HX:  Past Medical History:   Diagnosis Date   • Drug therapy 2016    For endometrial CA in 2016   • Elevated cholesterol    • Endometrial cancer (HCC) 04/2016    Stage IIIC2    • GERD (gastroesophageal reflux  "disease)    • HTN (hypertension)    • Hx of radiation therapy 2016    For endometrial CA 2016   • Stroke (HCC) 02/2016    No residual deficits.  Takes .       PAST SURG HX:  Past Surgical History:   Procedure Laterality Date   • APPENDECTOMY      Ex lap   • BREAST BIOPSY Left 2016   • FOOT SURGERY     • OOPHORECTOMY     • TONSILLECTOMY     • TOTAL LAPAROSCOPIC HYSTERECTOMY SALPINGO OOPHORECTOMY Bilateral 04/19/2016    RATLH, BSO, LND       FAM HX:  Family History   Problem Relation Age of Onset   • Arthritis Mother    • Breast cancer Mother         age unknown    • Osteoporosis Mother    • Cancer Mother    • Alcohol abuse Father    • Stroke Maternal Aunt    • Ovarian cancer Neg Hx    • Endometrial cancer Neg Hx        SOC HX:  Social History     Socioeconomic History   • Marital status:    Tobacco Use   • Smoking status: Never Smoker   • Smokeless tobacco: Never Used   Vaping Use   • Vaping Use: Never used   Substance and Sexual Activity   • Alcohol use: Yes   • Drug use: Never   • Sexual activity: Defer       PHYSICAL EXAM  /55 (BP Location: Left arm, Patient Position: Lying)   Pulse 72   Temp 96.4 °F (35.8 °C) (Oral)   Resp 18   Ht 162.6 cm (64\")   Wt 56.7 kg (125 lb)   LMP  (LMP Unknown)   SpO2 96%   BMI 21.46 kg/m²   Wt Readings from Last 3 Encounters:   12/08/21 56.7 kg (125 lb)   11/17/21 58.5 kg (129 lb)   10/07/21 59.4 kg (131 lb)   ,body mass index is 21.46 kg/m².  Physical Exam  Vitals and nursing note reviewed.   Constitutional:       General: She is not in acute distress.     Appearance: Normal appearance. She is normal weight. She is not ill-appearing or toxic-appearing.   HENT:      Head: Normocephalic and atraumatic.      Mouth/Throat:      Mouth: Mucous membranes are moist.      Pharynx: Oropharynx is clear. No oropharyngeal exudate.   Eyes:      General: No scleral icterus.     Extraocular Movements: Extraocular movements intact.      Conjunctiva/sclera: Conjunctivae " normal.      Pupils: Pupils are equal, round, and reactive to light.   Cardiovascular:      Rate and Rhythm: Normal rate and regular rhythm.      Pulses: Normal pulses.      Heart sounds: Normal heart sounds.   Pulmonary:      Effort: Pulmonary effort is normal. No respiratory distress.      Breath sounds: Normal breath sounds.   Abdominal:      General: Bowel sounds are normal. There is distension.      Palpations: Abdomen is soft.      Tenderness: There is abdominal tenderness in the epigastric area and left upper quadrant. There is no guarding or rebound.      Hernia: No hernia is present.   Genitourinary:     Rectum: Guaiac result positive.   Musculoskeletal:         General: Normal range of motion.      Cervical back: Normal range of motion and neck supple. No rigidity or tenderness.      Right lower leg: No edema.      Left lower leg: No edema.   Lymphadenopathy:      Cervical: No cervical adenopathy.   Skin:     General: Skin is warm and dry.      Capillary Refill: Capillary refill takes less than 2 seconds.      Coloration: Skin is pale. Skin is not jaundiced.   Neurological:      General: No focal deficit present.      Mental Status: She is alert and oriented to person, place, and time.   Psychiatric:         Mood and Affect: Mood normal.         Behavior: Behavior normal.         Thought Content: Thought content normal.         Judgment: Judgment normal.         Results Review:   I reviewed the patient's new clinical results.  I reviewed the patient's new imaging results and agree with the interpretation.  I personally viewed and interpreted the patient's EKG/Telemetry data    Lab Results   Component Value Date    WBC 3.97 12/09/2021    HGB 10.4 (L) 12/09/2021    HGB 10.4 (L) 12/09/2021    HGB 11.6 (L) 12/09/2021    HCT 31.3 (L) 12/09/2021    HCT 31.3 (L) 12/09/2021    MCV 87.7 12/09/2021     12/09/2021       Lab Results   Component Value Date    INR 0.88 03/26/2016       Lab Results   Component  Value Date    GLUCOSE 76 12/09/2021    BUN 18 12/09/2021    CREATININE 0.74 12/09/2021    EGFRIFNONA 76 12/09/2021    EGFRIFAFRI 77 10/07/2021    BCR 24.3 12/09/2021     (L) 12/09/2021    K 3.6 12/09/2021    CO2 23.0 12/09/2021    CALCIUM 7.0 (L) 12/09/2021    PROTENTOTREF 6.3 10/07/2021    ALBUMIN 3.50 12/08/2021    ALKPHOS 78 12/08/2021    BILITOT 0.6 12/08/2021    ALT 7 12/08/2021    AST 13 12/08/2021       CT Chest With Contrast Diagnostic    Result Date: 12/8/2021  EXAMINATION: CT CHEST W CONTRAST, DIAGNOSTIC-, CT ABDOMEN AND PELVIS W CONTRAST-12/08/2021:  INDICATION: History of endometrial cancer with newly elevated markers; R10.10-Upper abdominal pain, unspecified; K92.2-Gastrointestinal hemorrhage, unspecified; R97.1-Elevated cancer antigen 125 (), history of endometrial malignancy.  TECHNIQUE: Multiple axial CT imaging was obtained of the abdomen and pelvis following the administration of intravenous contrast.  The radiation dose reduction device was turned on for each scan per the ALARA (As Low as Reasonably Achievable) protocol.  COMPARISON: NONE.  FINDINGS:  CHEST: The thyroid is homogeneous. No mediastinal mass or adenopathy. Cardiac chambers are within normal limits. Small hiatal hernia. Atelectatic changes seen at the right lung base. No pleural effusion or pneumothorax. The visualized upper lung fields are clear. There is no pericardial effusion. Small-to-moderate sized hiatal hernia. There is a subcutaneous nodule identified along the medial aspect of the left breast measuring 1.2 cm. Soft tissue implant cannot be completely excluded. The findings may suggest a borderline enlarged lymph node.  ABDOMEN: The liver is homogeneous in appearance. The spleen is unremarkable. Tiny amount of fluid seen surrounding the liver. Small stone seen in the gallbladder. There is abnormal wall thickening identified of the colon to suggest a colitis. The distal descending colon and sigmoid colon reveal  diverticulosis with no evidence of diverticulitis. There are some dilated loops of small bowel seen distally suggesting an enteritis. Some minimal free fluid seen within the pelvis. No significant wall thickening.  PELVIS: The distal small bowel is mildly dilated and fluid-filled with no transition point identified. Small amount of pneumatosis identified in the proximal colon cannot be completely excluded. Findings suggesting a proximal colitis and distal enteritis. There is minimal free fluid. The bony structures reveal degenerative changes seen throughout the spine. No pelvic adenopathy. No abnormal mass or fluid collection is identified.  Delayed imaging reveals contrast seen in the renal collecting system bilaterally. Contrast seen within both ureters and bladder. No evidence of obvious obstruction.      Abnormal dilated loops of distal small bowel and proximal colon suggesting possible enteritis and colitis. There is surrounding inflammatory change identified and some wall thickening. A small amount of pneumatosis identified in the proximal colon cannot be completely excluded.  D:  12/08/2021 E:  12/08/2021       CT Abdomen Pelvis With Contrast    Result Date: 12/8/2021  EXAMINATION: CT CHEST W CONTRAST, DIAGNOSTIC-, CT ABDOMEN AND PELVIS W CONTRAST-12/08/2021:  INDICATION: History of endometrial cancer with newly elevated markers; R10.10-Upper abdominal pain, unspecified; K92.2-Gastrointestinal hemorrhage, unspecified; R97.1-Elevated cancer antigen 125 (), history of endometrial malignancy.  TECHNIQUE: Multiple axial CT imaging was obtained of the abdomen and pelvis following the administration of intravenous contrast.  The radiation dose reduction device was turned on for each scan per the ALARA (As Low as Reasonably Achievable) protocol.  COMPARISON: NONE.  FINDINGS:  CHEST: The thyroid is homogeneous. No mediastinal mass or adenopathy. Cardiac chambers are within normal limits. Small hiatal hernia.  Atelectatic changes seen at the right lung base. No pleural effusion or pneumothorax. The visualized upper lung fields are clear. There is no pericardial effusion. Small-to-moderate sized hiatal hernia. There is a subcutaneous nodule identified along the medial aspect of the left breast measuring 1.2 cm. Soft tissue implant cannot be completely excluded. The findings may suggest a borderline enlarged lymph node.  ABDOMEN: The liver is homogeneous in appearance. The spleen is unremarkable. Tiny amount of fluid seen surrounding the liver. Small stone seen in the gallbladder. There is abnormal wall thickening identified of the colon to suggest a colitis. The distal descending colon and sigmoid colon reveal diverticulosis with no evidence of diverticulitis. There are some dilated loops of small bowel seen distally suggesting an enteritis. Some minimal free fluid seen within the pelvis. No significant wall thickening.  PELVIS: The distal small bowel is mildly dilated and fluid-filled with no transition point identified. Small amount of pneumatosis identified in the proximal colon cannot be completely excluded. Findings suggesting a proximal colitis and distal enteritis. There is minimal free fluid. The bony structures reveal degenerative changes seen throughout the spine. No pelvic adenopathy. No abnormal mass or fluid collection is identified.  Delayed imaging reveals contrast seen in the renal collecting system bilaterally. Contrast seen within both ureters and bladder. No evidence of obvious obstruction.      Abnormal dilated loops of distal small bowel and proximal colon suggesting possible enteritis and colitis. There is surrounding inflammatory change identified and some wall thickening. A small amount of pneumatosis identified in the proximal colon cannot be completely excluded.  D:  12/08/2021 E:  12/08/2021         COVID19   Date Value Ref Range Status   12/08/2021 Not Detected Not Detected - Ref. Range Final    \  ASSESSMENTS/PLANS  1.  Acute abdominal pain  2.  Gastrointestinal bleeding with melena  3.  Change in bowel habits, variant pattern with constipation and diarrhea  4.  Unintentional weight loss  5.  History of endometrial cancer.    Cindy Sage is a 76 y.o. female admitted to hospital with worsening abdominal pain and change in bowel habits with intermittent melanotic stools.  Noted the patient had a failed colonoscopy attempt recently with plans for repeat C scope in future.  CT imaging reviewed which shows marked area of stenosis at the splenic flexure.  Suspect patient becomes intermittently obstructed at area of stenosis leading to periods of pain and distention; also suspect liquid stools as byproduct of this area of stenosis.  Recommend obtaining stool studies for infectious etiology as well as inflammatory etiologies; if negative, will plan colonoscopy this admission.    >>> GI PCR, C. difficile, fecal calprotectin, fecal lactoferrin, CRP, and sed rate ordered.  >>> Okay for clear liquid diet    I discussed the patient's findings and my recommendations with patient, family and nursing staff    CHAPARRO Dsouza  12/09/21  14:37 EST

## 2021-12-09 NOTE — PLAN OF CARE
Goal Outcome Evaluation:  VSS on RA. NSR/SB per tele. PRN zofran given for c/o nausea. NPO since midnight. One time dose of imodium given for diarrhea, relief per pt. Two liquid bm this shift. Awaiting GI consult.

## 2021-12-10 ENCOUNTER — ANESTHESIA EVENT (OUTPATIENT)
Dept: PERIOP | Facility: HOSPITAL | Age: 76
End: 2021-12-10

## 2021-12-10 ENCOUNTER — ANESTHESIA (OUTPATIENT)
Dept: PERIOP | Facility: HOSPITAL | Age: 76
End: 2021-12-10

## 2021-12-10 ENCOUNTER — ANESTHESIA EVENT CONVERTED (OUTPATIENT)
Dept: ANESTHESIOLOGY | Facility: HOSPITAL | Age: 76
End: 2021-12-10

## 2021-12-10 ENCOUNTER — APPOINTMENT (OUTPATIENT)
Dept: GENERAL RADIOLOGY | Facility: HOSPITAL | Age: 76
End: 2021-12-10

## 2021-12-10 PROBLEM — K56.609 LARGE BOWEL OBSTRUCTION: Status: ACTIVE | Noted: 2021-12-08

## 2021-12-10 LAB
ANION GAP SERPL CALCULATED.3IONS-SCNC: 9 MMOL/L (ref 5–15)
BUN SERPL-MCNC: 8 MG/DL (ref 8–23)
BUN/CREAT SERPL: 13.8 (ref 7–25)
CA-I SERPL ISE-MCNC: 1.18 MMOL/L (ref 1.12–1.32)
CALCIUM SPEC-SCNC: 7.5 MG/DL (ref 8.6–10.5)
CHLORIDE SERPL-SCNC: 102 MMOL/L (ref 98–107)
CO2 SERPL-SCNC: 21 MMOL/L (ref 22–29)
CREAT SERPL-MCNC: 0.58 MG/DL (ref 0.57–1)
DEPRECATED RDW RBC AUTO: 57.4 FL (ref 37–54)
ERYTHROCYTE [DISTWIDTH] IN BLOOD BY AUTOMATED COUNT: 17.9 % (ref 12.3–15.4)
GFR SERPL CREATININE-BSD FRML MDRD: 101 ML/MIN/1.73
GLUCOSE SERPL-MCNC: 74 MG/DL (ref 65–99)
HCT VFR BLD AUTO: 30.5 % (ref 34–46.6)
HCT VFR BLD AUTO: 34.9 % (ref 34–46.6)
HGB BLD-MCNC: 10 G/DL (ref 12–15.9)
HGB BLD-MCNC: 11.4 G/DL (ref 12–15.9)
MAGNESIUM SERPL-MCNC: 2.7 MG/DL (ref 1.6–2.4)
MCH RBC QN AUTO: 28.8 PG (ref 26.6–33)
MCHC RBC AUTO-ENTMCNC: 32.8 G/DL (ref 31.5–35.7)
MCV RBC AUTO: 87.9 FL (ref 79–97)
PHOSPHATE SERPL-MCNC: 1.7 MG/DL (ref 2.5–4.5)
PLATELET # BLD AUTO: 247 10*3/MM3 (ref 140–450)
PMV BLD AUTO: 8.3 FL (ref 6–12)
POTASSIUM SERPL-SCNC: 3.7 MMOL/L (ref 3.5–5.2)
POTASSIUM SERPL-SCNC: 5.1 MMOL/L (ref 3.5–5.2)
RBC # BLD AUTO: 3.47 10*6/MM3 (ref 3.77–5.28)
SODIUM SERPL-SCNC: 132 MMOL/L (ref 136–145)
WBC NRBC COR # BLD: 2.71 10*3/MM3 (ref 3.4–10.8)

## 2021-12-10 PROCEDURE — 25010000002 ONDANSETRON PER 1 MG: Performed by: ANESTHESIOLOGY

## 2021-12-10 PROCEDURE — G0378 HOSPITAL OBSERVATION PER HR: HCPCS

## 2021-12-10 PROCEDURE — 44140 PARTIAL REMOVAL OF COLON: CPT | Performed by: OBSTETRICS & GYNECOLOGY

## 2021-12-10 PROCEDURE — 25010000002 SUCCINYLCHOLINE PER 20 MG: Performed by: ANESTHESIOLOGY

## 2021-12-10 PROCEDURE — 44139 MOBILIZATION OF COLON: CPT | Performed by: OBSTETRICS & GYNECOLOGY

## 2021-12-10 PROCEDURE — 80048 BASIC METABOLIC PNL TOTAL CA: CPT | Performed by: INTERNAL MEDICINE

## 2021-12-10 PROCEDURE — C1889 IMPLANT/INSERT DEVICE, NOC: HCPCS | Performed by: OBSTETRICS & GYNECOLOGY

## 2021-12-10 PROCEDURE — 99223 1ST HOSP IP/OBS HIGH 75: CPT | Performed by: OBSTETRICS & GYNECOLOGY

## 2021-12-10 PROCEDURE — 0 DIATRIZOATE MEGLUMINE & SODIUM PER 1 ML: Performed by: INTERNAL MEDICINE

## 2021-12-10 PROCEDURE — 0DTL0ZZ RESECTION OF TRANSVERSE COLON, OPEN APPROACH: ICD-10-PCS | Performed by: OBSTETRICS & GYNECOLOGY

## 2021-12-10 PROCEDURE — 74270 X-RAY XM COLON 1CNTRST STD: CPT

## 2021-12-10 PROCEDURE — 99232 SBSQ HOSP IP/OBS MODERATE 35: CPT | Performed by: PHYSICIAN ASSISTANT

## 2021-12-10 PROCEDURE — 84100 ASSAY OF PHOSPHORUS: CPT | Performed by: INTERNAL MEDICINE

## 2021-12-10 PROCEDURE — 88341 IMHCHEM/IMCYTCHM EA ADD ANTB: CPT | Performed by: OBSTETRICS & GYNECOLOGY

## 2021-12-10 PROCEDURE — 44139 MOBILIZATION OF COLON: CPT | Performed by: PHYSICIAN ASSISTANT

## 2021-12-10 PROCEDURE — 25010000002 PROPOFOL 10 MG/ML EMULSION: Performed by: ANESTHESIOLOGY

## 2021-12-10 PROCEDURE — 85014 HEMATOCRIT: CPT | Performed by: INTERNAL MEDICINE

## 2021-12-10 PROCEDURE — 25010000002 HYDROMORPHONE 1 MG/ML SOLUTION

## 2021-12-10 PROCEDURE — 99233 SBSQ HOSP IP/OBS HIGH 50: CPT | Performed by: NURSE PRACTITIONER

## 2021-12-10 PROCEDURE — 85027 COMPLETE CBC AUTOMATED: CPT | Performed by: INTERNAL MEDICINE

## 2021-12-10 PROCEDURE — 44140 PARTIAL REMOVAL OF COLON: CPT | Performed by: PHYSICIAN ASSISTANT

## 2021-12-10 PROCEDURE — 83735 ASSAY OF MAGNESIUM: CPT | Performed by: INTERNAL MEDICINE

## 2021-12-10 PROCEDURE — 25010000002 DEXAMETHASONE PER 1 MG: Performed by: ANESTHESIOLOGY

## 2021-12-10 PROCEDURE — 84132 ASSAY OF SERUM POTASSIUM: CPT | Performed by: ANESTHESIOLOGY

## 2021-12-10 PROCEDURE — 82330 ASSAY OF CALCIUM: CPT | Performed by: INTERNAL MEDICINE

## 2021-12-10 PROCEDURE — 88342 IMHCHEM/IMCYTCHM 1ST ANTB: CPT | Performed by: OBSTETRICS & GYNECOLOGY

## 2021-12-10 PROCEDURE — 88307 TISSUE EXAM BY PATHOLOGIST: CPT | Performed by: OBSTETRICS & GYNECOLOGY

## 2021-12-10 PROCEDURE — 25010000002 FENTANYL CITRATE (PF) 50 MCG/ML SOLUTION: Performed by: ANESTHESIOLOGY

## 2021-12-10 PROCEDURE — 85018 HEMOGLOBIN: CPT | Performed by: INTERNAL MEDICINE

## 2021-12-10 PROCEDURE — 25010000002 ERTAPENEM PER 500 MG

## 2021-12-10 DEVICE — PROXIMATE LINEAR CUTTER  RELOAD (THICK)
Type: IMPLANTABLE DEVICE | Site: ABDOMEN | Status: FUNCTIONAL
Brand: PROXIMATE

## 2021-12-10 RX ORDER — HYDRALAZINE HYDROCHLORIDE 20 MG/ML
5 INJECTION INTRAMUSCULAR; INTRAVENOUS
Status: DISCONTINUED | OUTPATIENT
Start: 2021-12-10 | End: 2021-12-10 | Stop reason: HOSPADM

## 2021-12-10 RX ORDER — PROMETHAZINE HYDROCHLORIDE 25 MG/1
25 SUPPOSITORY RECTAL ONCE AS NEEDED
Status: DISCONTINUED | OUTPATIENT
Start: 2021-12-10 | End: 2021-12-10 | Stop reason: HOSPADM

## 2021-12-10 RX ORDER — LABETALOL HYDROCHLORIDE 5 MG/ML
5 INJECTION, SOLUTION INTRAVENOUS
Status: DISCONTINUED | OUTPATIENT
Start: 2021-12-10 | End: 2021-12-10 | Stop reason: HOSPADM

## 2021-12-10 RX ORDER — SODIUM CHLORIDE 0.9 % (FLUSH) 0.9 %
3-10 SYRINGE (ML) INJECTION AS NEEDED
Status: DISCONTINUED | OUTPATIENT
Start: 2021-12-10 | End: 2021-12-10 | Stop reason: HOSPADM

## 2021-12-10 RX ORDER — SODIUM CHLORIDE 0.9 % (FLUSH) 0.9 %
10 SYRINGE (ML) INJECTION AS NEEDED
Status: DISCONTINUED | OUTPATIENT
Start: 2021-12-10 | End: 2021-12-10 | Stop reason: HOSPADM

## 2021-12-10 RX ORDER — PROMETHAZINE HYDROCHLORIDE 12.5 MG/1
12.5 TABLET ORAL EVERY 6 HOURS PRN
Status: DISCONTINUED | OUTPATIENT
Start: 2021-12-10 | End: 2021-12-15 | Stop reason: HOSPADM

## 2021-12-10 RX ORDER — SODIUM CHLORIDE 0.9 % (FLUSH) 0.9 %
3 SYRINGE (ML) INJECTION EVERY 12 HOURS SCHEDULED
Status: DISCONTINUED | OUTPATIENT
Start: 2021-12-10 | End: 2021-12-10 | Stop reason: HOSPADM

## 2021-12-10 RX ORDER — DROPERIDOL 2.5 MG/ML
0.62 INJECTION, SOLUTION INTRAMUSCULAR; INTRAVENOUS ONCE AS NEEDED
Status: DISCONTINUED | OUTPATIENT
Start: 2021-12-10 | End: 2021-12-10 | Stop reason: HOSPADM

## 2021-12-10 RX ORDER — FENTANYL CITRATE 50 UG/ML
50 INJECTION, SOLUTION INTRAMUSCULAR; INTRAVENOUS
Status: DISCONTINUED | OUTPATIENT
Start: 2021-12-10 | End: 2021-12-10 | Stop reason: HOSPADM

## 2021-12-10 RX ORDER — MAGNESIUM HYDROXIDE 1200 MG/15ML
LIQUID ORAL AS NEEDED
Status: DISCONTINUED | OUTPATIENT
Start: 2021-12-10 | End: 2021-12-10 | Stop reason: HOSPADM

## 2021-12-10 RX ORDER — LIDOCAINE HYDROCHLORIDE 10 MG/ML
0.5 INJECTION, SOLUTION EPIDURAL; INFILTRATION; INTRACAUDAL; PERINEURAL ONCE AS NEEDED
Status: DISCONTINUED | OUTPATIENT
Start: 2021-12-10 | End: 2021-12-10 | Stop reason: HOSPADM

## 2021-12-10 RX ORDER — ATORVASTATIN CALCIUM 20 MG/1
20 TABLET, FILM COATED ORAL DAILY
Status: DISCONTINUED | OUTPATIENT
Start: 2021-12-11 | End: 2021-12-11

## 2021-12-10 RX ORDER — BUPIVACAINE HYDROCHLORIDE 2.5 MG/ML
INJECTION, SOLUTION EPIDURAL; INFILTRATION; INTRACAUDAL
Status: COMPLETED | OUTPATIENT
Start: 2021-12-10 | End: 2021-12-10

## 2021-12-10 RX ORDER — HYDROMORPHONE HYDROCHLORIDE 1 MG/ML
0.5 INJECTION, SOLUTION INTRAMUSCULAR; INTRAVENOUS; SUBCUTANEOUS
Status: DISCONTINUED | OUTPATIENT
Start: 2021-12-10 | End: 2021-12-10 | Stop reason: HOSPADM

## 2021-12-10 RX ORDER — SODIUM CHLORIDE, SODIUM LACTATE, POTASSIUM CHLORIDE, CALCIUM CHLORIDE 600; 310; 30; 20 MG/100ML; MG/100ML; MG/100ML; MG/100ML
INJECTION, SOLUTION INTRAVENOUS CONTINUOUS PRN
Status: DISCONTINUED | OUTPATIENT
Start: 2021-12-10 | End: 2021-12-10 | Stop reason: SURG

## 2021-12-10 RX ORDER — HYDROMORPHONE HYDROCHLORIDE 1 MG/ML
0.5 INJECTION, SOLUTION INTRAMUSCULAR; INTRAVENOUS; SUBCUTANEOUS
Status: DISCONTINUED | OUTPATIENT
Start: 2021-12-10 | End: 2021-12-11

## 2021-12-10 RX ORDER — SUCCINYLCHOLINE CHLORIDE 20 MG/ML
INJECTION INTRAMUSCULAR; INTRAVENOUS AS NEEDED
Status: DISCONTINUED | OUTPATIENT
Start: 2021-12-10 | End: 2021-12-10 | Stop reason: SURG

## 2021-12-10 RX ORDER — FAMOTIDINE 10 MG/ML
20 INJECTION, SOLUTION INTRAVENOUS ONCE
Status: DISCONTINUED | OUTPATIENT
Start: 2021-12-10 | End: 2021-12-10 | Stop reason: HOSPADM

## 2021-12-10 RX ORDER — NALOXONE HCL 0.4 MG/ML
0.4 VIAL (ML) INJECTION AS NEEDED
Status: DISCONTINUED | OUTPATIENT
Start: 2021-12-10 | End: 2021-12-10 | Stop reason: HOSPADM

## 2021-12-10 RX ORDER — PROMETHAZINE HYDROCHLORIDE 25 MG/1
25 TABLET ORAL ONCE AS NEEDED
Status: DISCONTINUED | OUTPATIENT
Start: 2021-12-10 | End: 2021-12-10 | Stop reason: HOSPADM

## 2021-12-10 RX ORDER — PROPOFOL 10 MG/ML
VIAL (ML) INTRAVENOUS AS NEEDED
Status: DISCONTINUED | OUTPATIENT
Start: 2021-12-10 | End: 2021-12-10 | Stop reason: SURG

## 2021-12-10 RX ORDER — DEXAMETHASONE SODIUM PHOSPHATE 4 MG/ML
INJECTION, SOLUTION INTRA-ARTICULAR; INTRALESIONAL; INTRAMUSCULAR; INTRAVENOUS; SOFT TISSUE AS NEEDED
Status: DISCONTINUED | OUTPATIENT
Start: 2021-12-10 | End: 2021-12-10 | Stop reason: SURG

## 2021-12-10 RX ORDER — PROMETHAZINE HYDROCHLORIDE 12.5 MG/1
12.5 SUPPOSITORY RECTAL EVERY 6 HOURS PRN
Status: DISCONTINUED | OUTPATIENT
Start: 2021-12-10 | End: 2021-12-15 | Stop reason: HOSPADM

## 2021-12-10 RX ORDER — HYDROMORPHONE HYDROCHLORIDE 1 MG/ML
0.5 INJECTION, SOLUTION INTRAMUSCULAR; INTRAVENOUS; SUBCUTANEOUS
Status: DISCONTINUED | OUTPATIENT
Start: 2021-12-10 | End: 2021-12-15 | Stop reason: HOSPADM

## 2021-12-10 RX ORDER — NALOXONE HCL 0.4 MG/ML
0.1 VIAL (ML) INJECTION
Status: DISCONTINUED | OUTPATIENT
Start: 2021-12-10 | End: 2021-12-15 | Stop reason: HOSPADM

## 2021-12-10 RX ORDER — HYDROCODONE BITARTRATE AND ACETAMINOPHEN 5; 325 MG/1; MG/1
1 TABLET ORAL ONCE AS NEEDED
Status: DISCONTINUED | OUTPATIENT
Start: 2021-12-10 | End: 2021-12-10 | Stop reason: HOSPADM

## 2021-12-10 RX ORDER — MEPERIDINE HYDROCHLORIDE 25 MG/ML
12.5 INJECTION INTRAMUSCULAR; INTRAVENOUS; SUBCUTANEOUS
Status: DISCONTINUED | OUTPATIENT
Start: 2021-12-10 | End: 2021-12-10 | Stop reason: HOSPADM

## 2021-12-10 RX ORDER — LIDOCAINE HYDROCHLORIDE 10 MG/ML
INJECTION, SOLUTION EPIDURAL; INFILTRATION; INTRACAUDAL; PERINEURAL AS NEEDED
Status: DISCONTINUED | OUTPATIENT
Start: 2021-12-10 | End: 2021-12-10 | Stop reason: SURG

## 2021-12-10 RX ORDER — DROPERIDOL 2.5 MG/ML
0.62 INJECTION, SOLUTION INTRAMUSCULAR; INTRAVENOUS AS NEEDED
Status: DISCONTINUED | OUTPATIENT
Start: 2021-12-10 | End: 2021-12-10 | Stop reason: HOSPADM

## 2021-12-10 RX ORDER — ONDANSETRON 2 MG/ML
INJECTION INTRAMUSCULAR; INTRAVENOUS AS NEEDED
Status: DISCONTINUED | OUTPATIENT
Start: 2021-12-10 | End: 2021-12-10 | Stop reason: SURG

## 2021-12-10 RX ORDER — ROCURONIUM BROMIDE 10 MG/ML
INJECTION, SOLUTION INTRAVENOUS AS NEEDED
Status: DISCONTINUED | OUTPATIENT
Start: 2021-12-10 | End: 2021-12-10 | Stop reason: SURG

## 2021-12-10 RX ORDER — IPRATROPIUM BROMIDE AND ALBUTEROL SULFATE 2.5; .5 MG/3ML; MG/3ML
3 SOLUTION RESPIRATORY (INHALATION) ONCE AS NEEDED
Status: DISCONTINUED | OUTPATIENT
Start: 2021-12-10 | End: 2021-12-10 | Stop reason: HOSPADM

## 2021-12-10 RX ORDER — FAMOTIDINE 20 MG/1
20 TABLET, FILM COATED ORAL ONCE
Status: DISCONTINUED | OUTPATIENT
Start: 2021-12-10 | End: 2021-12-10 | Stop reason: HOSPADM

## 2021-12-10 RX ORDER — BUPIVACAINE HCL/0.9 % NACL/PF 0.125 %
PLASTIC BAG, INJECTION (ML) EPIDURAL AS NEEDED
Status: DISCONTINUED | OUTPATIENT
Start: 2021-12-10 | End: 2021-12-10 | Stop reason: SURG

## 2021-12-10 RX ORDER — SODIUM CHLORIDE 9 MG/ML
125 INJECTION, SOLUTION INTRAVENOUS CONTINUOUS
Status: DISCONTINUED | OUTPATIENT
Start: 2021-12-10 | End: 2021-12-11

## 2021-12-10 RX ORDER — FENTANYL CITRATE 50 UG/ML
INJECTION, SOLUTION INTRAMUSCULAR; INTRAVENOUS AS NEEDED
Status: DISCONTINUED | OUTPATIENT
Start: 2021-12-10 | End: 2021-12-10 | Stop reason: SURG

## 2021-12-10 RX ORDER — NALOXONE HCL 0.4 MG/ML
0.4 VIAL (ML) INJECTION
Status: DISCONTINUED | OUTPATIENT
Start: 2021-12-10 | End: 2021-12-11

## 2021-12-10 RX ORDER — SODIUM CHLORIDE, SODIUM LACTATE, POTASSIUM CHLORIDE, CALCIUM CHLORIDE 600; 310; 30; 20 MG/100ML; MG/100ML; MG/100ML; MG/100ML
9 INJECTION, SOLUTION INTRAVENOUS CONTINUOUS
Status: DISCONTINUED | OUTPATIENT
Start: 2021-12-10 | End: 2021-12-13

## 2021-12-10 RX ORDER — MIDAZOLAM HYDROCHLORIDE 1 MG/ML
0.5 INJECTION INTRAMUSCULAR; INTRAVENOUS
Status: DISCONTINUED | OUTPATIENT
Start: 2021-12-10 | End: 2021-12-10 | Stop reason: HOSPADM

## 2021-12-10 RX ORDER — ONDANSETRON 2 MG/ML
4 INJECTION INTRAMUSCULAR; INTRAVENOUS ONCE AS NEEDED
Status: DISCONTINUED | OUTPATIENT
Start: 2021-12-10 | End: 2021-12-10 | Stop reason: HOSPADM

## 2021-12-10 RX ORDER — SODIUM CHLORIDE 0.9 % (FLUSH) 0.9 %
10 SYRINGE (ML) INJECTION EVERY 12 HOURS SCHEDULED
Status: DISCONTINUED | OUTPATIENT
Start: 2021-12-10 | End: 2021-12-10 | Stop reason: HOSPADM

## 2021-12-10 RX ADMIN — LIDOCAINE HYDROCHLORIDE 50 MG: 10 INJECTION, SOLUTION EPIDURAL; INFILTRATION; INTRACAUDAL; PERINEURAL at 20:06

## 2021-12-10 RX ADMIN — ROCURONIUM BROMIDE 35 MG: 10 INJECTION INTRAVENOUS at 20:19

## 2021-12-10 RX ADMIN — SODIUM CHLORIDE, PRESERVATIVE FREE 10 ML: 5 INJECTION INTRAVENOUS at 09:17

## 2021-12-10 RX ADMIN — PANTOPRAZOLE SODIUM 40 MG: 40 INJECTION, POWDER, FOR SOLUTION INTRAVENOUS at 09:17

## 2021-12-10 RX ADMIN — SODIUM CHLORIDE 125 ML/HR: 9 INJECTION, SOLUTION INTRAVENOUS at 06:53

## 2021-12-10 RX ADMIN — ROCURONIUM BROMIDE 5 MG: 10 INJECTION INTRAVENOUS at 20:06

## 2021-12-10 RX ADMIN — SODIUM CHLORIDE 100 ML/HR: 9 INJECTION, SOLUTION INTRAVENOUS at 23:46

## 2021-12-10 RX ADMIN — SODIUM CHLORIDE, POTASSIUM CHLORIDE, SODIUM LACTATE AND CALCIUM CHLORIDE: 600; 310; 30; 20 INJECTION, SOLUTION INTRAVENOUS at 21:16

## 2021-12-10 RX ADMIN — HYDROMORPHONE HYDROCHLORIDE 0.5 MG: 1 INJECTION, SOLUTION INTRAMUSCULAR; INTRAVENOUS; SUBCUTANEOUS at 22:45

## 2021-12-10 RX ADMIN — ROCURONIUM BROMIDE 10 MG: 10 INJECTION INTRAVENOUS at 20:51

## 2021-12-10 RX ADMIN — FENTANYL CITRATE 100 MCG: 50 INJECTION, SOLUTION INTRAMUSCULAR; INTRAVENOUS at 20:06

## 2021-12-10 RX ADMIN — Medication 100 MCG: at 21:05

## 2021-12-10 RX ADMIN — SODIUM CHLORIDE, POTASSIUM CHLORIDE, SODIUM LACTATE AND CALCIUM CHLORIDE: 600; 310; 30; 20 INJECTION, SOLUTION INTRAVENOUS at 20:01

## 2021-12-10 RX ADMIN — Medication 130 MG: at 20:07

## 2021-12-10 RX ADMIN — PROPOFOL 150 MG: 10 INJECTION, EMULSION INTRAVENOUS at 20:06

## 2021-12-10 RX ADMIN — DEXAMETHASONE SODIUM PHOSPHATE 4 MG: 4 INJECTION, SOLUTION INTRA-ARTICULAR; INTRALESIONAL; INTRAMUSCULAR; INTRAVENOUS; SOFT TISSUE at 20:21

## 2021-12-10 RX ADMIN — PANTOPRAZOLE SODIUM 40 MG: 40 INJECTION, POWDER, FOR SOLUTION INTRAVENOUS at 23:46

## 2021-12-10 RX ADMIN — FENTANYL CITRATE 100 MCG: 50 INJECTION, SOLUTION INTRAMUSCULAR; INTRAVENOUS at 22:10

## 2021-12-10 RX ADMIN — SUGAMMADEX 200 MG: 100 INJECTION, SOLUTION INTRAVENOUS at 22:03

## 2021-12-10 RX ADMIN — DIATRIZOATE MEGLUMINE AND DIATRIZOATE SODIUM 480 ML: 660; 100 LIQUID ORAL; RECTAL at 12:06

## 2021-12-10 RX ADMIN — ONDANSETRON 4 MG: 2 INJECTION INTRAMUSCULAR; INTRAVENOUS at 21:44

## 2021-12-10 RX ADMIN — BUPIVACAINE HYDROCHLORIDE 5 ML: 2.5 INJECTION, SOLUTION EPIDURAL; INFILTRATION; INTRACAUDAL at 20:18

## 2021-12-10 RX ADMIN — PROPOFOL 25 MCG/KG/MIN: 10 INJECTION, EMULSION INTRAVENOUS at 20:25

## 2021-12-10 RX ADMIN — ERTAPENEM 1 G: 1 INJECTION, POWDER, LYOPHILIZED, FOR SOLUTION INTRAMUSCULAR; INTRAVENOUS at 20:10

## 2021-12-10 NOTE — PROGRESS NOTES
[facility] Medicine Services  PROGRESS NOTE    Patient Name: Cindy Sage  : 1945  MRN: [id number]    Date of Admission: 2021  Primary Care Physician: Jose Carrillo MD    Subjective   Subjective     CC:  melena    HPI:  Patient is sitting up in bed with daughter in the room very anxious due to knowing she is going to surgery soon for complete blockage.  Dr. Calvin has been in to talk to the patient and her daughter about going to surgery.  Patient states that everything that when it is coming out, but she is afraid to push.  Patient does have some distention, but denies any pain.  Granddaughter at bedside.    ROS:  Gen- No fevers, chills  CV- No chest pain, palpitations  Resp- No cough, dyspnea  GI- No N/V/D, abd pain; +distention  All other systems have been reviewed and the pertinent positives and negatives are listed above in the HPI or ROS     Objective   Objective     Vital Signs:   Temp:  [95.6 °F (35.3 °C)-96.6 °F (35.9 °C)] 95.6 °F (35.3 °C)  Heart Rate:  [64-76] 67  Resp:  [16-20] 16  BP: (105-133)/(55-65) 112/65     Physical Exam:  Constitutional: Alert, younger than stated age female sitting up in bed talking to her daughter waiting to go to surgery and mildly anxious  Eyes: EOMI, sclerae anicteric, no conjunctival injection  Head: NCAT  ENT: [place], moist mucous membranes   Respiratory: Nonlabored, symmetrical chest expansion, CTAB  Cardiovascular: RRR, HR 76, no M/R/G, +DP pulses bilaterally  Gastrointestinal: Soft, +mild tenderness,+ distention  Musculoskeletal: RICH; no LE edema bilaterally  Neurologic: Oriented x4, strength symmetric in all extremities, follows all commands, speech clear  Skin: No rashes on exposed skin  Psychiatric: Pleasant and cooperative; anxious affect    Results Reviewed:  LAB RESULTS:      Lab 12/10/21  0542 21  1726 21  0340 21  0045 21  1756 21  1346 21  1055 21  1055   WBC 2.71*   --  3.97  --   --   --   --  4.70   HEMOGLOBIN 10.0* 10.7* 10.4*  10.4* 11.6* 12.2  --    < > 12.7   HEMATOCRIT 30.5* 33.2* 31.3*  31.3* 35.0 37.5  --    < > 38.9   PLATELETS 247  --  248  --   --   --   --  339   NEUTROS ABS  --   --   --   --   --   --   --  3.52   IMMATURE GRANS (ABS)  --   --   --   --   --   --   --  0.02   LYMPHS ABS  --   --   --   --   --   --   --  0.64*   MONOS ABS  --   --   --   --   --   --   --  0.48   EOS ABS  --   --   --   --   --   --   --  0.02   MCV 87.9  --  87.7  --   --   --   --  89.8   LACTATE  --   --   --   --   --  2.2*  --  2.2*    < > = values in this interval not displayed.         Lab 12/10/21  0542 12/09/21  0340 12/09/21  0045 12/08/21  1158   SODIUM 132* 131*  --  132*   POTASSIUM 5.1 3.6 3.6 2.6*   CHLORIDE 102 100  --  93*   CO2 21.0* 23.0  --  26.0   ANION GAP 9.0 8.0  --  13.0   BUN 8 18  --  26*   CREATININE 0.58 0.74  --  0.75   GLUCOSE 74 76  --  92   CALCIUM 7.5* 7.0*  --  8.3*   IONIZED CALCIUM 1.18  --   --   --    MAGNESIUM 2.7* 1.8  --  1.9   PHOSPHORUS 1.7*  --   --   --    HEMOGLOBIN A1C  --  5.40  --   --    TSH  --  2.440  --   --          Lab 12/08/21  1158   TOTAL PROTEIN 5.9*   ALBUMIN 3.50   GLOBULIN 2.4   ALT (SGPT) 7   AST (SGOT) 13   BILIRUBIN 0.6   ALK PHOS 78   LIPASE 26             Lab 12/09/21  0340   CHOLESTEROL 74   LDL CHOL 14   HDL CHOL 46   TRIGLYCERIDES 61         Lab 12/08/21  1158   ABO TYPING B   RH TYPING Positive   ANTIBODY SCREEN Negative         Brief Urine Lab Results  (Last result in the past 365 days)      Color   Clarity   Blood   Leuk Est   Nitrite   Protein   CREAT   Urine HCG        12/08/21 1059 Dark Yellow   Cloudy   Trace   Small (1+)   Negative   30 mg/dL (1+)                 Microbiology Results Abnormal     Procedure Component Value - Date/Time    COVID PRE-OP / PRE-PROCEDURE SCREENING ORDER (NO ISOLATION) - Swab, Nasopharynx [[id number]]  (Normal) Collected: 12/08/21 1216    Lab Status: Final result Specimen:  Swab from Nasopharynx Updated: 12/08/21 1311    Narrative:      The following orders were created for panel order COVID PRE-OP / PRE-PROCEDURE SCREENING ORDER (NO ISOLATION) - Swab, Nasopharynx.  Procedure                               Abnormality         Status                     ---------                               -----------         ------                     Respiratory Panel PCR w/...[[id number]]  Normal              Final result                 Please view results for these tests on the individual orders.    Respiratory Panel PCR w/COVID-19(SARS-CoV-2) DYLAN/DANN/SARMAD/PAD/COR/MAD/ANSELMO In-House, NP Swab in UTM/VTM, 3-4 HR TAT - Swab, Nasopharynx [[id number]]  (Normal) Collected: 12/08/21 1216    Lab Status: Final result Specimen: Swab from Nasopharynx Updated: 12/08/21 1311     ADENOVIRUS, PCR Not Detected     Coronavirus 229E Not Detected     Coronavirus HKU1 Not Detected     Coronavirus NL63 Not Detected     Coronavirus OC43 Not Detected     COVID19 Not Detected     Human Metapneumovirus Not Detected     Human Rhinovirus/Enterovirus Not Detected     Influenza A PCR Not Detected     Influenza B PCR Not Detected     Parainfluenza Virus 1 Not Detected     Parainfluenza Virus 2 Not Detected     Parainfluenza Virus 3 Not Detected     Parainfluenza Virus 4 Not Detected     RSV, PCR Not Detected     Bordetella pertussis pcr Not Detected     Bordetella parapertussis PCR Not Detected     Chlamydophila pneumoniae PCR Not Detected     Mycoplasma pneumo by PCR Not Detected    Narrative:      In the setting of a positive respiratory panel with a viral infection PLUS a negative procalcitonin without other underlying concern for bacterial infection, consider observing off antibiotics or discontinuation of antibiotics and continue supportive care. If the respiratory panel is positive for atypical bacterial infection (Bordetella pertussis, Chlamydophila pneumoniae, or Mycoplasma pneumoniae), consider antibiotic de-escalation to  target atypical bacterial infection.          CT Chest With Contrast Diagnostic    Result Date: 12/8/2021  EXAMINATION: CT CHEST W CONTRAST, DIAGNOSTIC-, CT ABDOMEN AND PELVIS W CONTRAST-12/08/2021:  INDICATION: History of endometrial cancer with newly elevated markers; R10.10-Upper abdominal pain, unspecified; K92.2-Gastrointestinal hemorrhage, unspecified; R97.1-Elevated cancer antigen 125 (), history of endometrial malignancy.  TECHNIQUE: Multiple axial CT imaging was obtained of the abdomen and pelvis following the administration of intravenous contrast.  The radiation dose reduction device was turned on for each scan per the ALARA (As Low as Reasonably Achievable) protocol.  COMPARISON: NONE.  FINDINGS:  CHEST: The thyroid is homogeneous. No mediastinal mass or adenopathy. Cardiac chambers are within normal limits. Small hiatal hernia. Atelectatic changes seen at the right lung base. No pleural effusion or pneumothorax. The visualized upper lung fields are clear. There is no pericardial effusion. Small-to-moderate sized hiatal hernia. There is a subcutaneous nodule identified along the medial aspect of the left breast measuring 1.2 cm. Soft tissue implant cannot be completely excluded. The findings may suggest a borderline enlarged lymph node.  ABDOMEN: The liver is homogeneous in appearance. The spleen is unremarkable. Tiny amount of fluid seen surrounding the liver. Small stone seen in the gallbladder. There is abnormal wall thickening identified of the colon to suggest a colitis. The distal descending colon and sigmoid colon reveal diverticulosis with no evidence of diverticulitis. There are some dilated loops of small bowel seen distally suggesting an enteritis. Some minimal free fluid seen within the pelvis. No significant wall thickening.  PELVIS: The distal small bowel is mildly dilated and fluid-filled with no transition point identified. Small amount of pneumatosis identified in the proximal  colon cannot be completely excluded. Findings suggesting a proximal colitis and distal enteritis. There is minimal free fluid. The bony structures reveal degenerative changes seen throughout the spine. No pelvic adenopathy. No abnormal mass or fluid collection is identified.  Delayed imaging reveals contrast seen in the renal collecting system bilaterally. Contrast seen within both ureters and bladder. No evidence of obvious obstruction.      Impression: Abnormal dilated loops of distal small bowel and proximal colon suggesting possible enteritis and colitis. There is surrounding inflammatory change identified and some wall thickening. A small amount of pneumatosis identified in the proximal colon cannot be completely excluded.  D:  12/08/2021 E:  12/08/2021       CT Abdomen Pelvis With Contrast    Result Date: 12/8/2021  EXAMINATION: CT CHEST W CONTRAST, DIAGNOSTIC-, CT ABDOMEN AND PELVIS W CONTRAST-12/08/2021:  INDICATION: History of endometrial cancer with newly elevated markers; R10.10-Upper abdominal pain, unspecified; K92.2-Gastrointestinal hemorrhage, unspecified; R97.1-Elevated cancer antigen 125 (), history of endometrial malignancy.  TECHNIQUE: Multiple axial CT imaging was obtained of the abdomen and pelvis following the administration of intravenous contrast.  The radiation dose reduction device was turned on for each scan per the ALARA (As Low as Reasonably Achievable) protocol.  COMPARISON: NONE.  FINDINGS:  CHEST: The thyroid is homogeneous. No mediastinal mass or adenopathy. Cardiac chambers are within normal limits. Small hiatal hernia. Atelectatic changes seen at the right lung base. No pleural effusion or pneumothorax. The visualized upper lung fields are clear. There is no pericardial effusion. Small-to-moderate sized hiatal hernia. There is a subcutaneous nodule identified along the medial aspect of the left breast measuring 1.2 cm. Soft tissue implant cannot be completely excluded. The  findings may suggest a borderline enlarged lymph node.  ABDOMEN: The liver is homogeneous in appearance. The spleen is unremarkable. Tiny amount of fluid seen surrounding the liver. Small stone seen in the gallbladder. There is abnormal wall thickening identified of the colon to suggest a colitis. The distal descending colon and sigmoid colon reveal diverticulosis with no evidence of diverticulitis. There are some dilated loops of small bowel seen distally suggesting an enteritis. Some minimal free fluid seen within the pelvis. No significant wall thickening.  PELVIS: The distal small bowel is mildly dilated and fluid-filled with no transition point identified. Small amount of pneumatosis identified in the proximal colon cannot be completely excluded. Findings suggesting a proximal colitis and distal enteritis. There is minimal free fluid. The bony structures reveal degenerative changes seen throughout the spine. No pelvic adenopathy. No abnormal mass or fluid collection is identified.  Delayed imaging reveals contrast seen in the renal collecting system bilaterally. Contrast seen within both ureters and bladder. No evidence of obvious obstruction.      Impression: Abnormal dilated loops of distal small bowel and proximal colon suggesting possible enteritis and colitis. There is surrounding inflammatory change identified and some wall thickening. A small amount of pneumatosis identified in the proximal colon cannot be completely excluded.  D:  12/08/2021 E:  12/08/2021         Results for orders placed during the hospital encounter of 02/28/19    Adult Transthoracic Echo Complete W/ Cont if Necessary Per Protocol (With Agitated Saline)    Interpretation Summary  · Estimated EF = 60%.  · Left ventricular systolic function is normal.  · Trace to mild mitral regurgitation  · Trace tricuspid regurgitation  · No evidence of PFO or ASD.      I have reviewed the medications:  Scheduled Meds:atorvastatin, 20 mg, Oral,  Daily  pantoprazole, 40 mg, Intravenous, Q12H  potassium chloride, 40 mEq, Oral, Once  sodium chloride, 10 mL, Intravenous, Q12H      Continuous Infusions:sodium chloride, 125 mL/hr, Last Rate: 125 mL/hr (12/10/21 0921)      PRN Meds:.•  acetaminophen **OR** acetaminophen **OR** acetaminophen  •  HYDROcodone-acetaminophen  •  influenza vaccine  •  magnesium sulfate **OR** magnesium sulfate **OR** magnesium sulfate  •  ondansetron **OR** ondansetron  •  potassium & sodium phosphates **OR** potassium & sodium phosphates  •  potassium chloride **OR** potassium chloride **OR** potassium chloride  •  Sodium Chloride (PF)  •  sodium chloride  •  [COMPLETED] Insert peripheral IV **AND** sodium chloride    Assessment/Plan   Assessment & Plan     Active Hospital Problems    Diagnosis  POA   • Pain of upper abdomen [R10.10]  Yes   • Melena [K92.1]  Unknown   • History of endometrial cancer [Z85.42]  Not Applicable   • GERD (gastroesophageal reflux disease) [K21.9]  Yes   • Hyperlipidemia [E78.5]  Yes   • Hypertension [I10]  Yes      Resolved Hospital Problems   No resolved problems to display.        Brief Hospital Course to date:  Cindy Sage is a 76 y.o. female with hx of HTN, HLD, prior TIA/CVA in 2016 with no residual deficits, and endometrial cancer s/p radiation and chemotherapy 5 years ago who follows with Dr. Ma, presented to the ED with complaints of melena.       These problems are new to me today    This patient's problems and plans were partially entered by my partner and updated as appropriate by me 12/10/21.    Melena/GIB  Intermittent constipation/diarrhea x months  Unintentional weight loss  --H&H currently stable  -- continue to monitor Q8 hours  -- previously had c-scope with Dr. Verdugo and was supposed to have follow up c-scope last month but was cancelled  -- start BID PPI  --GI following.  Note that failed previous colonoscopy attempt.  CT shows area of stenosis at splenic flexure.  GI  suspects that becomes intermittently obstructed at area of stenosis leading to periods of pain and distension.  Recs Stool studies, plans colonoscopy.  Clear liquid diet  --Gastrografin enema showed complete bowel obstruction, so Dr. Ma is taking patient to the OR today     H/o endometrial cancer  --s/p chemo/XRT five years ago  -- follows with Dr. Ma.   -- apparently recent increase in  from 16 to 23, so she was scheduled for outpatient CT A/P done 12/8 and showed abnormal dilated loops of distal small bowel and proximal colon suggesting possible enteritis and colitis     HTN  -- continue home meds with hold parameters     HLD  -- continue statin     H/o TIA/CVA  --with no residual symptoms  -- on ASA/Plavix at home, holding for now     Hypokalemia  Hypomagnesia  Hypophosphemia   -- replace per protocol  --Phos 1.7, mg 2.7, K 5.1     Hyponatremia  --Na 132             DVT prophylaxis:  Mechanical DVT prophylaxis orders are present. Mechanical      AM-PAC 6 Clicks Score (PT): 22 (12/09/21 0855)    Disposition: I expect the patient to be discharged TBD    CODE STATUS:   Code Status and Medical Interventions:   Ordered at: 12/08/21 1248     Level Of Support Discussed With:    Patient     Code Status (Patient has no pulse and is not breathing):    CPR (Attempt to Resuscitate)     Medical Interventions (Patient has pulse or is breathing):    Full Support       Sandhya Underwood, CHAPARRO  12/10/21

## 2021-12-10 NOTE — NURSING NOTE
Wocn consulted for pre-op stoma site marking.     Surgeon: Dr. Calvin    Ostomy type: Loop ileostomy    Patient assessed, lying, sitting, leaning forward.    Quadrant marked: Right lower quadrant    Education provided: Patient educated on anatomy specifically splenic flexure and complete bowel obstruction.  The nature of and purpose of loop ileostomy, and it is temporary Woc showed patient the pouch discussed how to empty the pouch clean the pouch shower with the pouch how often to change the pouch.  Discussed early ambulation and protein importance.  Explained that Woc will follow every day she is in the hospital and provide support and resources and supplies.  Woc will offered reassurance about teaching and education of how to care and manage ostomy.    Woc will follow.     Please contact with questions or concerns.     Dragon disclaimer:  This note was entered via electronic voice recognition/ transcription prorgram. The electronic translation of spoken language may permit erroneous, or at times, nonsensical words or phrases to be inadvertently transcribed; Although I have reviewed the note for such errors, some may still exist.

## 2021-12-10 NOTE — ANESTHESIA PROCEDURE NOTES
Peripheral Block      Patient reassessed immediately prior to procedure    Patient location during procedure: OR  Stop time: 12/10/2021 8:10 PM  Reason for block: at surgeon's request and post-op pain management  Performed by  Anesthesiologist: Andra Patino DO  Preanesthetic Checklist  Completed: patient identified, IV checked, site marked, risks and benefits discussed, surgical consent, monitors and equipment checked, pre-op evaluation and timeout performed  Prep:  Pt Position: supine  Sterile barriers:cap, gloves, sterile barriers and mask  Prep: ChloraPrep  Patient monitoring: blood pressure monitoring, continuous pulse oximetry and EKG  Procedure    Sedation: yes  Performed under: general  Guidance:ultrasound guided  Images:still images obtained, printed/placed on chart    Laterality:Bilateral  Block Type:TAP  Injection Technique:single-shot  Needle Type:short-bevel and echogenic  Needle Gauge:20 G  Resistance on Injection: none    Medications Used: bupivacaine PF (MARCAINE) 0.25 % injection, 5 mL  Med administered at 12/10/2021 8:18 PM      Medications  Comment:Block Injection:  LA dose divided between Right and Left block;  25cc used bilaterally 0.25% bupiv.  Negative serial aspirations.  Pt tolerated px well.         Post Assessment  Injection Assessment: negative aspiration for heme, incremental injection and no paresthesia on injection  Patient Tolerance:comfortable throughout block  Complications:no  Additional Notes      Under Ultrasound guidance, a BBraun 4inch 360 degree needle was advanced with Normal Saline hydro dissection of tissue.  The Internal Oblique and Transversus Abdominus muscles where visualized.  At or before the aponeurosis of Internal Oblique, local anesthetic spread was visualized in the Transversus Abdominus Plane. Injection was made incrementally with aspiration every 5 mls.  There was no  intravascular injection,  injection pressure was normal, there was no neural injection, and  the procedure was completed without difficulty.  Thank You.

## 2021-12-10 NOTE — PLAN OF CARE
Goal Outcome Evaluation: vss, mag replaced, awaiting repeat level. No c/o diarrhea, awaiting c. Diff specimen.

## 2021-12-10 NOTE — CONSULTS
Consults     Cindy Sage  [id number]  1945      Reason for visit: Abdominal pain, melena, large bowel obstruction    Consultation:  Patient is being seen at the request of Dr. Ronald Barrientos    History of present illness:  The patient is a 76 y.o. year old female who presented to the ED on 12/8 due to abdominal pain and melena. She has a history of Stage IIIC endometrial adenocarcinoma, s/p treatment as below and has been in surveillance for the last 5 years. GYN oncology is being consulted for concern for large bowel obstruction.    She reports several months of ongoing diarrhea and constipation that is alternating. When she does have diarrhea, she has painful cramps. She has also noticed bloody stools recently. She has had unintentional weight loss as well. She reports that she has had a colonoscopy previously and was supposed to have a repeat one, but never did. GI has been consulted during the admission and is planning for a gastrografin enema.   After the procedure which showed a significant stricture in the colon, Dr. Barrientos contacted Dr. Ma and requested urgent/emergent consultation.    Oncologic History:  Oncology/Hematology History   Endometrial cancer (HCC)   3/26/2016 Imaging    CT in ER for acute onset postmenopausal bleeding revealed mass in the lower uterine segment. Gyn Oncology called to evaluate.     3/29/2016 Biopsy    Endometrial biopsy in office, pathology revealed complex atypical hyperplasia with stromal breakdown.        4/1/2016 Initial Diagnosis    Endometrial cancer     4/19/2016 Surgery    RATLH, BSO, LND to several millimeters of residual disease.   Stage IIIC2     5/20/2016 - 11/15/2016 Chemotherapy    Carboplatin AUC 6, Paclitaxel 175 mg/m2 x 6 cycles in sandwich fashion with radiation in between cycles #3 and #4      - 9/2/2016 Radiation    Radiation completed. Pelvis and periaortic nodes received a total of 45 Gy in 25 fractions during sandwich therapy. This was  followed by vaginal brachytherapy: upper vagina treated utilizing cylinder and high dose rate iridium-192 to 18 Gy in 3 fractions.      11/21/2016 Imaging    Post-treatment PET/CT negative for disease           Past Medical History:   Diagnosis Date    Drug therapy 2016    For endometrial CA in 2016    Elevated cholesterol     Endometrial cancer (HCC) 04/2016    Stage IIIC2     GERD (gastroesophageal reflux disease)     HTN (hypertension)     Hx of radiation therapy 2016    For endometrial CA 2016    Stroke (HCC) 02/2016    No residual deficits.  Takes .       Past Surgical History:   Procedure Laterality Date    APPENDECTOMY      Ex lap    BREAST BIOPSY Left 2016    FOOT SURGERY      OOPHORECTOMY      TONSILLECTOMY      TOTAL LAPAROSCOPIC HYSTERECTOMY SALPINGO OOPHORECTOMY Bilateral 04/19/2016    RATLH, BSO, LND       MEDICATIONS: The current medication list was reviewed with the patient and updated in the EMR this date per the Medical Assistant. Medication dosages and frequencies were confirmed to be accurate.      Allergies:  is allergic to strawberry extract.    Social History:   Social History     Socioeconomic History    Marital status:    Tobacco Use    Smoking status: Never Smoker    Smokeless tobacco: Never Used   Vaping Use    Vaping Use: Never used   Substance and Sexual Activity    Alcohol use: Yes    Drug use: Never    Sexual activity: Defer       Family History:    Family History   Problem Relation Age of Onset    Arthritis Mother     Breast cancer Mother         age unknown     Osteoporosis Mother     Cancer Mother     Alcohol abuse Father     Stroke Maternal Aunt     Ovarian cancer Neg Hx     Endometrial cancer Neg Hx        Health Maintenance:    Health Maintenance   Topic Date Due    TDAP/TD VACCINES (1 - Tdap) Never done    ZOSTER VACCINE (1 of 2) Never done    HEPATITIS C SCREENING  Never done    ANNUAL WELLNESS VISIT  06/13/2020    Pneumococcal Vaccine 65+ (2 of 2 - PPSV23)  06/13/2020    INFLUENZA VACCINE  08/01/2021    COLORECTAL CANCER SCREENING  12/09/2021    LIPID PANEL  12/09/2022    DXA SCAN  12/28/2022    MAMMOGRAM  01/14/2023    COVID-19 Vaccine  Completed         Review of Systems  See HPI    Physical Exam    Vitals:    12/10/21 0300 12/10/21 0350 12/10/21 0500 12/10/21 0757   BP:  133/61  112/65   BP Location:  Left arm  Left arm   Patient Position:  Lying  Lying   Pulse: 64 73 67    Resp:  18  16   Temp:    95.6 °F (35.3 °C)   TempSrc:    Oral   SpO2:    98%   Weight:       Height:           Body mass index is 21.46 kg/m².    Wt Readings from Last 3 Encounters:   12/08/21 56.7 kg (125 lb)   11/17/21 58.5 kg (129 lb)   10/07/21 59.4 kg (131 lb)     GENERAL: Alert, well-appearing female appearing her stated age who is in no apparent distress.   HEENT: Sclera anicteric. Head normocephalic, atraumatic. Mucus membranes moist.   NECK: Trachea midline, supple, without masses.  No thyromegaly.   BREASTS: Deferred  CARDIOVASCULAR: Normal rate, regular rhythm, no murmurs, rubs, or gallops.  No peripheral edema.  RESPIRATORY: Clear to auscultation bilaterally, normal respiratory effort  BACK:  No CVA tenderness, no vertebral tenderness on palpation  GASTROINTESTINAL:  Abdomen is soft, no rebound or guarding, no masses, or hernias. No HSM.  Abdomen distended.  Hyperactive bowel sounds.  Mild discomfort with palpation.  SKIN:  Warm, dry, well-perfused.  All visible areas intact.  No rashes, lesions, ulcers.  PSYCHIATRIC: AO x3, with appropriate affect, normal thought processes.  NEUROLOGIC: No focal deficits. Moves extremities well.  MUSCULOSKELETAL: Normal gait and station.   EXTREMITIES:   No cyanosis, clubbing, symmetric.  LYMPHATICS:  No cervical or inguinal adenopathy noted.     PELVIC exam:    Deferred    ECOG PS 0    PROCEDURES:  None    Diagnostic Data:    CT Chest With Contrast Diagnostic    Result Date: 12/8/2021  EXAMINATION: CT CHEST W CONTRAST, DIAGNOSTIC-, CT ABDOMEN AND  PELVIS W CONTRAST-12/08/2021:  INDICATION: History of endometrial cancer with newly elevated markers; R10.10-Upper abdominal pain, unspecified; K92.2-Gastrointestinal hemorrhage, unspecified; R97.1-Elevated cancer antigen 125 (), history of endometrial malignancy.  TECHNIQUE: Multiple axial CT imaging was obtained of the abdomen and pelvis following the administration of intravenous contrast.  The radiation dose reduction device was turned on for each scan per the ALARA (As Low as Reasonably Achievable) protocol.  COMPARISON: NONE.  FINDINGS:  CHEST: The thyroid is homogeneous. No mediastinal mass or adenopathy. Cardiac chambers are within normal limits. Small hiatal hernia. Atelectatic changes seen at the right lung base. No pleural effusion or pneumothorax. The visualized upper lung fields are clear. There is no pericardial effusion. Small-to-moderate sized hiatal hernia. There is a subcutaneous nodule identified along the medial aspect of the left breast measuring 1.2 cm. Soft tissue implant cannot be completely excluded. The findings may suggest a borderline enlarged lymph node.  ABDOMEN: The liver is homogeneous in appearance. The spleen is unremarkable. Tiny amount of fluid seen surrounding the liver. Small stone seen in the gallbladder. There is abnormal wall thickening identified of the colon to suggest a colitis. The distal descending colon and sigmoid colon reveal diverticulosis with no evidence of diverticulitis. There are some dilated loops of small bowel seen distally suggesting an enteritis. Some minimal free fluid seen within the pelvis. No significant wall thickening.  PELVIS: The distal small bowel is mildly dilated and fluid-filled with no transition point identified. Small amount of pneumatosis identified in the proximal colon cannot be completely excluded. Findings suggesting a proximal colitis and distal enteritis. There is minimal free fluid. The bony structures reveal degenerative  changes seen throughout the spine. No pelvic adenopathy. No abnormal mass or fluid collection is identified.  Delayed imaging reveals contrast seen in the renal collecting system bilaterally. Contrast seen within both ureters and bladder. No evidence of obvious obstruction.      Abnormal dilated loops of distal small bowel and proximal colon suggesting possible enteritis and colitis. There is surrounding inflammatory change identified and some wall thickening. A small amount of pneumatosis identified in the proximal colon cannot be completely excluded.  D:  12/08/2021 E:  12/08/2021       CT Abdomen Pelvis With Contrast    Result Date: 12/8/2021  EXAMINATION: CT CHEST W CONTRAST, DIAGNOSTIC-, CT ABDOMEN AND PELVIS W CONTRAST-12/08/2021:  INDICATION: History of endometrial cancer with newly elevated markers; R10.10-Upper abdominal pain, unspecified; K92.2-Gastrointestinal hemorrhage, unspecified; R97.1-Elevated cancer antigen 125 (), history of endometrial malignancy.  TECHNIQUE: Multiple axial CT imaging was obtained of the abdomen and pelvis following the administration of intravenous contrast.  The radiation dose reduction device was turned on for each scan per the ALARA (As Low as Reasonably Achievable) protocol.  COMPARISON: NONE.  FINDINGS:  CHEST: The thyroid is homogeneous. No mediastinal mass or adenopathy. Cardiac chambers are within normal limits. Small hiatal hernia. Atelectatic changes seen at the right lung base. No pleural effusion or pneumothorax. The visualized upper lung fields are clear. There is no pericardial effusion. Small-to-moderate sized hiatal hernia. There is a subcutaneous nodule identified along the medial aspect of the left breast measuring 1.2 cm. Soft tissue implant cannot be completely excluded. The findings may suggest a borderline enlarged lymph node.  ABDOMEN: The liver is homogeneous in appearance. The spleen is unremarkable. Tiny amount of fluid seen surrounding the liver.  Small stone seen in the gallbladder. There is abnormal wall thickening identified of the colon to suggest a colitis. The distal descending colon and sigmoid colon reveal diverticulosis with no evidence of diverticulitis. There are some dilated loops of small bowel seen distally suggesting an enteritis. Some minimal free fluid seen within the pelvis. No significant wall thickening.  PELVIS: The distal small bowel is mildly dilated and fluid-filled with no transition point identified. Small amount of pneumatosis identified in the proximal colon cannot be completely excluded. Findings suggesting a proximal colitis and distal enteritis. There is minimal free fluid. The bony structures reveal degenerative changes seen throughout the spine. No pelvic adenopathy. No abnormal mass or fluid collection is identified.  Delayed imaging reveals contrast seen in the renal collecting system bilaterally. Contrast seen within both ureters and bladder. No evidence of obvious obstruction.      Abnormal dilated loops of distal small bowel and proximal colon suggesting possible enteritis and colitis. There is surrounding inflammatory change identified and some wall thickening. A small amount of pneumatosis identified in the proximal colon cannot be completely excluded.  D:  12/08/2021 E:  12/08/2021         Lab Results   Component Value Date    WBC 2.71 (L) 12/10/2021    HGB 10.0 (L) 12/10/2021    HCT 30.5 (L) 12/10/2021    MCV 87.9 12/10/2021     12/10/2021    NEUTROABS 3.52 12/08/2021    GLUCOSE 74 12/10/2021    BUN 8 12/10/2021    CREATININE 0.58 12/10/2021    EGFRIFNONA 101 12/10/2021    EGFRIFAFRI 77 10/07/2021     (L) 12/10/2021    K 5.1 12/10/2021     12/10/2021    CO2 21.0 (L) 12/10/2021    MG 2.7 (H) 12/10/2021    PHOS 1.7 (C) 12/10/2021    CALCIUM 7.5 (L) 12/10/2021    ALBUMIN 3.50 12/08/2021    AST 13 12/08/2021    ALT 7 12/08/2021    BILITOT 0.6 12/08/2021     Lab Results   Component Value Date      27.4 11/17/2021     16.6 05/13/2021     14.4 11/12/2020     16.3 05/12/2020     16.4 09/26/2019     11.2 03/20/2019     11.2 09/18/2018     10.5 06/14/2018     8.3 05/24/2017     11.6 11/30/2016           Assessment/Plan   This is a 76 y.o. woman with Stage IIIC endometrial cancer s/p radiation and chemotherapy finished in 2016 now with large bowel obstruction.    Large bowel obstruction  Dr. Ma personally reviewed the images from the enema with the patient and her daughter.  Stricture noted.  Dr. Barrientos was contacted and Dr. Ma and he discussed potential etiologies of the large bowel obstruction.  He was favoring a possible adhesion as patient has had a prolonged disease-free interval.  On review of the original pathology, there was ovarian involvement of serous component and the endometrium had a predominantly endometrioid component.  This is somewhat unusual and does raise the potential for possible malignant recurrence particularly with increasing CA-125 although this is a nonspecific marker.  -N.p.o., IV fluids, Invanz on-call to the OR    Patient was consented for exploratory laparotomy, colostomy.  Patient was advised that hopefully this would be a temporary ostomy although there is no way to determine that at this point.  She is advised that this is an emergent/urgent procedure due to risk of intestinal perforation.  Plan for surgery this evening.    Risks and benefits of surgery were discussed.  This included, but was not limited to, infection and bleeding like when the skin is cut; damage to surrounding structures; and incisional complications.  Risk of DVT was addressed for major surgeries.  Standard of care efforts to minimize these risks were reviewed.  Typical hospital stay and recovery were discussed as well as post-procedure precautions.  Surgical implications of chronic illnesses on recovery and surgical outcome were reviewed.     Pain  medication regimen for postoperative care was discussed.  Typical regimen and avoidance of narcotics was discussed.  Patient was educated that other factors, such as existing narcotic use, can impact postoperative pain management.      Patient verbalized understanding of the plan including the risks and benefits.  Appropriate perioperative testing including laboratory evaluation, EKG as clinically indicated, chest x-ray as clinically indicated, and preadmission evaluation were all ordered as a part of this patient's care.    History of stage IIIC endometrial adenocarcinoma  - S/p radiation and 6 cycles of carboplatin and paclitaxel  - DAVION for since 2016  - CA-125 in November increased to 27, prompting CT scan, still DAVION and on surveillance     Dispo:  - Continue inpatient management  I saw and evaluated the patient. I agree with the findings and the plan of care as documented in the note.    Daja Ma MD  12/10/21  17:23 EST

## 2021-12-10 NOTE — ANESTHESIA PREPROCEDURE EVALUATION
Anesthesia Evaluation     Patient summary reviewed and Nursing notes reviewed   no history of anesthetic complications:  NPO Solid Status: > 8 hours  NPO Liquid Status: > 8 hours           Airway   Mallampati: II  TM distance: >3 FB  Neck ROM: full  No difficulty expected  Dental    (+) upper dentures and poor dentition    Pulmonary - normal exam   Cardiovascular - normal exam  Exercise tolerance: good (4-7 METS)    ECG reviewed  PT is on anticoagulation therapy    (+) hypertension, hyperlipidemia,     ROS comment: 02/2019 - nl lvef, nl valves    Neuro/Psych  (+) CVA,     GI/Hepatic/Renal/Endo    (+)  hiatal hernia, GERD well controlled,      Musculoskeletal     (+) neck pain,   Abdominal    Substance History      OB/GYN          Other      history of cancer (endometrial ca s/p XRT)    ROS/Med Hx Other: hgb 11.4 k 3.7  Hiatal hernia  2019 - negative holter for afib/ brief svt  plavix 12/8      Phys Exam Other: Multiple loose lower teeth/poor condition              Anesthesia Plan    ASA 3     general with block   Rapid sequence(Risks and benefits of general anesthesia discussed with patient (including MI, CVA, death, recall, aspiration, oropharyngeal/dental damage), questions answered, agreeable to proceed.  )  intravenous induction     Anesthetic plan, all risks, benefits, and alternatives have been provided, discussed and informed consent has been obtained with: patient.  Use of blood products discussed with patient  Consented to blood products.   Plan discussed with CRNA.

## 2021-12-11 LAB
ANION GAP SERPL CALCULATED.3IONS-SCNC: 15 MMOL/L (ref 5–15)
BASOPHILS # BLD MANUAL: 0 10*3/MM3 (ref 0–0.2)
BASOPHILS NFR BLD MANUAL: 0 % (ref 0–1.5)
BUN SERPL-MCNC: 9 MG/DL (ref 8–23)
BUN/CREAT SERPL: 12.2 (ref 7–25)
CALCIUM SPEC-SCNC: 7.1 MG/DL (ref 8.6–10.5)
CHLORIDE SERPL-SCNC: 101 MMOL/L (ref 98–107)
CO2 SERPL-SCNC: 17 MMOL/L (ref 22–29)
CREAT SERPL-MCNC: 0.74 MG/DL (ref 0.57–1)
DEPRECATED RDW RBC AUTO: 59.3 FL (ref 37–54)
EOSINOPHIL # BLD MANUAL: 0 10*3/MM3 (ref 0–0.4)
EOSINOPHIL NFR BLD MANUAL: 0 % (ref 0.3–6.2)
ERYTHROCYTE [DISTWIDTH] IN BLOOD BY AUTOMATED COUNT: 18 % (ref 12.3–15.4)
GFR SERPL CREATININE-BSD FRML MDRD: 76 ML/MIN/1.73
GLUCOSE SERPL-MCNC: 140 MG/DL (ref 65–99)
HCT VFR BLD AUTO: 35.8 % (ref 34–46.6)
HGB BLD-MCNC: 11.8 G/DL (ref 12–15.9)
LYMPHOCYTES # BLD MANUAL: 0.16 10*3/MM3 (ref 0.7–3.1)
LYMPHOCYTES NFR BLD MANUAL: 7 % (ref 5–12)
MAGNESIUM SERPL-MCNC: 1.9 MG/DL (ref 1.6–2.4)
MCH RBC QN AUTO: 29.4 PG (ref 26.6–33)
MCHC RBC AUTO-ENTMCNC: 33 G/DL (ref 31.5–35.7)
MCV RBC AUTO: 89.3 FL (ref 79–97)
MONOCYTES # BLD: 0.55 10*3/MM3 (ref 0.1–0.9)
NEUTROPHILS # BLD AUTO: 7.11 10*3/MM3 (ref 1.7–7)
NEUTROPHILS NFR BLD MANUAL: 72 % (ref 42.7–76)
NEUTS BAND NFR BLD MANUAL: 19 % (ref 0–5)
PHOSPHATE SERPL-MCNC: 3.4 MG/DL (ref 2.5–4.5)
PLAT MORPH BLD: NORMAL
PLATELET # BLD AUTO: 359 10*3/MM3 (ref 140–450)
PMV BLD AUTO: 8.3 FL (ref 6–12)
POTASSIUM SERPL-SCNC: 4.4 MMOL/L (ref 3.5–5.2)
RBC # BLD AUTO: 4.01 10*6/MM3 (ref 3.77–5.28)
RBC MORPH BLD: NORMAL
SCAN SLIDE: NORMAL
SODIUM SERPL-SCNC: 133 MMOL/L (ref 136–145)
VARIANT LYMPHS NFR BLD MANUAL: 2 % (ref 19.6–45.3)
WBC MORPH BLD: NORMAL
WBC NRBC COR # BLD: 7.81 10*3/MM3 (ref 3.4–10.8)

## 2021-12-11 PROCEDURE — 80048 BASIC METABOLIC PNL TOTAL CA: CPT | Performed by: OBSTETRICS & GYNECOLOGY

## 2021-12-11 PROCEDURE — 25010000002 ERTAPENEM PER 500 MG: Performed by: OBSTETRICS & GYNECOLOGY

## 2021-12-11 PROCEDURE — 25010000002 HYDROMORPHONE PER 4 MG: Performed by: OBSTETRICS & GYNECOLOGY

## 2021-12-11 PROCEDURE — 97162 PT EVAL MOD COMPLEX 30 MIN: CPT

## 2021-12-11 PROCEDURE — 85007 BL SMEAR W/DIFF WBC COUNT: CPT | Performed by: OBSTETRICS & GYNECOLOGY

## 2021-12-11 PROCEDURE — 83735 ASSAY OF MAGNESIUM: CPT | Performed by: OBSTETRICS & GYNECOLOGY

## 2021-12-11 PROCEDURE — 85025 COMPLETE CBC W/AUTO DIFF WBC: CPT | Performed by: OBSTETRICS & GYNECOLOGY

## 2021-12-11 PROCEDURE — 84100 ASSAY OF PHOSPHORUS: CPT | Performed by: OBSTETRICS & GYNECOLOGY

## 2021-12-11 PROCEDURE — 94799 UNLISTED PULMONARY SVC/PX: CPT

## 2021-12-11 PROCEDURE — 25010000002 ENOXAPARIN PER 10 MG: Performed by: OBSTETRICS & GYNECOLOGY

## 2021-12-11 PROCEDURE — 99232 SBSQ HOSP IP/OBS MODERATE 35: CPT | Performed by: PHYSICIAN ASSISTANT

## 2021-12-11 RX ORDER — DEXTROSE AND SODIUM CHLORIDE 5; .45 G/100ML; G/100ML
75 INJECTION, SOLUTION INTRAVENOUS CONTINUOUS
Status: DISCONTINUED | OUTPATIENT
Start: 2021-12-11 | End: 2021-12-13

## 2021-12-11 RX ADMIN — PANTOPRAZOLE SODIUM 40 MG: 40 INJECTION, POWDER, FOR SOLUTION INTRAVENOUS at 21:07

## 2021-12-11 RX ADMIN — ENOXAPARIN SODIUM 30 MG: 30 INJECTION SUBCUTANEOUS at 21:07

## 2021-12-11 RX ADMIN — HYDROMORPHONE HYDROCHLORIDE 0.5 MG: 1 INJECTION, SOLUTION INTRAMUSCULAR; INTRAVENOUS; SUBCUTANEOUS at 18:50

## 2021-12-11 RX ADMIN — ENOXAPARIN SODIUM 30 MG: 30 INJECTION SUBCUTANEOUS at 10:34

## 2021-12-11 RX ADMIN — PANTOPRAZOLE SODIUM 40 MG: 40 INJECTION, POWDER, FOR SOLUTION INTRAVENOUS at 10:35

## 2021-12-11 RX ADMIN — ERTAPENEM 1 G: 1 INJECTION, POWDER, LYOPHILIZED, FOR SOLUTION INTRAMUSCULAR; INTRAVENOUS at 18:36

## 2021-12-11 RX ADMIN — DEXTROSE AND SODIUM CHLORIDE 125 ML/HR: 5; 450 INJECTION, SOLUTION INTRAVENOUS at 15:47

## 2021-12-11 RX ADMIN — HYDROMORPHONE HYDROCHLORIDE 0.5 MG: 1 INJECTION, SOLUTION INTRAMUSCULAR; INTRAVENOUS; SUBCUTANEOUS at 00:41

## 2021-12-11 RX ADMIN — HYDROMORPHONE HYDROCHLORIDE 0.5 MG: 1 INJECTION, SOLUTION INTRAMUSCULAR; INTRAVENOUS; SUBCUTANEOUS at 13:02

## 2021-12-11 NOTE — ANESTHESIA PROCEDURE NOTES
Airway  Urgency: elective    Date/Time: 12/10/2021 8:08 PM  Airway not difficult    General Information and Staff    Patient location during procedure: OR  Anesthesiologist: Andra Patino DO    Indications and Patient Condition  Indications for airway management: airway protection    Preoxygenated: yes  MILS not maintained throughout  Mask difficulty assessment: 0 - not attempted    Final Airway Details  Final airway type: endotracheal airway      Successful airway: ETT  Cuffed: yes   Successful intubation technique: direct laryngoscopy and RSI  Facilitating devices/methods: cricoid pressure  Endotracheal tube insertion site: oral  Blade: Braden  Blade size: 3  ETT size (mm): 7.0  Cormack-Lehane Classification: grade I - full view of glottis  Placement verified by: chest auscultation and capnometry   Measured from: lips  ETT/EBT  to lips (cm): 21  Number of attempts at approach: 1  Assessment: lips, teeth, and gum same as pre-op and atraumatic intubation    Additional Comments  Negative epigastric sounds, Breath sound equal bilaterally with symmetric chest rise and fall. Smooth IV induction after preoxygenation;  Ez intubation with  RSI with CP, held until etco2 >30 x5 breaths.

## 2021-12-11 NOTE — PROGRESS NOTES
[facility] Medicine Services  PROGRESS NOTE    Patient Name: Cindy Sage  : 1945  MRN: [id number]    Date of Admission: 2021  Primary Care Physician: Jose Carrillo MD    Subjective     CC: s/p colonic stricture s/p resection     HPI:  Sitting in chair, daughter at bedside. Post-op pain manageable. No nausea or vomiting. NG tube is bothersome. No chest pain or dyspnea. Rachel removed this morning, has not yet urinated.     ROS:  Gen- No fevers, chills  CV- No chest pain, palpitations  Resp- No cough, dyspnea  GI- as above    Objective     Vital Signs:   Temp:  [96.5 °F (35.8 °C)-98.3 °F (36.8 °C)] 98.2 °F (36.8 °C)  Heart Rate:  [64-97] 81  Resp:  [14-18] 16  BP: (105-135)/(51-72) 125/61  Flow (L/min):  [2-4] 2     Physical Exam:  Constitutional: No acute distress, awake, alert and conversant. Sitting upright in chair   HENT: NCAT, mucous membranes moist. NG tube in place.   Respiratory: Clear to auscultation bilaterally, respiratory effort normal on room air   Cardiovascular: RRR, no murmurs, rubs, or gallops  Gastrointestinal: Hypoactive bowel sounds, midline abdominal incision not fully visualized due to abdominal binder but inferior portion was c/d/i. Serosanguinous drainage in JULIANA bulb   Musculoskeletal: No bilateral ankle edema  Psychiatric: Appropriate affect, cooperative  Neurologic: Oriented x 3, moves all extremities spontaneously, speech clear  Skin: No rashes    Results Reviewed:  LAB RESULTS:      Lab 21  0250 12/10/21  1815 12/10/21  0542 21  1726 21  0340 21  1756 21  1346 21  1055   WBC 7.81  --  2.71*  --  3.97  --   --  4.70   HEMOGLOBIN 11.8* 11.4* 10.0* 10.7* 10.4*  10.4*   < >  --  12.7   HEMATOCRIT 35.8 34.9 30.5* 33.2* 31.3*  31.3*   < >  --  38.9   PLATELETS 359  --  247  --  248  --   --  339   NEUTROS ABS 7.11*  --   --   --   --   --   --  3.52   IMMATURE GRANS (ABS)  --   --   --   --   --   --   --   0.02   LYMPHS ABS  --   --   --   --   --   --   --  0.64*   MONOS ABS  --   --   --   --   --   --   --  0.48   EOS ABS 0.00  --   --   --   --   --   --  0.02   MCV 89.3  --  87.9  --  87.7  --   --  89.8   LACTATE  --   --   --   --   --   --  2.2* 2.2*    < > = values in this interval not displayed.         Lab 12/11/21  0250 12/10/21  1815 12/10/21  0542 12/09/21  0340 12/09/21  0045 12/08/21  1158 12/08/21  1158   SODIUM 133*  --  132* 131*  --   --  132*   POTASSIUM 4.4 3.7 5.1 3.6 3.6   < > 2.6*   CHLORIDE 101  --  102 100  --   --  93*   CO2 17.0*  --  21.0* 23.0  --   --  26.0   ANION GAP 15.0  --  9.0 8.0  --   --  13.0   BUN 9  --  8 18  --   --  26*   CREATININE 0.74  --  0.58 0.74  --   --  0.75   GLUCOSE 140*  --  74 76  --   --  92   CALCIUM 7.1*  --  7.5* 7.0*  --   --  8.3*   IONIZED CALCIUM  --   --  1.18  --   --   --   --    MAGNESIUM 1.9  --  2.7* 1.8  --   --  1.9   PHOSPHORUS 3.4  --  1.7*  --   --   --   --    HEMOGLOBIN A1C  --   --   --  5.40  --   --   --    TSH  --   --   --  2.440  --   --   --     < > = values in this interval not displayed.         Lab 12/08/21  1158   TOTAL PROTEIN 5.9*   ALBUMIN 3.50   GLOBULIN 2.4   ALT (SGPT) 7   AST (SGOT) 13   BILIRUBIN 0.6   ALK PHOS 78   LIPASE 26         Lab 12/09/21  0340   CHOLESTEROL 74   LDL CHOL 14   HDL CHOL 46   TRIGLYCERIDES 61     Brief Urine Lab Results  (Last result in the past 365 days)      Color   Clarity   Blood   Leuk Est   Nitrite   Protein   CREAT   Urine HCG        12/08/21 1059 Dark Yellow   Cloudy   Trace   Small (1+)   Negative   30 mg/dL (1+)               Microbiology Results Abnormal     Procedure Component Value - Date/Time    COVID PRE-OP / PRE-PROCEDURE SCREENING ORDER (NO ISOLATION) - Swab, Nasopharynx [[id number]]  (Normal) Collected: 12/08/21 1216    Lab Status: Final result Specimen: Swab from Nasopharynx Updated: 12/08/21 1311    Narrative:      The following orders were created for panel order COVID PRE-OP /  PRE-PROCEDURE SCREENING ORDER (NO ISOLATION) - Swab, Nasopharynx.  Procedure                               Abnormality         Status                     ---------                               -----------         ------                     Respiratory Panel PCR w/...[[id number]]  Normal              Final result                 Please view results for these tests on the individual orders.    Respiratory Panel PCR w/COVID-19(SARS-CoV-2) [facility] In-House, NP Swab in UTM/VTM, 3-4 HR TAT - Swab, Nasopharynx [[id number]]  (Normal) Collected: 12/08/21 1216    Lab Status: Final result Specimen: Swab from Nasopharynx Updated: 12/08/21 1311     ADENOVIRUS, PCR Not Detected     Coronavirus 229E Not Detected     Coronavirus HKU1 Not Detected     Coronavirus NL63 Not Detected     Coronavirus OC43 Not Detected     COVID19 Not Detected     Human Metapneumovirus Not Detected     Human Rhinovirus/Enterovirus Not Detected     Influenza A PCR Not Detected     Influenza B PCR Not Detected     Parainfluenza Virus 1 Not Detected     Parainfluenza Virus 2 Not Detected     Parainfluenza Virus 3 Not Detected     Parainfluenza Virus 4 Not Detected     RSV, PCR Not Detected     Bordetella pertussis pcr Not Detected     Bordetella parapertussis PCR Not Detected     Chlamydophila pneumoniae PCR Not Detected     Mycoplasma pneumo by PCR Not Detected    Narrative:      In the setting of a positive respiratory panel with a viral infection PLUS a negative procalcitonin without other underlying concern for bacterial infection, consider observing off antibiotics or discontinuation of antibiotics and continue supportive care. If the respiratory panel is positive for atypical bacterial infection (Bordetella pertussis, Chlamydophila pneumoniae, or Mycoplasma pneumoniae), consider antibiotic de-escalation to target atypical bacterial infection.        FL Barium Enema Water Soluble Single Contrast    Result Date:  12/10/2021  EXAMINATION: FL BARIUM ENEMA WATER SOLUBLE SINGLE CONTRAST-  INDICATION: colonic stricture; R10.10-Upper abdominal pain, unspecified; K92.2-Gastrointestinal hemorrhage, unspecified; R97.1-Elevated cancer antigen 125 (); R19.7-Diarrhea, unspecified; K59.00-Constipation, unspecified; E87.6-Hypokalemia  TECHNIQUE: 2 minutes and 18 seconds of fluoroscopic time was used for this exam. 12 associated fluoroscopic series were saved.  imaging reveals multiple loops of mildly prominent small bowel, and colon. Contrast is seen within the distal colon, likely from previous contrast-enhanced study performed 12/08/2021  COMPARISON: NONE  FINDINGS: The enema tip was carefully placed in the rectum, and the balloon was inflated. A single contrast study using water-soluble contrast was performed. The colon was filled in a retrograde fashion. There is mild redundancy of the sigmoid colon. There are multiple diverticula of the transverse, descending, and sigmoid colon. Multiple filling defects seen within the distal colon likely represent stool, and/or mucous secretions. Contrast was not able to be advanced proximal to the distal transverse colon. There is mild distention of the colon proximal to this area. High-grade stricture, obstructing, and/or infiltrating lesion of the distal transverse colon cannot be fully excluded at this time. These results were discussed immediately with Dr. Ronald Barrientos. Further evaluation may be considered if clinically relevant.       Impression: 1. Inability to advance contrast into the transverse colon may represent high-grade stricture, or spasm of the distal transverse colon, however an infiltrating, and or obstructing lesion cannot be fully excluded at this time. These results were discussed immediately with Dr. Ronald Barrientos. Please consider further evaluation of clinically relevant. 2. Diverticulosis of the descending, and sigmoid colon 3. Mild redundancy of the sigmoid colon         Peripheral Block    Result Date: 12/10/2021  Andra Patino DO     12/10/2021  8:18 PM Peripheral Block Patient reassessed immediately prior to procedure Patient location during procedure: OR Stop time: 12/10/2021 8:10 PM Reason for block: at surgeon's request and post-op pain management Performed by Anesthesiologist: Andra Patino DO Preanesthetic Checklist Completed: patient identified, IV checked, site marked, risks and benefits discussed, surgical consent, monitors and equipment checked, pre-op evaluation and timeout performed Prep: Pt Position: supine Sterile barriers:cap, gloves, sterile barriers and mask Prep: ChloraPrep Patient monitoring: blood pressure monitoring, continuous pulse oximetry and EKG Procedure Sedation: yes Performed under: general Guidance:ultrasound guided Images:still images obtained, printed/placed on chart Laterality:Bilateral Block Type:TAP Injection Technique:single-shot Needle Type:short-bevel and echogenic Needle Gauge:20 G Resistance on Injection: none Medications Used: bupivacaine PF (MARCAINE) 0.25 % injection, 5 mL Med administered at 12/10/2021 8:18 PM Medications Comment:Block Injection:  LA dose divided between Right and Left block;  25cc used bilaterally 0.25% bupiv.  Negative serial aspirations.  Pt tolerated px well. Post Assessment Injection Assessment: negative aspiration for heme, incremental injection and no paresthesia on injection Patient Tolerance:comfortable throughout block Complications:no Additional Notes   Under Ultrasound guidance, a BBraun 4inch 360 degree needle was advanced with Normal Saline hydro dissection of tissue.  The Internal Oblique and Transversus Abdominus muscles where visualized.  At or before the aponeurosis of Internal Oblique, local anesthetic spread was visualized in the Transversus Abdominus Plane. Injection was made incrementally with aspiration every 5 mls.  There was no  intravascular injection,  injection pressure was normal,  there was no neural injection, and the procedure was completed without difficulty.  Thank You.     Results for orders placed during the hospital encounter of 02/28/19    Adult Transthoracic Echo Complete W/ Cont if Necessary Per Protocol (With Agitated Saline)    Interpretation Summary  · Estimated EF = 60%.  · Left ventricular systolic function is normal.  · Trace to mild mitral regurgitation  · Trace tricuspid regurgitation  · No evidence of PFO or ASD.    I have reviewed the medications:  Scheduled Meds:atorvastatin, 20 mg, Oral, Daily  enoxaparin, 30 mg, Subcutaneous, Q12H  ertapenem, 1 g, Intravenous, Q24H  pantoprazole, 40 mg, Intravenous, Q12H  sodium chloride, 10 mL, Intravenous, Q12H      Continuous Infusions:lactated ringers, 9 mL/hr  sodium chloride, 125 mL/hr, Last Rate: 125 mL/hr (12/11/21 1034)      PRN Meds:.HYDROmorphone **AND** naloxone  •  HYDROmorphone **AND** naloxone  •  influenza vaccine  •  magnesium sulfate **OR** magnesium sulfate **OR** magnesium sulfate  •  ondansetron **OR** ondansetron  •  potassium & sodium phosphates **OR** potassium & sodium phosphates  •  potassium chloride **OR** potassium chloride **OR** potassium chloride  •  promethazine **OR** promethazine  •  Sodium Chloride (PF)  •  sodium chloride    Assessment & Plan     Active Hospital Problems    Diagnosis  POA   • **Large bowel obstruction (HCC) [K56.609]  Yes   • Pain of upper abdomen [R10.10]  Yes   • Melena [K92.1]  Yes   • History of endometrial cancer [Z85.42]  Not Applicable   • GERD (gastroesophageal reflux disease) [K21.9]  Yes   • Hyperlipidemia [E78.5]  Yes   • Hypertension [I10]  Yes      Resolved Hospital Problems   No resolved problems to display.     Brief Hospital Course to date:  Cindy Sage is a 76 y.o. female with PMH significant for HTN, HLD, prior TIA/CVA (2016 without residual deficits), GERD and endometrial cancer (s/p radiation/chemotherapy and followed by Dr. Ma). She presented to  [facility] ED on 12/8/21 with complaints of a several month history of abdominal pain, intermittent diarrhea/constipation and melena. She was admitted to the hospital medicine service, GI and Dr. Ma were consulted to follow the patient.  She underwent exploratory laparotomy with transverse colon resection and reanastomosis on 12/10/2020 by Dr. Ma.     Large bowel obstruction due to stricture  Intermittent constipation / diarrhea / melena  Unintentional weight loss  - s/p exploratory laparotomy with transverse colonic resection, reanastamosis and mobilization of splenic flexure by Dr. Ma on 12/10/21.   - Intraoperatively, there was a portion of transverse colon that was tethered - no obvious cancer but the area was firm and this was the area of stricture identified on imaging.   - NGT to remain in place until return of bowel function  - Rachel catheter removed, monitor for retention  - Acidotic on labs today - change normal saline to D5 1/2 NS  - Electrolyte replacement protocol   - IV / PO PRN pain control  - PRN antiemetics  - Encourage ambulation, PT/OT consulted to follow  - Follow pathology  - AM CBC / BMP / Mag / Phos     History of stage IIIC endometrial adenocarcinoma  - s/p radiation therapy / chemotherapy. Patient of Dr. Ma     HTN  HLD  - All PO medications on hold, resume as able  - Can order IV PRNs for BP if indicated   -  elevated to 27.4 on 11/17/21 - CT imaging was ordered and planned for 12/8 but patient presented to ED   -Had a recent field colonoscopy attempt with Dr. Verdugo    DVT prophylaxis:Medical and mechanical DVT prophylaxis orders are present.     AM-PAC 6 Clicks Score (PT): 22 (12/10/21 0800)    Disposition: I expect the patient to be discharged home TBD     CODE STATUS:   Code Status and Medical Interventions:   Ordered at: 12/10/21 1235     Code Status (Patient has no pulse and is not breathing):    CPR (Attempt to Resuscitate)     Medical  Interventions (Patient has pulse or is breathing):    Full Support     Marcy Belcher PA-C  12/11/21

## 2021-12-11 NOTE — PLAN OF CARE
Goal Outcome Evaluation:           Progress: improving  Outcome Summary: pt came back to room afte surgery at 2315, R tarik NG in place to LWS, LLQ JULIANA to bulb suction, vetical midline incision with transparent dressing CDI, berry catheter in place post surgery, pain controlled with PRN medication, abdominal binder placed per order, VSS

## 2021-12-11 NOTE — PLAN OF CARE
Goal Outcome Evaluation:  Plan of Care Reviewed With: patient        Progress: no change  Outcome Summary: pt is s/p colectomy with JULIANA drain placement and abd binder in place. pt ambulated to chair with PT. NGT to R ghulame to LWS. pt had incont. episode due to purewick leaking. VSS, SB-NSR on monitor.

## 2021-12-11 NOTE — ANESTHESIA POSTPROCEDURE EVALUATION
Patient: Cindy Sage    Procedure Summary     Date: 12/10/21 Room / Location:  [facility]  /  [facility]    Anesthesia Start: 2001 Anesthesia Stop: 2231    Procedure: EXPLORATORY LAPAROTOMY, TRANSVERSE COLONIC RESECTION, REANASTIMOSIS, AND MOBILIZATION OF SPLENIC FLEXURE (N/A Abdomen) Diagnosis:       Large bowel obstruction (HCC)      (Large bowel obstruction (HCC) [K56.609])    Surgeons: Daja Ma MD Provider: Andra Patino DO    Anesthesia Type: general with block ASA Status: 3          Anesthesia Type: general with block    Vitals  No vitals data found for the desired time range.          Post Anesthesia Care and Evaluation    Patient location during evaluation: PACU  Patient participation: complete - patient participated  Level of consciousness: awake and alert  Pain management: adequate  Airway patency: patent  Anesthetic complications: No anesthetic complications  PONV Status: none  Cardiovascular status: hemodynamically stable and acceptable  Respiratory status: nonlabored ventilation, acceptable, nasal cannula and airway suctioned  Hydration status: acceptable    Comments: Airway suctioned prior to and immediately after extubation. To PACU on O2NC, breathing comfortably.  Report to PACU RN at bedside. VSS.

## 2021-12-11 NOTE — PROGRESS NOTES
Gynecologic Oncology   Daily Progress Note    Chief Complaint: Postop state    Subjective   Patient did well overnight.  Complains of earache and jaw ache, I am sure this is related to NG tube.  Denies flatus    Objective   Temp:  [96.5 °F (35.8 °C)-98.3 °F (36.8 °C)] 98 °F (36.7 °C)  Heart Rate:  [64-97] 82  Resp:  [14-18] 14  BP: (105-135)/(51-72) 110/53  Vitals:    12/11/21 0700   BP: 110/53   Pulse: 82   Resp: 14   Temp: 98 °F (36.7 °C)   SpO2: 94%     I/O last 3 completed shifts:  In: 6355 [P.O.:720; I.V.:5585; Other:50]  Out: 1850 [Urine:735; Emesis/NG output:750; Drains:365]        GENERAL: Alert, well-appearing female in no apparent distress.    GASTROINTESTINAL:  Soft, appropriately tender, non-distended, no rebound or guarding.  Absent bowel sounds, incision(s) c/d/i.  JULIANA drain with serosanguineous output  GENITOURINARY: Rachel in place   SKIN:  Warm, dry, well-perfused.    PSYCHIATRIC: AO x3, with appropriate affect, normal thought processes  EXREMITIES: Symmetric. No peripheral edema.    Lab Results   Component Value Date    WBC 7.81 12/11/2021    HGB 11.8 (L) 12/11/2021    HCT 35.8 12/11/2021    MCV 89.3 12/11/2021     12/11/2021    NEUTROABS 7.11 (H) 12/11/2021    GLUCOSE 140 (H) 12/11/2021    BUN 9 12/11/2021    CREATININE 0.74 12/11/2021    EGFRIFNONA 76 12/11/2021    EGFRIFAFRI 77 10/07/2021     (L) 12/11/2021    K 4.4 12/11/2021     12/11/2021    CO2 17.0 (L) 12/11/2021    MG 1.9 12/11/2021    CALCIUM 7.1 (L) 12/11/2021         Assessment/Plan   Cindy HOSKINS Chu is a 76 y.o. female status post exploratory laparotomy, transverse colonic resection with reanastomosis on 12/10/2021    1.  Post-operative care  -Routine care   -Continue NG tube to low wall suction until GI function  -Stable postop  -DC Rachel catheter  -Out of bed    2.  Comorbidities per primary team    3.  Disposition-continue hospital stay  -         Daja Ma MD  12/11/21  09:38 EST

## 2021-12-11 NOTE — THERAPY EVALUATION
Patient Name: Cindy Sage  : 1945    MRN: [id number]                              Today's Date: 2021       Admit Date: 2021    Visit Dx:     ICD-10-CM ICD-9-CM   1. Pain of upper abdomen  R10.10 789.09   2. Gastrointestinal hemorrhage, unspecified gastrointestinal hemorrhage type  K92.2 578.9   3. Elevated CA-125  R97.1 795.82   4. Diarrhea, unspecified type  R19.7 787.91   5. Obstipation  K59.00 564.00   6. Hypokalemia  E87.6 276.8   7. Large bowel obstruction (HCC)  K56.609 560.9   8. Impaired functional mobility, balance, gait, and endurance  Z74.09 V49.89     Patient Active Problem List   Diagnosis   • Hyperlipidemia   • Hypertension   • Ischemic stroke (HCC)   • Endometrial cancer (HCC)   • GERD (gastroesophageal reflux disease)   • Hx: UTI (urinary tract infection)   • History of endometrial cancer   • TIA (transient ischemic attack)   • Stenosis of right middle cerebral artery   • Musculoskeletal neck pain   • Pain of upper abdomen   • Melena   • Large bowel obstruction (HCC)     Past Medical History:   Diagnosis Date   • Drug therapy 2016    For endometrial CA in 2016   • Elevated cholesterol    • Endometrial cancer (HCC) 2016    Stage IIIC2    • GERD (gastroesophageal reflux disease)    • HTN (hypertension)    • Hx of radiation therapy 2016    For endometrial CA 2016   • Stroke (HCC) 2016    No residual deficits.  Takes .     Past Surgical History:   Procedure Laterality Date   • APPENDECTOMY      Ex lap   • BREAST BIOPSY Left    • FOOT SURGERY     • OOPHORECTOMY     • TONSILLECTOMY     • TOTAL LAPAROSCOPIC HYSTERECTOMY SALPINGO OOPHORECTOMY Bilateral 2016    YANN, BSO, LND      General Information     Row Name 21 1135          Physical Therapy Time and Intention    Document Type evaluation  -SJ     Mode of Treatment individual therapy; physical therapy  -SJ     Row Name 21 1137          General Information    Patient Profile Reviewed yes  -SJ      Prior Level of Function independent:; all household mobility; community mobility; gait; transfer; bed mobility; ADL's  -     Existing Precautions/Restrictions other (see comments)  abdominal binder, angelo drain  -     Barriers to Rehab medically complex  -     Row Name 12/11/21 1135          Living Environment    Lives With spouse; grandchild(joby)  -     Row Name 12/11/21 1135          Home Main Entrance    Number of Stairs, Main Entrance three  -     Row Name 12/11/21 1135          Stairs Within Home, Primary    Stairs, Within Home, Primary 2-story home with basement  -     Row Name 12/11/21 1135          Cognition    Orientation Status (Cognition) oriented x 3  -SJ     Row Name 12/11/21 1135          Safety Issues, Functional Mobility    Safety Issues Affecting Function (Mobility) awareness of need for assistance; insight into deficits/self-awareness; judgment; positioning of assistive device; sequencing abilities; safety precaution awareness  -     Impairments Affecting Function (Mobility) balance; pain; strength; endurance/activity tolerance  -     Comment, Safety Issues/Impairments (Mobility) pt fearful with mobility  -           User Key  (r) = Recorded By, (t) = Taken By, (c) = Cosigned By    Initials Name Provider Type    Kita Conner, PT Physical Therapist               Mobility     Row Name 12/11/21 1321          Bed Mobility    Bed Mobility supine-sit  -SJ     Supine-Sit [place] (Bed Mobility) maximum assist (25% patient effort); 1 person assist; verbal cues; nonverbal cues (demo/gesture)  -     Assistive Device (Bed Mobility) bed rails; draw sheet; head of bed elevated  -     Comment (Bed Mobility) Pt needed maxA to t/f to EOB with extended time needed due to abdominal pain.  -     Row Name 12/11/21 1321          Transfers    Comment (Transfers) STS from EOB with Gil and RW, max cues for sequencing  -     Row Name 12/11/21 1321          Sit-Stand Transfer     Sit-Stand [place] (Transfers) minimum assist (75% patient effort); 1 person assist; nonverbal cues (demo/gesture); verbal cues  -     Assistive Device (Sit-Stand Transfers) walker, front-wheeled  -     Row Name 12/11/21 1321          Gait/Stairs (Locomotion)    [place] Level (Gait) contact guard; 1 person assist  -     Assistive Device (Gait) walker, front-wheeled  -     Distance in Feet (Gait) 13  -SJ     Deviations/Abnormal Patterns (Gait) bilateral deviations; antalgic; rodolfo decreased; gait speed decreased; stride length decreased  -     Bilateral Gait Deviations forward flexed posture; heel strike decreased  -     Comment (Gait/Stairs) Pt amb a short distance in room with slow speed, fwd flexed posture. Distance limited by pain. VSS.  -           User Key  (r) = Recorded By, (t) = Taken By, (c) = Cosigned By    Initials Name Provider Type    Kita Conner PT Physical Therapist               Obj/Interventions     [facility] Name 12/11/21 1323          Range of Motion Comprehensive    General Range of Motion bilateral lower extremity ROM WFL  -[facility] Name 12/11/21 1323          Strength Comprehensive (MMT)    General Manual Muscle Testing (MMT) Assessment lower extremity strength deficits identified  -     Comment, General Manual Muscle Testing (MMT) Assessment BLE's 4+/5  -[facility] Name 12/11/21 1323          Motor Skills    Motor Skills therapeutic exercise  -     Therapeutic Exercise hip; knee; ankle  -[facility] Name 12/11/21 1323          Hip (Therapeutic Exercise)    Hip (Therapeutic Exercise) AAROM (active assistive range of motion)  -     Hip AAROM (Therapeutic Exercise) bilateral; flexion; aBduction; aDduction; supine; 10 repetitions  -[facility] Name 12/11/21 1323          Knee (Therapeutic Exercise)    Knee (Therapeutic Exercise) AAROM (active assistive range of motion)  -     Knee AAROM (Therapeutic Exercise) bilateral; flexion; extension; supine; 10 repetitions   -     Row Name 12/11/21 1323          Ankle (Therapeutic Exercise)    Ankle (Therapeutic Exercise) AROM (active range of motion)  -     Ankle AROM (Therapeutic Exercise) bilateral; dorsiflexion; plantarflexion; supine; 10 repetitions  -     Row Name 12/11/21 1323          Balance    Balance Assessment sitting static balance; standing static balance  -SJ     Static Sitting Balance WFL; unsupported; sitting, edge of bed  -SJ     Static Standing Balance WFL; supported  -SJ           User Key  (r) = Recorded By, (t) = Taken By, (c) = Cosigned By    Initials Name Provider Type    Kita Conner, PT Physical Therapist               Goals/Plan     Row Name 12/11/21 1327          Bed Mobility Goal 1 (PT)    Activity/Assistive Device (Bed Mobility Goal 1, PT) rolling to left; rolling to right; sit to supine; supine to sit  -SJ     [place] Level/Cues Needed (Bed Mobility Goal 1, PT) modified independence  -SJ     Time Frame (Bed Mobility Goal 1, PT) long term goal (LTG); 2 weeks  -[facility] Name 12/11/21 1327          Transfer Goal 1 (PT)    Activity/Assistive Device (Transfer Goal 1, PT) sit-to-stand/stand-to-sit; bed-to-chair/chair-to-bed  -SJ     [place] Level/Cues Needed (Transfer Goal 1, PT) modified independence  -SJ     Time Frame (Transfer Goal 1, PT) long term goal (LTG); 2 weeks  -     Row Name 12/11/21 1327          Gait Training Goal 1 (PT)    Activity/Assistive Device (Gait Training Goal 1, PT) gait (walking locomotion); increase endurance/gait distance  -SJ     [place] Level (Gait Training Goal 1, PT) modified independence  -SJ     Distance (Gait Training Goal 1, PT) 200  -SJ     Time Frame (Gait Training Goal 1, PT) long term goal (LTG); 2 weeks  -     Row Name 12/11/21 1327          Stairs Goal 1 (PT)    Activity/Assistive Device (Stairs Goal 1, PT) ascending stairs; descending stairs  -SJ     [place] Level/Cues Needed (Stairs Goal 1, PT) modified independence  -SJ      Number of Stairs (Stairs Goal 1, PT) 1 flight  -     Time Frame (Stairs Goal 1, PT) long term goal (LTG); 2 weeks  -           User Key  (r) = Recorded By, (t) = Taken By, (c) = Cosigned By    Initials Name Provider Type    Kita Conner, PT Physical Therapist               Clinical Impression     Row Name 12/11/21 1324          Pain    Additional Documentation Pain Scale: FACES Pre/Post-Treatment (Group)  -[facility] Name 12/11/21 1324          Pain Scale: Numbers Pre/Post-Treatment    Pain Intervention(s) Ambulation/increased activity; Repositioned  -     Row Name 12/11/21 1324          Pain Scale: FACES Pre/Post-Treatment    Pain: FACES Scale, Pretreatment 2-->hurts little bit  -SJ     Posttreatment Pain Rating 6-->hurts even more  -     Pain Location - Side Left  -     Pain Location - Orientation incisional  -     Pain Location abdomen  -[facility] Name 12/11/21 1325          Plan of Care Review    Plan of Care Reviewed With patient  -SJ     Outcome Summary PT eval completed. Pt presents with difficulty walking and decreased functional mobility independence s/p ex-lap. Pt needed maxA to t/f sup > sit EOB with extended time needed due to abdominal pain. Pt amb a short distance in room with RW with CGA, dem slow speed, fwd flexed posture. Distance limited by pain. VSS.  -[facility] Name 12/11/21 132          Therapy Assessment/Plan (PT)    Patient/Family Therapy Goals Statement (PT) return home, return to [facility]  -     Rehab Potential (PT) good, to achieve stated therapy goals  -     Criteria for Skilled Interventions Met (PT) yes; skilled treatment is necessary  -     Predicted Duration of Therapy Intervention (PT) 2wks  -     Row Name 12/11/21 1324          Vital Signs    Pre Systolic BP Rehab 125  -SJ     Pre Treatment Diastolic BP 61  -SJ     Posttreatment Heart Rate (beats/min) 87  -SJ     Post SpO2 (%) 97  -SJ     O2 Delivery Post Treatment room air  -     Pre Patient Position  Supine  -SJ     Post Patient Position Sitting  -SJ     Row Name 12/11/21 1324          Positioning and Restraints    Pre-Treatment Position in bed  -SJ     Post Treatment Position chair  -SJ     In Chair notified nsg; reclined; call light within reach; encouraged to call for assist; exit alarm on; waffle cushion; heels elevated  -SJ           User Key  (r) = Recorded By, (t) = Taken By, (c) = Cosigned By    Initials Name Provider Type    Kita Conner PT Physical Therapist               Outcome Measures     Row Name 12/11/21 1328          How much help from another person do you currently need...    Turning from your back to your side while in flat bed without using bedrails? 2  -SJ     Moving from lying on back to sitting on the side of a flat bed without bedrails? 2  -SJ     Moving to and from a bed to a chair (including a wheelchair)? 3  -SJ     Standing up from a chair using your arms (e.g., wheelchair, bedside chair)? 3  -SJ     Climbing 3-5 steps with a railing? 2  -SJ     To walk in hospital room? 3  -SJ     AM-PAC 6 Clicks Score (PT) 15  -SJ     Row Name 12/11/21 1328          Functional Assessment    Outcome Measure Options AM-PAC 6 Clicks Basic Mobility (PT)  -           User Key  (r) = Recorded By, (t) = Taken By, (c) = Cosigned By    Initials Name Provider Type    Kita Conner PT Physical Therapist                             Physical Therapy Education                 Title: PT OT SLP Therapies (In Progress)     Topic: Physical Therapy (In Progress)     Point: Mobility training (In Progress)     Learning Progress Summary           Patient Acceptance, E, NR by MILLER at 12/11/2021 1329                   Point: Home exercise program (Not Started)     Learner Progress:  Not documented in this visit.          Point: Body mechanics (In Progress)     Learning Progress Summary           Patient Acceptance, E, NR by MILLER at 12/11/2021 1329                   Point: Precautions (In Progress)      Learning Progress Summary           Patient Acceptance, E, NR by  at 12/11/2021 1329                               User Key     Initials Effective Dates Name Provider Type Discipline     06/16/21 -  Kita Fontaine PT Physical Therapist PT              PT Recommendation and Plan  Planned Therapy Interventions (PT): balance training, bed mobility training, gait training, home exercise program, strengthening, patient/family education, transfer training, stair training  Plan of Care Reviewed With: patient  Outcome Summary: PT eval completed. Pt presents with difficulty walking and decreased functional mobility independence s/p ex-lap. Pt needed maxA to t/f sup > sit EOB with extended time needed due to abdominal pain. Pt amb a short distance in room with RW with CGA, dem slow speed, fwd flexed posture. Distance limited by pain. VSS.     Time Calculation:    PT Charges     Row Name 12/11/21 1330             Time Calculation    Start Time 1135  -      PT Non-Billable Time (min) 46 min  -      PT Received On 12/11/21  -      PT Goal Re-Cert Due Date 12/21/21  -            User Key  (r) = Recorded By, (t) = Taken By, (c) = Cosigned By    Initials Name Provider Type     Kita Fontaine PT Physical Therapist              Therapy Charges for Today     Code Description Service Date Service Provider Modifiers Qty    [id number] HC PT EVAL MOD COMPLEXITY 4 12/11/2021 Kita Fontaine PT GP 1          PT G-Codes  Outcome Measure Options: AM-PAC 6 Clicks Basic Mobility (PT)  AM-PAC 6 Clicks Score (PT): 15    Kita Fontaine PT  12/11/2021

## 2021-12-11 NOTE — OP NOTE
Subjective     Date of Service:  12/10/21  Time of Service:  22:05 EST    Surgical Staff: Surgeon(s) and Role:     * Daja Ma MD - Primary   Additional Staff:   Assistant: Melvi Harvey PA  was responsible for performing the following activities: Retraction, Suction, Irrigation, Suturing, Closing and Placing Dressing and their skilled assistance was necessary for the success of this case.     Pre-operative diagnosis(es): Pre-Op Diagnosis Codes:     * Large bowel obstruction (HCC) [K56.609]     Post-operative diagnosis(es): Post-Op Diagnosis Codes:     * Large bowel obstruction (HCC) [K56.609]   Procedure(s): Procedure(s)  Exploratory laparotomy, transverse colonic resection with reanastomosis, mobilization of splenic flexure     Antibiotics:  Invanz ordered on call to OR     Anesthesia: Type: General with Block  ASA:  III     Objective      Operative findings:  At the time of laparotomy, there was a portion of the distal transverse colon which was tethered. There is no obvious cancer although the area was quite firm. This was the area of stricture identified on imaging. Small bowel was dilated, but was free from adhesive disease on serial inspection. NG tube was palpated to be in the stomach.     Specimens removed: ID Type Source Tests Collected by Time   A (Not marked as sent) : transverse section of transverse colon Tissue Large Intestine, Transverse Colon TISSUE PATHOLOGY EXAM Daja Ma MD 12/10/2021 2053      Fluid Intake and Output: I/O this shift:  In: 1000 [I.V.:1000]  Out: 655 [Urine:155; Emesis/NG output:500]   Blood products used: Yes   Drains: Closed/Suction Drain 1 Superior; Midline Abdomen Bulb (Active)       Urethral Catheter Double-lumen; Silicone 16 Fr. (Active)      Implant Information: Implant Name Type Inv. Item Serial No.  Lot No. LRB No. Used Action   RELOAD STPLR LNR CUT PROX THK 75 GRN - [id number] Implant RELOAD STPLR LNR CUT PROX THK 75 GRN  [employer] [id number] N/A 1 Implanted   RELOAD STPLR LNR CUT PROX THK 75 GRN - [id number] Implant RELOAD STPLR LNR CUT PROX THK 75 GRN  [employer]  DIV OF DOMINIC AND J [id number] N/A 1 Implanted   RELOAD STPLR LNR CUT PROX THK 75 GRN - [id number] Implant RELOAD STPLR LNR CUT PROX THK 75 GRN  [employer]  DIV [employer] DOM[employer] [id number] N/A 1 Implanted      Complications:  No immediate   Intraoperative consult(s):    Condition: stable   Disposition: to PACU and then admit to  medical - surgical floor       Indications:  Patient is a pleasant 76-year-old woman who was admitted to the hospital with GI complaints. She underwent a work-up and was identified to have a stricture in her colon. Risks and benefits of procedure were discussed. Consent was signed and on chart.    Procedure: After obtaining informed consent, patient was taken to the operating room and underwent general endotracheal anesthesia after patient and site verification. She was in the supine position through the procedure. Regional block was performed by anesthesia team. Abdomen was prepped and draped in the usual sterile fashion. Rachel catheter was anchored. Vertical midline incision was made and extended as surgically necessary. Area of colonic dilation was immediately identified. Bovie electrocautery and 2-0 Vicryl ties were used to create pedicles at the omentum. Proximal and distal colon to the stricture was undermined and transected using 75 mm KERI with a green load. Mesentery was serially divided using Bovie electrocautery and 2-0 Vicryl ties. Small bowel was significantly dilated and was serially decompressed in order to allow better visualization. This was performed multiple times. Superficial disruption of the mesentery was encountered during this portion of the procedure and was thought to be related to likely adhesions that were bluntly divided. Colon was mobilized at the splenic flexure. This was facilitated using Bovie  electrocautery and right angle clamp. Adequate length was noted for anastomosis. This was a side-to-side anastomosis using a 75 mm green load and was performed in the usual antimesenteric fashion. Open mucosal defect was closed using 75 mm KERI stapler with a green load. No leakage of fecal matter was encountered during the procedure. Mesenteric defect was reapproximated using 2-0 Vicryl in a simple running stitch. Additional hemostasis was achieved at the mesenteric reapproximation using 2-0 Vicryl in a simple tie. Surgical field was copiously irrigated and aspirated. Closure tray was called for. 15 round [place]-Math[place] drain was placed at the tail terminating the pelvis was secured to the skin using 2-0 silk. Fascia was reapproximated using #1 looped PDS in a bulk closure. All suprafascial tissue was irrigated, aspirated, and made hemostatic. Deep dermis was reapproximated with 2-0 Vicryl in a simple running stitch. Skin was closed with 3-0 Monocryl subcuticular stitch and pernio generic was used as a dressing at the skin. All counts were correct. There are no immediate complications. Patient was taken in stable condition to the recovery room.      Daja Ma MD  12/10/21  22:04 EST

## 2021-12-12 ENCOUNTER — APPOINTMENT (OUTPATIENT)
Dept: GENERAL RADIOLOGY | Facility: HOSPITAL | Age: 76
End: 2021-12-12

## 2021-12-12 LAB
ANION GAP SERPL CALCULATED.3IONS-SCNC: 14 MMOL/L (ref 5–15)
BUN SERPL-MCNC: 13 MG/DL (ref 8–23)
BUN/CREAT SERPL: 18.3 (ref 7–25)
CA-I SERPL ISE-MCNC: 1.09 MMOL/L (ref 1.12–1.32)
CALCIUM SPEC-SCNC: 7.7 MG/DL (ref 8.6–10.5)
CHLORIDE SERPL-SCNC: 97 MMOL/L (ref 98–107)
CO2 SERPL-SCNC: 18 MMOL/L (ref 22–29)
CREAT SERPL-MCNC: 0.71 MG/DL (ref 0.57–1)
DEPRECATED RDW RBC AUTO: 60.1 FL (ref 37–54)
ERYTHROCYTE [DISTWIDTH] IN BLOOD BY AUTOMATED COUNT: 18.5 % (ref 12.3–15.4)
GFR SERPL CREATININE-BSD FRML MDRD: 80 ML/MIN/1.73
GLUCOSE SERPL-MCNC: 127 MG/DL (ref 65–99)
HBV SURFACE AG SERPL QL IA: NORMAL
HCT VFR BLD AUTO: 34.4 % (ref 34–46.6)
HCV AB SER DONR QL: NORMAL
HGB BLD-MCNC: 11.5 G/DL (ref 12–15.9)
HIV1 P24 AG SER QL: NORMAL
HIV1+2 AB SER QL: NORMAL
MAGNESIUM SERPL-MCNC: 3 MG/DL (ref 1.6–2.4)
MCH RBC QN AUTO: 29.4 PG (ref 26.6–33)
MCHC RBC AUTO-ENTMCNC: 33.4 G/DL (ref 31.5–35.7)
MCV RBC AUTO: 88 FL (ref 79–97)
PHOSPHATE SERPL-MCNC: 1.6 MG/DL (ref 2.5–4.5)
PLATELET # BLD AUTO: 292 10*3/MM3 (ref 140–450)
PMV BLD AUTO: 8.4 FL (ref 6–12)
POTASSIUM SERPL-SCNC: 3.7 MMOL/L (ref 3.5–5.2)
RBC # BLD AUTO: 3.91 10*6/MM3 (ref 3.77–5.28)
SODIUM SERPL-SCNC: 129 MMOL/L (ref 136–145)
WBC NRBC COR # BLD: 8.88 10*3/MM3 (ref 3.4–10.8)

## 2021-12-12 PROCEDURE — G0432 EIA HIV-1/HIV-2 SCREEN: HCPCS | Performed by: INTERNAL MEDICINE

## 2021-12-12 PROCEDURE — 86803 HEPATITIS C AB TEST: CPT | Performed by: INTERNAL MEDICINE

## 2021-12-12 PROCEDURE — 87899 AGENT NOS ASSAY W/OPTIC: CPT | Performed by: INTERNAL MEDICINE

## 2021-12-12 PROCEDURE — 83735 ASSAY OF MAGNESIUM: CPT | Performed by: PHYSICIAN ASSISTANT

## 2021-12-12 PROCEDURE — 74018 RADEX ABDOMEN 1 VIEW: CPT

## 2021-12-12 PROCEDURE — 99231 SBSQ HOSP IP/OBS SF/LOW 25: CPT | Performed by: PHYSICIAN ASSISTANT

## 2021-12-12 PROCEDURE — 87340 HEPATITIS B SURFACE AG IA: CPT | Performed by: INTERNAL MEDICINE

## 2021-12-12 PROCEDURE — 0 MAGNESIUM SULFATE 4 GM/100ML SOLUTION: Performed by: OBSTETRICS & GYNECOLOGY

## 2021-12-12 PROCEDURE — 84100 ASSAY OF PHOSPHORUS: CPT | Performed by: PHYSICIAN ASSISTANT

## 2021-12-12 PROCEDURE — 85027 COMPLETE CBC AUTOMATED: CPT | Performed by: PHYSICIAN ASSISTANT

## 2021-12-12 PROCEDURE — 82330 ASSAY OF CALCIUM: CPT | Performed by: PHYSICIAN ASSISTANT

## 2021-12-12 PROCEDURE — 80048 BASIC METABOLIC PNL TOTAL CA: CPT | Performed by: PHYSICIAN ASSISTANT

## 2021-12-12 PROCEDURE — 25010000002 ENOXAPARIN PER 10 MG: Performed by: OBSTETRICS & GYNECOLOGY

## 2021-12-12 PROCEDURE — 25010000002 ONDANSETRON PER 1 MG: Performed by: OBSTETRICS & GYNECOLOGY

## 2021-12-12 PROCEDURE — 25010000002 HYDROMORPHONE PER 4 MG: Performed by: OBSTETRICS & GYNECOLOGY

## 2021-12-12 RX ORDER — IBUPROFEN 600 MG/1
600 TABLET ORAL EVERY 6 HOURS PRN
Status: DISCONTINUED | OUTPATIENT
Start: 2021-12-12 | End: 2021-12-15 | Stop reason: HOSPADM

## 2021-12-12 RX ORDER — ACETAMINOPHEN 325 MG/1
650 TABLET ORAL EVERY 6 HOURS
Status: DISCONTINUED | OUTPATIENT
Start: 2021-12-12 | End: 2021-12-15 | Stop reason: HOSPADM

## 2021-12-12 RX ORDER — HYDROXYZINE HYDROCHLORIDE 50 MG/ML
25 INJECTION, SOLUTION INTRAMUSCULAR ONCE
Status: DISCONTINUED | OUTPATIENT
Start: 2021-12-12 | End: 2021-12-14

## 2021-12-12 RX ORDER — L.ACID,PARA/B.BIFIDUM/S.THERM 8B CELL
1 CAPSULE ORAL DAILY
Status: DISCONTINUED | OUTPATIENT
Start: 2021-12-12 | End: 2021-12-15 | Stop reason: HOSPADM

## 2021-12-12 RX ORDER — OXYCODONE HYDROCHLORIDE 5 MG/1
5 TABLET ORAL EVERY 4 HOURS PRN
Status: DISCONTINUED | OUTPATIENT
Start: 2021-12-12 | End: 2021-12-15 | Stop reason: HOSPADM

## 2021-12-12 RX ADMIN — ACETAMINOPHEN 650 MG: 325 TABLET, FILM COATED ORAL at 12:15

## 2021-12-12 RX ADMIN — PANTOPRAZOLE SODIUM 40 MG: 40 INJECTION, POWDER, FOR SOLUTION INTRAVENOUS at 20:15

## 2021-12-12 RX ADMIN — ENOXAPARIN SODIUM 30 MG: 30 INJECTION SUBCUTANEOUS at 08:37

## 2021-12-12 RX ADMIN — PANTOPRAZOLE SODIUM 40 MG: 40 INJECTION, POWDER, FOR SOLUTION INTRAVENOUS at 08:37

## 2021-12-12 RX ADMIN — ONDANSETRON 4 MG: 2 INJECTION INTRAMUSCULAR; INTRAVENOUS at 15:11

## 2021-12-12 RX ADMIN — MAGNESIUM SULFATE HEPTAHYDRATE 4 G: 40 INJECTION, SOLUTION INTRAVENOUS at 02:38

## 2021-12-12 RX ADMIN — SODIUM CHLORIDE, PRESERVATIVE FREE 10 ML: 5 INJECTION INTRAVENOUS at 20:15

## 2021-12-12 RX ADMIN — PHENOL 2 SPRAY: 1.5 LIQUID ORAL at 23:59

## 2021-12-12 RX ADMIN — HYDROMORPHONE HYDROCHLORIDE 0.5 MG: 1 INJECTION, SOLUTION INTRAMUSCULAR; INTRAVENOUS; SUBCUTANEOUS at 02:38

## 2021-12-12 RX ADMIN — OXYCODONE 5 MG: 5 TABLET ORAL at 12:15

## 2021-12-12 RX ADMIN — Medication 1 CAPSULE: at 12:15

## 2021-12-12 RX ADMIN — ACETAMINOPHEN 650 MG: 325 TABLET, FILM COATED ORAL at 18:52

## 2021-12-12 RX ADMIN — DEXTROSE AND SODIUM CHLORIDE 75 ML/HR: 5; 450 INJECTION, SOLUTION INTRAVENOUS at 12:37

## 2021-12-12 RX ADMIN — HYDROMORPHONE HYDROCHLORIDE 0.5 MG: 1 INJECTION, SOLUTION INTRAMUSCULAR; INTRAVENOUS; SUBCUTANEOUS at 08:37

## 2021-12-12 RX ADMIN — ENOXAPARIN SODIUM 30 MG: 30 INJECTION SUBCUTANEOUS at 20:15

## 2021-12-12 NOTE — PROGRESS NOTES
Gynecologic Oncology   Daily Progress Note    Chief Complaint: Postop state    Subjective   Patient did well overnight. Denies flatus.  Not very thirsty or hungry, but would like lemon ice.  NGT output less, but looks like it has been repositioned since OR.    Objective   Temp:  [96.5 °F (35.8 °C)-98.4 °F (36.9 °C)] 98.4 °F (36.9 °C)  Heart Rate:  [76-87] 77  Resp:  [16-18] 18  BP: (124-139)/(58-69) 132/58  Vitals:    12/12/21 0708   BP: 132/58   Pulse: 77   Resp: 18   Temp:    SpO2: 99%     I/O last 3 completed shifts:  In: 4964 [I.V.:4794; Other:170]  Out: 2260 [Urine:785; Emesis/NG output:900; Drains:575]   On review of 12 hour I/O decreasing NGT and JULIANA output     GENERAL: Alert, well-appearing female in no apparent distress.    RESP:  CTAB  CV: Heart RRR  GASTROINTESTINAL:  Soft, appropriately tender, non-distended, no rebound or guarding.  + bowel sounds, incision(s) c/d/i.  JULIANA drain with serosanguineous output  GENITOURINARY: WICC in place   SKIN:  Warm, dry, well-perfused.    PSYCHIATRIC: AO x3, with appropriate affect, normal thought processes  EXREMITIES: Symmetric. No peripheral edema.    Lab Results   Component Value Date    WBC 7.81 12/11/2021    HGB 11.8 (L) 12/11/2021    HCT 35.8 12/11/2021    MCV 89.3 12/11/2021     12/11/2021    NEUTROABS 7.11 (H) 12/11/2021    GLUCOSE 140 (H) 12/11/2021    BUN 9 12/11/2021    CREATININE 0.74 12/11/2021    EGFRIFNONA 76 12/11/2021    EGFRIFAFRI 77 10/07/2021     (L) 12/11/2021    K 4.4 12/11/2021     12/11/2021    CO2 17.0 (L) 12/11/2021    MG 1.9 12/11/2021    CALCIUM 7.1 (L) 12/11/2021         Assessment/Plan   Cindy Sage is a 76 y.o. female status post exploratory laparotomy, transverse colonic resection with reanastomosis on 12/10/2021    1.  Post-operative care  -Routine care   -Trial of NGT clamp.  Get Xray to check position  -Stable postop  -DC JULIANA  -Out of bed  -pain med regimen    2.  Comorbidities per primary team    3.   Disposition-continue hospital stay  -         Daja Ma MD  12/12/21  10:01 EST

## 2021-12-12 NOTE — PLAN OF CARE
Goal Outcome Evaluation:  Plan of Care Reviewed With: patient        Progress: improving  Outcome Summary: pt walked 4-5 laps around room with minimal assistance from RN and granddaughter. pt verbalized minimal pain while ambulating. JULIANA drain removed with no resistance, pt encouraged to ambulate as freqently as possible to facilitate gas and BM.

## 2021-12-12 NOTE — PLAN OF CARE
Goal Outcome Evaluation:  Plan of Care Reviewed With: patient        Progress: improving  Outcome Summary: rested comfortably throughout shift, slept in the chair, only one dose of PRN pain medication given, BRUNA MCCALL to LWS, LLQ JULIANA to bulb suction

## 2021-12-12 NOTE — PROGRESS NOTES
[facility] Medicine Services  PROGRESS NOTE    Patient Name: Cindy Sage  : 1945  MRN: [id number]    Date of Admission: 2021  Primary Care Physician: Jose Carrillo MD    Subjective     CC: s/p colonic stricture s/p resection     HPI:  In chair. No family at bedside. Pain manageable at rest, more severe when out of bed. NG bothersome. No BM or flatus. Urinating without berry but having some mild retention - has not required I&O catheterization    ROS:  Gen- No fevers, chills  CV- No chest pain, palpitations  Resp- No cough, dyspnea  GI- as above    Objective     Vital Signs:   Temp:  [96.5 °F (35.8 °C)-98.4 °F (36.9 °C)] 98.4 °F (36.9 °C)  Heart Rate:  [76-87] 80  Resp:  [16-18] 18  BP: (120-139)/(55-69) 120/55     Physical Exam:  Constitutional: No acute distress, awake, alert and conversant. Sitting upright in chair   HENT: NCAT, mucous membranes moist. NG tube in place.   Respiratory: Clear to auscultation bilaterally, respiratory effort normal on room air   Cardiovascular: RRR, no murmurs, rubs, or gallops  Gastrointestinal: Hypoactive bowel sounds, midline abdominal incision not fully visualized due to abdominal binder but inferior portion was c/d/i. Serosanguinous drainage in JULIANA bulb   Musculoskeletal: No bilateral ankle edema  Psychiatric: Appropriate affect, cooperative  Neurologic: Oriented x 3, moves all extremities spontaneously, speech clear  Skin: No rashes    Results Reviewed:  LAB RESULTS:      Lab 21  0250 12/10/21  1815 12/10/21  0542 21  1726 21  0340 21  1756 21  1346 21  1055   WBC 7.81  --  2.71*  --  3.97  --   --  4.70   HEMOGLOBIN 11.8* 11.4* 10.0* 10.7* 10.4*  10.4*   < >  --  12.7   HEMATOCRIT 35.8 34.9 30.5* 33.2* 31.3*  31.3*   < >  --  38.9   PLATELETS 359  --  247  --  248  --   --  339   NEUTROS ABS 7.11*  --   --   --   --   --   --  3.52   IMMATURE GRANS (ABS)  --   --   --   --   --   --   --   0.02   LYMPHS ABS  --   --   --   --   --   --   --  0.64*   MONOS ABS  --   --   --   --   --   --   --  0.48   EOS ABS 0.00  --   --   --   --   --   --  0.02   MCV 89.3  --  87.9  --  87.7  --   --  89.8   LACTATE  --   --   --   --   --   --  2.2* 2.2*    < > = values in this interval not displayed.         Lab 12/11/21  0250 12/10/21  1815 12/10/21  0542 12/09/21  0340 12/09/21  0045 12/08/21  1158 12/08/21  1158   SODIUM 133*  --  132* 131*  --   --  132*   POTASSIUM 4.4 3.7 5.1 3.6 3.6   < > 2.6*   CHLORIDE 101  --  102 100  --   --  93*   CO2 17.0*  --  21.0* 23.0  --   --  26.0   ANION GAP 15.0  --  9.0 8.0  --   --  13.0   BUN 9  --  8 18  --   --  26*   CREATININE 0.74  --  0.58 0.74  --   --  0.75   GLUCOSE 140*  --  74 76  --   --  92   CALCIUM 7.1*  --  7.5* 7.0*  --   --  8.3*   IONIZED CALCIUM  --   --  1.18  --   --   --   --    MAGNESIUM 1.9  --  2.7* 1.8  --   --  1.9   PHOSPHORUS 3.4  --  1.7*  --   --   --   --    HEMOGLOBIN A1C  --   --   --  5.40  --   --   --    TSH  --   --   --  2.440  --   --   --     < > = values in this interval not displayed.         Lab 12/08/21  1158   TOTAL PROTEIN 5.9*   ALBUMIN 3.50   GLOBULIN 2.4   ALT (SGPT) 7   AST (SGOT) 13   BILIRUBIN 0.6   ALK PHOS 78   LIPASE 26         Lab 12/09/21  0340   CHOLESTEROL 74   LDL CHOL 14   HDL CHOL 46   TRIGLYCERIDES 61     Brief Urine Lab Results  (Last result in the past 365 days)      Color   Clarity   Blood   Leuk Est   Nitrite   Protein   CREAT   Urine HCG        12/08/21 1059 Dark Yellow   Cloudy   Trace   Small (1+)   Negative   30 mg/dL (1+)               Microbiology Results Abnormal     Procedure Component Value - Date/Time    COVID PRE-OP / PRE-PROCEDURE SCREENING ORDER (NO ISOLATION) - Swab, Nasopharynx [[id number]]  (Normal) Collected: 12/08/21 1216    Lab Status: Final result Specimen: Swab from Nasopharynx Updated: 12/08/21 1311    Narrative:      The following orders were created for panel order COVID PRE-OP /  PRE-PROCEDURE SCREENING ORDER (NO ISOLATION) - Swab, Nasopharynx.  Procedure                               Abnormality         Status                     ---------                               -----------         ------                     Respiratory Panel PCR w/...[[id number]]  Normal              Final result                 Please view results for these tests on the individual orders.    Respiratory Panel PCR w/COVID-19(SARS-CoV-2) DYLAN/DANN/SARMAD/PAD/COR/MAD/ANSELMO In-House, NP Swab in UTM/VTM, 3-4 HR TAT - Swab, Nasopharynx [[id number]]  (Normal) Collected: 12/08/21 1216    Lab Status: Final result Specimen: Swab from Nasopharynx Updated: 12/08/21 1311     ADENOVIRUS, PCR Not Detected     Coronavirus 229E Not Detected     Coronavirus HKU1 Not Detected     Coronavirus NL63 Not Detected     Coronavirus OC43 Not Detected     COVID19 Not Detected     Human Metapneumovirus Not Detected     Human Rhinovirus/Enterovirus Not Detected     Influenza A PCR Not Detected     Influenza B PCR Not Detected     Parainfluenza Virus 1 Not Detected     Parainfluenza Virus 2 Not Detected     Parainfluenza Virus 3 Not Detected     Parainfluenza Virus 4 Not Detected     RSV, PCR Not Detected     Bordetella pertussis pcr Not Detected     Bordetella parapertussis PCR Not Detected     Chlamydophila pneumoniae PCR Not Detected     Mycoplasma pneumo by PCR Not Detected    Narrative:      In the setting of a positive respiratory panel with a viral infection PLUS a negative procalcitonin without other underlying concern for bacterial infection, consider observing off antibiotics or discontinuation of antibiotics and continue supportive care. If the respiratory panel is positive for atypical bacterial infection (Bordetella pertussis, Chlamydophila pneumoniae, or Mycoplasma pneumoniae), consider antibiotic de-escalation to target atypical bacterial infection.        Peripheral Block    Result Date: 12/10/2021  Andra Patino DO     12/10/2021   8:18 PM Peripheral Block Patient reassessed immediately prior to procedure Patient location during procedure: OR Stop time: 12/10/2021 8:10 PM Reason for block: at surgeon's request and post-op pain management Performed by Anesthesiologist: Andra Patino DO Preanesthetic Checklist Completed: patient identified, IV checked, site marked, risks and benefits discussed, surgical consent, monitors and equipment checked, pre-op evaluation and timeout performed Prep: Pt Position: supine Sterile barriers:cap, gloves, sterile barriers and mask Prep: ChloraPrep Patient monitoring: blood pressure monitoring, continuous pulse oximetry and EKG Procedure Sedation: yes Performed under: general Guidance:ultrasound guided Images:still images obtained, printed/placed on chart Laterality:Bilateral Block Type:TAP Injection Technique:single-shot Needle Type:short-bevel and echogenic Needle Gauge:20 G Resistance on Injection: none Medications Used: bupivacaine PF (MARCAINE) 0.25 % injection, 5 mL Med administered at 12/10/2021 8:18 PM Medications Comment:Block Injection:  LA dose divided between Right and Left block;  25cc used bilaterally 0.25% bupiv.  Negative serial aspirations.  Pt tolerated px well. Post Assessment Injection Assessment: negative aspiration for heme, incremental injection and no paresthesia on injection Patient Tolerance:comfortable throughout block Complications:no Additional Notes   Under Ultrasound guidance, a BBraun 4inch 360 degree needle was advanced with Normal Saline hydro dissection of tissue.  The Internal Oblique and Transversus Abdominus muscles where visualized.  At or before the aponeurosis of Internal Oblique, local anesthetic spread was visualized in the Transversus Abdominus Plane. Injection was made incrementally with aspiration every 5 mls.  There was no  intravascular injection,  injection pressure was normal, there was no neural injection, and the procedure was completed without difficulty.   Thank You.     Results for orders placed during the hospital encounter of 02/28/19    Adult Transthoracic Echo Complete W/ Cont if Necessary Per Protocol (With Agitated Saline)    Interpretation Summary  · Estimated EF = 60%.  · Left ventricular systolic function is normal.  · Trace to mild mitral regurgitation  · Trace tricuspid regurgitation  · No evidence of PFO or ASD.    I have reviewed the medications:  Scheduled Meds:acetaminophen, 650 mg, Oral, Q6H  enoxaparin, 30 mg, Subcutaneous, Q12H  lactobacillus acidophilus, 1 capsule, Oral, Daily  pantoprazole, 40 mg, Intravenous, Q12H  sodium chloride, 10 mL, Intravenous, Q12H      Continuous Infusions:dextrose 5 % and sodium chloride 0.45 %, 75 mL/hr, Last Rate: 75 mL/hr (12/12/21 1237)  lactated ringers, 9 mL/hr      PRN Meds:.HYDROmorphone **AND** naloxone  •  ibuprofen  •  influenza vaccine  •  magnesium sulfate **OR** magnesium sulfate **OR** magnesium sulfate  •  ondansetron **OR** ondansetron  •  oxyCODONE  •  potassium & sodium phosphates **OR** potassium & sodium phosphates  •  potassium chloride **OR** potassium chloride **OR** potassium chloride  •  promethazine **OR** promethazine  •  Sodium Chloride (PF)  •  sodium chloride    Assessment & Plan     Active Hospital Problems    Diagnosis  POA   • **Large bowel obstruction (HCC) [K56.609]  Yes   • Pain of upper abdomen [R10.10]  Yes   • Melena [K92.1]  Yes   • History of endometrial cancer [Z85.42]  Not Applicable   • GERD (gastroesophageal reflux disease) [K21.9]  Yes   • Hyperlipidemia [E78.5]  Yes   • Hypertension [I10]  Yes      Resolved Hospital Problems   No resolved problems to display.     Brief Hospital Course to date:  Cindy Sage is a 76 y.o. female with PMH significant for HTN, HLD, prior TIA/CVA (2016 without residual deficits), GERD and endometrial cancer (s/p radiation/chemotherapy and followed by Dr. Ma). She presented to [facility] ED on 12/8/21 with complaints  of a several month history of abdominal pain, intermittent diarrhea/constipation and melena. She was admitted to the hospital medicine service, GI and Dr. Ma were consulted to follow the patient.  She underwent exploratory laparotomy with transverse colon resection and reanastomosis on 12/10/2020 by Dr. Ma.     Large bowel obstruction due to stricture  Intermittent constipation / diarrhea / melena  Unintentional weight loss  - s/p exploratory laparotomy with transverse colonic resection, reanastamosis and mobilization of splenic flexure by Dr. Ma on 12/10/21.   - Intraoperatively, there was a portion of transverse colon that was tethered - no obvious cancer but the area was firm and this was the area of stricture identified on imaging.   - NGT to remain in place until return of bowel function  - Rachel catheter removed, monitoring for retention  - NGT clamped, clear liquid diet   - Acidotic on 12/11 - fluids changed to D5 1/2 NS - still awaiting labs today to review   - Electrolyte replacement protocol   - IV / PO PRN pain control  - PRN antiemetics  - Encourage ambulation, PT/OT consulted to follow  - Follow pathology  - AM CBC / BMP / Mag / Phos     History of stage IIIC endometrial adenocarcinoma  - s/p radiation therapy / chemotherapy. Patient of Dr. Ma   -  elevated to 27.4 on 11/17/21 - CT imaging was ordered and planned for 12/8 but patient presented to ED   - Had a recent failed colonoscopy attempt with Dr. Verdugo    HTN  HLD  - Home HCTZ on hold - BP is stable    DVT prophylaxis:Medical and mechanical DVT prophylaxis orders are present.     AM-PAC 6 Clicks Score (PT): 15 (12/12/21 0800)    Disposition: I expect the patient to be discharged home TBD     CODE STATUS:   Code Status and Medical Interventions:   Ordered at: 12/10/21 5290     Code Status (Patient has no pulse and is not breathing):    CPR (Attempt to Resuscitate)     Medical Interventions (Patient has pulse or is  breathing):    Full Support     Marcy Belcher PA-C  12/12/21

## 2021-12-13 LAB
ANION GAP SERPL CALCULATED.3IONS-SCNC: 12 MMOL/L (ref 5–15)
BUN SERPL-MCNC: 12 MG/DL (ref 8–23)
BUN/CREAT SERPL: 16.9 (ref 7–25)
CALCIUM SPEC-SCNC: 7.3 MG/DL (ref 8.6–10.5)
CHLORIDE SERPL-SCNC: 99 MMOL/L (ref 98–107)
CO2 SERPL-SCNC: 19 MMOL/L (ref 22–29)
CREAT SERPL-MCNC: 0.71 MG/DL (ref 0.57–1)
DEPRECATED RDW RBC AUTO: 60.3 FL (ref 37–54)
ERYTHROCYTE [DISTWIDTH] IN BLOOD BY AUTOMATED COUNT: 18.4 % (ref 12.3–15.4)
GFR SERPL CREATININE-BSD FRML MDRD: 80 ML/MIN/1.73
GLUCOSE SERPL-MCNC: 134 MG/DL (ref 65–99)
HCT VFR BLD AUTO: 29.8 % (ref 34–46.6)
HGB BLD-MCNC: 9.8 G/DL (ref 12–15.9)
MAGNESIUM SERPL-MCNC: 2.3 MG/DL (ref 1.6–2.4)
MCH RBC QN AUTO: 29.5 PG (ref 26.6–33)
MCHC RBC AUTO-ENTMCNC: 32.9 G/DL (ref 31.5–35.7)
MCV RBC AUTO: 89.8 FL (ref 79–97)
PHOSPHATE SERPL-MCNC: 2.9 MG/DL (ref 2.5–4.5)
PLATELET # BLD AUTO: 174 10*3/MM3 (ref 140–450)
PMV BLD AUTO: 8.4 FL (ref 6–12)
POTASSIUM SERPL-SCNC: 3.8 MMOL/L (ref 3.5–5.2)
RBC # BLD AUTO: 3.32 10*6/MM3 (ref 3.77–5.28)
SODIUM SERPL-SCNC: 130 MMOL/L (ref 136–145)
WBC NRBC COR # BLD: 5.16 10*3/MM3 (ref 3.4–10.8)

## 2021-12-13 PROCEDURE — 80048 BASIC METABOLIC PNL TOTAL CA: CPT | Performed by: PHYSICIAN ASSISTANT

## 2021-12-13 PROCEDURE — 25010000002 ENOXAPARIN PER 10 MG: Performed by: OBSTETRICS & GYNECOLOGY

## 2021-12-13 PROCEDURE — 84100 ASSAY OF PHOSPHORUS: CPT | Performed by: PHYSICIAN ASSISTANT

## 2021-12-13 PROCEDURE — 83735 ASSAY OF MAGNESIUM: CPT | Performed by: PHYSICIAN ASSISTANT

## 2021-12-13 PROCEDURE — 85027 COMPLETE CBC AUTOMATED: CPT | Performed by: PHYSICIAN ASSISTANT

## 2021-12-13 PROCEDURE — 99232 SBSQ HOSP IP/OBS MODERATE 35: CPT | Performed by: PHYSICIAN ASSISTANT

## 2021-12-13 RX ADMIN — PANTOPRAZOLE SODIUM 40 MG: 40 INJECTION, POWDER, FOR SOLUTION INTRAVENOUS at 21:22

## 2021-12-13 RX ADMIN — PHENOL 2 SPRAY: 1.5 LIQUID ORAL at 05:27

## 2021-12-13 RX ADMIN — Medication 1 CAPSULE: at 09:43

## 2021-12-13 RX ADMIN — SODIUM CHLORIDE, PRESERVATIVE FREE 10 ML: 5 INJECTION INTRAVENOUS at 21:23

## 2021-12-13 RX ADMIN — DEXTROSE AND SODIUM CHLORIDE 75 ML/HR: 5; 450 INJECTION, SOLUTION INTRAVENOUS at 05:20

## 2021-12-13 RX ADMIN — SODIUM CHLORIDE, PRESERVATIVE FREE 10 ML: 5 INJECTION INTRAVENOUS at 09:43

## 2021-12-13 RX ADMIN — ENOXAPARIN SODIUM 30 MG: 30 INJECTION SUBCUTANEOUS at 09:43

## 2021-12-13 RX ADMIN — PANTOPRAZOLE SODIUM 40 MG: 40 INJECTION, POWDER, FOR SOLUTION INTRAVENOUS at 09:43

## 2021-12-13 RX ADMIN — POTASSIUM PHOSPHATE, MONOBASIC POTASSIUM PHOSPHATE, DIBASIC 30 MMOL: 224; 236 INJECTION, SOLUTION, CONCENTRATE INTRAVENOUS at 01:33

## 2021-12-13 RX ADMIN — ENOXAPARIN SODIUM 30 MG: 30 INJECTION SUBCUTANEOUS at 21:22

## 2021-12-13 RX ADMIN — OXYCODONE 5 MG: 5 TABLET ORAL at 03:44

## 2021-12-13 RX ADMIN — ACETAMINOPHEN 650 MG: 325 TABLET, FILM COATED ORAL at 17:50

## 2021-12-13 NOTE — CASE MANAGEMENT/SOCIAL WORK
Continued Stay Note  [facility]     Patient Name: Cindy Sage  MRN: [id number]  Today's Date: 12/13/2021    Admit Date: 12/8/2021     Discharge Plan     Row Name 12/13/21 1342       Plan    Plan Home    Patient/Family in Agreement with Plan yes    Plan Comments I met with pt and daughter at the bedside. Pt's NG tube was D/C'd today, she is attempting to eat a clear diet. Pt states she plans to D/C to home. Pt was discussed in MDR-Dr. Jeannette states that pt should be medically ready to D/C in 2-3 days. Pt states that her daugher can transport her home and that she does not believe she will have any needs. CM will continue to follow.    Final Discharge Disposition Code 01 - home or self-care               Discharge Codes    No documentation.               Expected Discharge Date and Time     Expected Discharge Date Expected Discharge Time    Dec 15, 2021             Keke Ferraro RN

## 2021-12-13 NOTE — PROGRESS NOTES
[facility] Medicine Services  PROGRESS NOTE    Patient Name: Cindy Sage  : 1945  MRN: [id number]    Date of Admission: 2021  Primary Care Physician: Jose Carrillo MD    Subjective     CC: s/p colonic stricture s/p resection     HPI:  Sitting up in chair. Her daughter is at bedside. Post-surgical pain manageable. Tolerating clear liquids. NGT bothersome and her throat is sore. Urinating well. No flatus or BM. She is concerned that her knees appear very swollen. No pain. Lower legs also edematous, we discussed.     ROS:  Gen- No fevers, chills  CV- No chest pain, palpitations  Resp- No cough, dyspnea  GI- as above    Objective     Vital Signs:   Temp:  [97.3 °F (36.3 °C)-97.6 °F (36.4 °C)] 97.6 °F (36.4 °C)  Heart Rate:  [73-90] 90  Resp:  [16-19] 18  BP: (112-146)/(54-70) 112/54     Physical Exam:  Constitutional: No acute distress, awake, alert and conversant. Sitting upright in chair   HENT: NCAT, mucous membranes moist. NG tube in place. Voice hoarse   Respiratory: Clear to auscultation bilaterally, respiratory effort normal on room air   Cardiovascular: RRR, no murmurs, rubs, or gallops  Gastrointestinal: Hypoactive bowel sounds, midline abdominal incision not fully visualized due to abdominal binder but inferior portion was c/d/i. Serosanguinous drainage in JULIANA bulb   Musculoskeletal: 1+ pitting BLE and thigh edema   Psychiatric: Appropriate affect, cooperative  Neurologic: Oriented x 3, moves all extremities spontaneously, speech clear  Skin: No rashes    Results Reviewed:  LAB RESULTS:      Lab 21  1134 21  1608 21  0250 12/10/21  1815 12/10/21  0542 21  1726 21  0340 21  1756 21  1346 21  1055   0000   WBC 5.16 8.88 7.81  --  2.71*  --  3.97  --   --  4.70   < >   HEMOGLOBIN 9.8* 11.5* 11.8* 11.4* 10.0*   < > 10.4*  10.4*   < >  --  12.7  --    HEMATOCRIT 29.8* 34.4 35.8 34.9 30.5*   < > 31.3*  31.3*   < >   --  38.9  --    PLATELETS 174 292 359  --  247  --  248  --   --  339   < >   NEUTROS ABS  --   --  7.11*  --   --   --   --   --   --  3.52  --    IMMATURE GRANS (ABS)  --   --   --   --   --   --   --   --   --  0.02  --    LYMPHS ABS  --   --   --   --   --   --   --   --   --  0.64*  --    MONOS ABS  --   --   --   --   --   --   --   --   --  0.48  --    EOS ABS  --   --  0.00  --   --   --   --   --   --  0.02  --    MCV 89.8 88.0 89.3  --  87.9  --  87.7  --   --  89.8   < >   LACTATE  --   --   --   --   --   --   --   --  2.2* 2.2*  --     < > = values in this interval not displayed.         Lab 12/13/21  1136 12/12/21  1608 12/11/21  0250 12/10/21  1815 12/10/21  0542 12/09/21  0340 12/09/21  0340   SODIUM 130* 129* 133*  --  132*  --  131*   POTASSIUM 3.8 3.7 4.4 3.7 5.1   < > 3.6   CHLORIDE 99 97* 101  --  102  --  100   CO2 19.0* 18.0* 17.0*  --  21.0*  --  23.0   ANION GAP 12.0 14.0 15.0  --  9.0  --  8.0   BUN 12 13 9  --  8  --  18   CREATININE 0.71 0.71 0.74  --  0.58  --  0.74   GLUCOSE 134* 127* 140*  --  74  --  76   CALCIUM 7.3* 7.7* 7.1*  --  7.5*  --  7.0*   IONIZED CALCIUM  --  1.09*  --   --  1.18  --   --    MAGNESIUM 2.3 3.0* 1.9  --  2.7*  --  1.8   PHOSPHORUS 2.9 1.6* 3.4  --  1.7*  --   --    HEMOGLOBIN A1C  --   --   --   --   --   --  5.40   TSH  --   --   --   --   --   --  2.440    < > = values in this interval not displayed.         Lab 12/08/21  1158   TOTAL PROTEIN 5.9*   ALBUMIN 3.50   GLOBULIN 2.4   ALT (SGPT) 7   AST (SGOT) 13   BILIRUBIN 0.6   ALK PHOS 78   LIPASE 26     Brief Urine Lab Results  (Last result in the past 365 days)      Color   Clarity   Blood   Leuk Est   Nitrite   Protein   CREAT   Urine HCG        12/08/21 1059 Dark Yellow   Cloudy   Trace   Small (1+)   Negative   30 mg/dL (1+)               Microbiology Results Abnormal     Procedure Component Value - Date/Time    COVID PRE-OP / PRE-PROCEDURE SCREENING ORDER (NO ISOLATION) - Swab, Nasopharynx [[id number]]   (Normal) Collected: 12/08/21 1216    Lab Status: Final result Specimen: Swab from Nasopharynx Updated: 12/08/21 1311    Narrative:      The following orders were created for panel order COVID PRE-OP / PRE-PROCEDURE SCREENING ORDER (NO ISOLATION) - Swab, Nasopharynx.  Procedure                               Abnormality         Status                     ---------                               -----------         ------                     Respiratory Panel PCR w/...[[id number]]  Normal              Final result                 Please view results for these tests on the individual orders.    Respiratory Panel PCR w/COVID-19(SARS-CoV-2) DYLAN/DANN/SARMAD/PAD/COR/MAD/ANSELMO In-House, NP Swab in UTM/VTM, 3-4 HR TAT - Swab, Nasopharynx [[id number]]  (Normal) Collected: 12/08/21 1216    Lab Status: Final result Specimen: Swab from Nasopharynx Updated: 12/08/21 1311     ADENOVIRUS, PCR Not Detected     Coronavirus 229E Not Detected     Coronavirus HKU1 Not Detected     Coronavirus NL63 Not Detected     Coronavirus OC43 Not Detected     COVID19 Not Detected     Human Metapneumovirus Not Detected     Human Rhinovirus/Enterovirus Not Detected     Influenza A PCR Not Detected     Influenza B PCR Not Detected     Parainfluenza Virus 1 Not Detected     Parainfluenza Virus 2 Not Detected     Parainfluenza Virus 3 Not Detected     Parainfluenza Virus 4 Not Detected     RSV, PCR Not Detected     Bordetella pertussis pcr Not Detected     Bordetella parapertussis PCR Not Detected     Chlamydophila pneumoniae PCR Not Detected     Mycoplasma pneumo by PCR Not Detected    Narrative:      In the setting of a positive respiratory panel with a viral infection PLUS a negative procalcitonin without other underlying concern for bacterial infection, consider observing off antibiotics or discontinuation of antibiotics and continue supportive care. If the respiratory panel is positive for atypical bacterial infection (Bordetella pertussis, Chlamydophila  pneumoniae, or Mycoplasma pneumoniae), consider antibiotic de-escalation to target atypical bacterial infection.        XR Abdomen KUB    Result Date: 12/12/2021  EXAMINATION: XR ABDOMEN KUB-  INDICATION: check NGT placement; R10.10-Upper abdominal pain, unspecified; K92.2-Gastrointestinal hemorrhage, unspecified; R97.1-Elevated cancer antigen 125 (); R19.7-Diarrhea, unspecified; K59.00-Constipation, unspecified; E87.6-Hypokalemia; K56.609-Unspecified intestinal obstruction, unspecified as to partial versus complete obstruction; Z74.09-Other reduced mobility  COMPARISON: NONE  FINDINGS: Nasogastric tube terminates in the stomach. Bowel gas pattern is nonobstructive.      Impression: Nasogastric tube terminates in the stomach. Bowel gas pattern is nonobstructive.  This report was finalized on 12/12/2021 2:25 PM by Jomar Joaquin.      Results for orders placed during the hospital encounter of 02/28/19    Adult Transthoracic Echo Complete W/ Cont if Necessary Per Protocol (With Agitated Saline)    Interpretation Summary  · Estimated EF = 60%.  · Left ventricular systolic function is normal.  · Trace to mild mitral regurgitation  · Trace tricuspid regurgitation  · No evidence of PFO or ASD.    I have reviewed the medications:  Scheduled Meds:acetaminophen, 650 mg, Oral, Q6H  enoxaparin, 30 mg, Subcutaneous, Q12H  hydrOXYzine, 25 mg, Intramuscular, Once  lactobacillus acidophilus, 1 capsule, Oral, Daily  pantoprazole, 40 mg, Intravenous, Q12H  sodium chloride, 10 mL, Intravenous, Q12H      Continuous Infusions:dextrose 5 % and sodium chloride 0.45 %, 75 mL/hr, Last Rate: 75 mL/hr (12/13/21 0520)  lactated ringers, 9 mL/hr      PRN Meds:.HYDROmorphone **AND** naloxone  •  ibuprofen  •  influenza vaccine  •  magnesium sulfate **OR** magnesium sulfate **OR** magnesium sulfate  •  ondansetron **OR** ondansetron  •  oxyCODONE  •  phenol  •  potassium & sodium phosphates **OR** potassium & sodium phosphates  •   potassium chloride **OR** potassium chloride **OR** potassium chloride  •  potassium phosphate infusion greater than 15 mMoles **OR** potassium phosphate infusion greater than 15 mMoles **OR** potassium phosphate **OR** sodium phosphate IVPB **OR** sodium phosphate IVPB **OR** sodium phosphate IVPB  •  promethazine **OR** promethazine  •  Sodium Chloride (PF)  •  sodium chloride    Assessment & Plan     Active Hospital Problems    Diagnosis  POA   • **Large bowel obstruction (HCC) [K56.609]  Yes   • Pain of upper abdomen [R10.10]  Yes   • Melena [K92.1]  Yes   • History of endometrial cancer [Z85.42]  Not Applicable   • GERD (gastroesophageal reflux disease) [K21.9]  Yes   • Hyperlipidemia [E78.5]  Yes   • Hypertension [I10]  Yes      Resolved Hospital Problems   No resolved problems to display.     Brief Hospital Course to date:  Cindy Sage is a 76 y.o. female with PMH significant for HTN, HLD, prior TIA/CVA (2016 without residual deficits), GERD and endometrial cancer (s/p radiation/chemotherapy and followed by Dr. Ma). She presented to [facility] ED on 12/8/21 with complaints of a several month history of abdominal pain, intermittent diarrhea/constipation and melena. She was admitted to the hospital medicine service, GI and Dr. Ma were consulted to follow the patient.  She underwent exploratory laparotomy with transverse colon resection and reanastomosis on 12/10/2020 by Dr. Ma.     Large bowel obstruction due to stricture  Intermittent constipation / diarrhea / melena  Unintentional weight loss  - s/p exploratory laparotomy with transverse colonic resection, reanastamosis and mobilization of splenic flexure by Dr. Ma on 12/10/21.   - Intraoperatively, there was a portion of transverse colon that was tethered - no obvious cancer but the area was firm and this was the area of stricture identified on imaging.   - NGT removed today. Stop IV fluids, as she is developing  lower extremity edema  - Continue electrolyte replacement protocol   - IV / PO PRN pain control  - PRN antiemetics  - Encourage ambulation, PT/OT to see   - Follow pathology  - AM CBC / BMP / Mag / Phos     History of stage IIIC endometrial adenocarcinoma  - s/p radiation therapy / chemotherapy. Patient of Dr. Ma   -  elevated to 27.4 on 11/17/21 - CT imaging was ordered and planned for 12/8 but patient presented to ED   - Had a recent failed colonoscopy attempt with Dr. Verdugo    HTN  HLD  - Home HCTZ on hold - BP is stable  - Given her hyponatremia, would favor alternative agent if her BP rises enough to warrant treatment     DVT prophylaxis:Medical and mechanical DVT prophylaxis orders are present.     AM-PAC 6 Clicks Score (PT): 15 (12/12/21 0800)    Disposition: I expect the patient to be discharged home TBD     CODE STATUS:   Code Status and Medical Interventions:   Ordered at: 12/10/21 0506     Code Status (Patient has no pulse and is not breathing):    CPR (Attempt to Resuscitate)     Medical Interventions (Patient has pulse or is breathing):    Full Support     Marcy Belcher PA-C  12/13/21

## 2021-12-13 NOTE — PLAN OF CARE
Goal Outcome Evaluation:  Plan of Care Reviewed With: patient        Progress: improving   VSS. Pt has been improving today. NG tube out. Pt tolerating diet. No N/V today. She has been walking to the bathroom and in the ralph multiple times today. No c/o pain. Family at bedside throughout the shift. Hoping for advanced diet tomorrow?    Problem: Adult Inpatient Plan of Care  Goal: Plan of Care Review  Outcome: Ongoing, Progressing  Flowsheets (Taken 12/13/2021 1657)  Progress: improving  Plan of Care Reviewed With: patient     Problem: Adult Inpatient Plan of Care  Goal: Optimal Comfort and Wellbeing  Intervention: Provide Person-Centered Care  Recent Flowsheet Documentation  Taken 12/13/2021 1610 by Alise Abad RN  Trust Relationship/Rapport:   care explained   choices provided  Taken 12/13/2021 1045 by Alise Abad RN  Trust Relationship/Rapport:   care explained   choices provided  Taken 12/13/2021 0815 by Alise Abad RN  Trust Relationship/Rapport:   care explained   choices provided     Problem: Fall Injury Risk  Goal: Absence of Fall and Fall-Related Injury  Outcome: Ongoing, Progressing  Intervention: Identify and Manage Contributors to Fall Injury Risk  Recent Flowsheet Documentation  Taken 12/13/2021 1610 by Alise Abad RN  Medication Review/Management: medications reviewed  Taken 12/13/2021 1415 by Alise Abad RN  Medication Review/Management: medications reviewed  Taken 12/13/2021 1045 by Alise Abad RN  Medication Review/Management: medications reviewed  Intervention: Promote Injury-Free Environment  Recent Flowsheet Documentation  Taken 12/13/2021 1610 by Alise Abad RN  Safety Promotion/Fall Prevention:   activity supervised   assistive device/personal items within reach   clutter free environment maintained   fall prevention program maintained   lighting adjusted   muscle strengthening facilitated   nonskid shoes/slippers when out of bed   room organization  consistent   safety round/check completed  Taken 12/13/2021 1415 by Alise Abad RN  Safety Promotion/Fall Prevention:   assistive device/personal items within reach   activity supervised   elopement precautions   clutter free environment maintained   fall prevention program maintained   lighting adjusted   muscle strengthening facilitated   nonskid shoes/slippers when out of bed   safety round/check completed   room organization consistent  Taken 12/13/2021 1220 by Alise Abad RN  Safety Promotion/Fall Prevention:   activity supervised   assistive device/personal items within reach   clutter free environment maintained   fall prevention program maintained   lighting adjusted   nonskid shoes/slippers when out of bed   muscle strengthening facilitated   room organization consistent   safety round/check completed  Taken 12/13/2021 1045 by Alise Abad RN  Safety Promotion/Fall Prevention:   activity supervised   assistive device/personal items within reach   clutter free environment maintained   fall prevention program maintained   lighting adjusted   muscle strengthening facilitated   nonskid shoes/slippers when out of bed   room organization consistent   safety round/check completed  Taken 12/13/2021 0815 by Alise Abad RN  Safety Promotion/Fall Prevention:   activity supervised   assistive device/personal items within reach   clutter free environment maintained   fall prevention program maintained   lighting adjusted   nonskid shoes/slippers when out of bed   room organization consistent   muscle strengthening facilitated   safety round/check completed

## 2021-12-13 NOTE — PLAN OF CARE
Goal Outcome Evaluation:  Patient awake throughout the night. Patient concerned about knee swelling at start of shift that made patient visibly upset and weeping. Patient did not endorse pain with knee swelling. Provider notified about patients knee swelling and anxiety. X1 IM dose of vistaril ordered, when going to administer patient refused. Patient to f/u with attending provider about knee swelling.    Patient c/o pain x1 this shift with administration of PRN pain medication.     No c/o nausea this shift. NG tube to intermittent low wall suction.      Sinus rhythm per telemetry.

## 2021-12-14 LAB
ANION GAP SERPL CALCULATED.3IONS-SCNC: 8 MMOL/L (ref 5–15)
BUN SERPL-MCNC: 10 MG/DL (ref 8–23)
BUN/CREAT SERPL: 15.6 (ref 7–25)
CALCIUM SPEC-SCNC: 7.8 MG/DL (ref 8.6–10.5)
CHLORIDE SERPL-SCNC: 103 MMOL/L (ref 98–107)
CO2 SERPL-SCNC: 22 MMOL/L (ref 22–29)
CREAT SERPL-MCNC: 0.64 MG/DL (ref 0.57–1)
DEPRECATED RDW RBC AUTO: 58.5 FL (ref 37–54)
ERYTHROCYTE [DISTWIDTH] IN BLOOD BY AUTOMATED COUNT: 18.3 % (ref 12.3–15.4)
GFR SERPL CREATININE-BSD FRML MDRD: 90 ML/MIN/1.73
GLUCOSE SERPL-MCNC: 72 MG/DL (ref 65–99)
HCT VFR BLD AUTO: 28.6 % (ref 34–46.6)
HGB BLD-MCNC: 9.4 G/DL (ref 12–15.9)
MAGNESIUM SERPL-MCNC: 2.2 MG/DL (ref 1.6–2.4)
MCH RBC QN AUTO: 28.7 PG (ref 26.6–33)
MCHC RBC AUTO-ENTMCNC: 32.9 G/DL (ref 31.5–35.7)
MCV RBC AUTO: 87.2 FL (ref 79–97)
PHOSPHATE SERPL-MCNC: 2.9 MG/DL (ref 2.5–4.5)
PLATELET # BLD AUTO: 269 10*3/MM3 (ref 140–450)
PMV BLD AUTO: 8.6 FL (ref 6–12)
POTASSIUM SERPL-SCNC: 4 MMOL/L (ref 3.5–5.2)
RBC # BLD AUTO: 3.28 10*6/MM3 (ref 3.77–5.28)
SODIUM SERPL-SCNC: 133 MMOL/L (ref 136–145)
WBC NRBC COR # BLD: 3.61 10*3/MM3 (ref 3.4–10.8)

## 2021-12-14 PROCEDURE — 85027 COMPLETE CBC AUTOMATED: CPT | Performed by: PHYSICIAN ASSISTANT

## 2021-12-14 PROCEDURE — 80048 BASIC METABOLIC PNL TOTAL CA: CPT | Performed by: PHYSICIAN ASSISTANT

## 2021-12-14 PROCEDURE — 99232 SBSQ HOSP IP/OBS MODERATE 35: CPT | Performed by: PHYSICIAN ASSISTANT

## 2021-12-14 PROCEDURE — 25010000002 ENOXAPARIN PER 10 MG: Performed by: OBSTETRICS & GYNECOLOGY

## 2021-12-14 PROCEDURE — 25010000002 FUROSEMIDE PER 20 MG: Performed by: PHYSICIAN ASSISTANT

## 2021-12-14 PROCEDURE — 84100 ASSAY OF PHOSPHORUS: CPT | Performed by: PHYSICIAN ASSISTANT

## 2021-12-14 PROCEDURE — 83735 ASSAY OF MAGNESIUM: CPT | Performed by: PHYSICIAN ASSISTANT

## 2021-12-14 RX ORDER — DOCUSATE SODIUM 100 MG/1
200 CAPSULE, LIQUID FILLED ORAL 2 TIMES DAILY
Status: DISCONTINUED | OUTPATIENT
Start: 2021-12-14 | End: 2021-12-14

## 2021-12-14 RX ORDER — POLYETHYLENE GLYCOL 3350 17 G/17G
17 POWDER, FOR SOLUTION ORAL DAILY PRN
Status: DISCONTINUED | OUTPATIENT
Start: 2021-12-14 | End: 2021-12-15 | Stop reason: HOSPADM

## 2021-12-14 RX ORDER — DOCUSATE SODIUM 50 MG/5 ML
100 LIQUID (ML) ORAL 2 TIMES DAILY
Status: DISCONTINUED | OUTPATIENT
Start: 2021-12-14 | End: 2021-12-15 | Stop reason: HOSPADM

## 2021-12-14 RX ORDER — PANTOPRAZOLE SODIUM 40 MG/1
40 TABLET, DELAYED RELEASE ORAL
Status: DISCONTINUED | OUTPATIENT
Start: 2021-12-14 | End: 2021-12-15 | Stop reason: HOSPADM

## 2021-12-14 RX ORDER — FUROSEMIDE 10 MG/ML
20 INJECTION INTRAMUSCULAR; INTRAVENOUS ONCE
Status: COMPLETED | OUTPATIENT
Start: 2021-12-14 | End: 2021-12-14

## 2021-12-14 RX ADMIN — FUROSEMIDE 20 MG: 10 INJECTION, SOLUTION INTRAMUSCULAR; INTRAVENOUS at 12:04

## 2021-12-14 RX ADMIN — ACETAMINOPHEN 650 MG: 325 TABLET, FILM COATED ORAL at 17:35

## 2021-12-14 RX ADMIN — DOCUSATE SODIUM 100 MG: 50 LIQUID ORAL at 17:35

## 2021-12-14 RX ADMIN — ACETAMINOPHEN 650 MG: 325 TABLET, FILM COATED ORAL at 12:04

## 2021-12-14 RX ADMIN — ENOXAPARIN SODIUM 30 MG: 30 INJECTION SUBCUTANEOUS at 20:30

## 2021-12-14 RX ADMIN — ENOXAPARIN SODIUM 30 MG: 30 INJECTION SUBCUTANEOUS at 08:42

## 2021-12-14 RX ADMIN — Medication 1 CAPSULE: at 08:42

## 2021-12-14 RX ADMIN — PANTOPRAZOLE SODIUM 40 MG: 40 INJECTION, POWDER, FOR SOLUTION INTRAVENOUS at 08:42

## 2021-12-14 RX ADMIN — ACETAMINOPHEN 650 MG: 325 TABLET, FILM COATED ORAL at 00:01

## 2021-12-14 NOTE — PROGRESS NOTES
[facility] Medicine Services  PROGRESS NOTE    Patient Name: Cindy Sage  : 1945  MRN: [id number]    Date of Admission: 2021  Primary Care Physician: Jose Carrillo MD    Subjective     CC: s/p colonic stricture s/p resection     HPI:  In chair. Had a good night. Pain manageable. Tolerating full liquid diet without nausea or vomiting.  Sore throat better following NG tube removal.  She is urinating well.  Not sure if she has passed any gas, no BM.  Legs still swollen and this concerns her.  We discussed cautious diuresis    ROS:  Gen- No fevers, chills  CV- No chest pain, palpitations  Resp- No cough, dyspnea  GI- as above    Objective     Vital Signs:   Temp:  [97.4 °F (36.3 °C)-98.6 °F (37 °C)] 97.4 °F (36.3 °C)  Heart Rate:  [60-88] 66  Resp:  [17-18] 17  BP: (107-130)/(56-65) 107/57     Physical Exam:  Constitutional: No acute distress, awake, alert and conversant. Sitting upright in chair   HENT: NCAT, mucous membranes moist.  Interval removal of NG tube  Respiratory: Clear to auscultation bilaterally, respiratory effort normal on room air   Cardiovascular: RRR, no murmurs, rubs, or gallops  Gastrointestinal: Positive bowel sounds, midline abdominal incision C/D/I without erythema, edema or induration.  Interval removal of JULIANA   Musculoskeletal: 1-2+ pitting BLE and thigh edema   Psychiatric: Appropriate affect, cooperative  Neurologic: Oriented x 3, moves all extremities spontaneously, speech clear  Skin: No rashes    Results Reviewed:  LAB RESULTS:      Lab 21  0400 21  1134 21  1608 21  0250 12/10/21  1815 12/10/21  0542 12/10/21  0542 21  1756 21  1346 21  1055   WBC 3.61 5.16 8.88 7.81  --   --  2.71*   < >  --  4.70   HEMOGLOBIN 9.4* 9.8* 11.5* 11.8* 11.4*   < > 10.0*   < >  --  12.7   HEMATOCRIT 28.6* 29.8* 34.4 35.8 34.9   < > 30.5*   < >  --  38.9   PLATELETS 269 174 292 359  --   --  247   < >  --  339    NEUTROS ABS  --   --   --  7.11*  --   --   --   --   --  3.52   IMMATURE GRANS (ABS)  --   --   --   --   --   --   --   --   --  0.02   LYMPHS ABS  --   --   --   --   --   --   --   --   --  0.64*   MONOS ABS  --   --   --   --   --   --   --   --   --  0.48   EOS ABS  --   --   --  0.00  --   --   --   --   --  0.02   MCV 87.2 89.8 88.0 89.3  --   --  87.9   < >  --  89.8   LACTATE  --   --   --   --   --   --   --   --  2.2* 2.2*    < > = values in this interval not displayed.         Lab 12/14/21  0515 12/13/21  1136 12/12/21  1608 12/11/21  0250 12/10/21  1815 12/10/21  0542 12/10/21  0542 12/09/21  0340 12/09/21  0340   SODIUM 133* 130* 129* 133*  --   --  132*   < > 131*   POTASSIUM 4.0 3.8 3.7 4.4 3.7   < > 5.1   < > 3.6   CHLORIDE 103 99 97* 101  --   --  102   < > 100   CO2 22.0 19.0* 18.0* 17.0*  --   --  21.0*   < > 23.0   ANION GAP 8.0 12.0 14.0 15.0  --   --  9.0   < > 8.0   BUN 10 12 13 9  --   --  8   < > 18   CREATININE 0.64 0.71 0.71 0.74  --   --  0.58   < > 0.74   GLUCOSE 72 134* 127* 140*  --   --  74   < > 76   CALCIUM 7.8* 7.3* 7.7* 7.1*  --   --  7.5*   < > 7.0*   IONIZED CALCIUM  --   --  1.09*  --   --   --  1.18  --   --    MAGNESIUM 2.2 2.3 3.0* 1.9  --   --  2.7*   < > 1.8   PHOSPHORUS 2.9 2.9 1.6* 3.4  --   --  1.7*  --   --    HEMOGLOBIN A1C  --   --   --   --   --   --   --   --  5.40   TSH  --   --   --   --   --   --   --   --  2.440    < > = values in this interval not displayed.         Lab 12/08/21  1158   TOTAL PROTEIN 5.9*   ALBUMIN 3.50   GLOBULIN 2.4   ALT (SGPT) 7   AST (SGOT) 13   BILIRUBIN 0.6   ALK PHOS 78   LIPASE 26     Brief Urine Lab Results  (Last result in the past 365 days)      Color   Clarity   Blood   Leuk Est   Nitrite   Protein   CREAT   Urine HCG        12/08/21 1059 Dark Yellow   Cloudy   Trace   Small (1+)   Negative   30 mg/dL (1+)               Microbiology Results Abnormal     Procedure Component Value - Date/Time    COVID PRE-OP / PRE-PROCEDURE  SCREENING ORDER (NO ISOLATION) - Swab, Nasopharynx [[id number]]  (Normal) Collected: 12/08/21 1216    Lab Status: Final result Specimen: Swab from Nasopharynx Updated: 12/08/21 1311    Narrative:      The following orders were created for panel order COVID PRE-OP / PRE-PROCEDURE SCREENING ORDER (NO ISOLATION) - Swab, Nasopharynx.  Procedure                               Abnormality         Status                     ---------                               -----------         ------                     Respiratory Panel PCR w/...[[id number]]  Normal              Final result                 Please view results for these tests on the individual orders.    Respiratory Panel PCR w/COVID-19(SARS-CoV-2) DYLAN/DANN/SARMAD/PAD/COR/MAD/ANSELMO In-House, NP Swab in UTM/VTM, 3-4 HR TAT - Swab, Nasopharynx [[id number]]  (Normal) Collected: 12/08/21 1216    Lab Status: Final result Specimen: Swab from Nasopharynx Updated: 12/08/21 1311     ADENOVIRUS, PCR Not Detected     Coronavirus 229E Not Detected     Coronavirus HKU1 Not Detected     Coronavirus NL63 Not Detected     Coronavirus OC43 Not Detected     COVID19 Not Detected     Human Metapneumovirus Not Detected     Human Rhinovirus/Enterovirus Not Detected     Influenza A PCR Not Detected     Influenza B PCR Not Detected     Parainfluenza Virus 1 Not Detected     Parainfluenza Virus 2 Not Detected     Parainfluenza Virus 3 Not Detected     Parainfluenza Virus 4 Not Detected     RSV, PCR Not Detected     Bordetella pertussis pcr Not Detected     Bordetella parapertussis PCR Not Detected     Chlamydophila pneumoniae PCR Not Detected     Mycoplasma pneumo by PCR Not Detected    Narrative:      In the setting of a positive respiratory panel with a viral infection PLUS a negative procalcitonin without other underlying concern for bacterial infection, consider observing off antibiotics or discontinuation of antibiotics and continue supportive care. If the respiratory panel is positive for  atypical bacterial infection (Bordetella pertussis, Chlamydophila pneumoniae, or Mycoplasma pneumoniae), consider antibiotic de-escalation to target atypical bacterial infection.        No radiology results from the last 24 hrs  Results for orders placed during the hospital encounter of 02/28/19    Adult Transthoracic Echo Complete W/ Cont if Necessary Per Protocol (With Agitated Saline)    Interpretation Summary  · Estimated EF = 60%.  · Left ventricular systolic function is normal.  · Trace to mild mitral regurgitation  · Trace tricuspid regurgitation  · No evidence of PFO or ASD.    I have reviewed the medications:  Scheduled Meds:acetaminophen, 650 mg, Oral, Q6H  docusate sodium, 200 mg, Oral, BID  enoxaparin, 30 mg, Subcutaneous, Q12H  hydrOXYzine, 25 mg, Intramuscular, Once  lactobacillus acidophilus, 1 capsule, Oral, Daily  pantoprazole, 40 mg, Oral, Q AM  sodium chloride, 10 mL, Intravenous, Q12H      Continuous Infusions:   PRN Meds:.HYDROmorphone **AND** naloxone  •  ibuprofen  •  influenza vaccine  •  magnesium sulfate **OR** magnesium sulfate **OR** magnesium sulfate  •  ondansetron **OR** ondansetron  •  oxyCODONE  •  phenol  •  polyethylene glycol  •  potassium & sodium phosphates **OR** potassium & sodium phosphates  •  potassium chloride **OR** potassium chloride **OR** potassium chloride  •  potassium phosphate infusion greater than 15 mMoles **OR** potassium phosphate infusion greater than 15 mMoles **OR** potassium phosphate **OR** sodium phosphate IVPB **OR** sodium phosphate IVPB **OR** sodium phosphate IVPB  •  promethazine **OR** promethazine  •  Sodium Chloride (PF)  •  sodium chloride    Assessment & Plan     Active Hospital Problems    Diagnosis  POA   • **Large bowel obstruction (HCC) [K56.609]  Yes   • Pain of upper abdomen [R10.10]  Yes   • Melena [K92.1]  Yes   • History of endometrial cancer [Z85.42]  Not Applicable   • GERD (gastroesophageal reflux disease) [K21.9]  Yes   •  Hyperlipidemia [E78.5]  Yes   • Hypertension [I10]  Yes      Resolved Hospital Problems   No resolved problems to display.     Brief Hospital Course to date:  Cindy Sage is a 76 y.o. female with PMH significant for HTN, HLD, prior TIA/CVA (2016 without residual deficits), GERD and endometrial cancer (s/p radiation/chemotherapy and followed by Dr. Ma). She presented to [facility] ED on 12/8/21 with complaints of a several month history of abdominal pain, intermittent diarrhea/constipation and melena. She was admitted to the hospital medicine service, GI and Dr. Ma were consulted to follow the patient.  She underwent exploratory laparotomy with transverse colon resection and reanastomosis on 12/10/2020 by Dr. Ma.     Large bowel obstruction due to stricture  Intermittent constipation / diarrhea / melena  Unintentional weight loss  - s/p exploratory laparotomy with transverse colonic resection, reanastamosis and mobilization of splenic flexure by Dr. Ma on 12/10/21.   - Intraoperatively, there was a portion of transverse colon that was tethered - no obvious cancer but the area was firm and this was the area of stricture identified on imaging.   -NGT removed on 12/13.  She is tolerating a full liquid diet and may advance to GI soft as she wishes.  -Lower extremity edema persists, will diurese with IV Lasix 20 mg x 1  - Continue electrolyte replacement protocol   - IV / PO PRN pain control  - PRN antiemetics  - Colage started today   - Encourage ambulation, PT/OT recommend home with assist  - Follow pathology  - AM CBC / BMP / Mag / Phos   -Dr. Ma recommends Xarelto 10 mg daily x1 month for DVT prophylaxis    History of stage IIIC endometrial adenocarcinoma  - s/p radiation therapy / chemotherapy. Patient of Dr. Ma   -  elevated to 27.4 on 11/17/21 - CT imaging was ordered and planned for 12/8 but patient presented to ED   - Had a recent failed colonoscopy  attempt with Dr. Verdugo    HTN  HLD  - Home HCTZ on hold - BP is stable  - Given her hyponatremia, would favor alternative agent if her BP rises enough to warrant treatment     DVT prophylaxis:Medical and mechanical DVT prophylaxis orders are present.     AM-PAC 6 Clicks Score (PT): 15 (12/12/21 0800)    Disposition: I expect the patient to be discharged home tomorrow if bowel function has returned    CODE STATUS:   Code Status and Medical Interventions:   Ordered at: 12/10/21 5626     Code Status (Patient has no pulse and is not breathing):    CPR (Attempt to Resuscitate)     Medical Interventions (Patient has pulse or is breathing):    Full Support     Marcy Belcher PA-C  12/14/21

## 2021-12-14 NOTE — PROGRESS NOTES
Gynecologic Oncology   Daily Progress Note    Chief Complaint: S/p exploratory laparotomy with transverse colonic resection and reanastomosis on 12/10    Subjective   No acute events overnight. Reports sore throat is much improved since removal of NG tube. Pain is well controlled with oral medications. Unsure if she has passed flatus yet or not. Denies nausea or vomiting. Tolerating a clear liquid diet.    Objective   Temp:  [97.7 °F (36.5 °C)-98.6 °F (37 °C)] 97.7 °F (36.5 °C)  Heart Rate:  [60-90] 66  Resp:  [16-18] 17  BP: (112-126)/(54-65) 123/56  Vitals:    12/14/21 0600   BP:    Pulse: 66   Resp:    Temp:    SpO2: 98%     I/O last 3 completed shifts:  In: 120 [Other:120]  Out: 695 [Urine:350; Emesis/NG output:320; Drains:25]      GENERAL: Alert, well-appearing female in no apparent distress.    RESP:  CTAB  CV: Heart RRR  GASTROINTESTINAL:  Soft, appropriately tender, non-distended, no rebound or guarding.  + bowel sounds, incision(s) c/d/i.    GENITOURINARY: Deferred  SKIN:  Warm, dry, well-perfused.    PSYCHIATRIC: AO x3, with appropriate affect, normal thought processes  EXREMITIES: Symmetric. No peripheral edema.    Lab Results   Component Value Date    WBC 3.61 12/14/2021    HGB 9.4 (L) 12/14/2021    HCT 28.6 (L) 12/14/2021    MCV 87.2 12/14/2021     12/14/2021    NEUTROABS 7.11 (H) 12/11/2021    GLUCOSE 72 12/14/2021    BUN 10 12/14/2021    CREATININE 0.64 12/14/2021    EGFRIFNONA 90 12/14/2021    EGFRIFAFRI 77 10/07/2021     (L) 12/14/2021    K 4.0 12/14/2021     12/14/2021    CO2 22.0 12/14/2021    MG 2.2 12/14/2021    CALCIUM 7.8 (L) 12/14/2021         Assessment/Plan   Cindy HOSKINS Chu is a 76 y.o. female status post exploratory laparotomy, transverse colonic resection with reanastomosis on 12/10/2021    1.  Post-operative care  - Routine post operative care   - Pain well controlled with oral meds  - s/p NG tube on 12/13 after successful clamp trial, denies N/V  - S/p JULIANA  drain  - Voiding spontaneously, ambulating to bathroom  - Await flatus    2.  Chronic medical conditions  - Per primary team    3.  Disposition  - Continue inpatient management.    I saw and evaluated the patient. I agree with the findings and the plan of care as documented in the note.  + flatus  Doing well from post op standpoint  Path pending  Full liquid diet - pt request to stay on it for now, but can advance at her request.  Disp- hopefully d/c home tomorrow  Will need [id number] mg for PPX x 1 month post op at d/c home    Daja Ma MD  12/14/21  10:29 EST                 no

## 2021-12-14 NOTE — PLAN OF CARE
Goal Outcome Evaluation:  Plan of Care Reviewed With: patient        Progress: improving  VSS pt has been improving today. Tolerating new diet well. Walking with minimal assist to the bathroom. 1 bowel movement today. Good urine output. Pt is hopeful to go home soon.     Problem: Fall Injury Risk  Goal: Absence of Fall and Fall-Related Injury  Outcome: Ongoing, Progressing  Intervention: Identify and Manage Contributors to Fall Injury Risk  Recent Flowsheet Documentation  Taken 12/14/2021 1450 by Alise Abad RN  Medication Review/Management: medications reviewed  Taken 12/14/2021 1035 by Alise Abad RN  Medication Review/Management: medications reviewed  Taken 12/14/2021 0840 by Alise Abad RN  Medication Review/Management: medications reviewed  Intervention: Promote Injury-Free Environment  Recent Flowsheet Documentation  Taken 12/14/2021 1450 by Alise Abad RN  Safety Promotion/Fall Prevention:   activity supervised   assistive device/personal items within reach   clutter free environment maintained   fall prevention program maintained   lighting adjusted   muscle strengthening facilitated   nonskid shoes/slippers when out of bed   room organization consistent   safety round/check completed  Taken 12/14/2021 1035 by Alise Abad RN  Safety Promotion/Fall Prevention:   activity supervised   assistive device/personal items within reach   clutter free environment maintained   fall prevention program maintained   lighting adjusted   nonskid shoes/slippers when out of bed   room organization consistent   safety round/check completed  Taken 12/14/2021 0840 by Alise Abad RN  Safety Promotion/Fall Prevention:   activity supervised   assistive device/personal items within reach   clutter free environment maintained   fall prevention program maintained   lighting adjusted   muscle strengthening facilitated   nonskid shoes/slippers when out of bed   room organization consistent   safety  round/check completed     Problem: Hypertension Comorbidity  Goal: Blood Pressure in Desired Range  Outcome: Ongoing, Progressing  Intervention: Maintain Hypertension-Management Strategies  Recent Flowsheet Documentation  Taken 12/14/2021 1450 by Alise Abad RN  Medication Review/Management: medications reviewed  Taken 12/14/2021 1035 by Alise Abad RN  Medication Review/Management: medications reviewed  Taken 12/14/2021 0840 by Alise Abad RN  Medication Review/Management: medications reviewed

## 2021-12-14 NOTE — PLAN OF CARE
Goal Outcome Evaluation: no issues overnight, vss, adequate uop, minimal pain with activity. Tylenol given for pain

## 2021-12-14 NOTE — PROGRESS NOTES
"                    Clinical Nutrition       Patient Name: Cindy Sage  YOB: 1945  MRN: [id number]  Date of Encounter: 12/14/21 12:28 EST  Admission date: 12/8/2021      Reason for Visit   Po Follow up      EMR  Reviewed   Yes; RD reviewed dx list this adm.   Nsg administering lasix while RD in room today.   No new actual wts avail at time of RD visit.  RD spoke with RN in room and requested that RN obtain actual wt and RN agreed to do so.     Reported/Observed/Food/Nutrition Related - Comments   12/14) RD spoke with pt in room and pt states she is tolerating solids well today.  Pt perceives that meal trays at [facility] are large. Pt reports consuming less than/equal to approx 50% of trays most recently. Pt requests 3 premier pro/d; RD will order accordingly.     12/9)RD spoke with pt on phone and pt states she weighs regularly at home and perceives she has had some recent wt loss but says she \"couldn't tell you how much\" wt has been lost. RD reviewed wts avail in EMR, and noted 5/13/2107=249ul at outpt visit, and 11/17/21 br=175kn; this data does not suggest signif wt loss. Pt's Stated wt vs wt in May 2021 suggests 14lb wt loss/10% change over the past 7 months, which is not significant in this time frame;  BMI wnl. Need to obtain current wt to more accurately assess reported wt changes.  RD requested that nsg obtain pt's current wt.     Current Nutrition Prescription     Diet Regular; GI Soft      Average Intake from Charting: insuffic data      Actions     Follow treatment progress, Supplement provided  Pt requests 3 premier pro/d; RD will order accordingly.    Will follow for actual wt data as avail.  Monitor per current protocol.      Annmarie Diaz, MS,RD,LD,   Time Spent: 20 mins        "

## 2021-12-15 ENCOUNTER — READMISSION MANAGEMENT (OUTPATIENT)
Dept: CALL CENTER | Facility: HOSPITAL | Age: 76
End: 2021-12-15

## 2021-12-15 VITALS
TEMPERATURE: 97.6 F | HEIGHT: 64 IN | OXYGEN SATURATION: 98 % | SYSTOLIC BLOOD PRESSURE: 124 MMHG | DIASTOLIC BLOOD PRESSURE: 77 MMHG | BODY MASS INDEX: 21.34 KG/M2 | WEIGHT: 125 LBS | RESPIRATION RATE: 15 BRPM | HEART RATE: 76 BPM

## 2021-12-15 PROBLEM — R10.10 PAIN OF UPPER ABDOMEN: Status: RESOLVED | Noted: 2021-12-08 | Resolved: 2021-12-15

## 2021-12-15 PROBLEM — K92.1 MELENA: Status: RESOLVED | Noted: 2021-12-08 | Resolved: 2021-12-15

## 2021-12-15 PROBLEM — C80.1 RECURRENT CANCER: Status: ACTIVE | Noted: 2021-12-15

## 2021-12-15 LAB
ANION GAP SERPL CALCULATED.3IONS-SCNC: 10 MMOL/L (ref 5–15)
BUN SERPL-MCNC: 12 MG/DL (ref 8–23)
BUN/CREAT SERPL: 17.9 (ref 7–25)
CALCIUM SPEC-SCNC: 8 MG/DL (ref 8.6–10.5)
CHLORIDE SERPL-SCNC: 102 MMOL/L (ref 98–107)
CO2 SERPL-SCNC: 23 MMOL/L (ref 22–29)
CREAT SERPL-MCNC: 0.67 MG/DL (ref 0.57–1)
DEPRECATED RDW RBC AUTO: 62.4 FL (ref 37–54)
ERYTHROCYTE [DISTWIDTH] IN BLOOD BY AUTOMATED COUNT: 18.3 % (ref 12.3–15.4)
GFR SERPL CREATININE-BSD FRML MDRD: 85 ML/MIN/1.73
GLUCOSE SERPL-MCNC: 79 MG/DL (ref 65–99)
HCT VFR BLD AUTO: 33 % (ref 34–46.6)
HGB BLD-MCNC: 10.4 G/DL (ref 12–15.9)
MAGNESIUM SERPL-MCNC: 1.9 MG/DL (ref 1.6–2.4)
MCH RBC QN AUTO: 29.2 PG (ref 26.6–33)
MCHC RBC AUTO-ENTMCNC: 31.5 G/DL (ref 31.5–35.7)
MCV RBC AUTO: 92.7 FL (ref 79–97)
PHOSPHATE SERPL-MCNC: 3.8 MG/DL (ref 2.5–4.5)
PLATELET # BLD AUTO: 266 10*3/MM3 (ref 140–450)
PMV BLD AUTO: 8.6 FL (ref 6–12)
POTASSIUM SERPL-SCNC: 4.2 MMOL/L (ref 3.5–5.2)
RBC # BLD AUTO: 3.56 10*6/MM3 (ref 3.77–5.28)
SODIUM SERPL-SCNC: 135 MMOL/L (ref 136–145)
WBC NRBC COR # BLD: 4.16 10*3/MM3 (ref 3.4–10.8)

## 2021-12-15 PROCEDURE — 83735 ASSAY OF MAGNESIUM: CPT | Performed by: PHYSICIAN ASSISTANT

## 2021-12-15 PROCEDURE — 25010000002 FUROSEMIDE PER 20 MG: Performed by: OBSTETRICS & GYNECOLOGY

## 2021-12-15 PROCEDURE — 0 POTASSIUM CHLORIDE 10 MEQ/100ML SOLUTION: Performed by: STUDENT IN AN ORGANIZED HEALTH CARE EDUCATION/TRAINING PROGRAM

## 2021-12-15 PROCEDURE — 99239 HOSP IP/OBS DSCHRG MGMT >30: CPT | Performed by: PHYSICIAN ASSISTANT

## 2021-12-15 PROCEDURE — 85027 COMPLETE CBC AUTOMATED: CPT | Performed by: PHYSICIAN ASSISTANT

## 2021-12-15 PROCEDURE — 84100 ASSAY OF PHOSPHORUS: CPT | Performed by: PHYSICIAN ASSISTANT

## 2021-12-15 PROCEDURE — 0 MAGNESIUM SULFATE 4 GM/100ML SOLUTION: Performed by: OBSTETRICS & GYNECOLOGY

## 2021-12-15 PROCEDURE — 80048 BASIC METABOLIC PNL TOTAL CA: CPT | Performed by: PHYSICIAN ASSISTANT

## 2021-12-15 PROCEDURE — 25010000002 ENOXAPARIN PER 10 MG: Performed by: OBSTETRICS & GYNECOLOGY

## 2021-12-15 RX ORDER — CLOPIDOGREL BISULFATE 75 MG/1
75 TABLET ORAL DAILY
Qty: 30 TABLET | Refills: 5 | Status: SHIPPED | OUTPATIENT
Start: 2021-12-15 | End: 2022-07-18

## 2021-12-15 RX ORDER — DOCUSATE SODIUM 100 MG/1
200 CAPSULE, LIQUID FILLED ORAL DAILY
Qty: 60 CAPSULE | Refills: 2 | Status: SHIPPED | OUTPATIENT
Start: 2021-12-15 | End: 2021-12-29

## 2021-12-15 RX ORDER — FUROSEMIDE 10 MG/ML
40 INJECTION INTRAMUSCULAR; INTRAVENOUS ONCE
Status: COMPLETED | OUTPATIENT
Start: 2021-12-15 | End: 2021-12-15

## 2021-12-15 RX ORDER — ACETAMINOPHEN 325 MG/1
650 TABLET ORAL EVERY 6 HOURS
Status: ON HOLD
Start: 2021-12-15 | End: 2022-11-11 | Stop reason: SDUPTHER

## 2021-12-15 RX ORDER — HYDROCODONE BITARTRATE AND ACETAMINOPHEN 5; 325 MG/1; MG/1
1 TABLET ORAL EVERY 6 HOURS PRN
Qty: 12 TABLET | Refills: 0 | Status: SHIPPED | OUTPATIENT
Start: 2021-12-15 | End: 2021-12-18

## 2021-12-15 RX ORDER — ONDANSETRON 4 MG/1
4 TABLET, FILM COATED ORAL EVERY 6 HOURS PRN
Qty: 12 TABLET | Refills: 0 | Status: SHIPPED | OUTPATIENT
Start: 2021-12-15 | End: 2021-12-29

## 2021-12-15 RX ORDER — POTASSIUM CHLORIDE 750 MG/1
20 CAPSULE, EXTENDED RELEASE ORAL DAILY
Status: DISCONTINUED | OUTPATIENT
Start: 2021-12-15 | End: 2021-12-15 | Stop reason: HOSPADM

## 2021-12-15 RX ORDER — POTASSIUM CHLORIDE 7.45 MG/ML
20 INJECTION INTRAVENOUS ONCE
Status: DISCONTINUED | OUTPATIENT
Start: 2021-12-15 | End: 2021-12-15 | Stop reason: HOSPADM

## 2021-12-15 RX ORDER — POTASSIUM CHLORIDE 1.5 G/1.77G
20 POWDER, FOR SOLUTION ORAL ONCE
Status: COMPLETED | OUTPATIENT
Start: 2021-12-15 | End: 2021-12-15

## 2021-12-15 RX ADMIN — ENOXAPARIN SODIUM 30 MG: 30 INJECTION SUBCUTANEOUS at 08:31

## 2021-12-15 RX ADMIN — Medication 1 CAPSULE: at 08:31

## 2021-12-15 RX ADMIN — FUROSEMIDE 40 MG: 10 INJECTION, SOLUTION INTRAMUSCULAR; INTRAVENOUS at 10:00

## 2021-12-15 RX ADMIN — SODIUM CHLORIDE, PRESERVATIVE FREE 10 ML: 5 INJECTION INTRAVENOUS at 08:31

## 2021-12-15 RX ADMIN — POTASSIUM CHLORIDE 20 MEQ: 1.5 POWDER, FOR SOLUTION ORAL at 11:10

## 2021-12-15 RX ADMIN — MAGNESIUM SULFATE HEPTAHYDRATE 4 G: 40 INJECTION, SOLUTION INTRAVENOUS at 11:10

## 2021-12-15 RX ADMIN — DOCUSATE SODIUM 100 MG: 50 LIQUID ORAL at 08:31

## 2021-12-15 RX ADMIN — PANTOPRAZOLE SODIUM 40 MG: 40 TABLET, DELAYED RELEASE ORAL at 06:45

## 2021-12-15 NOTE — PLAN OF CARE
Goal Outcome Evaluation:  Plan of Care Reviewed With: patient        Progress: improving   VSS. She is doing well today. No c/o pain. Incision site looks good. Ambulating well. 2BMs. Home with family. Follow up appts made. Education complete.     Problem: Adult Inpatient Plan of Care  Goal: Plan of Care Review  Outcome: Ongoing, Progressing  Flowsheets (Taken 12/15/2021 7453)  Progress: improving  Plan of Care Reviewed With: patient

## 2021-12-15 NOTE — DISCHARGE SUMMARY
[facility] Medicine Services  DISCHARGE SUMMARY    Patient Name: Cindy Sage  : 1945  MRN: [id number]    Date of Admission: 2021 10:12 AM  Date of Discharge: 12/15/2021  Primary Care Physician: Jose Carrillo MD    Consults     Date and Time Order Name Status Description    2021  3:30 PM Inpatient Gastroenterology Consult Completed         Hospital Course     Presenting Problem:   Pain of upper abdomen [R10.10]    Active Hospital Problems    Diagnosis  POA   • **Large bowel obstruction s/p colonic resection by Dr. Ma 12/10/21 [K56.609]  Yes   • Recurrent endometrial cancer (HCC) [C80.1]  Unknown   • History of endometrial cancer [Z85.42]  Not Applicable   • GERD (gastroesophageal reflux disease) [K21.9]  Yes   • Hyperlipidemia [E78.5]  Yes   • Hypertension [I10]  Yes      Resolved Hospital Problems    Diagnosis Date Resolved POA   • Pain of upper abdomen [R10.10] 12/15/2021 Yes   • Melena [K92.1] 12/15/2021 Yes      Hospital Course:  Cindy Sage is a 76 y.o. female with PMH significant for HTN, HLD, prior TIA/CVA (2016 without residual deficits), GERD and endometrial cancer (s/p radiation/chemotherapy and followed by Dr. Ma). She presented to [facility] ED on 21 with complaints of a several month history of abdominal pain, intermittent diarrhea/constipation and melena. She was admitted to the hospital medicine service, GI and Dr. Ma were consulted to follow the patient.  She underwent exploratory laparotomy with transverse colon resection and reanastomosis on 12/10/2020 by Dr. Ma.      Large bowel obstruction due to stricture  Intermittent constipation / diarrhea / melena  Unintentional weight loss  - s/p exploratory laparotomy with transverse colonic resection, reanastamosis and mobilization of splenic flexure by Dr. Ma on 12/10/21.   - Intraoperatively, there was a portion of transverse colon that  was tethered - no obvious cancer but the area was firm and this was the area of stricture identified on imaging.   - Pathology concerning for recurrent endometrial cancer  - NGT removed on 12/13 - she is tolerating diet    - Electrolytes replaced per hospital protocol this admission  - Will DC with PO pain control / antiemetics  - PT/OT followed - recommend home with assist  - Dr. Ma recommends Xarelto 10 mg daily x1 month for DVT prophylaxis  - Follow up with Dr. Ma in 2 weeks    Bilateral lower extremity edema  - Suspect secondary to aggressive volume resuscitation  - Given hyponatremia, gently diuresed with IV Lasix prior to DC     History of stage IIIC endometrial adenocarcinoma  - s/p radiation therapy / chemotherapy. Patient of Dr. Ma   -  elevated to 27.4 on 11/17/21 - CT imaging was ordered and planned for 12/8 but patient presented to ED   - Had a recent failed colonoscopy attempt with Dr. Verdugo     HTN  HLD  - DC off of home HCTZ - she is normotensive   - Given her hyponatremia, would favor alternative agent if her BP rises enough to warrant treatment    History of CVA, on ASA / Plavix. Recommend holding Plavix while taking Xarelto for the next 30 days      Day of Discharge     HPI:   Sitting up in bed. She is feeling well today. Tolerating PO intake without nausea or vomiting. Post-op pain well controlled. Multiple bowel movements over past 24 hours. Edema improved with Lasix but still persists - we discussed plan for additional IV Lasix prior to DC.     Review of Systems  Gen- No fevers, chills  CV- No chest pain, palpitations  Resp- No cough, dyspnea  GI- as above     Vital Signs:   Temp:  [97.3 °F (36.3 °C)-98.3 °F (36.8 °C)] 97.7 °F (36.5 °C)  Heart Rate:  [70-80] 76  Resp:  [15-17] 15  BP: (107-134)/(57-69) 134/69     Physical Exam:  Constitutional: No acute distress, awake, alert and conversant. Sitting upright in chair   HENT: NCAT, mucous membranes moist   Respiratory:  Clear to auscultation bilaterally, respiratory effort normal on room air   Cardiovascular: RRR, no murmurs, rubs, or gallops  Gastrointestinal: Positive bowel sounds, midline abdominal incision C/D/I without erythema, edema or induration.  Musculoskeletal: 1-2+ pitting BLE and thigh edema, improved from yesterday   Psychiatric: Appropriate affect, cooperative  Neurologic: Oriented x 3, moves all extremities spontaneously, speech clear  Skin: No rashes    Pertinent  and/or Most Recent Results     LAB RESULTS:      Lab 12/15/21  0659 12/14/21  0400 12/13/21  1134 12/12/21  1608 12/11/21  0250 12/08/21  1756 12/08/21  1346 12/08/21  1055   WBC 4.16 3.61 5.16 8.88 7.81   < >  --  4.70   HEMOGLOBIN 10.4* 9.4* 9.8* 11.5* 11.8*   < >  --  12.7   HEMATOCRIT 33.0* 28.6* 29.8* 34.4 35.8   < >  --  38.9   PLATELETS 266 269 174 292 359   < >  --  339   NEUTROS ABS  --   --   --   --  7.11*  --   --  3.52   IMMATURE GRANS (ABS)  --   --   --   --   --   --   --  0.02   LYMPHS ABS  --   --   --   --   --   --   --  0.64*   MONOS ABS  --   --   --   --   --   --   --  0.48   EOS ABS  --   --   --   --  0.00  --   --  0.02   MCV 92.7 87.2 89.8 88.0 89.3   < >  --  89.8   LACTATE  --   --   --   --   --   --  2.2* 2.2*    < > = values in this interval not displayed.         Lab 12/15/21  0659 12/14/21  0515 12/13/21  1136 12/12/21  1608 12/11/21  0250 12/10/21  1815 12/10/21  0542 12/09/21  0340 12/09/21  0340   SODIUM 135* 133* 130* 129* 133*  --  132*   < > 131*   POTASSIUM 4.2 4.0 3.8 3.7 4.4   < > 5.1   < > 3.6   CHLORIDE 102 103 99 97* 101  --  102   < > 100   CO2 23.0 22.0 19.0* 18.0* 17.0*  --  21.0*   < > 23.0   ANION GAP 10.0 8.0 12.0 14.0 15.0  --  9.0   < > 8.0   BUN 12 10 12 13 9  --  8   < > 18   CREATININE 0.67 0.64 0.71 0.71 0.74  --  0.58   < > 0.74   GLUCOSE 79 72 134* 127* 140*  --  74   < > 76   CALCIUM 8.0* 7.8* 7.3* 7.7* 7.1*  --  7.5*   < > 7.0*   IONIZED CALCIUM  --   --   --  1.09*  --   --  1.18  --   --     MAGNESIUM 1.9 2.2 2.3 3.0* 1.9  --  2.7*   < > 1.8   PHOSPHORUS 3.8 2.9 2.9 1.6* 3.4  --  1.7*   < >  --    HEMOGLOBIN A1C  --   --   --   --   --   --   --   --  5.40   TSH  --   --   --   --   --   --   --   --  2.440    < > = values in this interval not displayed.     Microbiology Results (last 10 days)     Procedure Component Value - Date/Time    COVID PRE-OP / PRE-PROCEDURE SCREENING ORDER (NO ISOLATION) - Swab, Nasopharynx [[id number]]  (Normal) Collected: 12/08/21 1216    Lab Status: Final result Specimen: Swab from Nasopharynx Updated: 12/08/21 1311    Narrative:      The following orders were created for panel order COVID PRE-OP / PRE-PROCEDURE SCREENING ORDER (NO ISOLATION) - Swab, Nasopharynx.  Procedure                               Abnormality         Status                     ---------                               -----------         ------                     Respiratory Panel PCR w/...[[id number]]  Normal              Final result                 Please view results for these tests on the individual orders.    Respiratory Panel PCR w/COVID-19(SARS-CoV-2) DYLAN/DANN/SARMAD/PAD/COR/MAD/ANSELMO In-House, NP Swab in UTM/VTM, 3-4 HR TAT - Swab, Nasopharynx [[id number]]  (Normal) Collected: 12/08/21 1216    Lab Status: Final result Specimen: Swab from Nasopharynx Updated: 12/08/21 1311     ADENOVIRUS, PCR Not Detected     Coronavirus 229E Not Detected     Coronavirus HKU1 Not Detected     Coronavirus NL63 Not Detected     Coronavirus OC43 Not Detected     COVID19 Not Detected     Human Metapneumovirus Not Detected     Human Rhinovirus/Enterovirus Not Detected     Influenza A PCR Not Detected     Influenza B PCR Not Detected     Parainfluenza Virus 1 Not Detected     Parainfluenza Virus 2 Not Detected     Parainfluenza Virus 3 Not Detected     Parainfluenza Virus 4 Not Detected     RSV, PCR Not Detected     Bordetella pertussis pcr Not Detected     Bordetella parapertussis PCR Not Detected     Chlamydophila  pneumoniae PCR Not Detected     Mycoplasma pneumo by PCR Not Detected    Narrative:      In the setting of a positive respiratory panel with a viral infection PLUS a negative procalcitonin without other underlying concern for bacterial infection, consider observing off antibiotics or discontinuation of antibiotics and continue supportive care. If the respiratory panel is positive for atypical bacterial infection (Bordetella pertussis, Chlamydophila pneumoniae, or Mycoplasma pneumoniae), consider antibiotic de-escalation to target atypical bacterial infection.        CT Chest With Contrast Diagnostic    Result Date: 12/10/2021  EXAMINATION: CT CHEST W CONTRAST, DIAGNOSTIC-, CT ABDOMEN AND PELVIS W CONTRAST-12/08/2021:  INDICATION: History of endometrial cancer with newly elevated markers; R10.10-Upper abdominal pain, unspecified; K92.2-Gastrointestinal hemorrhage, unspecified; R97.1-Elevated cancer antigen 125 (), history of endometrial malignancy.  TECHNIQUE: Multiple axial CT imaging was obtained of the abdomen and pelvis following the administration of intravenous contrast.  The radiation dose reduction device was turned on for each scan per the ALARA (As Low as Reasonably Achievable) protocol.  COMPARISON: NONE.  FINDINGS:  CHEST: The thyroid is homogeneous. No mediastinal mass or adenopathy. Cardiac chambers are within normal limits. Small hiatal hernia. Atelectatic changes seen at the right lung base. No pleural effusion or pneumothorax. The visualized upper lung fields are clear. There is no pericardial effusion. Small-to-moderate sized hiatal hernia. There is a subcutaneous nodule identified along the medial aspect of the left breast measuring 1.2 cm. Soft tissue implant cannot be completely excluded. The findings may suggest a borderline enlarged lymph node.  ABDOMEN: The liver is homogeneous in appearance. The spleen is unremarkable. Tiny amount of fluid seen surrounding the liver. Small stone seen in  the gallbladder. There is abnormal wall thickening identified of the colon to suggest a colitis. The distal descending colon and sigmoid colon reveal diverticulosis with no evidence of diverticulitis. There are some dilated loops of small bowel seen distally suggesting an enteritis. Some minimal free fluid seen within the pelvis. No significant wall thickening.  PELVIS: The distal small bowel is mildly dilated and fluid-filled with no transition point identified. Small amount of pneumatosis identified in the proximal colon cannot be completely excluded. Findings suggesting a proximal colitis and distal enteritis. There is minimal free fluid. The bony structures reveal degenerative changes seen throughout the spine. No pelvic adenopathy. No abnormal mass or fluid collection is identified.  Delayed imaging reveals contrast seen in the renal collecting system bilaterally. Contrast seen within both ureters and bladder. No evidence of obvious obstruction.      Abnormal dilated loops of distal small bowel and proximal colon suggesting possible enteritis and colitis. There is surrounding inflammatory change identified and some wall thickening. A small amount of pneumatosis identified in the proximal colon cannot be completely excluded.  D:  12/08/2021 E:  12/08/2021  This report was finalized on 12/10/2021 4:37 PM by Dr. Emily Farris MD.      CT Abdomen Pelvis With Contrast    Result Date: 12/10/2021  EXAMINATION: CT CHEST W CONTRAST, DIAGNOSTIC-, CT ABDOMEN AND PELVIS W CONTRAST-12/08/2021:  INDICATION: History of endometrial cancer with newly elevated markers; R10.10-Upper abdominal pain, unspecified; K92.2-Gastrointestinal hemorrhage, unspecified; R97.1-Elevated cancer antigen 125 (), history of endometrial malignancy.  TECHNIQUE: Multiple axial CT imaging was obtained of the abdomen and pelvis following the administration of intravenous contrast.  The radiation dose reduction device was turned on for each  scan per the ALARA (As Low as Reasonably Achievable) protocol.  COMPARISON: NONE.  FINDINGS:  CHEST: The thyroid is homogeneous. No mediastinal mass or adenopathy. Cardiac chambers are within normal limits. Small hiatal hernia. Atelectatic changes seen at the right lung base. No pleural effusion or pneumothorax. The visualized upper lung fields are clear. There is no pericardial effusion. Small-to-moderate sized hiatal hernia. There is a subcutaneous nodule identified along the medial aspect of the left breast measuring 1.2 cm. Soft tissue implant cannot be completely excluded. The findings may suggest a borderline enlarged lymph node.  ABDOMEN: The liver is homogeneous in appearance. The spleen is unremarkable. Tiny amount of fluid seen surrounding the liver. Small stone seen in the gallbladder. There is abnormal wall thickening identified of the colon to suggest a colitis. The distal descending colon and sigmoid colon reveal diverticulosis with no evidence of diverticulitis. There are some dilated loops of small bowel seen distally suggesting an enteritis. Some minimal free fluid seen within the pelvis. No significant wall thickening.  PELVIS: The distal small bowel is mildly dilated and fluid-filled with no transition point identified. Small amount of pneumatosis identified in the proximal colon cannot be completely excluded. Findings suggesting a proximal colitis and distal enteritis. There is minimal free fluid. The bony structures reveal degenerative changes seen throughout the spine. No pelvic adenopathy. No abnormal mass or fluid collection is identified.  Delayed imaging reveals contrast seen in the renal collecting system bilaterally. Contrast seen within both ureters and bladder. No evidence of obvious obstruction.      Abnormal dilated loops of distal small bowel and proximal colon suggesting possible enteritis and colitis. There is surrounding inflammatory change identified and some wall thickening. A  small amount of pneumatosis identified in the proximal colon cannot be completely excluded.  D:  12/08/2021 E:  12/08/2021  This report was finalized on 12/10/2021 4:37 PM by Dr. Emily Farris MD.      FL Barium Enema Water Soluble Single Contrast    Result Date: 12/14/2021  EXAMINATION: FL BARIUM ENEMA WATER SOLUBLE SINGLE CONTRAST-  INDICATION: colonic stricture; R10.10-Upper abdominal pain, unspecified; K92.2-Gastrointestinal hemorrhage, unspecified; R97.1-Elevated cancer antigen 125 (); R19.7-Diarrhea, unspecified; K59.00-Constipation, unspecified; E87.6-Hypokalemia  TECHNIQUE: 2 minutes and 18 seconds of fluoroscopic time was used for this exam. 12 associated fluoroscopic series were saved.  imaging reveals multiple loops of mildly prominent small bowel, and colon. Contrast is seen within the distal colon, likely from previous contrast-enhanced study performed 12/08/2021  COMPARISON: NONE  FINDINGS: The enema tip was carefully placed in the rectum, and the balloon was inflated. A single contrast study using water-soluble contrast was performed. The colon was filled in a retrograde fashion. There is mild redundancy of the sigmoid colon. There are multiple diverticula of the transverse, descending, and sigmoid colon. Multiple filling defects seen within the distal colon likely represent stool, and/or mucous secretions. Contrast was not able to be advanced proximal to the distal transverse colon. There is mild distention of the colon proximal to this area. High-grade stricture, obstructing, and/or infiltrating lesion of the distal transverse colon cannot be fully excluded at this time. These results were discussed immediately with Dr. Ronald Barrientos. Further evaluation may be considered if clinically relevant.       1. Inability to advance contrast into the transverse colon may represent high-grade stricture, or spasm of the distal transverse colon, however an infiltrating, and or obstructing lesion  cannot be fully excluded at this time. These results were discussed immediately with Dr. Ronald Barrientos. Please consider further evaluation of clinically relevant. 2. Diverticulosis of the descending, and sigmoid colon 3. Mild redundancy of the sigmoid colon    This report was finalized on 12/14/2021 11:38 AM by Dr. Emily Farris MD.      Peripheral Block    Result Date: 12/10/2021  Andra Patino DO     12/10/2021  8:18 PM Peripheral Block Patient reassessed immediately prior to procedure Patient location during procedure: OR Stop time: 12/10/2021 8:10 PM Reason for block: at surgeon's request and post-op pain management Performed by Anesthesiologist: Andra Patino DO Preanesthetic Checklist Completed: patient identified, IV checked, site marked, risks and benefits discussed, surgical consent, monitors and equipment checked, pre-op evaluation and timeout performed Prep: Pt Position: supine Sterile barriers:cap, gloves, sterile barriers and mask Prep: ChloraPrep Patient monitoring: blood pressure monitoring, continuous pulse oximetry and EKG Procedure Sedation: yes Performed under: general Guidance:ultrasound guided Images:still images obtained, printed/placed on chart Laterality:Bilateral Block Type:TAP Injection Technique:single-shot Needle Type:short-bevel and echogenic Needle Gauge:20 G Resistance on Injection: none Medications Used: bupivacaine PF (MARCAINE) 0.25 % injection, 5 mL Med administered at 12/10/2021 8:18 PM Medications Comment:Block Injection:  LA dose divided between Right and Left block;  25cc used bilaterally 0.25% bupiv.  Negative serial aspirations.  Pt tolerated px well. Post Assessment Injection Assessment: negative aspiration for heme, incremental injection and no paresthesia on injection Patient Tolerance:comfortable throughout block Complications:no Additional Notes   Under Ultrasound guidance, a BBraun 4inch 360 degree needle was advanced with Normal Saline hydro dissection of  tissue.  The Internal Oblique and Transversus Abdominus muscles where visualized.  At or before the aponeurosis of Internal Oblique, local anesthetic spread was visualized in the Transversus Abdominus Plane. Injection was made incrementally with aspiration every 5 mls.  There was no  intravascular injection,  injection pressure was normal, there was no neural injection, and the procedure was completed without difficulty.  Thank You.     XR Abdomen KUB    Result Date: 12/12/2021  EXAMINATION: XR ABDOMEN KUB-  INDICATION: check NGT placement; R10.10-Upper abdominal pain, unspecified; K92.2-Gastrointestinal hemorrhage, unspecified; R97.1-Elevated cancer antigen 125 (); R19.7-Diarrhea, unspecified; K59.00-Constipation, unspecified; E87.6-Hypokalemia; K56.609-Unspecified intestinal obstruction, unspecified as to partial versus complete obstruction; Z74.09-Other reduced mobility  COMPARISON: NONE  FINDINGS: Nasogastric tube terminates in the stomach. Bowel gas pattern is nonobstructive.      Nasogastric tube terminates in the stomach. Bowel gas pattern is nonobstructive.  This report was finalized on 12/12/2021 2:25 PM by Jomar Joaquin.      Results for orders placed during the hospital encounter of 02/28/19    Adult Transthoracic Echo Complete W/ Cont if Necessary Per Protocol (With Agitated Saline)    Interpretation Summary  · Estimated EF = 60%.  · Left ventricular systolic function is normal.  · Trace to mild mitral regurgitation  · Trace tricuspid regurgitation  · No evidence of PFO or ASD.    Discharge Details        Discharge Medications      New Medications      Instructions Start Date   acetaminophen 325 MG tablet  Commonly known as: TYLENOL   650 mg, Oral, Every 6 Hours      docusate sodium 100 MG capsule  Commonly known as: Colace   200 mg, Oral, Daily      HYDROcodone-acetaminophen 5-325 MG per tablet  Commonly known as: NORCO   1 tablet, Oral, Every 6 Hours PRN      ondansetron 4 MG tablet  Commonly  known as: ZOFRAN   4 mg, Oral, Every 6 Hours PRN      rivaroxaban 10 MG tablet  Commonly known as: XARELTO   10 mg, Oral, Daily, (Hold Plavix while taking Xarelto)         Continue These Medications      Instructions Start Date   aspirin 81 MG chewable tablet   81 mg, Oral, Daily      clopidogrel 75 MG tablet  Commonly known as: PLAVIX   Hold while taking Xarelto. Resume taking 1 tablet by mouth daily when off of Xarelto      omeprazole 20 MG capsule  Commonly known as: priLOSEC   20 mg, Oral, Daily PRN      simvastatin 40 MG tablet  Commonly known as: ZOCOR   40 mg, Oral, Daily      Turmeric 500 MG capsule   2 capsules, Oral, Daily      VITAMIN D3 GUMMIES ADULT PO   Oral         Stop These Medications    hydroCHLOROthiazide 25 MG tablet  Commonly known as: HYDRODIURIL          Allergies   Allergen Reactions   • Strawberry Extract Swelling     Discharge Disposition:  Home or Self Care    Diet:  Diet Order   Procedures   • Diet Regular; GI Soft     Activity:  Activity Instructions     Other Activity Instructions      No lifting more than 10 lbs until cleared by your surgeon (usually 4-6 weeks)   You may shower normally. Do not scrub or soak your incisions     Cover your incisions as needed for drainage. Do not leave covered at all times - leave open to air when able   Contact your surgeon if you develop increased pain, redness, swelling or purulent/foul-smelling discharge        CODE STATUS:    Code Status and Medical Interventions:   Ordered at: 12/10/21 8475     Code Status (Patient has no pulse and is not breathing):    CPR (Attempt to Resuscitate)     Medical Interventions (Patient has pulse or is breathing):    Full Support     Future Appointments   Date Time Provider Department Center   12/16/2021 11:30 AM [facility] CT 1  [facility]   3/1/2022  3:15 PM Jose Carrillo MD [facility]   11/18/2022 10:30 AM Zayra Georges APRN [facility]     Additional Instructions for the  Follow-ups that You Need to Schedule     Discharge Follow-up with PCP   As directed       Currently Documented PCP:    Jose Carrillo MD    PCP Phone Number:    [phone]     Follow Up Details: 1 week         Discharge Follow-up with Specified Provider: Anil in 2 weeks   As directed      To: Anil in 2 weeks             Marcy Belcher PA-C  12/15/21    Time Spent on Discharge:  I spent 35 minutes on this discharge activity which included: face-to-face encounter with the patient, reviewing the data in the system, coordination of the care with the nursing staff as well as consultants, documentation, and entering orders.

## 2021-12-15 NOTE — OUTREACH NOTE
Prep Survey      Responses   [facility] facility patient discharged from? [facility]   Is LACE score < 7 ? No   Emergency Room discharge w/ pulse ox? No   Eligibility [facility]   Date of Admission 12/08/21   Date of Discharge 12/15/21   Discharge Disposition Home or Self Care   Discharge diagnosis Large bowel obstruction s/p colonic resection Recurrent endometrial cancer    Does the patient have one of the following disease processes/diagnoses(primary or secondary)? Other   Does the patient have Home health ordered? No   Is there a DME ordered? No   Prep survey completed? Yes          Mary Jo Faust RN

## 2021-12-15 NOTE — PROGRESS NOTES
Gynecologic Oncology   Daily Progress Note    Chief Complaint: S/p exploratory laparotomy with transverse colonic resection and reanastomosis on 12/10    Subjective   No acute events overnight. Pain is well controlled with oral medications. Passing flatus and voiding spontaneously. Denies nausea or vomiting. Tolerating a full liquid diet, is open to trying regular diet.    Objective   Temp:  [97.3 °F (36.3 °C)-98.3 °F (36.8 °C)] 97.8 °F (36.6 °C)  Heart Rate:  [70-80] 70  Resp:  [15-17] 15  BP: (107-132)/(57-68) 118/57  Vitals:    12/15/21 0430   BP:    Pulse: 70   Resp:    Temp:    SpO2:      I/O last 3 completed shifts:  In: -   Out: 970 [Urine:950; Emesis/NG output:20]      GENERAL: Alert, well-appearing female in no apparent distress.    RESP:  CTAB  CV: Heart RRR  GASTROINTESTINAL:  Soft, appropriately tender, non-distended, no rebound or guarding.  Positive bowel sounds, incision(s) c/d/i.    GENITOURINARY: Deferred  SKIN:  Warm, dry, well-perfused.    PSYCHIATRIC: AO x3, with appropriate affect, normal thought processes  EXREMITIES: Symmetric. No peripheral edema.    Lab Results   Component Value Date    WBC 3.61 12/14/2021    HGB 9.4 (L) 12/14/2021    HCT 28.6 (L) 12/14/2021    MCV 87.2 12/14/2021     12/14/2021    NEUTROABS 7.11 (H) 12/11/2021    GLUCOSE 72 12/14/2021    BUN 10 12/14/2021    CREATININE 0.64 12/14/2021    EGFRIFNONA 90 12/14/2021    EGFRIFAFRI 77 10/07/2021     (L) 12/14/2021    K 4.0 12/14/2021     12/14/2021    CO2 22.0 12/14/2021    MG 2.2 12/14/2021    CALCIUM 7.8 (L) 12/14/2021         Assessment/Plan   Cindy HOSKINS Chu is a 76 y.o. female status post exploratory laparotomy, transverse colonic resection with reanastomosis on 12/10/2021    1.  Post-operative care  - Routine post operative care   - Pain well controlled with oral meds  - s/p NG tube on 12/13 after successful clamp trial, denies N/V  - On FLD per her request, can advance as tolerated  - S/p JULIANA drain  -  Voiding spontaneously, ambulating to bathroom  - Passing flatus  - Follow up final pathology  - Will need Xarelto 10 mg  For 1 month for VTE prophylaxis after surgery on d/c    2.  Chronic medical conditions  - Per primary team    3.  Disposition  - Can discharge home from postoperative standpoint.    I saw and evaluated the patient. I agree with the findings and the plan of care as documented in the note.  Patient was eating solid food this morning and noted positive bowel movement.  We discussed her pathology report which showed recurrent endometrial cancer at the colonic serosa which was contributing to her large bowel obstruction.  I advised her that she would likely need additional treatment, but treatment plan succussion will be deferred until patient has better recovered from surgery.  She will also likely need additional imaging.  At the time of surgery, I saw no other evidence of disease within the abdominal cavity.  Precautions reviewed.  Patient is happy to be discharged home.  Discussed with primary team.    Daja Ma MD  12/15/21  10:37 EST

## 2021-12-15 NOTE — PLAN OF CARE
Goal Outcome Evaluation: no issues overnight, pt denies pain, vss, a.febrile. pt rested well in chair.

## 2021-12-15 NOTE — CASE MANAGEMENT/SOCIAL WORK
Continued Stay Note  [facility]     Patient Name: Cindy Sage  MRN: [id number]  Today's Date: 12/15/2021    Admit Date: 12/8/2021     Discharge Plan     Row Name 12/15/21 0949       Plan    Plan Home at [place]    Patient/Family in Agreement with Plan yes    Plan Comments I spoke with the pt. Offered to arrange HH at [place]. The pt is not interested in HH at this time. She states her spouse will be with her at [place]. She denies any new DC needs.    Final Discharge Disposition Code 01 - home or self-care               Discharge Codes    No documentation.               Expected Discharge Date and Time     Expected Discharge Date Expected Discharge Time    Dec 15, 2021             Nahomi Bain RN

## 2021-12-17 ENCOUNTER — TRANSITIONAL CARE MANAGEMENT TELEPHONE ENCOUNTER (OUTPATIENT)
Dept: CALL CENTER | Facility: HOSPITAL | Age: 76
End: 2021-12-17

## 2021-12-17 NOTE — OUTREACH NOTE
Call Center TCM Note      Responses   [facility] patient discharged from? [place]   Does the patient have one of the following disease processes/diagnoses(primary or secondary)? Other   TCM attempt successful? Yes   Call start time 1515   Call end time 1517   Discharge diagnosis Large bowel obstruction s/p colonic resection Recurrent endometrial cancer    Meds reviewed with patient/caregiver? Yes   Is the patient having any side effects they believe may be caused by any medication additions or changes? No   Does the patient have all medications ordered at discharge? Yes   Is the patient taking all medications as directed (includes completed medication regime)? Yes   Does the patient have a primary care provider?  Yes   Does the patient have an appointment with their PCP within 7 days of discharge? Yes   Comments regarding PCP Hospital d/c f/u appt is on 12/22/21 at 8:15 am    Has the patient kept scheduled appointments due by today? N/A   Psychosocial issues? No   Did the patient receive a copy of their discharge instructions? Yes   Nursing interventions Reviewed instructions with patient   What is the patient's perception of their health status since discharge? Improving  [Pt reports her legs are still swollen]   Is the patient/caregiver able to teach back signs and symptoms related to disease process for when to call PCP? Yes   Is the patient/caregiver able to teach back signs and symptoms related to disease process for when to call 911? Yes   Is the patient/caregiver able to teach back the hierarchy of who to call/visit for symptoms/problems? PCP, Specialist, Home health nurse, Urgent Care, ED, 911 Yes   If the patient is a current smoker, are they able to teach back resources for cessation? Not a smoker   TCM call completed? Yes          Rosy Quan RN    12/17/2021, 15:17 EST

## 2021-12-22 ENCOUNTER — LAB (OUTPATIENT)
Dept: LAB | Facility: HOSPITAL | Age: 76
End: 2021-12-22

## 2021-12-22 ENCOUNTER — OFFICE VISIT (OUTPATIENT)
Dept: INTERNAL MEDICINE | Facility: CLINIC | Age: 76
End: 2021-12-22

## 2021-12-22 VITALS
HEIGHT: 64 IN | TEMPERATURE: 98 F | HEART RATE: 74 BPM | BODY MASS INDEX: 22.09 KG/M2 | WEIGHT: 129.4 LBS | DIASTOLIC BLOOD PRESSURE: 82 MMHG | SYSTOLIC BLOOD PRESSURE: 132 MMHG | RESPIRATION RATE: 18 BRPM

## 2021-12-22 DIAGNOSIS — R60.0 LOCALIZED EDEMA: ICD-10-CM

## 2021-12-22 DIAGNOSIS — Z23 IMMUNIZATION DUE: ICD-10-CM

## 2021-12-22 DIAGNOSIS — I10 ESSENTIAL HYPERTENSION: Primary | ICD-10-CM

## 2021-12-22 LAB
ALBUMIN SERPL-MCNC: 2.8 G/DL (ref 3.5–5.2)
ALBUMIN/GLOB SERPL: 1.1 G/DL
ALP SERPL-CCNC: 87 U/L (ref 39–117)
ALT SERPL-CCNC: 19 U/L (ref 1–33)
AST SERPL-CCNC: 20 U/L (ref 1–32)
BASOPHILS # BLD AUTO: 0.01 10*3/MM3 (ref 0–0.2)
BASOPHILS NFR BLD AUTO: 0.2 % (ref 0–1.5)
BILIRUB SERPL-MCNC: 0.4 MG/DL (ref 0–1.2)
BUN SERPL-MCNC: 9 MG/DL (ref 8–23)
BUN/CREAT SERPL: 11.5 (ref 7–25)
CALCIUM SERPL-MCNC: 8.4 MG/DL (ref 8.6–10.5)
CHLORIDE SERPL-SCNC: 104 MMOL/L (ref 98–107)
CO2 SERPL-SCNC: 22.4 MMOL/L (ref 22–29)
CREAT SERPL-MCNC: 0.78 MG/DL (ref 0.57–1)
EOSINOPHIL # BLD AUTO: 0.04 10*3/MM3 (ref 0–0.4)
EOSINOPHIL NFR BLD AUTO: 0.8 % (ref 0.3–6.2)
ERYTHROCYTE [DISTWIDTH] IN BLOOD BY AUTOMATED COUNT: 17.7 % (ref 12.3–15.4)
GLOBULIN SER CALC-MCNC: 2.5 GM/DL
GLUCOSE SERPL-MCNC: 88 MG/DL (ref 65–99)
HCT VFR BLD AUTO: 27.3 % (ref 34–46.6)
HGB BLD-MCNC: 9.1 G/DL (ref 12–15.9)
IMM GRANULOCYTES # BLD AUTO: 0.02 10*3/MM3 (ref 0–0.05)
IMM GRANULOCYTES NFR BLD AUTO: 0.4 % (ref 0–0.5)
LYMPHOCYTES # BLD AUTO: 0.82 10*3/MM3 (ref 0.7–3.1)
LYMPHOCYTES NFR BLD AUTO: 16.1 % (ref 19.6–45.3)
MCH RBC QN AUTO: 29.1 PG (ref 26.6–33)
MCHC RBC AUTO-ENTMCNC: 33.3 G/DL (ref 31.5–35.7)
MCV RBC AUTO: 87.2 FL (ref 79–97)
MONOCYTES # BLD AUTO: 0.42 10*3/MM3 (ref 0.1–0.9)
MONOCYTES NFR BLD AUTO: 8.2 % (ref 5–12)
NEUTROPHILS # BLD AUTO: 3.79 10*3/MM3 (ref 1.7–7)
NEUTROPHILS NFR BLD AUTO: 74.3 % (ref 42.7–76)
NRBC BLD AUTO-RTO: 0 /100 WBC (ref 0–0.2)
PLATELET # BLD AUTO: 336 10*3/MM3 (ref 140–450)
POTASSIUM SERPL-SCNC: 2.4 MMOL/L (ref 3.5–5.2)
PROT SERPL-MCNC: 5.3 G/DL (ref 6–8.5)
RBC # BLD AUTO: 3.13 10*6/MM3 (ref 3.77–5.28)
SODIUM SERPL-SCNC: 141 MMOL/L (ref 136–145)
WBC # BLD AUTO: 5.1 10*3/MM3 (ref 3.4–10.8)

## 2021-12-22 PROCEDURE — G0008 ADMIN INFLUENZA VIRUS VAC: HCPCS | Performed by: INTERNAL MEDICINE

## 2021-12-22 PROCEDURE — 90662 IIV NO PRSV INCREASED AG IM: CPT | Performed by: INTERNAL MEDICINE

## 2021-12-22 PROCEDURE — 36415 COLL VENOUS BLD VENIPUNCTURE: CPT | Performed by: INTERNAL MEDICINE

## 2021-12-22 PROCEDURE — 99495 TRANSJ CARE MGMT MOD F2F 14D: CPT | Performed by: INTERNAL MEDICINE

## 2021-12-22 PROCEDURE — 1111F DSCHRG MED/CURRENT MED MERGE: CPT | Performed by: INTERNAL MEDICINE

## 2021-12-22 NOTE — PROGRESS NOTES
Chief Complaint   Patient presents with   • Hospital Follow Up Visit     hospital follow up        History of Present Illness    The patient presents to the office for a follow-up visit from a recent hospitalization. The patient was hospitalized from 08-Dec-2021 through 15-Dec-2021 with large bowel obstruction, edema and recurrent endometrial cancer. The records have been received and reviewed. The medication list has been reconciled. The hospital stay was uncomplicated. Her in hospital treatment consisted of surgery.  The patient underwent a partical colectomy.     Hospital Course (copied and pasted from discharge summary- reviewed with patient in detail):   Cindy Sage is a 76 y.o. female with PMH significant for HTN, HLD, prior TIA/CVA (2016 without residual deficits), GERD and endometrial cancer (s/p radiation/chemotherapy and followed by Dr. Ma). She presented to [facility] ED on 12/8/21 with complaints of a several month history of abdominal pain, intermittent diarrhea/constipation and melena. She was admitted to the hospital medicine service, GI and Dr. Ma were consulted to follow the patient. She underwent exploratory laparotomy with transverse colon resection and reanastomosis on 12/10/2020 by Dr. Ma.       Large bowel obstruction due to stricture   Intermittent constipation / diarrhea / melena   Unintentional weight loss   - s/p exploratory laparotomy with transverse colonic resection, reanastamosis and mobilization of splenic flexure by Dr. Ma on 12/10/21.    - Intraoperatively, there was a portion of transverse colon that was tethered - no obvious cancer but the area was firm and this was the area of stricture identified on imaging.    - Pathology concerning for recurrent endometrial cancer   - NGT removed on 12/13 - she is tolerating diet    - Electrolytes replaced per hospital protocol this admission   - Will DC with PO pain control / antiemetics   - PT/OT  followed - recommend home with assist   - Dr. Ma recommends Xarelto 10 mg daily x1 month for DVT prophylaxis   - Follow up with Dr. Ma in 2 weeks      Bilateral lower extremity edema   - Suspect secondary to aggressive volume resuscitation   - Given hyponatremia, gently diuresed with IV Lasix prior to DC      History of stage IIIC endometrial adenocarcinoma   - s/p radiation therapy / chemotherapy. Patient of Dr. Ma    -  elevated to 27.4 on 11/17/21 - CT imaging was ordered and planned for 12/8 but patient presented to ED    - Had a recent failed colonoscopy attempt with Dr. Verdugo      HTN   HLD   - DC off of home HCTZ - she is normotensive    - Given her hyponatremia, would favor alternative agent if her BP rises enough to warrant treatment      History of CVA, on ASA / Plavix. Recommend holding Plavix while taking Xarelto for the next 30 days       Since leaving the hospital she reports that she is improved. She denies abdominal pain, itchy watery eyes, bone pain, chills, congestion, a dry cough, a wet cough, decreased appetite, diarrhea, dysuria, ear drainage, ear pain, eye drainage, facial pain, fever, a headache, joint pain, myalgias, nasal discharge, nausea, neck stiffness, night sweats, a rash, sneezing, a sore throat, vomiting or wheezing. She also reports that she does not have chest pain, dyspnea, edema, syncope, blurred vision or palpitations.    Medications      Current Outpatient Medications:   •  acetaminophen (TYLENOL) 325 MG tablet, Take 2 tablets by mouth Every 6 (Six) Hours., Disp: , Rfl:   •  aspirin 81 MG chewable tablet, Chew 1 tablet Daily., Disp: 90 tablet, Rfl: 3  •  Cholecalciferol (VITAMIN D3 GUMMIES ADULT PO), Take  by mouth., Disp: , Rfl:   •  clopidogrel (PLAVIX) 75 MG tablet, Hold while taking Xarelto. Resume taking 1 tablet by mouth daily when off of Xarelto, Disp: 30 tablet, Rfl: 5  •  docusate sodium (Colace) 100 MG capsule, Take 2 capsules by mouth  "Daily., Disp: 60 capsule, Rfl: 2  •  ondansetron (ZOFRAN) 4 MG tablet, Take 1 tablet by mouth Every 6 (Six) Hours As Needed for Nausea or Vomiting., Disp: 12 tablet, Rfl: 0  •  omeprazole (priLOSEC) 20 MG capsule, Take 20 mg by mouth Daily As Needed., Disp: , Rfl:   •  rivaroxaban (XARELTO) 10 MG tablet, Take 1 tablet by mouth Daily for 30 days. (Hold Plavix while taking Xarelto)  Indications: Prevention of Unwanted Clot in Veins, Disp: 30 tablet, Rfl: 0  •  simvastatin (ZOCOR) 40 MG tablet, Take 1 tablet by mouth Daily., Disp: 30 tablet, Rfl: 5  •  Turmeric 500 MG capsule, Take 2 capsules by mouth Daily., Disp: , Rfl:    Current outpatient and discharge medications have been reconciled for the patient.  Reviewed by: Jose Carrillo MD    Allergies    Allergies   Allergen Reactions   • Strawberry Extract Swelling       Problem List    Patient Active Problem List   Diagnosis   • Hyperlipidemia   • Hypertension   • Ischemic stroke (HCC)   • Endometrial cancer (HCC)   • GERD (gastroesophageal reflux disease)   • Hx: UTI (urinary tract infection)   • History of endometrial cancer   • TIA (transient ischemic attack)   • Stenosis of right middle cerebral artery   • Musculoskeletal neck pain   • Large bowel obstruction s/p colonic resection by Dr. Ma 12/10/21   • Recurrent endometrial cancer (HCC)       Medications, Allergies, Problems List and Past History were reviewed and updated.    Physical Examination    /82 (BP Location: Left arm, Patient Position: Sitting, Cuff Size: Adult)   Pulse 74   Temp 98 °F (36.7 °C) (Infrared)   Resp 18   Ht 162.6 cm (64\")   Wt 58.7 kg (129 lb 6.4 oz)   LMP  (LMP Unknown)   Breastfeeding No   BMI 22.21 kg/m²     HEENT: Head- Normocephalic Atraumatic. Facies- Within normal limits. Pinnas- Normal texture and shape bilaterally. Canals- Normal bilaterally. TMs- Normal bilaterally. Nares- Patent bilaterally. Nasal Septum- is normal. There is no tenderness to palpation " over the frontal or maxillary sinuses. Lids- Normal bilaterally. Conjunctiva- Clear bilaterally. Sclera- Anicteric bilaterally. Oropharynx- Moist with no lesions. Tonsils- No enlargement, erythema or exudate.    Neck: Thyroid- non enlarged, symmetric and has no nodules. No bruits are detected. ROM- Normal Range of Motion with no rigidity.    Lungs: Auscultation- Clear to auscultation bilaterally. There are no retractions, clubbing or cyanosis. The Expiratory to Inspiratory ratio is equal.    Lymph Nodes: Cervical- no enlarged lymph nodes noted. Clavicular- Deferred. Axillary- Deferred. Inguinal- Deferred.    Cardiovascular: There are no carotid bruits. Heart- Normal Rate with Regular rhythm and no murmurs. There are no gallops. There are no rubs. In the lower extremities there is no edema. The upper extremities do not have edema.    Abdomen: Soft, benign, non-tender with no masses, hernias, organomegaly or scars.    Diagnoses and all orders for this visit:    1. Essential hypertension (Primary)  -     CBC & Differential; Future  -     Comprehensive Metabolic Panel; Future  -     Comprehensive Metabolic Panel  -     CBC & Differential    2. Localized edema  -     CBC & Differential; Future  -     Comprehensive Metabolic Panel; Future  -     Comprehensive Metabolic Panel  -     CBC & Differential    3. Immunization due  -     Fluzone High-Dose 65+yrs      Transitional Care Management Certification  I certify that the following are true:  1. Communication was made within 2 business days of discharge.  2. Complexity of Medical Decision Making is moderate.  3. Face to face visit occurred within 7 days.    *Note: 66885 is for high complexity patients with a face to face visit within 7 days of discharge.  26249 is for high complexity patients with a face to face on days 8-14 post discharge or medium complexity with face to face visit within 14 days post discharge.      Return to Office    The patient was instructed to  return for follow-up in 1 month. The patient was instructed to return sooner if the condition changes, worsens, or does not resolve.

## 2021-12-23 ENCOUNTER — TELEPHONE (OUTPATIENT)
Dept: INTERNAL MEDICINE | Facility: CLINIC | Age: 76
End: 2021-12-23

## 2021-12-23 DIAGNOSIS — E87.6 HYPOKALEMIA: Primary | ICD-10-CM

## 2021-12-23 NOTE — TELEPHONE ENCOUNTER
S/W pt, informed that Dr Sparks said let her know that her K is very low let her know that her K is very low and she needs to take klor-con 20 mEq tablets 4 po BID today and 2 po tomorrow AM.  Go to hospital lab and have K level rechecked tomorrow morning. Her other labs reveal anemia but it is stable. States she absolutely cannot take the Klor con tablets.  States in the hospital they gave her a potassium powder that she mixed in apple sauce and ate.  States she will have to have the powder form in order to be able to take. Verb understanding and agreement of rest of message and states she will go to hospital to have level rechecked tomorrow.  Requests call back when rx is changed.

## 2021-12-23 NOTE — TELEPHONE ENCOUNTER
Please let her know that her K is very low.  Please call in klor-con 20 mEq tablets.  #30.  Sig:  Take 4 po BID today and 2 po tomorrow AM.  Go to hospital lab and have K level rechecked tomorrow morning.     Her other labs reveal anemia but it is stable.

## 2021-12-23 NOTE — TELEPHONE ENCOUNTER
S/W [facility].  States they do not have the powder and cannot order it but that she found a bottle of liquid potassium 10 % which is 20 meq/15 ml that she can fill.  States does not have enough to last a month with the loading doses but has enough to last til Tuesday and they can order and get more in on Monday or Tuesday.  Took rx for 80 meq or 60 ml twice today and 40 meq or 30 ml tomorrow and to follow up with MD for further dosing.  Dispense 180 ml with 1 refill.      Pt notified of above. Verb understanding and great apprec.

## 2021-12-23 NOTE — TELEPHONE ENCOUNTER
Please call her pharmacy and see what kind of powder they have that would be equivalent and call in appropriate amount.  Jose Carrillo MD  12:07 EST  12/23/21

## 2021-12-24 ENCOUNTER — LAB (OUTPATIENT)
Dept: LAB | Facility: HOSPITAL | Age: 76
End: 2021-12-24

## 2021-12-27 ENCOUNTER — DOCUMENTATION (OUTPATIENT)
Dept: GYNECOLOGIC ONCOLOGY | Facility: CLINIC | Age: 76
End: 2021-12-27

## 2021-12-27 NOTE — PROGRESS NOTES
Patient with recurrent endometrial cancer.  Caris testing to be performed on colon resection specimen where she has had a cancer recurrence.  This will help guide her treatment whether she is a candidate for pembrolizumab as a single agent or pembrolizumab plus lymphedema.  If there is not enough tissue at the colon resection specimen, her primary surgical resection cancer could be submitted.  Electronically signed by Daja Ma MD, 12/27/21, 11:58 AM EST.

## 2021-12-28 ENCOUNTER — READMISSION MANAGEMENT (OUTPATIENT)
Dept: CALL CENTER | Facility: HOSPITAL | Age: 76
End: 2021-12-28

## 2021-12-28 NOTE — OUTREACH NOTE
Medical Week 2 Survey      Responses   [facility] patient discharged from? [place]   Does the patient have one of the following disease processes/diagnoses(primary or secondary)? Other   Week 2 attempt successful? No   Unsuccessful attempts Attempt 1          Yvonne Briggs LPN

## 2021-12-29 ENCOUNTER — OFFICE VISIT (OUTPATIENT)
Dept: GYNECOLOGIC ONCOLOGY | Facility: CLINIC | Age: 76
End: 2021-12-29

## 2021-12-29 ENCOUNTER — READMISSION MANAGEMENT (OUTPATIENT)
Dept: CALL CENTER | Facility: HOSPITAL | Age: 76
End: 2021-12-29

## 2021-12-29 VITALS
SYSTOLIC BLOOD PRESSURE: 125 MMHG | DIASTOLIC BLOOD PRESSURE: 59 MMHG | RESPIRATION RATE: 18 BRPM | TEMPERATURE: 98.2 F | HEIGHT: 64 IN | HEART RATE: 82 BPM | WEIGHT: 123 LBS | BODY MASS INDEX: 21 KG/M2 | OXYGEN SATURATION: 99 %

## 2021-12-29 DIAGNOSIS — C80.1 RECURRENT CANCER: Primary | ICD-10-CM

## 2021-12-29 DIAGNOSIS — R22.2 MASS OF CHEST WALL, LEFT: ICD-10-CM

## 2021-12-29 DIAGNOSIS — C54.1 ENDOMETRIAL CANCER: ICD-10-CM

## 2021-12-29 PROCEDURE — 99214 OFFICE O/P EST MOD 30 MIN: CPT | Performed by: OBSTETRICS & GYNECOLOGY

## 2021-12-29 RX ORDER — POTASSIUM CHLORIDE 20MEQ/15ML
15 LIQUID (ML) ORAL DAILY
COMMUNITY
Start: 2021-12-23 | End: 2022-02-04

## 2021-12-29 NOTE — OUTREACH NOTE
Medical Week 2 Survey      Responses   [facility] patient discharged from? [place]   Does the patient have one of the following disease processes/diagnoses(primary or secondary)? Other   Week 2 attempt successful? Yes   Call start time 1710   Call end time 1712   Meds reviewed with patient/caregiver? Yes   Is the patient taking all medications as directed (includes completed medication regime)? Yes   Medication comments added potassium   Has the patient kept scheduled appointments due by today? Yes   Comments Has seen Oncology and PCP   Has home health visited the patient within 72 hours of discharge? N/A   Comments uses walker as needed.    What is the patient's perception of their health status since discharge? Improving   Week 2 Call Completed? Yes   Graduated Yes   Is the patient interested in additional calls from an ambulatory ?  NOTE:  applies to high risk patients requiring additional follow-up. No   Did the patient feel the follow up calls were helpful during their recovery period? Yes   Was the number of calls appropriate? Yes   Graduated/Revoked comments She says is doing well, no need for calls sees her Drs and calls them if needed, ,no questions or new issues today          Alise Patino RN

## 2022-01-04 ENCOUNTER — TRANSCRIBE ORDERS (OUTPATIENT)
Dept: MAMMOGRAPHY | Facility: HOSPITAL | Age: 77
End: 2022-01-04

## 2022-01-04 DIAGNOSIS — N63.24 UNSPECIFIED LUMP IN THE LEFT BREAST, LOWER INNER QUADRANT: Primary | ICD-10-CM

## 2022-01-17 ENCOUNTER — HOSPITAL ENCOUNTER (OUTPATIENT)
Dept: MAMMOGRAPHY | Facility: HOSPITAL | Age: 77
Discharge: HOME | End: 2022-01-17

## 2022-01-17 ENCOUNTER — HOSPITAL ENCOUNTER (OUTPATIENT)
Dept: ULTRASOUND IMAGING | Facility: HOSPITAL | Age: 77
Discharge: HOME | End: 2022-01-17

## 2022-01-17 DIAGNOSIS — N63.24 UNSPECIFIED LUMP IN THE LEFT BREAST, LOWER INNER QUADRANT: ICD-10-CM

## 2022-01-17 PROCEDURE — 88305 TISSUE EXAM BY PATHOLOGIST: CPT | Performed by: SURGERY

## 2022-01-17 PROCEDURE — 88360 TUMOR IMMUNOHISTOCHEM/MANUAL: CPT | Performed by: SURGERY

## 2022-01-17 PROCEDURE — 88341 IMHCHEM/IMCYTCHM EA ADD ANTB: CPT | Performed by: SURGERY

## 2022-01-17 PROCEDURE — 19083 BX BREAST 1ST LESION US IMAG: CPT | Performed by: RADIOLOGY

## 2022-01-17 PROCEDURE — 76642 ULTRASOUND BREAST LIMITED: CPT

## 2022-01-17 PROCEDURE — A4648 IMPLANTABLE TISSUE MARKER: HCPCS

## 2022-01-17 PROCEDURE — G0279 TOMOSYNTHESIS, MAMMO: HCPCS

## 2022-01-17 PROCEDURE — G0279 TOMOSYNTHESIS, MAMMO: HCPCS | Performed by: RADIOLOGY

## 2022-01-17 PROCEDURE — 77066 DX MAMMO INCL CAD BI: CPT

## 2022-01-17 PROCEDURE — 0 LIDOCAINE 1 % SOLUTION: Performed by: RADIOLOGY

## 2022-01-17 PROCEDURE — 77066 DX MAMMO INCL CAD BI: CPT | Performed by: RADIOLOGY

## 2022-01-17 PROCEDURE — 88342 IMHCHEM/IMCYTCHM 1ST ANTB: CPT | Performed by: SURGERY

## 2022-01-17 PROCEDURE — 76642 ULTRASOUND BREAST LIMITED: CPT | Performed by: RADIOLOGY

## 2022-01-17 RX ORDER — LIDOCAINE HYDROCHLORIDE AND EPINEPHRINE 10; 10 MG/ML; UG/ML
10 INJECTION, SOLUTION INFILTRATION; PERINEURAL ONCE
Status: COMPLETED | OUTPATIENT
Start: 2022-01-17 | End: 2022-01-17

## 2022-01-17 RX ORDER — LIDOCAINE HYDROCHLORIDE 10 MG/ML
5 INJECTION, SOLUTION INFILTRATION; PERINEURAL ONCE
Status: COMPLETED | OUTPATIENT
Start: 2022-01-17 | End: 2022-01-17

## 2022-01-17 RX ADMIN — LIDOCAINE HYDROCHLORIDE,EPINEPHRINE BITARTRATE 5 ML: 10; .01 INJECTION, SOLUTION INFILTRATION; PERINEURAL at 15:38

## 2022-01-17 RX ADMIN — Medication 0.5 ML: at 15:37

## 2022-01-17 NOTE — PROGRESS NOTES
Alert and orientated. Denies discomfort. No active bleeding. Steri-strips not visualized, gauze dressing intact. Ice pack given. Verbalizes and demonstrates understanding of post-care instructions, written copy given.   Patient established with Dr TAMMIE Muse. An appointment will be made for surgical consult after pathology is returned. Questions answered & support given. Breast cancer information packet offered and accepted. Patient verbalized understanding.  Encouraged to call back or contact RENNY Doyle RN, OCN with further questions or concerns.

## 2022-01-20 ENCOUNTER — TELEPHONE (OUTPATIENT)
Dept: GYNECOLOGIC ONCOLOGY | Facility: CLINIC | Age: 77
End: 2022-01-20

## 2022-01-20 LAB
CYTO UR: NORMAL
LAB AP CASE REPORT: NORMAL
LAB AP CLINICAL INFORMATION: NORMAL
LAB AP DIAGNOSIS COMMENT: NORMAL
LAB AP SPECIAL STAINS: NORMAL
PATH REPORT.FINAL DX SPEC: NORMAL
PATH REPORT.GROSS SPEC: NORMAL

## 2022-01-20 NOTE — TELEPHONE ENCOUNTER
I called patient to discuss Caris results.  She would be a candidate for single agent Keytruda.  However, she had a breast biopsy 3 days ago and now pathology shows invasive breast cancer.  Of sent a message to Dr. Muse so we can coordinate care approach.  I told patient Dr. Muse would have to call her regarding the breast cancer.  She verbalized understanding.

## 2022-01-20 NOTE — TELEPHONE ENCOUNTER
Caller: Kitty Sage    Relationship: Self    Best call back number: [phone]    What was the call regarding: KITTY CALLED TO SPEAK WITH SOMEONE ON WHETHER SHE NEEDS TO SET UP AN APPT OR NOT. SHE SAYS SHE WAS WAITING TO HEAR BACK FROM DR. KUHN, BUT HAS NOT HEARD ANYTHING.    Do you require a callback: YES

## 2022-01-21 ENCOUNTER — TELEPHONE (OUTPATIENT)
Dept: MAMMOGRAPHY | Facility: HOSPITAL | Age: 77
End: 2022-01-21

## 2022-01-21 LAB
CYTO UR: NORMAL
LAB AP CARIS, ADDENDUM: NORMAL
LAB AP CASE REPORT: NORMAL
LAB AP CLINICAL INFORMATION: NORMAL
LAB AP DIAGNOSIS COMMENT: NORMAL
LAB AP SPECIAL STAINS: NORMAL
PATH REPORT.FINAL DX SPEC: NORMAL
PATH REPORT.GROSS SPEC: NORMAL

## 2022-01-21 NOTE — TELEPHONE ENCOUNTER
Called patient to follow up after biopsy.  Patient stated she was notified of pathology results yesterday by Dr. ANGEL Ma. Denies discomfort.Denies signs and symptoms of infection.     Patient established with Dr. TAMMIE Muse and stated she was previously notified of surgical consult appointment on 1/25/22 @ 1100/1130@. Patient verbalized understanding.    Patient aware of office contact & location information. Told to bring photo ID, list of prescription & OTC medications, insurance information, must wear a mask & can bring one person for support also wearing a mask.     Reviewed what would be discussed at surgical consult visit, including detailed explanation of pathology report & imaging reports; treatment options & pros/cons, availability of Breast Nurse Navigator. Patient encouraged to call back or contact Breast Nurse Navigator, with any questions or concerns. Patient information sent to Genetics/Navigator pool for evaluation & possible referral for genetic counseling.    Breast cancer information packet offered and accepted.

## 2022-01-25 ENCOUNTER — NURSE NAVIGATOR (OUTPATIENT)
Dept: OTHER | Facility: HOSPITAL | Age: 77
End: 2022-01-25

## 2022-01-25 DIAGNOSIS — Z17.0 MALIGNANT NEOPLASM OF BREAST IN FEMALE, ESTROGEN RECEPTOR POSITIVE, UNSPECIFIED LATERALITY, UNSPECIFIED SITE OF BREAST: Primary | ICD-10-CM

## 2022-01-25 DIAGNOSIS — C50.919 MALIGNANT NEOPLASM OF BREAST IN FEMALE, ESTROGEN RECEPTOR POSITIVE, UNSPECIFIED LATERALITY, UNSPECIFIED SITE OF BREAST: Primary | ICD-10-CM

## 2022-01-25 NOTE — PROGRESS NOTES
I saw patient with Dr Muse and patient's daughter - Dr Muse reviewed the pathology report and surgical options. IG IDC, ER/AK positive Her Negative breast cancer. Stage IA. The patient will have a MRI - She has a history and is currently being treated by Dr. Ma for metastatic uteran cancer. A referral to genetics has been made- Educational and supportive materials were given and reviewed.

## 2022-01-26 ENCOUNTER — OFFICE VISIT (OUTPATIENT)
Dept: INTERNAL MEDICINE | Facility: CLINIC | Age: 77
End: 2022-01-26

## 2022-01-26 ENCOUNTER — HOSPITAL ENCOUNTER (OUTPATIENT)
Dept: GENERAL RADIOLOGY | Facility: HOSPITAL | Age: 77
Discharge: HOME | End: 2022-01-26

## 2022-01-26 ENCOUNTER — HOSPITAL ENCOUNTER (OUTPATIENT)
Dept: CARDIOLOGY | Facility: HOSPITAL | Age: 77
Discharge: HOME | End: 2022-01-26

## 2022-01-26 ENCOUNTER — LAB (OUTPATIENT)
Dept: LAB | Facility: HOSPITAL | Age: 77
End: 2022-01-26

## 2022-01-26 VITALS
BODY MASS INDEX: 19.05 KG/M2 | SYSTOLIC BLOOD PRESSURE: 134 MMHG | RESPIRATION RATE: 20 BRPM | TEMPERATURE: 97.7 F | WEIGHT: 111 LBS | HEART RATE: 84 BPM | DIASTOLIC BLOOD PRESSURE: 60 MMHG

## 2022-01-26 VITALS — WEIGHT: 111 LBS | BODY MASS INDEX: 19.67 KG/M2 | HEIGHT: 63 IN

## 2022-01-26 VITALS — WEIGHT: 111 LBS | HEIGHT: 63 IN | BODY MASS INDEX: 19.67 KG/M2

## 2022-01-26 DIAGNOSIS — I73.9 CLAUDICATION: ICD-10-CM

## 2022-01-26 DIAGNOSIS — M79.604 RIGHT LEG PAIN: ICD-10-CM

## 2022-01-26 DIAGNOSIS — M79.604 RIGHT LEG PAIN: Primary | ICD-10-CM

## 2022-01-26 LAB
BH CV ECHO MEAS - BSA(HAYCOCK): 1.5 M^2
BH CV ECHO MEAS - BSA: 1.5 M^2
BH CV ECHO MEAS - BZI_BMI: 19.1 KILOGRAMS/M^2
BH CV ECHO MEAS - BZI_METRIC_HEIGHT: 162.6 CM
BH CV ECHO MEAS - BZI_METRIC_WEIGHT: 50.3 KG
BH CV GRAFT BRACHIAL PRESSURE LEFT: 144 MMHG
BH CV GRAFT BRACHIAL PRESSURE RIGHT: 148 MMHG
BH CV LEA LEFT DPA PRESSURE: 152 MMHG
BH CV LEA LEFT PTA PRESSURE: 168 MMHG
BH CV LEA RIGHT ANT TIBIAL A DISTAL PSV: 68.1 CM/S
BH CV LEA RIGHT ANT TIBIAL A MID PSV: 68.1 CM/S
BH CV LEA RIGHT ANT TIBIAL A PROX EDV: 9.4 CM/S
BH CV LEA RIGHT ANT TIBIAL A PROX PSV: 74.4 CM/S
BH CV LEA RIGHT CFA DISTAL EDV: 13.8 CM/S
BH CV LEA RIGHT CFA DISTAL PSV: 204 CM/S
BH CV LEA RIGHT DFA PROX EDV: 7.4 CM/S
BH CV LEA RIGHT DFA PROX PSV: 69.7 CM/S
BH CV LEA RIGHT DPA PRESSURE: 158 MMHG
BH CV LEA RIGHT PERONEAL  MID PSV: 75 CM/S
BH CV LEA RIGHT POPITEAL A  DISTAL EDV: 11.9 CM/S
BH CV LEA RIGHT POPITEAL A  DISTAL PSV: 78.6 CM/S
BH CV LEA RIGHT POPITEAL A  PROX EDV: 6.6 CM/S
BH CV LEA RIGHT POPITEAL A  PROX PSV: 51.1 CM/S
BH CV LEA RIGHT PTA DISTAL PSV: 74.7 CM/S
BH CV LEA RIGHT PTA MID EDV: 12.2 CM/S
BH CV LEA RIGHT PTA MID PSV: 150 CM/S
BH CV LEA RIGHT PTA PRESSURE: 150 MMHG
BH CV LEA RIGHT PTA PROX PSV: 88 CM/S
BH CV LEA RIGHT SFA DISTAL PSV: 76.8 CM/S
BH CV LEA RIGHT SFA MID PSV: 106 CM/S
BH CV LEA RIGHT SFA PROX PSV: 98.5 CM/S
BH CV LOWER ARTERIAL LEFT ABI RATIO: 1.1
BH CV LOWER ARTERIAL RIGHT ABI RATIO: 1.1
BH CV LOWER VASCULAR LEFT COMMON FEMORAL AUGMENT: NORMAL
BH CV LOWER VASCULAR LEFT COMMON FEMORAL COMPRESS: NORMAL
BH CV LOWER VASCULAR LEFT COMMON FEMORAL PHASIC: NORMAL
BH CV LOWER VASCULAR LEFT COMMON FEMORAL SPONT: NORMAL
BH CV LOWER VASCULAR RIGHT COMMON FEMORAL AUGMENT: NORMAL
BH CV LOWER VASCULAR RIGHT COMMON FEMORAL COMPRESS: NORMAL
BH CV LOWER VASCULAR RIGHT COMMON FEMORAL PHASIC: NORMAL
BH CV LOWER VASCULAR RIGHT COMMON FEMORAL SPONT: NORMAL
BH CV LOWER VASCULAR RIGHT DISTAL FEMORAL AUGMENT: NORMAL
BH CV LOWER VASCULAR RIGHT DISTAL FEMORAL COMPRESS: NORMAL
BH CV LOWER VASCULAR RIGHT DISTAL FEMORAL PHASIC: NORMAL
BH CV LOWER VASCULAR RIGHT DISTAL FEMORAL SPONT: NORMAL
BH CV LOWER VASCULAR RIGHT GASTRONEMIUS COMPRESS: NORMAL
BH CV LOWER VASCULAR RIGHT GREATER SAPH AK COMPRESS: NORMAL
BH CV LOWER VASCULAR RIGHT GREATER SAPH BK COMPRESS: NORMAL
BH CV LOWER VASCULAR RIGHT LESSER SAPH COMPRESS: NORMAL
BH CV LOWER VASCULAR RIGHT MID FEMORAL AUGMENT: NORMAL
BH CV LOWER VASCULAR RIGHT MID FEMORAL COMPRESS: NORMAL
BH CV LOWER VASCULAR RIGHT MID FEMORAL PHASIC: NORMAL
BH CV LOWER VASCULAR RIGHT MID FEMORAL SPONT: NORMAL
BH CV LOWER VASCULAR RIGHT PERONEAL COMPRESS: NORMAL
BH CV LOWER VASCULAR RIGHT POPLITEAL AUGMENT: NORMAL
BH CV LOWER VASCULAR RIGHT POPLITEAL COMPRESS: NORMAL
BH CV LOWER VASCULAR RIGHT POPLITEAL PHASIC: NORMAL
BH CV LOWER VASCULAR RIGHT POPLITEAL SPONT: NORMAL
BH CV LOWER VASCULAR RIGHT POSTERIOR TIBIAL COMPRESS: NORMAL
BH CV LOWER VASCULAR RIGHT PROFUNDA FEMORAL AUGMENT: NORMAL
BH CV LOWER VASCULAR RIGHT PROFUNDA FEMORAL COMPRESS: NORMAL
BH CV LOWER VASCULAR RIGHT PROFUNDA FEMORAL PHASIC: NORMAL
BH CV LOWER VASCULAR RIGHT PROFUNDA FEMORAL SPONT: NORMAL
BH CV LOWER VASCULAR RIGHT PROXIMAL FEMORAL AUGMENT: NORMAL
BH CV LOWER VASCULAR RIGHT PROXIMAL FEMORAL COMPRESS: NORMAL
BH CV LOWER VASCULAR RIGHT PROXIMAL FEMORAL PHASIC: NORMAL
BH CV LOWER VASCULAR RIGHT PROXIMAL FEMORAL SPONT: NORMAL
BH CV LOWER VASCULAR RIGHT SAPHENOFEMORAL JUNCTION AUGMENT: NORMAL
BH CV LOWER VASCULAR RIGHT SAPHENOFEMORAL JUNCTION COMPRESS: NORMAL
BH CV LOWER VASCULAR RIGHT SAPHENOFEMORAL JUNCTION PHASIC: NORMAL
BH CV LOWER VASCULAR RIGHT SAPHENOFEMORAL JUNCTION SPONT: NORMAL
BH CV LOWER VASCULAR RIGHT SOLEAL COMPRESS: NORMAL
MAXIMAL PREDICTED HEART RATE: 143 BPM
STRESS TARGET HR: 122 BPM

## 2022-01-26 PROCEDURE — 93971 EXTREMITY STUDY: CPT

## 2022-01-26 PROCEDURE — 99213 OFFICE O/P EST LOW 20 MIN: CPT | Performed by: INTERNAL MEDICINE

## 2022-01-26 PROCEDURE — 93971 EXTREMITY STUDY: CPT | Performed by: INTERNAL MEDICINE

## 2022-01-26 PROCEDURE — 93926 LOWER EXTREMITY STUDY: CPT | Performed by: INTERNAL MEDICINE

## 2022-01-26 PROCEDURE — 93926 LOWER EXTREMITY STUDY: CPT

## 2022-01-26 PROCEDURE — 73590 X-RAY EXAM OF LOWER LEG: CPT

## 2022-01-26 NOTE — PROGRESS NOTES
Chief Complaint   Patient presents with   • Follow-up     1 month follow up hypertension   • right lower leg pain       History of Present Illness    The patient reports pain in the right leg of 3 weeks duration The symptoms have the greatest intensity in the calf and doesn't radiate. She does report edema. There has been trauma. There is no history of immobilization. The patient denies a rash associated with the pain. The patient denies a dry cough, a wet cough, wheezing, nasal discharge, facial pain, a headache, eye drainage, ear pain, ear drainage, a sore throat, night sweats, abdominal pain, neck stiffness, dysuria, bone pain, joint pain, chills, myalgias, nausea, vomiting, diarrhea, fussiness or decreased appetite. She states that the pain is made worse with walking or activity and it is relieved with rest.    Review of Systems    CONSTITUTIONAL- Denies Unexplained Weight Loss, Fever, Sweats, Fatigue, Weakness or Malaise.    HENT- Denies Sinus Pain, Nasal Congestion, Decreased Hearing or Tinnitus.    GASTROINTESTINAL- Denies Blood per Rectum, Constipation or Heartburn.    PULMONARY- Denies Dyspnea, Sputum Production, Cough, Hemoptysis or Pleuritic Chest Pain.    CARDIOVASCULAR- Denies Chest Pain, Claudication, Syncope or Palpitations.    Medications      Current Outpatient Medications:   •  acetaminophen (TYLENOL) 325 MG tablet, Take 2 tablets by mouth Every 6 (Six) Hours. (Patient taking differently: Take 650 mg by mouth Every 6 (Six) Hours As Needed.), Disp: , Rfl:   •  aspirin 81 MG chewable tablet, Chew 1 tablet Daily., Disp: 90 tablet, Rfl: 3  •  Cholecalciferol (VITAMIN D3 GUMMIES ADULT PO), Take  by mouth Daily., Disp: , Rfl:   •  clopidogrel (PLAVIX) 75 MG tablet, Hold while taking Xarelto. Resume taking 1 tablet by mouth daily when off of Xarelto, Disp: 30 tablet, Rfl: 5  •  potassium chloride (KAYCIEL) 20 mEq/15 mL solution, Take 15 mEq by mouth Daily., Disp: , Rfl:   •  simvastatin (ZOCOR) 40 MG  tablet, Take 1 tablet by mouth Daily., Disp: 30 tablet, Rfl: 5     Allergies    Allergies   Allergen Reactions   • Strawberry Extract Swelling       Problem List    Patient Active Problem List   Diagnosis   • Hyperlipidemia   • Hypertension   • Ischemic stroke (HCC)   • Endometrial cancer (HCC)   • GERD (gastroesophageal reflux disease)   • Hx: UTI (urinary tract infection)   • History of endometrial cancer   • TIA (transient ischemic attack)   • Stenosis of right middle cerebral artery   • Musculoskeletal neck pain   • Large bowel obstruction s/p colonic resection by Dr. Ma 12/10/21   • Recurrent endometrial cancer (HCC)       Medications, Allergies, Problems List and Past History were reviewed and updated.    Physical Examination    /60 (BP Location: Right arm, Patient Position: Sitting, Cuff Size: Adult)   Pulse 84   Temp 97.7 °F (36.5 °C) (Infrared)   Resp 20   Wt 50.3 kg (111 lb)   LMP  (LMP Unknown)   Breastfeeding No   BMI 19.05 kg/m²     Lungs: Auscultation- Clear to auscultation bilaterally. There are no retractions, clubbing or cyanosis. The Expiratory to Inspiratory ratio is equal.    Cardiovascular: There are no carotid bruits. Heart- Normal Rate with Regular rhythm and no murmurs. There are no gallops. There are no rubs. In the lower extremities there is no edema. The upper extremities do not have edema.    Abdomen: Soft, benign, non-tender with no masses, hernias, organomegaly or scars.    Lower Extremity Neuro Exam: Muscle Strength is 5/5 in all major lower extremity muscle groups. Deep Tendon Reflexes are 2+ and equal in the patellar and Achilles distributions bilaterally. Sensation is normal in all distributions.    Gait: Normal.    Legs: The legs are normal on the left and abnormal on the right. The right leg is noted to have tenderness of the mid calf.    Impression and Assessment    Leg Pain.    Claudication.    Plan    Claudication Plan: Further plans will be made following  the receipt of the test results.    Leg Pain Plan: Further plans will be made after the test results are received and reviewed.    Diagnoses and all orders for this visit:    1. Right leg pain (Primary)  -     XR tibia fibula 2 vw right; Future  -     CBC & Differential; Future  -     D-dimer, Quantitative; Future  -     C-reactive Protein; Future  -     Sedimentation Rate; Future  -     Duplex Venous Lower Extremity - Right CAR; Future  -     Comprehensive Metabolic Panel; Future    2. Claudication (HCC)  -     Duplex Lower Extremity Art / Grafts - Right CAR; Future  -     Comprehensive Metabolic Panel; Future        Return to Office    The patient was instructed to return for follow-up at the next scheduled visit. The patient was instructed to return sooner if the condition changes, worsens, or does not resolve.

## 2022-01-27 ENCOUNTER — LAB (OUTPATIENT)
Dept: LAB | Facility: HOSPITAL | Age: 77
End: 2022-01-27

## 2022-01-27 ENCOUNTER — LAB (OUTPATIENT)
Dept: INTERNAL MEDICINE | Facility: CLINIC | Age: 77
End: 2022-01-27

## 2022-01-28 LAB
ALBUMIN SERPL-MCNC: 3.9 G/DL (ref 3.5–5.2)
ALBUMIN/GLOB SERPL: 1.4 G/DL
ALP SERPL-CCNC: 136 U/L (ref 39–117)
ALT SERPL-CCNC: 9 U/L (ref 1–33)
AST SERPL-CCNC: 10 U/L (ref 1–32)
BASOPHILS # BLD AUTO: 0.03 10*3/MM3 (ref 0–0.2)
BASOPHILS NFR BLD AUTO: 0.4 % (ref 0–1.5)
BILIRUB SERPL-MCNC: 0.4 MG/DL (ref 0–1.2)
BUN SERPL-MCNC: 19 MG/DL (ref 8–23)
BUN/CREAT SERPL: 18.3 (ref 7–25)
CALCIUM SERPL-MCNC: 9.7 MG/DL (ref 8.6–10.5)
CHLORIDE SERPL-SCNC: 94 MMOL/L (ref 98–107)
CO2 SERPL-SCNC: 22.1 MMOL/L (ref 22–29)
CREAT SERPL-MCNC: 1.04 MG/DL (ref 0.57–1)
CRP SERPL-MCNC: <0.3 MG/DL (ref 0–0.5)
D DIMER PPP FEU-MCNC: 0.61 MCGFEU/ML (ref 0–0.56)
EOSINOPHIL # BLD AUTO: 0.02 10*3/MM3 (ref 0–0.4)
EOSINOPHIL NFR BLD AUTO: 0.3 % (ref 0.3–6.2)
ERYTHROCYTE [DISTWIDTH] IN BLOOD BY AUTOMATED COUNT: 15.4 % (ref 12.3–15.4)
ERYTHROCYTE [SEDIMENTATION RATE] IN BLOOD BY WESTERGREN METHOD: 21 MM/HR (ref 0–30)
GLOBULIN SER CALC-MCNC: 2.7 GM/DL
GLUCOSE SERPL-MCNC: 152 MG/DL (ref 65–99)
HCT VFR BLD AUTO: 38.2 % (ref 34–46.6)
HGB BLD-MCNC: 11.6 G/DL (ref 12–15.9)
IMM GRANULOCYTES # BLD AUTO: 0.03 10*3/MM3 (ref 0–0.05)
IMM GRANULOCYTES NFR BLD AUTO: 0.4 % (ref 0–0.5)
LYMPHOCYTES # BLD AUTO: 1.24 10*3/MM3 (ref 0.7–3.1)
LYMPHOCYTES NFR BLD AUTO: 15.6 % (ref 19.6–45.3)
MCH RBC QN AUTO: 26.6 PG (ref 26.6–33)
MCHC RBC AUTO-ENTMCNC: 30.4 G/DL (ref 31.5–35.7)
MCV RBC AUTO: 87.6 FL (ref 79–97)
MONOCYTES # BLD AUTO: 0.55 10*3/MM3 (ref 0.1–0.9)
MONOCYTES NFR BLD AUTO: 6.9 % (ref 5–12)
NEUTROPHILS # BLD AUTO: 6.08 10*3/MM3 (ref 1.7–7)
NEUTROPHILS NFR BLD AUTO: 76.4 % (ref 42.7–76)
NRBC BLD AUTO-RTO: 0 /100 WBC (ref 0–0.2)
PLATELET # BLD AUTO: 455 10*3/MM3 (ref 140–450)
POTASSIUM SERPL-SCNC: 2.9 MMOL/L (ref 3.5–5.2)
PROT SERPL-MCNC: 6.6 G/DL (ref 6–8.5)
RBC # BLD AUTO: 4.36 10*6/MM3 (ref 3.77–5.28)
SODIUM SERPL-SCNC: 131 MMOL/L (ref 136–145)
WBC # BLD AUTO: 7.95 10*3/MM3 (ref 3.4–10.8)

## 2022-02-01 ENCOUNTER — TELEPHONE (OUTPATIENT)
Dept: INTERNAL MEDICINE | Facility: CLINIC | Age: 77
End: 2022-02-01

## 2022-02-01 ENCOUNTER — HOSPITAL ENCOUNTER (OUTPATIENT)
Dept: MRI IMAGING | Facility: HOSPITAL | Age: 77
Discharge: HOME | End: 2022-02-01
Admitting: SURGERY

## 2022-02-01 DIAGNOSIS — C50.812 MALIGNANT NEOPLASM OF OVERLAPPING SITES OF LEFT FEMALE BREAST: ICD-10-CM

## 2022-02-01 PROCEDURE — 0 GADOBENATE DIMEGLUMINE 529 MG/ML SOLUTION: Performed by: SURGERY

## 2022-02-01 PROCEDURE — C8908 MRI W/O FOL W/CONT, BREAST,: HCPCS

## 2022-02-01 PROCEDURE — A9577 INJ MULTIHANCE: HCPCS | Performed by: SURGERY

## 2022-02-01 PROCEDURE — 77049 MRI BREAST C-+ W/CAD BI: CPT | Performed by: RADIOLOGY

## 2022-02-01 PROCEDURE — C8937 CAD BREAST MRI: HCPCS

## 2022-02-01 RX ADMIN — GADOBENATE DIMEGLUMINE 9 ML: 529 INJECTION, SOLUTION INTRAVENOUS at 10:35

## 2022-02-02 ENCOUNTER — APPOINTMENT (OUTPATIENT)
Dept: MRI IMAGING | Facility: HOSPITAL | Age: 77
End: 2022-02-02

## 2022-02-03 ENCOUNTER — TELEPHONE (OUTPATIENT)
Dept: MRI IMAGING | Facility: HOSPITAL | Age: 77
End: 2022-02-03

## 2022-02-03 NOTE — TELEPHONE ENCOUNTER
Called patient with MRI Breast results. Recommended MRI biopsies to be scheduled for 2/16 at 8:00. Pt wanted to speak to Dr. Muse first so an e-mail was sent to her. Pt expressed understanding and was encouraged to call with any further questions or concerns.

## 2022-02-04 ENCOUNTER — TELEPHONE (OUTPATIENT)
Dept: INTERNAL MEDICINE | Facility: CLINIC | Age: 77
End: 2022-02-04

## 2022-02-04 RX ORDER — POTASSIUM CHLORIDE 20MEQ/15ML
LIQUID (ML) ORAL
Qty: 270 ML | Refills: 0 | Status: SHIPPED | OUTPATIENT
Start: 2022-02-04 | End: 2022-02-07 | Stop reason: SDUPTHER

## 2022-02-04 NOTE — TELEPHONE ENCOUNTER
Please confirm route for Potassium Refill, order came from Outside provider, please advise?    Spoke to [facility] advised to placed medication on hold, until confirmed with PCP.

## 2022-02-04 NOTE — TELEPHONE ENCOUNTER
Please call the patient to confirm that this is actually the formulation of potassium that she has been taking.

## 2022-02-07 RX ORDER — POTASSIUM CHLORIDE 20MEQ/15ML
20 LIQUID (ML) ORAL DAILY
Qty: 450 ML | Refills: 5 | Status: SHIPPED | OUTPATIENT
Start: 2022-02-07 | End: 2022-09-07

## 2022-02-07 NOTE — TELEPHONE ENCOUNTER
Cindy states she is out of her rx for Potassium solution.  She states that Dr Carrillo prescribed it due to having abnormal labs.  She is not sure what dose she needs to be on now.    In the rx on the most resent medication refill  there are 2 different dose instructions   For her to take .  One is 60 ml for 2 doses and then 30 ml on one day     Please clarify .  She is out of medication  And can only take liquid

## 2022-02-08 ENCOUNTER — TELEPHONE (OUTPATIENT)
Dept: INTERNAL MEDICINE | Facility: CLINIC | Age: 77
End: 2022-02-08

## 2022-02-08 ENCOUNTER — TELEPHONE (OUTPATIENT)
Dept: MRI IMAGING | Facility: HOSPITAL | Age: 77
End: 2022-02-08

## 2022-02-08 NOTE — TELEPHONE ENCOUNTER
[facility] [phone]  Spoke to pharmacist, advised of medication dosage as listed.  potassium chloride (KAYCIEL) 20 mEq/15 mL solution   Take 15 mL by mouth Daily.  Dose: 20 mEq  Dispense Quantity: 450 mL  Refills: 5  Good verbal understanding.

## 2022-02-08 NOTE — TELEPHONE ENCOUNTER
Spoke with pt this morning to confirm her Feb. 16 8:00 MRI Bx appt at [facility]. Dr. MOOKIE Muse is having her stop her Plavix and baby aspirin.

## 2022-02-08 NOTE — TELEPHONE ENCOUNTER
Pharmacy Name:  [facility]     Pharmacy representative name:     Pharmacy representative phone number: [phone]    What medication are you calling in regards to: POTASSIUM CHLORIDE     What question does the pharmacy have: NEEDS CLARIFICATION ON WHICH DIRECTIONS TO FOLLOW    Who is the provider that prescribed the medication: ODALIS    Additional notes:

## 2022-02-16 ENCOUNTER — HOSPITAL ENCOUNTER (OUTPATIENT)
Dept: MRI IMAGING | Facility: HOSPITAL | Age: 77
Discharge: HOME | End: 2022-02-16

## 2022-02-16 ENCOUNTER — HOSPITAL ENCOUNTER (OUTPATIENT)
Dept: MAMMOGRAPHY | Facility: HOSPITAL | Age: 77
Discharge: HOME | End: 2022-02-16

## 2022-02-16 DIAGNOSIS — C50.812 MALIGNANT NEOPLASM OF OVERLAPPING SITES OF LEFT FEMALE BREAST: ICD-10-CM

## 2022-02-16 DIAGNOSIS — N63.24 BREAST LUMP ON LEFT SIDE AT 7 O'CLOCK POSITION: ICD-10-CM

## 2022-02-16 PROCEDURE — 88360 TUMOR IMMUNOHISTOCHEM/MANUAL: CPT | Performed by: RADIOLOGY

## 2022-02-16 PROCEDURE — 88342 IMHCHEM/IMCYTCHM 1ST ANTB: CPT | Performed by: RADIOLOGY

## 2022-02-16 PROCEDURE — 0 LIDOCAINE 1 % SOLUTION: Performed by: RADIOLOGY

## 2022-02-16 PROCEDURE — 88341 IMHCHEM/IMCYTCHM EA ADD ANTB: CPT | Performed by: RADIOLOGY

## 2022-02-16 PROCEDURE — 19085 BX BREAST 1ST LESION MR IMAG: CPT | Performed by: RADIOLOGY

## 2022-02-16 PROCEDURE — A9577 INJ MULTIHANCE: HCPCS | Performed by: SURGERY

## 2022-02-16 PROCEDURE — 88305 TISSUE EXAM BY PATHOLOGIST: CPT | Performed by: RADIOLOGY

## 2022-02-16 PROCEDURE — 19086 BX BREAST ADD LESION MR IMAG: CPT | Performed by: RADIOLOGY

## 2022-02-16 PROCEDURE — 77066 DX MAMMO INCL CAD BI: CPT | Performed by: RADIOLOGY

## 2022-02-16 PROCEDURE — 0 GADOBENATE DIMEGLUMINE 529 MG/ML SOLUTION: Performed by: SURGERY

## 2022-02-16 PROCEDURE — A4648 IMPLANTABLE TISSUE MARKER: HCPCS

## 2022-02-16 RX ORDER — LIDOCAINE HYDROCHLORIDE AND EPINEPHRINE 10; 10 MG/ML; UG/ML
12 INJECTION, SOLUTION INFILTRATION; PERINEURAL
Status: COMPLETED | OUTPATIENT
Start: 2022-02-16 | End: 2022-02-16

## 2022-02-16 RX ORDER — LIDOCAINE HYDROCHLORIDE 10 MG/ML
2 INJECTION, SOLUTION INFILTRATION; PERINEURAL
Status: COMPLETED | OUTPATIENT
Start: 2022-02-16 | End: 2022-02-16

## 2022-02-16 RX ORDER — LIDOCAINE HYDROCHLORIDE AND EPINEPHRINE 10; 10 MG/ML; UG/ML
14 INJECTION, SOLUTION INFILTRATION; PERINEURAL
Status: COMPLETED | OUTPATIENT
Start: 2022-02-16 | End: 2022-02-16

## 2022-02-16 RX ORDER — LIDOCAINE HYDROCHLORIDE 10 MG/ML
4.5 INJECTION, SOLUTION INFILTRATION; PERINEURAL
Status: COMPLETED | OUTPATIENT
Start: 2022-02-16 | End: 2022-02-16

## 2022-02-16 RX ADMIN — LIDOCAINE HYDROCHLORIDE AND EPINEPHRINE 12 ML: 10; 10 INJECTION, SOLUTION INFILTRATION; PERINEURAL at 10:41

## 2022-02-16 RX ADMIN — Medication 2 ML: at 10:46

## 2022-02-16 RX ADMIN — LIDOCAINE HYDROCHLORIDE AND EPINEPHRINE 14 ML: 10; 10 INJECTION, SOLUTION INFILTRATION; PERINEURAL at 10:46

## 2022-02-16 RX ADMIN — GADOBENATE DIMEGLUMINE 10 ML: 529 INJECTION, SOLUTION INTRAVENOUS at 10:40

## 2022-02-16 RX ADMIN — Medication 4.5 ML: at 10:41

## 2022-02-24 LAB
CYTO UR: NORMAL
DX PRELIMINARY: NORMAL
LAB AP CASE REPORT: NORMAL
LAB AP CLINICAL INFORMATION: NORMAL
LAB AP DIAGNOSIS COMMENT: NORMAL
PATH REPORT.FINAL DX SPEC: NORMAL
PATH REPORT.GROSS SPEC: NORMAL

## 2022-02-25 ENCOUNTER — TELEPHONE (OUTPATIENT)
Dept: MAMMOGRAPHY | Facility: HOSPITAL | Age: 77
End: 2022-02-25

## 2022-02-25 NOTE — TELEPHONE ENCOUNTER
Notified Trisha at Dr TAMMIE Muse's office patient's pathology returned as cancer. Patient has established care with Dr TAMMIE Muse and has been notified of consult visit on 3.1.22 @ 0930 by Dr Muse's office.    Denies discomfort. Denies signs and symptoms of infection. Questions answered, verbalized understanding.  Patient encouraged to call back or call RENNY Doyle RN, OCN with any questions or concerns.

## 2022-03-01 ENCOUNTER — NURSE NAVIGATOR (OUTPATIENT)
Dept: OTHER | Facility: HOSPITAL | Age: 77
End: 2022-03-01

## 2022-03-01 ENCOUNTER — TRANSCRIBE ORDERS (OUTPATIENT)
Dept: MAMMOGRAPHY | Facility: HOSPITAL | Age: 77
End: 2022-03-01

## 2022-03-01 DIAGNOSIS — D05.12 INTRADUCTAL CARCINOMA IN SITU OF LEFT BREAST: Primary | ICD-10-CM

## 2022-03-01 DIAGNOSIS — C50.812 MALIGNANT NEOPLASM OF OVERLAPPING SITES OF LEFT FEMALE BREAST, UNSPECIFIED ESTROGEN RECEPTOR STATUS: ICD-10-CM

## 2022-03-01 DIAGNOSIS — N63.24 UNSPECIFIED LUMP IN THE LEFT BREAST, LOWER INNER QUADRANT: ICD-10-CM

## 2022-03-01 DIAGNOSIS — C50.411 MALIGNANT NEOPLASM OF UPPER-OUTER QUADRANT OF RIGHT FEMALE BREAST, UNSPECIFIED ESTROGEN RECEPTOR STATUS: ICD-10-CM

## 2022-03-01 NOTE — PROGRESS NOTES
I confirmed with Le Baker that a referral has been received from Dr. Muse for genetics - Le states she will call patient

## 2022-03-08 ENCOUNTER — CLINICAL SUPPORT (OUTPATIENT)
Dept: GENETICS | Facility: HOSPITAL | Age: 77
End: 2022-03-08

## 2022-03-08 ENCOUNTER — PRE-ADMISSION TESTING (OUTPATIENT)
Dept: PREADMISSION TESTING | Facility: HOSPITAL | Age: 77
End: 2022-03-08

## 2022-03-08 DIAGNOSIS — Z17.0 BILATERAL MALIGNANT NEOPLASM OF BREAST IN FEMALE, ESTROGEN RECEPTOR POSITIVE, UNSPECIFIED SITE OF BREAST: ICD-10-CM

## 2022-03-08 DIAGNOSIS — Z13.79 GENETIC TESTING: Primary | ICD-10-CM

## 2022-03-08 DIAGNOSIS — C50.912 BILATERAL MALIGNANT NEOPLASM OF BREAST IN FEMALE, ESTROGEN RECEPTOR POSITIVE, UNSPECIFIED SITE OF BREAST: ICD-10-CM

## 2022-03-08 DIAGNOSIS — C50.911 BILATERAL MALIGNANT NEOPLASM OF BREAST IN FEMALE, ESTROGEN RECEPTOR POSITIVE, UNSPECIFIED SITE OF BREAST: ICD-10-CM

## 2022-03-08 DIAGNOSIS — Z80.3 FAMILY HISTORY OF MALIGNANT NEOPLASM OF BREAST: ICD-10-CM

## 2022-03-08 DIAGNOSIS — C54.1 ENDOMETRIAL CANCER: ICD-10-CM

## 2022-03-08 LAB
ALBUMIN SERPL-MCNC: 4.1 G/DL (ref 3.5–5.2)
ALBUMIN/GLOB SERPL: 1.9 G/DL
ALP SERPL-CCNC: 146 U/L (ref 39–117)
ALT SERPL W P-5'-P-CCNC: 13 U/L (ref 1–33)
ANION GAP SERPL CALCULATED.3IONS-SCNC: 11 MMOL/L (ref 5–15)
AST SERPL-CCNC: 17 U/L (ref 1–32)
BILIRUB SERPL-MCNC: 0.7 MG/DL (ref 0–1.2)
BUN SERPL-MCNC: 9 MG/DL (ref 8–23)
BUN/CREAT SERPL: 11.4 (ref 7–25)
CALCIUM SPEC-SCNC: 9.4 MG/DL (ref 8.6–10.5)
CHLORIDE SERPL-SCNC: 102 MMOL/L (ref 98–107)
CO2 SERPL-SCNC: 26 MMOL/L (ref 22–29)
CREAT SERPL-MCNC: 0.79 MG/DL (ref 0.57–1)
DEPRECATED RDW RBC AUTO: 50 FL (ref 37–54)
EGFRCR SERPLBLD CKD-EPI 2021: 76.7 ML/MIN/1.73
ERYTHROCYTE [DISTWIDTH] IN BLOOD BY AUTOMATED COUNT: 16.3 % (ref 12.3–15.4)
GLOBULIN UR ELPH-MCNC: 2.2 GM/DL
GLUCOSE SERPL-MCNC: 103 MG/DL (ref 65–99)
HCT VFR BLD AUTO: 33.5 % (ref 34–46.6)
HGB BLD-MCNC: 10.3 G/DL (ref 12–15.9)
MCH RBC QN AUTO: 25.8 PG (ref 26.6–33)
MCHC RBC AUTO-ENTMCNC: 30.7 G/DL (ref 31.5–35.7)
MCV RBC AUTO: 83.8 FL (ref 79–97)
PLATELET # BLD AUTO: 276 10*3/MM3 (ref 140–450)
PMV BLD AUTO: 8.7 FL (ref 6–12)
POTASSIUM SERPL-SCNC: 4 MMOL/L (ref 3.5–5.2)
PROT SERPL-MCNC: 6.3 G/DL (ref 6–8.5)
QT INTERVAL: 354 MS
QTC INTERVAL: 371 MS
RBC # BLD AUTO: 4 10*6/MM3 (ref 3.77–5.28)
SARS-COV-2 RNA PNL SPEC NAA+PROBE: NOT DETECTED
SODIUM SERPL-SCNC: 139 MMOL/L (ref 136–145)
WBC NRBC COR # BLD: 4.75 10*3/MM3 (ref 3.4–10.8)

## 2022-03-08 PROCEDURE — 85027 COMPLETE CBC AUTOMATED: CPT

## 2022-03-08 PROCEDURE — U0004 COV-19 TEST NON-CDC HGH THRU: HCPCS

## 2022-03-08 PROCEDURE — 93005 ELECTROCARDIOGRAM TRACING: CPT

## 2022-03-08 PROCEDURE — 93010 ELECTROCARDIOGRAM REPORT: CPT | Performed by: INTERNAL MEDICINE

## 2022-03-08 PROCEDURE — 36415 COLL VENOUS BLD VENIPUNCTURE: CPT

## 2022-03-08 PROCEDURE — C9803 HOPD COVID-19 SPEC COLLECT: HCPCS

## 2022-03-08 PROCEDURE — 80053 COMPREHEN METABOLIC PANEL: CPT

## 2022-03-08 NOTE — PROGRESS NOTES
Cindy Sage, a 77-year-old female, was seen for genetic counseling due to a personal history of breast and endometrial cancer.  Genetic counseling was provided via telehealth. Ms. Sage was recently diagnosed with bilateral breast cancer at age 76. She is scheduled for lumpectomies on 3/10/22. Ms. Sage also has a history of endometrial cancer that was diagnosed at age 71. She does not report a personal history of colon polyps. She was interested in discussing her risk for a hereditary cancer syndrome.  Ms. Sage was interested in pursuing a multi-gene panel to evaluate her risk of cancer, therefore the CancerNext panel was ordered through [employer] which analyzes BRCA1/2 and 34 additional genes associated with an increased cancer risk.     PERTINENT FAMILY HISTORY: (See attached pedigree)   Sister:  Breast cancer, 76  Mother: Breast cancer, late 50s; Contralateral breast cancer, 60s  Nephew: Unspecified cancer type, 40s  Unknown paternal family history.    Records regarding the family history were not available for review.     RISK ASSESSMENT:  Ms. Sage’s personal and family history of cancer led to concern for a hereditary cancer syndrome. We discussed BRCA1/2 testing as well as the option of pursuing a panel that would test for other genes known to impact cancer risk in addition to BRCA1/2.  She clearly meets NCCN guidelines criteria for testing given her personal history of bilateral breast cancer and family history of breast cancer. These risk assessments are based on the family history information provided at the time of the appointment, and could change in the future should new information be obtained.    GENETIC COUNSELING: We reviewed the family history information in detail. Cases of cancer follow three general patterns: sporadic, familial, and hereditary.  While most cancer is sporadic, some cases appear to occur in family clusters.  These cases are said to be familial and  account for 10-20% of cancer cases.  Familial cases may be due to a combination of shared genes and environmental factors among family members.  In even fewer families, the cancer is said to be inherited, and the genes responsible for the cancer are known.      Family histories typical of hereditary cancer syndromes usually include multiple first- and second-degree relatives diagnosed with cancer types that define a syndrome.  These cases tend to be diagnosed at younger-than-expected ages and can be bilateral or multifocal.  The cancer in these families follows an autosomal dominant inheritance pattern, which indicates the likely presence of a mutation in a cancer susceptibility gene.  Children and siblings of an individual believed to carry this mutation have a 50% chance of inheriting that mutation, thereby inheriting the increased risk to develop cancer.  These mutations can be passed down from the maternal or the paternal lineage.    Hereditary breast cancer accounts for 5-10% of all cases of breast cancer.  A significant proportion of hereditary breast and ovarian cancer can be attributed to mutations in the BRCA1 and BRCA2 genes.  Mutations in these genes confer an increased risk for breast cancer, ovarian cancer, male breast cancer, prostate cancer, and pancreatic cancer. Women with a BRCA1 or BRCA2 mutation who have already been diagnosed with breast cancer have a 40-60% lifetime risk of a second breast cancer. Women with a BRCA1 or BRCA2 mutation have up to a 44% risk of ovarian cancer.      There are other genes that are known to be associated with an increased risk for cancer.  Some of these genes have well defined cancer risks and established management guidelines.  Other genes that can be tested for have been more recently described, and there may be less data regarding the risks and therefore may not have established management guidelines. We reviewed that in some cases, the identification of a genetic  mutation may impact treatment options for some types of cancer. We discussed these limitations at length.  Based on Ms. Sage’s desire to get as much information as possible regarding her personal risks and potential risks for her family, she opted to pursue testing through a panel that would look at several other genes known to increase the risk for cancer.    GENETIC TESTING:  The risks, benefits and limitations of genetic testing and implications for clinical management following testing were reviewed.  DNA test results can influence decisions regarding screening, prevention and surgical management.  Genetic testing can have significant psychological implications for both individuals and families.  Also discussed was the possibility of employment and insurance discrimination based on genetic test results and the laws in place to prevent this (AKHIL).    We discussed panel testing, which would involve testing for BRCA1/2 as well as 34 additional genes that are associated with increased cancer risk. The benefits and limitations of genetic testing were discussed and Ms. Sage decided to pursue testing via the panel. The implications of a positive or negative test result were discussed. We discussed the possibility that, in some cases, genetic test results may be informative or may be ambiguous due to the identification of a genetic variant. These variants may or may not be associated with an increased cancer risk.  With multigene panel testing, it is not uncommon for a variant of uncertain significance (VUS) to be identified.  If a VUS is identified, testing family members is typically not recommended and screening recommendations are made based on the family history.  The laboratories that perform genetic testing work to reclassify the VUS and send out an amended report if and when a VUS is reclassified.  The majority of variant findings are ultimately reclassified to a negative result.  Given her personal  and family history, a negative test result would not eliminate all cancer risk to her relatives, although the risk would not be as high as it would with positive genetic testing.      PLAN: Genetic testing via the CancerNext panel through [employer] was ordered. A saliva kit is being mailed to her home and results are expected 2-3 weeks after the lab receives her sample. Ms. Sage is welcome to contact us in the meantime with any questions she may have at [phone].      Najma Sheridan MS, [facility], [facility]  Licensed Certified Genetic Counselor

## 2022-03-10 ENCOUNTER — HOSPITAL ENCOUNTER (OUTPATIENT)
Dept: MAMMOGRAPHY | Facility: HOSPITAL | Age: 77
Discharge: HOME | End: 2022-03-10

## 2022-03-10 ENCOUNTER — HOSPITAL ENCOUNTER (OUTPATIENT)
Dept: ULTRASOUND IMAGING | Facility: HOSPITAL | Age: 77
Discharge: HOME | End: 2022-03-10

## 2022-03-10 ENCOUNTER — LAB REQUISITION (OUTPATIENT)
Dept: LAB | Facility: HOSPITAL | Age: 77
End: 2022-03-10

## 2022-03-10 DIAGNOSIS — C50.812 MALIGNANT NEOPLASM OF OVERLAPPING SITES OF LEFT FEMALE BREAST, UNSPECIFIED ESTROGEN RECEPTOR STATUS: ICD-10-CM

## 2022-03-10 DIAGNOSIS — N63.24 UNSPECIFIED LUMP IN THE LEFT BREAST, LOWER INNER QUADRANT: ICD-10-CM

## 2022-03-10 DIAGNOSIS — D05.12 INTRADUCTAL CARCINOMA IN SITU OF LEFT BREAST: ICD-10-CM

## 2022-03-10 DIAGNOSIS — C50.812 MALIGNANT NEOPLASM OF OVERLAPPING SITES OF LEFT FEMALE BREAST: ICD-10-CM

## 2022-03-10 DIAGNOSIS — C50.411 MALIGNANT NEOPLASM OF UPPER-OUTER QUADRANT OF RIGHT FEMALE BREAST: ICD-10-CM

## 2022-03-10 DIAGNOSIS — C50.411 MALIGNANT NEOPLASM OF UPPER-OUTER QUADRANT OF RIGHT FEMALE BREAST, UNSPECIFIED ESTROGEN RECEPTOR STATUS: ICD-10-CM

## 2022-03-10 PROCEDURE — 19285 PERQ DEV BREAST 1ST US IMAG: CPT | Performed by: RADIOLOGY

## 2022-03-10 PROCEDURE — C1819 TISSUE LOCALIZATION-EXCISION: HCPCS

## 2022-03-10 PROCEDURE — 0 LIDOCAINE 1 % SOLUTION: Performed by: SURGERY

## 2022-03-10 PROCEDURE — 88342 IMHCHEM/IMCYTCHM 1ST ANTB: CPT | Performed by: SURGERY

## 2022-03-10 PROCEDURE — 76098 X-RAY EXAM SURGICAL SPECIMEN: CPT

## 2022-03-10 PROCEDURE — 88341 IMHCHEM/IMCYTCHM EA ADD ANTB: CPT | Performed by: SURGERY

## 2022-03-10 PROCEDURE — 76098 X-RAY EXAM SURGICAL SPECIMEN: CPT | Performed by: RADIOLOGY

## 2022-03-10 PROCEDURE — 19284 PERQ DEV BREAST ADD STRTCTC: CPT | Performed by: RADIOLOGY

## 2022-03-10 PROCEDURE — 19283 PERQ DEV BREAST 1ST STRTCTC: CPT | Performed by: RADIOLOGY

## 2022-03-10 PROCEDURE — 88307 TISSUE EXAM BY PATHOLOGIST: CPT | Performed by: SURGERY

## 2022-03-10 PROCEDURE — 77066 DX MAMMO INCL CAD BI: CPT | Performed by: RADIOLOGY

## 2022-03-10 RX ORDER — LIDOCAINE HYDROCHLORIDE 10 MG/ML
5 INJECTION, SOLUTION INFILTRATION; PERINEURAL ONCE
Status: COMPLETED | OUTPATIENT
Start: 2022-03-10 | End: 2022-03-10

## 2022-03-10 RX ADMIN — Medication 1 ML: at 08:33

## 2022-03-10 RX ADMIN — Medication 3 ML: at 09:17

## 2022-03-10 RX ADMIN — Medication 2 ML: at 09:01

## 2022-03-17 ENCOUNTER — TRANSCRIBE ORDERS (OUTPATIENT)
Dept: ADMINISTRATIVE | Facility: HOSPITAL | Age: 77
End: 2022-03-17

## 2022-03-17 DIAGNOSIS — Z11.59 ENCOUNTER FOR SCREENING FOR VIRAL DISEASE: Primary | ICD-10-CM

## 2022-03-21 ENCOUNTER — LAB (OUTPATIENT)
Dept: PREADMISSION TESTING | Facility: HOSPITAL | Age: 77
End: 2022-03-21

## 2022-03-21 DIAGNOSIS — Z11.59 ENCOUNTER FOR SCREENING FOR VIRAL DISEASE: ICD-10-CM

## 2022-03-21 LAB — SARS-COV-2 RNA PNL SPEC NAA+PROBE: NOT DETECTED

## 2022-03-21 PROCEDURE — U0004 COV-19 TEST NON-CDC HGH THRU: HCPCS

## 2022-03-21 PROCEDURE — C9803 HOPD COVID-19 SPEC COLLECT: HCPCS

## 2022-03-23 ENCOUNTER — LAB REQUISITION (OUTPATIENT)
Dept: LAB | Facility: HOSPITAL | Age: 77
End: 2022-03-23

## 2022-03-23 DIAGNOSIS — C50.812 MALIGNANT NEOPLASM OF OVERLAPPING SITES OF LEFT FEMALE BREAST: ICD-10-CM

## 2022-03-23 PROCEDURE — 88307 TISSUE EXAM BY PATHOLOGIST: CPT | Performed by: SURGERY

## 2022-03-24 LAB
CYTO UR: NORMAL
LAB AP CASE REPORT: NORMAL
LAB AP CLINICAL INFORMATION: NORMAL
PATH REPORT.FINAL DX SPEC: NORMAL
PATH REPORT.GROSS SPEC: NORMAL

## 2022-04-06 ENCOUNTER — TELEPHONE (OUTPATIENT)
Dept: GENETICS | Facility: HOSPITAL | Age: 77
End: 2022-04-06

## 2022-04-06 NOTE — TELEPHONE ENCOUNTER
Spoke with patient and discussed that [facility] is requesting a second sample for genetic testing. The saliva sample she sent in did not pass  metrics and testing was not able to be completed. Patient has appointment with Dr. Ma in [place] next week 4/13/22 at 10am and she can go to the cancer center lab before or after her appointment for a blood draw for testing. Patient agreed to have her blood drawn on 4/13.    Najma Sheridan MS, [facility], [facility]  Licensed Certified Genetic Counselor

## 2022-04-12 NOTE — PROGRESS NOTES
Cindy Sage  [id number]  1945      Reason for visit: Endometrial cancer recurrence, discussion of treatment options    History of present illness:  The patient is a 77 y.o. year old female who presents today for treatment and evaluation of the above issues.  After patient was diagnosed with recurrent endometrial cancer after colon resection she underwent imaging that showed nodule of left chest wall. Patient had biopsy of nodule that was significant for invasive ductal carcinoma ER+/AL+/HER2-/ on 2/16/22. She was then seen by Dr. Muse and underwent left breast lumpectomy with negative margins. Presents today to discuss options for treatment of recurrent endometrial cancer as well as breast cancer.     Patient reports doing well today. Would like to discuss next steps of treatment. Overall has had no pain from recent lumpectomy. Healing well. Denies any vaginal bleeding or bowel/bladder changes. Notes constipation that has resolved with activia yogurt. Feels like her energy and appetite is doing well and is happy to note some increased weight gain. No other issues.    Oncologic History:  Oncology/Hematology History   Endometrial cancer (HCC)   3/26/2016 Imaging    CT in ER for acute onset postmenopausal bleeding revealed mass in the lower uterine segment. Gyn Oncology called to evaluate.     3/29/2016 Biopsy    Endometrial biopsy in office, pathology revealed complex atypical hyperplasia with stromal breakdown.        4/1/2016 Initial Diagnosis    Endometrial cancer     4/19/2016 Surgery    RATLH, BSO, LND to several millimeters of residual disease.   Stage IIIC2     5/20/2016 - 11/15/2016 Chemotherapy    Carboplatin AUC 6, Paclitaxel 175 mg/m2 x 6 cycles in sandwich fashion with radiation in between cycles #3 and #4      - 9/2/2016 Radiation    Radiation completed. Pelvis and periaortic nodes received a total of 45 Gy in 25 fractions during sandwich therapy. This was followed by vaginal brachytherapy:  upper vagina treated utilizing cylinder and high dose rate iridium-192 to 18 Gy in 3 fractions.      11/21/2016 Imaging    Post-treatment PET/CT negative for disease     12/8/2021 Imaging    CT scan chest abdomen and pelvis: Large bowel obstruction, left chest wall mass     12/10/2021 Surgery    Exploratory laparotomy, colonic resection, side-to-side anastomosis for large bowel obstruction    Pathology showed metastatic adenocarcinoma at the colon.          1/10/2022 Molecular Testing    CARIS results:  MMR proficient/MSI stable--level 2 benefit pembrolizumab + lenvima  TMB high--level 2 benefit pembrolizumab alone  ER/TX+--level 3 benefit endocrine therapy  BRCA 1/2 negative           Past Medical History:   Diagnosis Date   • Drug therapy 2016    For endometrial CA in 2016   • Elevated cholesterol    • Endometrial cancer (HCC) 04/2016    Stage IIIC2    • GERD (gastroesophageal reflux disease)    • HTN (hypertension)    • Hx of radiation therapy 2016    For endometrial CA 2016   • Stroke (HCC) 02/2016    No residual deficits.  Takes .       Past Surgical History:   Procedure Laterality Date   • APPENDECTOMY      Ex lap   • BREAST BIOPSY Left 2016   • BREAST LUMPECTOMY Bilateral 2022    2-Left; 1-Right   • COLOSTOMY N/A 12/10/2021    Procedure: EXPLORATORY LAPAROTOMY, TRANSVERSE COLONIC RESECTION, REANASTIMOSIS, AND MOBILIZATION OF SPLENIC FLEXURE;  Surgeon: Daja Ma MD;  Location: [facility];  Service: Gynecology Oncology;  Laterality: N/A;   • FOOT SURGERY     • OOPHORECTOMY     • TONSILLECTOMY     • TOTAL LAPAROSCOPIC HYSTERECTOMY SALPINGO OOPHORECTOMY Bilateral 04/19/2016    RATLH, BSO, LND       MEDICATIONS: The current medication list was reviewed with the patient and updated in the EMR this date per the Medical Assistant. Medication dosages and frequencies were confirmed to be accurate.      Allergies:  is allergic to strawberry extract.    Social History:   Social History     Socioeconomic  History   • Marital status:    Tobacco Use   • Smoking status: Never Smoker   • Smokeless tobacco: Never Used   Vaping Use   • Vaping Use: Never used   Substance and Sexual Activity   • Alcohol use: Yes   • Drug use: Never   • Sexual activity: Defer       Family History:    Family History   Problem Relation Age of Onset   • Arthritis Mother    • Breast cancer Mother         age unknown    • Osteoporosis Mother    • Cancer Mother    • Alcohol abuse Father    • Stroke Maternal Aunt    • Ovarian cancer Neg Hx    • Endometrial cancer Neg Hx        Health Maintenance:    Health Maintenance   Topic Date Due   • TDAP/TD VACCINES (1 - Tdap) Never done   • ZOSTER VACCINE (1 of 2) Never done   • ANNUAL WELLNESS VISIT  06/13/2020   • Pneumococcal Vaccine 65+ (2 - PPSV23 or PCV20) 06/13/2020   • COLORECTAL CANCER SCREENING  12/09/2021   • INFLUENZA VACCINE  08/01/2022   • LIPID PANEL  12/09/2022   • DXA SCAN  12/28/2022   • MAMMOGRAM  01/17/2023   • HEPATITIS C SCREENING  Completed   • COVID-19 Vaccine  Completed       Review of Systems   Constitutional: Negative for activity change, chills, fatigue, fever and unexpected weight change.   HENT: Negative for congestion and sore throat.    Eyes: Negative for visual disturbance.   Respiratory: Negative for chest tightness, shortness of breath and wheezing.    Cardiovascular: Negative for chest pain and palpitations.   Gastrointestinal: Positive for constipation. Negative for abdominal distention, abdominal pain, diarrhea, nausea and vomiting.   Genitourinary: Negative for difficulty urinating, hematuria, vaginal bleeding, vaginal discharge and vaginal pain.   Musculoskeletal: Negative for arthralgias and myalgias.   Skin: Negative for rash and wound.   Neurological: Negative for dizziness, weakness, light-headedness and numbness.   Psychiatric/Behavioral: Negative for agitation, behavioral problems and confusion.        Physical Exam    Vitals:    04/13/22 1004   BP:  "151/65  Comment: LUE   Pulse: 60   Resp: 18   Temp: 97.3 °F (36.3 °C)   TempSrc: Infrared   SpO2: 95%  Comment: RA   Weight: 55.3 kg (122 lb)   Height: 160 cm (63\")   PainSc: 0-No pain       Body mass index is 21.61 kg/m².    Wt Readings from Last 3 Encounters:   04/13/22 55.3 kg (122 lb)   02/16/22 53.1 kg (117 lb)   02/01/22 50.3 kg (111 lb)     GENERAL: Alert, well-appearing female appearing her stated age who is in no apparent distress.   HEENT: Sclera anicteric. Head normocephalic, atraumatic. Mucus membranes moist.   GASTROINTESTINAL:  Abdomen is soft, non-tender, non-distended, no rebound or guarding, no masses, or hernias. No HSM.  Midline vertical incision healing appropriately  SKIN:  Warm, dry, well-perfused.  All visible areas intact.  No rashes, lesions, ulcers.  PSYCHIATRIC: AO x3, with appropriate affect, normal thought processes.  NEUROLOGIC: No focal deficits.  Moves extremities well.  Using walker to assist with ambulation.  EXTREMITIES:   No cyanosis, clubbing, symmetric.     PELVIC exam:    Deferred    ECOG PS 0    PROCEDURES: None    Diagnostic Data:    PATHOLOGY:  Final Diagnosis   LEFT BREAST, MEDIAL LUMPECTOMY EXTENDED SUPERIOR MARGIN:  Changes consistent with prior surgical procedure.  No residual invasive/in situ carcinoma identified.  Micropapilloma with florid epithelial hyperplasia without atypia.  Surgical margins negative for atypia.   Electronically signed by Sarkis West MD on 3/24/2022 at 1510         CT Chest With Contrast Diagnostic  Result Date: 12/10/2021  Abnormal dilated loops of distal small bowel and proximal colon suggesting possible enteritis and colitis. There is surrounding inflammatory change identified and some wall thickening. A small amount of pneumatosis identified in the proximal colon cannot be completely excluded.  D:  12/08/2021 E:  12/08/2021  This report was finalized on 12/10/2021 4:37 PM by Dr. Emily Farris MD.      CT Abdomen Pelvis With " Contrast  Result Date: 12/10/2021  Abnormal dilated loops of distal small bowel and proximal colon suggesting possible enteritis and colitis. There is surrounding inflammatory change identified and some wall thickening. A small amount of pneumatosis identified in the proximal colon cannot be completely excluded.  D:  12/08/2021 E:  12/08/2021  This report was finalized on 12/10/2021 4:37 PM by Dr. Emily Farris MD.      FL Barium Enema Water Soluble Single Contrast  Result Date: 12/14/2021  1. Inability to advance contrast into the transverse colon may represent high-grade stricture, or spasm of the distal transverse colon, however an infiltrating, and or obstructing lesion cannot be fully excluded at this time. These results were discussed immediately with Dr. Ronald Barrientos. Please consider further evaluation of clinically relevant. 2. Diverticulosis of the descending, and sigmoid colon 3. Mild redundancy of the sigmoid colon    This report was finalized on 12/14/2021 11:38 AM by Dr. Emily Farris MD.      XR Abdomen KUB  Result Date: 12/12/2021  Nasogastric tube terminates in the stomach. Bowel gas pattern is nonobstructive.  This report was finalized on 12/12/2021 2:25 PM by Jomar Joaquin.        Lab Results   Component Value Date    WBC 4.75 03/08/2022    HGB 10.3 (L) 03/08/2022    HCT 33.5 (L) 03/08/2022    MCV 83.8 03/08/2022     03/08/2022    NEUTROABS 6.08 01/26/2022    GLUCOSE 103 (H) 03/08/2022    BUN 9 03/08/2022    CREATININE 0.79 03/08/2022    EGFRIFNONA 51 (L) 01/26/2022    EGFRIFAFRI 62 01/26/2022     03/08/2022    K 4.0 03/08/2022     03/08/2022    CO2 26.0 03/08/2022    MG 1.9 12/15/2021    PHOS 3.8 12/15/2021    CALCIUM 9.4 03/08/2022    ALBUMIN 4.10 03/08/2022    AST 17 03/08/2022    ALT 13 03/08/2022    BILITOT 0.7 03/08/2022     Lab Results   Component Value Date     27.4 11/17/2021     16.6 05/13/2021     14.4 11/12/2020     16.3 05/12/2020      16.4 09/26/2019     11.2 03/20/2019     11.2 09/18/2018     10.5 06/14/2018     8.3 05/24/2017     11.6 11/30/2016           Assessment/Plan   This is a 77 y.o. woman with recurrent endometrial cancer and newly diagnosed invasive breast cancer here for discussion on further management     Encounter Diagnosis   Name Primary?   • Endometrial cancer (HCC) Yes        #Endometrial cancer recurrence  - stage IIIC2 status post RA-TLH/BSO/SLND in 2016   - underwent chemotherapy with carboplatin/paclitaxel and radiation and had DAVION   - recurrence diagnosed after bowel resection secondary to obstruction showed endometrial cancer in 12/2021   - CARIS testing obtained and patient is candidate for Keytruda   - not a candidate for clinical trial due to second primary cancer   - CT abdomen/pelvis ordered to evaluate post surgery especially due to significant interval between surgical debulking and the present.  - discussed with patient that due to her ER/AR + breast cancer, could consider treatment with estrogen blocking medication such as anastrozole for her breast cancer and that this would also be efficacious for endometrial cancer.  If there is no disease visualized on CT scan, I would certainly lean towards hormonal blockade.  However, if there is disease on the CT scan, patient may be a better candidate for surgery versus radiation versus systemic treatment.  Will call her to discuss results.  - will see Dr. Fam (medical oncology) today for further treatment planning    #Invasive Breast Cancer  - diagnosed in 2/2022, now status post lumpectomy of left breast with negative margins on 3/24/22; ER/AR +  -Previously referred to oncology  -Dr. Ma personally discussed the case with Dr. Fam today.  Appointment with Dr. Fam 5/5/2022 and she is agreeable to see the patient today as the patient is here today.  We face to face discussed the treatment plan that would potentially be of  benefit for both her breast cancer and her endometrial cancer.  Please refer to the above.  -Advised that either Dr. Fam or I could prescribe hormone blockade for the patient.    Pain assessment was performed today as a part of patient’s care.  For patients with pain related to surgery, gynecologic malignancy or cancer treatment, the plan is as noted in the assessment/plan.  For patients with pain not related to these issues, they are to seek any further needed care from a more appropriate provider, such as PCP.      Orders Placed This Encounter   Procedures   • CT Abdomen Pelvis With Contrast     Standing Status:   Future     Standing Expiration Date:   4/13/2023     Order Specific Question:   Will Oral Contrast be needed for this procedure?     Answer:   Yes       FOLLOW UP: 3 months, can consider alternating visits with Zayra Georges in the future    Fabby Breaux M.D  Obstetrics & Gynecology, PGY3    I saw and evaluated the patient. I agree with the findings and the plan of care as documented in the note.  *Moderate decision making: High problem with life-threatening endometrial cancer recurrence, low data with ordering of CT scan, moderate risk due to consideration of new prescription drug    Daja Ma MD  04/13/22  10:47 EDT

## 2022-04-13 ENCOUNTER — OFFICE VISIT (OUTPATIENT)
Dept: GYNECOLOGIC ONCOLOGY | Facility: CLINIC | Age: 77
End: 2022-04-13

## 2022-04-13 ENCOUNTER — OFFICE VISIT (OUTPATIENT)
Dept: ONCOLOGY | Facility: CLINIC | Age: 77
End: 2022-04-13

## 2022-04-13 ENCOUNTER — CLINICAL SUPPORT (OUTPATIENT)
Dept: GENETICS | Facility: HOSPITAL | Age: 77
End: 2022-04-13

## 2022-04-13 ENCOUNTER — LAB (OUTPATIENT)
Dept: LAB | Facility: HOSPITAL | Age: 77
End: 2022-04-13

## 2022-04-13 VITALS
HEART RATE: 60 BPM | WEIGHT: 122 LBS | BODY MASS INDEX: 21.62 KG/M2 | TEMPERATURE: 97.3 F | OXYGEN SATURATION: 95 % | RESPIRATION RATE: 18 BRPM | DIASTOLIC BLOOD PRESSURE: 65 MMHG | SYSTOLIC BLOOD PRESSURE: 151 MMHG | HEIGHT: 63 IN

## 2022-04-13 VITALS
OXYGEN SATURATION: 95 % | DIASTOLIC BLOOD PRESSURE: 65 MMHG | HEART RATE: 60 BPM | WEIGHT: 122 LBS | SYSTOLIC BLOOD PRESSURE: 151 MMHG | RESPIRATION RATE: 18 BRPM | BODY MASS INDEX: 21.62 KG/M2 | TEMPERATURE: 97.3 F | HEIGHT: 63 IN

## 2022-04-13 DIAGNOSIS — Z13.79 GENETIC TESTING: Primary | ICD-10-CM

## 2022-04-13 DIAGNOSIS — C50.911 BILATERAL MALIGNANT NEOPLASM OF BREAST IN FEMALE, ESTROGEN RECEPTOR POSITIVE, UNSPECIFIED SITE OF BREAST: Primary | ICD-10-CM

## 2022-04-13 DIAGNOSIS — C50.912 BILATERAL MALIGNANT NEOPLASM OF BREAST IN FEMALE, ESTROGEN RECEPTOR POSITIVE, UNSPECIFIED SITE OF BREAST: Primary | ICD-10-CM

## 2022-04-13 DIAGNOSIS — Z17.0 BILATERAL MALIGNANT NEOPLASM OF BREAST IN FEMALE, ESTROGEN RECEPTOR POSITIVE, UNSPECIFIED SITE OF BREAST: Primary | ICD-10-CM

## 2022-04-13 DIAGNOSIS — C54.1 ENDOMETRIAL CANCER: Primary | ICD-10-CM

## 2022-04-13 PROCEDURE — 99215 OFFICE O/P EST HI 40 MIN: CPT | Performed by: INTERNAL MEDICINE

## 2022-04-13 PROCEDURE — 99214 OFFICE O/P EST MOD 30 MIN: CPT | Performed by: OBSTETRICS & GYNECOLOGY

## 2022-04-13 RX ORDER — ANASTROZOLE 1 MG/1
1 TABLET ORAL DAILY
Qty: 30 TABLET | Refills: 11 | Status: SHIPPED | OUTPATIENT
Start: 2022-04-13 | End: 2023-04-04

## 2022-04-13 RX ORDER — HYDROCHLOROTHIAZIDE 25 MG/1
25 TABLET ORAL DAILY
COMMUNITY
Start: 2022-02-19 | End: 2022-06-29

## 2022-04-13 NOTE — PROGRESS NOTES
Subjective     PROBLEM LIST:  1. [id number] ER+ (100%) SD+ (100%) Her2 negative invasive ductal carcinoma of the left breast  A) lumpectomy on 3/10/22 showed a 1.2 cm intermediate grade IDC.  Positive superior margin.  Separate area of 0.4 cm grade 1 IDC.  2. [id number] ER+(100%) SD+(95%) Her2 negative (1+) invasive ductal carcinoma of the right breast  A) lumpectomy on 3/10/22. Pathology showed a low grade IDC, 1.2 cm by imaging measurements.  3. Endometrial cancer  A) diagnosed 2016.  Surgery 4/19/16 - stage IIIC2  B) adjuvant chemotherapy with carboplatin and paclitaxel x 6 cycles, with radiation between cycles 3 and 4 (45 Gy in 25 fractions, followed by vaginal brachytherapy)  C) presented with large bowel obstruction December 2021.  Surgery 12/10/21 - metastatic adenocarcinoma to the colon.  Caris testing showed TMB high, ER+ SD+, MSI stable.  4. Hypertension  5. GERD  6. History of CVA  7. hyperlipidemia    CHIEF COMPLAINT: breast cancer      HISTORY OF PRESENT ILLNESS:  The patient is a 77 y.o. female, referred for a recent diagnosis of bilateral breast cancer.    She has a history of endometrial cancer, treated with surgery, chemotherapy, and radiation in 2016, with Dr. Ma.  In December she presented with bowel obstruction, and was found to have recurrent tumor metastatic to the bowel.  CT imaging at that time showed a left breast mass.  Subsequent mammogram and biopsy ultimately revealed bilateral ER+ her2 negative breast cancer.   She has had bilateral lumpectomies, and reexcision for positive margins.    REVIEW OF SYSTEMS:  A 14 point review of systems was performed and is negative except as noted above.    Past Medical History:   Diagnosis Date   • Drug therapy 2016    For endometrial CA in 2016   • Elevated cholesterol    • Endometrial cancer (HCC) 04/2016    Stage IIIC2    • GERD (gastroesophageal reflux disease)    • HTN (hypertension)    • Hx of radiation therapy 2016    For endometrial CA 2016  "  • Stroke (HCC) 02/2016    No residual deficits.  Takes .             Objective     /65   Pulse 60   Temp 97.3 °F (36.3 °C)   Resp 18   Ht 160 cm (63\")   Wt 55.3 kg (122 lb)   LMP  (LMP Unknown)   SpO2 95%   BMI 21.61 kg/m²   Performance Status:  ECOG score: 1           General: well appearing female in no acute distress  Neuro: alert and oriented  HEENT: sclerae anicteric, oropharynx clear  Extremities: no lower extremity edema  Skin: no rashes, lesions, bruising, or petechiae  Psych: mood and affect appropriate    Lab Results   Component Value Date    WBC 4.75 03/08/2022    HGB 10.3 (L) 03/08/2022    HCT 33.5 (L) 03/08/2022    MCV 83.8 03/08/2022     03/08/2022     Lab Results   Component Value Date    GLUCOSE 103 (H) 03/08/2022    BUN 9 03/08/2022    CREATININE 0.79 03/08/2022    EGFRIFNONA 51 (L) 01/26/2022    EGFRIFAFRI 62 01/26/2022    BCR 11.4 03/08/2022    K 4.0 03/08/2022    CO2 26.0 03/08/2022    CALCIUM 9.4 03/08/2022    PROTENTOTREF 6.6 01/26/2022    ALBUMIN 4.10 03/08/2022    LABIL2 1.4 01/26/2022    AST 17 03/08/2022    ALT 13 03/08/2022                 Assessment/Plan     Cindy Sage is a 77 y.o. female with metastatic endometrial cancer, with a late recurrence involving bowel. She now also has a new diagnosis of bilateral ER+ Her2 negative stage IA breast cancer.    Her tumors have low risk features, and given her age and other comorbidities, particularly the recurrent endometrial cancer, I would not recommend IV chemotherapy for adjuvant treatment of her breast cancer.  I would recommend adjuvant endocrine therapy with an aromatase inhibitor.  We reviewed the potential side effects of anastrozole including hot flashes, vaginal dryness, joint pain, decrease in bone density, and mood or sleep disturbance.    She is going to meet with dr. Kerr in a few weeks to discuss radiotherapy.  I think she can go ahead and start taking anastrozole, and continue during RT if " she decides to proceed with this.    She had a dexa scan in Dec 2020 which showed osteoporosis.  She has not been on any treatment for this.  We discussed treatment with zometa.  This is given IV every 6 months and has been studied in breast cancer patients on endocrine therapy.  She is concerned about osteonecrosis risk.  I recommended that she have an evaluation by her dentist in the next few months, and we can discuss further at her next visit.    Endometrial cancer: discussed with Dr. Ma.  Endocrine suppression therapy may also be an effective management option for her late oligometastatic recurrence.  I will defer to Dr. Albert for any imaging studies for follow up.    F/u in 3 months.               A total greater than 60 mins minutes was spent in face to face patient time, examination, counseling, charting, reviewing test results, and reviewing outside records.    Sofia Fam MD    4/13/2022

## 2022-04-22 NOTE — PROGRESS NOTES
Blood draw for genetic testing as discussed in genetic counseling consult note on 3/8/22. Patient initially submitted a saliva sample, but a second sample was requested due to the first sample not passing  metrics to complete genetic testing.

## 2022-04-25 ENCOUNTER — HOSPITAL ENCOUNTER (OUTPATIENT)
Dept: RADIATION ONCOLOGY | Facility: HOSPITAL | Age: 77
Setting detail: RADIATION/ONCOLOGY SERIES
Discharge: HOME | End: 2022-04-25

## 2022-04-25 ENCOUNTER — OFFICE VISIT (OUTPATIENT)
Dept: RADIATION ONCOLOGY | Facility: HOSPITAL | Age: 77
End: 2022-04-25

## 2022-04-25 ENCOUNTER — DOCUMENTATION (OUTPATIENT)
Dept: RADIATION ONCOLOGY | Facility: HOSPITAL | Age: 77
End: 2022-04-25

## 2022-04-25 VITALS
TEMPERATURE: 97.2 F | DIASTOLIC BLOOD PRESSURE: 73 MMHG | OXYGEN SATURATION: 94 % | RESPIRATION RATE: 16 BRPM | HEART RATE: 66 BPM | SYSTOLIC BLOOD PRESSURE: 176 MMHG | WEIGHT: 124.9 LBS | BODY MASS INDEX: 21.32 KG/M2 | HEIGHT: 64 IN

## 2022-04-25 DIAGNOSIS — Z17.0 BILATERAL MALIGNANT NEOPLASM OF BREAST IN FEMALE, ESTROGEN RECEPTOR POSITIVE, UNSPECIFIED SITE OF BREAST: ICD-10-CM

## 2022-04-25 DIAGNOSIS — Z17.0 MALIGNANT NEOPLASM OF OVERLAPPING SITES OF BOTH BREASTS IN FEMALE, ESTROGEN RECEPTOR POSITIVE: Primary | ICD-10-CM

## 2022-04-25 DIAGNOSIS — C50.811 MALIGNANT NEOPLASM OF OVERLAPPING SITES OF BOTH BREASTS IN FEMALE, ESTROGEN RECEPTOR POSITIVE: Primary | ICD-10-CM

## 2022-04-25 DIAGNOSIS — C50.912 BILATERAL MALIGNANT NEOPLASM OF BREAST IN FEMALE, ESTROGEN RECEPTOR POSITIVE, UNSPECIFIED SITE OF BREAST: ICD-10-CM

## 2022-04-25 DIAGNOSIS — C50.911 BILATERAL MALIGNANT NEOPLASM OF BREAST IN FEMALE, ESTROGEN RECEPTOR POSITIVE, UNSPECIFIED SITE OF BREAST: ICD-10-CM

## 2022-04-25 DIAGNOSIS — C50.812 MALIGNANT NEOPLASM OF OVERLAPPING SITES OF BOTH BREASTS IN FEMALE, ESTROGEN RECEPTOR POSITIVE: Primary | ICD-10-CM

## 2022-04-25 PROCEDURE — G0463 HOSPITAL OUTPT CLINIC VISIT: HCPCS

## 2022-04-25 NOTE — PROGRESS NOTES
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 female  previously treated in 2016 in our department with 45 Cabrera to the pelvis followed by 3 vaginal cylinder implants postoperatively for endometrial cancer.  She has a new diagnosis of bilateral ER positive HER2 negative stage Ia breast cancers.  Left breast medial lumpectomy revealed invasive ductal carcinoma, Darrius grade, 1.2 cm in greatest dimension with a positive superior margin extended margins were taken and negative.  She also underwent reexcision on a different date with no residual tumor.  Left breast \"DCIS\" lumpectomy revealed invasive ductal carcinoma, Darrius grade 1 that was 4 mm in greatest dimension and associated with intermediate grade DCIS.  Right breast lumpectomy was positive for invasive ductal carcinoma, Darrius grade 1 with 3 mm of residual tumor.  No lymph nodes were taken.  She was started on anastrozole by Dr. Sofia Fam and IV chemotherapy was not recommended.  She is here to discuss postoperative radiation.      Age at menarche:  12  Age at menopause:  43  Hormone replacement therapy:  no  Personal history of breast cancer:  no  Family history of breast " cancer:  yes; family member mother and sister  Radiation to chest before age of 29:  no  Age of first live birth: 23    BMI:  Body mass index is 21.44 kg/m².      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  Takes .       family history includes Alcohol abuse in her father; Arthritis in her mother; Breast cancer in her mother and sister; Cancer in her mother; Osteoporosis in her mother; Stroke in her maternal aunt.     Past Surgical History:   Procedure Laterality Date   • APPENDECTOMY      Ex lap   • BREAST BIOPSY Left 2016   • BREAST LUMPECTOMY Bilateral 2022    2-Left; 1-Right   • COLON SURGERY N/A 12/10/2021    Exploratory Laparotamy; Transverse Colon resect w/ re-anastimosis.   • COLOSTOMY N/A 12/10/2021    Procedure: EXPLORATORY LAPAROTOMY, TRANSVERSE COLONIC RESECTION, REANASTIMOSIS, AND MOBILIZATION OF SPLENIC FLEXURE;  Surgeon: Daja Ma MD;  Location: [facility];  Service: Gynecology Oncology;  Laterality: N/A;   • FOOT SURGERY     • OOPHORECTOMY     • TONSILLECTOMY     • TOTAL LAPAROSCOPIC HYSTERECTOMY SALPINGO OOPHORECTOMY Bilateral 04/19/2016    RATLH, BSO, LND        Review of Systems   HENT:   Positive for sore throat (related to allergies).   "  Cardiovascular: Positive for leg swelling.   Genitourinary: Positive for frequency (chronic).    Musculoskeletal: Positive for gait problem (walks with walker since stroke).   Neurological: Positive for gait problem (walks with walker since stroke).   All other systems reviewed and are negative.          Objective   VITAL SIGNS:   Vitals:    04/25/22 1009   BP: 176/73   Pulse: 66   Resp: 16   Temp: 97.2 °F (36.2 °C)   TempSrc: Tympanic   SpO2: 94%   Weight: 56.7 kg (124 lb 14.4 oz)   Height: 162.6 cm (64\")   PainSc: 0-No pain        KPS     90%    Physical Exam  Vitals reviewed.   Constitutional:       Appearance: Normal appearance. She is normal weight.   Cardiovascular:      Rate and Rhythm: Normal rate and regular rhythm.      Heart sounds: Normal heart sounds.   Pulmonary:      Effort: Pulmonary effort is normal.      Breath sounds: Normal breath sounds.   Neurological:      Mental Status: She is alert.     The breast exam reveals a well-healing surgical incision in the medial to the upper outer quadrant of the right breast nipple areolar complex.  The left breast has a well-healing surgical incision at the 6 o'clock position away from the nipple areolar complex.  The nipples are everted bilaterally since surgery.  She has no axillary adenopathy bilaterally.         The following portions of the patient's history were reviewed and updated as appropriate: allergies, current medications, past family history, past medical history, past social history, past surgical history and problem list.    Assessment      IMPRESSION:     Cindy Sage  is a 77 y.o. female with new diagnosis of bilateral ER positive HER2 negative stage Ia breast cancers.  Left breast medial lumpectomy revealed invasive ductal carcinoma, Darrius grade, 1.2 cm in greatest dimension with a positive superior margin extended margins were taken and negative.  She also underwent reexcision on a different date with no residual tumor.  Left " "breast \"DCIS\" lumpectomy revealed invasive ductal carcinoma, Darrius grade 1 that was 4 mm in greatest dimension and associated with intermediate grade DCIS.  Right breast lumpectomy was positive for invasive ductal carcinoma, [place] grade 1 with 3 mm of residual tumor.  No lymph nodes were taken.  She was started on anastrozole by Dr. Sofia Fam and IV chemotherapy was not recommended.  She is here to discuss postoperative radiation.          RECOMMENDATIONS: Ms. Sage has a CT scan Friday morning to evaluate the abdomen and pelvis for metastatic disease from endometrial cancer.  If if she is able in the abdomen we will proceed with postoperative radiation to the breast bilaterally. I will treat to 40.05 Cabrera in 15 fractions to each breast.  She has 3 areas of disease in the breast and we can decrease the risk of local recurrence.  The pros and cons, risks and benefits of treatment were discussed with the patient and her daughter and informed consent obtained.  She will return on May 9 for treatment planning.  If she has much metastatic disease in the abdomen or pelvis we will cancel the radiation treatment planning session.  Thank you very much for asking me to see this very pleasant.         Cate Kerr MD    Total time of patient care on day of service including time prior to, face to face with patient, and following visit spent in reviewing records, lab results, imaging studies, discussion with patient, and documentation/charting was > 45 minutes.    Errors in dictation may reflect use of voice recognition software and not all errors in transcription may have been detected prior to signing.  "

## 2022-04-29 ENCOUNTER — HOSPITAL ENCOUNTER (OUTPATIENT)
Dept: CT IMAGING | Facility: HOSPITAL | Age: 77
Discharge: HOME | End: 2022-04-29
Admitting: OBSTETRICS & GYNECOLOGY

## 2022-04-29 ENCOUNTER — TELEPHONE (OUTPATIENT)
Dept: GYNECOLOGIC ONCOLOGY | Facility: CLINIC | Age: 77
End: 2022-04-29

## 2022-04-29 DIAGNOSIS — C54.1 ENDOMETRIAL CANCER: ICD-10-CM

## 2022-04-29 LAB — CREAT BLDA-MCNC: 0.9 MG/DL (ref 0.6–1.3)

## 2022-04-29 PROCEDURE — 74177 CT ABD & PELVIS W/CONTRAST: CPT

## 2022-04-29 PROCEDURE — 82565 ASSAY OF CREATININE: CPT

## 2022-04-29 PROCEDURE — 25010000002 IOPAMIDOL 61 % SOLUTION: Performed by: OBSTETRICS & GYNECOLOGY

## 2022-04-29 RX ADMIN — IOPAMIDOL 60 ML: 612 INJECTION, SOLUTION INTRAVENOUS at 11:20

## 2022-04-29 NOTE — TELEPHONE ENCOUNTER
----- Message from Daja Ma MD sent at 4/29/2022 12:37 PM EDT -----  Please notify patient of benign CT scan results.  Thanks      ----- Message -----  From: Jett, Rad Results Pink Rebel Shoes In  Sent: 4/29/2022  11:56 AM EDT  To: Daja Ma MD

## 2022-05-02 ENCOUNTER — DOCUMENTATION (OUTPATIENT)
Dept: GENETICS | Facility: HOSPITAL | Age: 77
End: 2022-05-02

## 2022-05-02 DIAGNOSIS — G45.9 TIA (TRANSIENT ISCHEMIC ATTACK): ICD-10-CM

## 2022-05-02 DIAGNOSIS — I66.01 STENOSIS OF RIGHT MIDDLE CEREBRAL ARTERY: ICD-10-CM

## 2022-05-02 RX ORDER — SIMVASTATIN 40 MG
TABLET ORAL
Qty: 90 TABLET | Refills: 1 | Status: SHIPPED | OUTPATIENT
Start: 2022-05-02 | End: 2022-05-03

## 2022-05-02 NOTE — PROGRESS NOTES
Cindy Sage, a 77-year-old female, was seen for genetic counseling due to a personal history of breast and endometrial cancer.  Genetic counseling was provided via telehealth. Ms. Sage was recently diagnosed with bilateral breast cancer at age 76. She is scheduled for lumpectomies on 3/10/22. Ms. Sage also has a history of endometrial cancer that was diagnosed at age 71. She does not report a personal history of colon polyps. She was interested in discussing her risk for a hereditary cancer syndrome.  Ms. Sage was interested in pursuing a multi-gene panel to evaluate her risk of cancer, therefore the CancerNext panel was ordered through [employer] which analyzes BRCA1/2 and 34 additional genes associated with an increased cancer risk. The genes on this panel include APC, MARCO, AXIN2, BARD1, BMPR1A, BRCA1, BRCA2, BRIP1, CDH1, CDK4, CDKN2A, CHEK2, DICER1, EPCAM, GREM1, HOXB13, MLH1, MSH2, MSH3, MSH6, MUTYH, NBN, NF1, NTHL1, PALB2, PMS2, POLD1, POLE, PTEN, RAD51C, RAD51D, RECQL, SMAD4, SMARCA4, STK11, and TP53.   Genetic testing was negative for known pathogenic (harmful) mutations in BRCA1/2 and 34 additional genes on the CancerNext panel. While no known pathogenic mutations were identified, a variant of uncertain significance was identified in the MLH1 gene. Variants are changes in DNA that may or may not affect the function of the gene.  The classification of a variant to either a benign gene change or pathogenic mutation depends on a number of factors.  The majority (estimated to be >90%) of VUS’s are eventually reclassified as benign gene changes. It is not recommended that any unaffected relatives be tested for this variant at this time. This result is not considered clinically actionable at this time.  These results were discussed with Ms. Sage by telephone on 5/2/22.    PERTINENT FAMILY HISTORY: (See attached pedigree)   Sister:  Breast cancer, 76  Mother: Breast cancer, late 50s;  Contralateral breast cancer, 60s  Nephew: Unspecified cancer type, 40s  Unknown paternal family history.    Records regarding the family history were not available for review.     RISK ASSESSMENT:  Ms. Sage’s personal and family history of cancer led to concern for a hereditary cancer syndrome. We discussed BRCA1/2 testing as well as the option of pursuing a panel that would test for other genes known to impact cancer risk in addition to BRCA1/2.  She clearly meets NCCN guidelines criteria for testing given her personal history of bilateral breast cancer and family history of breast cancer. These risk assessments are based on the family history information provided at the time of the appointment, and could change in the future should new information be obtained.    GENETIC COUNSELING: We reviewed the family history information in detail. Cases of cancer follow three general patterns: sporadic, familial, and hereditary.  While most cancer is sporadic, some cases appear to occur in family clusters.  These cases are said to be familial and account for 10-20% of cancer cases.  Familial cases may be due to a combination of shared genes and environmental factors among family members.  In even fewer families, the cancer is said to be inherited, and the genes responsible for the cancer are known.      Family histories typical of hereditary cancer syndromes usually include multiple first- and second-degree relatives diagnosed with cancer types that define a syndrome.  These cases tend to be diagnosed at younger-than-expected ages and can be bilateral or multifocal.  The cancer in these families follows an autosomal dominant inheritance pattern, which indicates the likely presence of a mutation in a cancer susceptibility gene.  Children and siblings of an individual believed to carry this mutation have a 50% chance of inheriting that mutation, thereby inheriting the increased risk to develop cancer.  These mutations can  be passed down from the maternal or the paternal lineage.    Hereditary breast cancer accounts for 5-10% of all cases of breast cancer.  A significant proportion of hereditary breast and ovarian cancer can be attributed to mutations in the BRCA1 and BRCA2 genes.  Mutations in these genes confer an increased risk for breast cancer, ovarian cancer, male breast cancer, prostate cancer, and pancreatic cancer. Women with a BRCA1 or BRCA2 mutation who have already been diagnosed with breast cancer have a 40-60% lifetime risk of a second breast cancer. Women with a BRCA1 or BRCA2 mutation have up to a 44% risk of ovarian cancer.      There are other genes that are known to be associated with an increased risk for cancer.  Some of these genes have well defined cancer risks and established management guidelines.  Other genes that can be tested for have been more recently described, and there may be less data regarding the risks and therefore may not have established management guidelines. We reviewed that in some cases, the identification of a genetic mutation may impact treatment options for some types of cancer. We discussed these limitations at length.  Based on Ms. Sage’s desire to get as much information as possible regarding her personal risks and potential risks for her family, she opted to pursue testing through a panel that would look at several other genes known to increase the risk for cancer.    GENETIC TESTING:  The risks, benefits and limitations of genetic testing and implications for clinical management following testing were reviewed.  DNA test results can influence decisions regarding screening, prevention and surgical management.  Genetic testing can have significant psychological implications for both individuals and families.  Also discussed was the possibility of employment and insurance discrimination based on genetic test results and the laws in place to prevent this (AKHIL).    We discussed panel  testing, which would involve testing for BRCA1/2 as well as 34 additional genes that are associated with increased cancer risk. The benefits and limitations of genetic testing were discussed and Ms. Sage decided to pursue testing via the panel. The implications of a positive or negative test result were discussed. We discussed the possibility that, in some cases, genetic test results may be informative or may be ambiguous due to the identification of a genetic variant. These variants may or may not be associated with an increased cancer risk.  With multigene panel testing, it is not uncommon for a variant of uncertain significance (VUS) to be identified.  If a VUS is identified, testing family members is typically not recommended and screening recommendations are made based on the family history.  The laboratories that perform genetic testing work to reclassify the VUS and send out an amended report if and when a VUS is reclassified.  The majority of variant findings are ultimately reclassified to a negative result.  Given her personal and family history, a negative test result would not eliminate all cancer risk to her relatives, although the risk would not be as high as it would with positive genetic testing.      TEST RESULTS:  Genetic testing was negative for known pathogenic mutations by sequencing, rearrangement testing, and RNA analysis for the genes on the CancerNext panel.    A variant of uncertain significance was identified in the MLH1 gene.  A VUS is a finding that may or may not impact the function of the gene.  VUSs are not clinically actionable findings, and therefore this result does not impact management at this time.  The majority of VUSs are ultimately reclassified to benign changes.  The identification of a VUS is common in multigene panel testing, given the number of genes being evaluated.  If this VUS is ever reclassified, a new report will be issued by the laboratory and released directly  to the ordering physician, and our office.  This assessment is based on the information provided at the time of the consultation.    CLINICAL MANAGEMENT GUIDELINES: ’s surveillance and management should be determined by her oncology team. Despite the genetic test results that were negative for known pathogenic mutations, Ms. Sage’s female relatives may have a somewhat increased lifetime risk for breast cancer based on family history. Relatives may have a personalized risk assessment performed to quantify their breast cancer risk using a family history based risk model, such as the Tyrer-Cuzick model. Surveillance for individuals with a high lifetime risk of breast cancer (>20%), based on NCCN guidelines, would consist of semi-annual clinical breast exams and monthly self-breast exams starting by age 18 and annual mammography starting 10 years younger than the earliest diagnosis in the family, or age 40, whichever is earliest.  According to an American Cancer Society expert panel, annual breast MRI should be offered to women whose lifetime risk of breast cancer is 20-25 percent or more, typically beginning at the same age as mammography.    PLAN: Genetic counseling remains available to Ms. Sage. We encourage her to contact our office with any questions she may have at [phone].        Najma Sheridan MS, [facility] [place]  Licensed Certified Genetic Counselor       Cc: Cindy Muse MD

## 2022-05-03 DIAGNOSIS — G45.9 TIA (TRANSIENT ISCHEMIC ATTACK): ICD-10-CM

## 2022-05-03 DIAGNOSIS — I66.01 STENOSIS OF RIGHT MIDDLE CEREBRAL ARTERY: ICD-10-CM

## 2022-05-03 RX ORDER — SIMVASTATIN 40 MG
TABLET ORAL
Qty: 90 TABLET | Refills: 1 | Status: SHIPPED | OUTPATIENT
Start: 2022-05-03 | End: 2022-05-03

## 2022-05-03 RX ORDER — SIMVASTATIN 40 MG
TABLET ORAL
Qty: 90 TABLET | Refills: 1 | Status: SHIPPED | OUTPATIENT
Start: 2022-05-03 | End: 2022-05-04

## 2022-05-04 ENCOUNTER — HOSPITAL ENCOUNTER (OUTPATIENT)
Dept: RADIATION ONCOLOGY | Facility: HOSPITAL | Age: 77
Discharge: HOME | End: 2022-05-04

## 2022-05-04 ENCOUNTER — HOSPITAL ENCOUNTER (OUTPATIENT)
Dept: RADIATION ONCOLOGY | Facility: HOSPITAL | Age: 77
Setting detail: RADIATION/ONCOLOGY SERIES
Discharge: HOME | End: 2022-05-04

## 2022-05-04 PROCEDURE — 77290 THER RAD SIMULAJ FIELD CPLX: CPT | Performed by: RADIOLOGY

## 2022-05-04 PROCEDURE — 77333 RADIATION TREATMENT AID(S): CPT | Performed by: RADIOLOGY

## 2022-05-04 RX ORDER — SIMVASTATIN 40 MG
TABLET ORAL
Qty: 90 TABLET | Refills: 1 | Status: SHIPPED | OUTPATIENT
Start: 2022-05-04

## 2022-05-09 PROCEDURE — 77295 3-D RADIOTHERAPY PLAN: CPT | Performed by: RADIOLOGY

## 2022-05-09 PROCEDURE — 77334 RADIATION TREATMENT AID(S): CPT | Performed by: RADIOLOGY

## 2022-05-09 PROCEDURE — 77300 RADIATION THERAPY DOSE PLAN: CPT | Performed by: RADIOLOGY

## 2022-05-11 ENCOUNTER — HOSPITAL ENCOUNTER (OUTPATIENT)
Dept: RADIATION ONCOLOGY | Facility: HOSPITAL | Age: 77
Discharge: HOME | End: 2022-05-11

## 2022-05-11 PROCEDURE — 77280 THER RAD SIMULAJ FIELD SMPL: CPT | Performed by: RADIOLOGY

## 2022-05-12 ENCOUNTER — HOSPITAL ENCOUNTER (OUTPATIENT)
Dept: RADIATION ONCOLOGY | Facility: HOSPITAL | Age: 77
Discharge: HOME | End: 2022-05-12

## 2022-05-12 PROCEDURE — 77387 GUIDANCE FOR RADJ TX DLVR: CPT | Performed by: RADIOLOGY

## 2022-05-12 PROCEDURE — 77412 RADIATION TX DELIVERY LVL 3: CPT | Performed by: RADIOLOGY

## 2022-05-13 ENCOUNTER — HOSPITAL ENCOUNTER (OUTPATIENT)
Dept: RADIATION ONCOLOGY | Facility: HOSPITAL | Age: 77
Discharge: HOME | End: 2022-05-13

## 2022-05-13 PROCEDURE — 77336 RADIATION PHYSICS CONSULT: CPT | Performed by: RADIOLOGY

## 2022-05-13 PROCEDURE — 77387 GUIDANCE FOR RADJ TX DLVR: CPT | Performed by: RADIOLOGY

## 2022-05-13 PROCEDURE — 77412 RADIATION TX DELIVERY LVL 3: CPT | Performed by: RADIOLOGY

## 2022-05-16 ENCOUNTER — HOSPITAL ENCOUNTER (OUTPATIENT)
Dept: RADIATION ONCOLOGY | Facility: HOSPITAL | Age: 77
Discharge: HOME | End: 2022-05-16

## 2022-05-16 PROCEDURE — 77412 RADIATION TX DELIVERY LVL 3: CPT | Performed by: RADIOLOGY

## 2022-05-16 PROCEDURE — 77387 GUIDANCE FOR RADJ TX DLVR: CPT | Performed by: RADIOLOGY

## 2022-05-17 ENCOUNTER — HOSPITAL ENCOUNTER (OUTPATIENT)
Dept: RADIATION ONCOLOGY | Facility: HOSPITAL | Age: 77
Discharge: HOME | End: 2022-05-17

## 2022-05-17 VITALS — BODY MASS INDEX: 21.73 KG/M2 | WEIGHT: 126.6 LBS

## 2022-05-17 PROCEDURE — 77387 GUIDANCE FOR RADJ TX DLVR: CPT | Performed by: RADIOLOGY

## 2022-05-17 PROCEDURE — 77412 RADIATION TX DELIVERY LVL 3: CPT | Performed by: RADIOLOGY

## 2022-05-18 ENCOUNTER — HOSPITAL ENCOUNTER (OUTPATIENT)
Dept: RADIATION ONCOLOGY | Facility: HOSPITAL | Age: 77
Discharge: HOME | End: 2022-05-18

## 2022-05-18 PROCEDURE — 77387 GUIDANCE FOR RADJ TX DLVR: CPT | Performed by: RADIOLOGY

## 2022-05-18 PROCEDURE — 77412 RADIATION TX DELIVERY LVL 3: CPT | Performed by: RADIOLOGY

## 2022-05-19 ENCOUNTER — HOSPITAL ENCOUNTER (OUTPATIENT)
Dept: RADIATION ONCOLOGY | Facility: HOSPITAL | Age: 77
Discharge: HOME | End: 2022-05-19

## 2022-05-19 PROCEDURE — 77387 GUIDANCE FOR RADJ TX DLVR: CPT | Performed by: RADIOLOGY

## 2022-05-19 PROCEDURE — 77412 RADIATION TX DELIVERY LVL 3: CPT | Performed by: RADIOLOGY

## 2022-05-19 PROCEDURE — 77336 RADIATION PHYSICS CONSULT: CPT | Performed by: RADIOLOGY

## 2022-05-20 ENCOUNTER — HOSPITAL ENCOUNTER (OUTPATIENT)
Dept: RADIATION ONCOLOGY | Facility: HOSPITAL | Age: 77
Discharge: HOME | End: 2022-05-20

## 2022-05-20 PROCEDURE — 77387 GUIDANCE FOR RADJ TX DLVR: CPT | Performed by: RADIOLOGY

## 2022-05-20 PROCEDURE — 77412 RADIATION TX DELIVERY LVL 3: CPT | Performed by: RADIOLOGY

## 2022-05-23 ENCOUNTER — HOSPITAL ENCOUNTER (OUTPATIENT)
Dept: RADIATION ONCOLOGY | Facility: HOSPITAL | Age: 77
Discharge: HOME | End: 2022-05-23

## 2022-05-23 PROCEDURE — 77412 RADIATION TX DELIVERY LVL 3: CPT | Performed by: RADIOLOGY

## 2022-05-23 PROCEDURE — 77387 GUIDANCE FOR RADJ TX DLVR: CPT | Performed by: RADIOLOGY

## 2022-05-24 ENCOUNTER — HOSPITAL ENCOUNTER (OUTPATIENT)
Dept: RADIATION ONCOLOGY | Facility: HOSPITAL | Age: 77
Discharge: HOME | End: 2022-05-24

## 2022-05-24 VITALS — WEIGHT: 127.4 LBS | BODY MASS INDEX: 21.87 KG/M2

## 2022-05-24 PROCEDURE — 77387 GUIDANCE FOR RADJ TX DLVR: CPT | Performed by: RADIOLOGY

## 2022-05-24 PROCEDURE — 77412 RADIATION TX DELIVERY LVL 3: CPT | Performed by: RADIOLOGY

## 2022-05-25 ENCOUNTER — HOSPITAL ENCOUNTER (OUTPATIENT)
Dept: RADIATION ONCOLOGY | Facility: HOSPITAL | Age: 77
Discharge: HOME | End: 2022-05-25

## 2022-05-25 PROCEDURE — 77412 RADIATION TX DELIVERY LVL 3: CPT | Performed by: RADIOLOGY

## 2022-05-25 PROCEDURE — 77387 GUIDANCE FOR RADJ TX DLVR: CPT | Performed by: RADIOLOGY

## 2022-05-26 ENCOUNTER — HOSPITAL ENCOUNTER (OUTPATIENT)
Dept: RADIATION ONCOLOGY | Facility: HOSPITAL | Age: 77
Discharge: HOME | End: 2022-05-26

## 2022-05-26 PROCEDURE — 77387 GUIDANCE FOR RADJ TX DLVR: CPT | Performed by: RADIOLOGY

## 2022-05-26 PROCEDURE — 77412 RADIATION TX DELIVERY LVL 3: CPT | Performed by: RADIOLOGY

## 2022-05-27 ENCOUNTER — HOSPITAL ENCOUNTER (OUTPATIENT)
Dept: RADIATION ONCOLOGY | Facility: HOSPITAL | Age: 77
Discharge: HOME | End: 2022-05-27

## 2022-05-27 PROCEDURE — 77412 RADIATION TX DELIVERY LVL 3: CPT | Performed by: RADIOLOGY

## 2022-05-27 PROCEDURE — 77387 GUIDANCE FOR RADJ TX DLVR: CPT | Performed by: RADIOLOGY

## 2022-05-27 PROCEDURE — 77336 RADIATION PHYSICS CONSULT: CPT | Performed by: RADIOLOGY

## 2022-05-30 ENCOUNTER — APPOINTMENT (OUTPATIENT)
Dept: GENERAL RADIOLOGY | Facility: HOSPITAL | Age: 77
End: 2022-05-30

## 2022-05-30 ENCOUNTER — HOSPITAL ENCOUNTER (EMERGENCY)
Facility: HOSPITAL | Age: 77
Discharge: HOME | End: 2022-05-30
Attending: EMERGENCY MEDICINE | Admitting: EMERGENCY MEDICINE

## 2022-05-30 VITALS
WEIGHT: 126 LBS | BODY MASS INDEX: 21.51 KG/M2 | HEIGHT: 64 IN | HEART RATE: 75 BPM | DIASTOLIC BLOOD PRESSURE: 64 MMHG | SYSTOLIC BLOOD PRESSURE: 139 MMHG | TEMPERATURE: 99.6 F | OXYGEN SATURATION: 100 % | RESPIRATION RATE: 18 BRPM

## 2022-05-30 DIAGNOSIS — C50.912 BILATERAL MALIGNANT NEOPLASM OF BREAST IN FEMALE, ESTROGEN RECEPTOR POSITIVE, UNSPECIFIED SITE OF BREAST: ICD-10-CM

## 2022-05-30 DIAGNOSIS — T66.XXXA ADVERSE EFFECT OF RADIATION THERAPY, INITIAL ENCOUNTER: ICD-10-CM

## 2022-05-30 DIAGNOSIS — Z17.0 BILATERAL MALIGNANT NEOPLASM OF BREAST IN FEMALE, ESTROGEN RECEPTOR POSITIVE, UNSPECIFIED SITE OF BREAST: ICD-10-CM

## 2022-05-30 DIAGNOSIS — C50.911 BILATERAL MALIGNANT NEOPLASM OF BREAST IN FEMALE, ESTROGEN RECEPTOR POSITIVE, UNSPECIFIED SITE OF BREAST: ICD-10-CM

## 2022-05-30 DIAGNOSIS — R07.9 CHEST PAIN, UNSPECIFIED TYPE: Primary | ICD-10-CM

## 2022-05-30 LAB
ALBUMIN SERPL-MCNC: 4.4 G/DL (ref 3.5–5.2)
ALBUMIN/GLOB SERPL: 1.8 G/DL
ALP SERPL-CCNC: 97 U/L (ref 39–117)
ALT SERPL W P-5'-P-CCNC: 11 U/L (ref 1–33)
ANION GAP SERPL CALCULATED.3IONS-SCNC: 13 MMOL/L (ref 5–15)
AST SERPL-CCNC: 31 U/L (ref 1–32)
BASOPHILS # BLD AUTO: 0.01 10*3/MM3 (ref 0–0.2)
BASOPHILS NFR BLD AUTO: 0.1 % (ref 0–1.5)
BILIRUB SERPL-MCNC: 0.8 MG/DL (ref 0–1.2)
BUN SERPL-MCNC: 10 MG/DL (ref 8–23)
BUN/CREAT SERPL: 10.6 (ref 7–25)
CALCIUM SPEC-SCNC: 9.4 MG/DL (ref 8.6–10.5)
CHLORIDE SERPL-SCNC: 99 MMOL/L (ref 98–107)
CO2 SERPL-SCNC: 26 MMOL/L (ref 22–29)
CREAT SERPL-MCNC: 0.94 MG/DL (ref 0.57–1)
DEPRECATED RDW RBC AUTO: 49.4 FL (ref 37–54)
EGFRCR SERPLBLD CKD-EPI 2021: 62.2 ML/MIN/1.73
EOSINOPHIL # BLD AUTO: 0 10*3/MM3 (ref 0–0.4)
EOSINOPHIL NFR BLD AUTO: 0 % (ref 0.3–6.2)
ERYTHROCYTE [DISTWIDTH] IN BLOOD BY AUTOMATED COUNT: 17 % (ref 12.3–15.4)
GLOBULIN UR ELPH-MCNC: 2.5 GM/DL
GLUCOSE SERPL-MCNC: 117 MG/DL (ref 65–99)
HCT VFR BLD AUTO: 31.6 % (ref 34–46.6)
HGB BLD-MCNC: 9.9 G/DL (ref 12–15.9)
HOLD SPECIMEN: NORMAL
IMM GRANULOCYTES # BLD AUTO: 0.02 10*3/MM3 (ref 0–0.05)
IMM GRANULOCYTES NFR BLD AUTO: 0.3 % (ref 0–0.5)
LIPASE SERPL-CCNC: 33 U/L (ref 13–60)
LYMPHOCYTES # BLD AUTO: 0.22 10*3/MM3 (ref 0.7–3.1)
LYMPHOCYTES NFR BLD AUTO: 3 % (ref 19.6–45.3)
MCH RBC QN AUTO: 25.2 PG (ref 26.6–33)
MCHC RBC AUTO-ENTMCNC: 31.3 G/DL (ref 31.5–35.7)
MCV RBC AUTO: 80.4 FL (ref 79–97)
MONOCYTES # BLD AUTO: 0.53 10*3/MM3 (ref 0.1–0.9)
MONOCYTES NFR BLD AUTO: 7.3 % (ref 5–12)
NEUTROPHILS NFR BLD AUTO: 6.48 10*3/MM3 (ref 1.7–7)
NEUTROPHILS NFR BLD AUTO: 89.3 % (ref 42.7–76)
NRBC BLD AUTO-RTO: 0 /100 WBC (ref 0–0.2)
NT-PROBNP SERPL-MCNC: 201.4 PG/ML (ref 0–1800)
PLATELET # BLD AUTO: 193 10*3/MM3 (ref 140–450)
PMV BLD AUTO: 8.6 FL (ref 6–12)
POTASSIUM SERPL-SCNC: 3.1 MMOL/L (ref 3.5–5.2)
PROT SERPL-MCNC: 6.9 G/DL (ref 6–8.5)
QT INTERVAL: 364 MS
QT INTERVAL: 524 MS
QTC INTERVAL: 419 MS
QTC INTERVAL: 634 MS
RBC # BLD AUTO: 3.93 10*6/MM3 (ref 3.77–5.28)
SODIUM SERPL-SCNC: 138 MMOL/L (ref 136–145)
TROPONIN T SERPL-MCNC: <0.01 NG/ML (ref 0–0.03)
TROPONIN T SERPL-MCNC: <0.01 NG/ML (ref 0–0.03)
WBC NRBC COR # BLD: 7.26 10*3/MM3 (ref 3.4–10.8)
WHOLE BLOOD HOLD COAG: NORMAL
WHOLE BLOOD HOLD SPECIMEN: NORMAL

## 2022-05-30 PROCEDURE — 71045 X-RAY EXAM CHEST 1 VIEW: CPT

## 2022-05-30 PROCEDURE — 83880 ASSAY OF NATRIURETIC PEPTIDE: CPT | Performed by: EMERGENCY MEDICINE

## 2022-05-30 PROCEDURE — 84484 ASSAY OF TROPONIN QUANT: CPT | Performed by: EMERGENCY MEDICINE

## 2022-05-30 PROCEDURE — 99284 EMERGENCY DEPT VISIT MOD MDM: CPT

## 2022-05-30 PROCEDURE — 93005 ELECTROCARDIOGRAM TRACING: CPT | Performed by: EMERGENCY MEDICINE

## 2022-05-30 PROCEDURE — 83690 ASSAY OF LIPASE: CPT | Performed by: EMERGENCY MEDICINE

## 2022-05-30 PROCEDURE — 93005 ELECTROCARDIOGRAM TRACING: CPT

## 2022-05-30 PROCEDURE — 36415 COLL VENOUS BLD VENIPUNCTURE: CPT

## 2022-05-30 PROCEDURE — 85025 COMPLETE CBC W/AUTO DIFF WBC: CPT | Performed by: EMERGENCY MEDICINE

## 2022-05-30 PROCEDURE — 80053 COMPREHEN METABOLIC PANEL: CPT | Performed by: EMERGENCY MEDICINE

## 2022-05-30 RX ORDER — ASPIRIN 81 MG/1
324 TABLET, CHEWABLE ORAL ONCE
Status: DISCONTINUED | OUTPATIENT
Start: 2022-05-30 | End: 2022-05-30 | Stop reason: HOSPADM

## 2022-05-30 RX ORDER — ACETAMINOPHEN 500 MG
1000 TABLET ORAL ONCE
Status: DISCONTINUED | OUTPATIENT
Start: 2022-05-30 | End: 2022-05-30 | Stop reason: HOSPADM

## 2022-05-30 RX ORDER — KETOROLAC TROMETHAMINE 15 MG/ML
10 INJECTION, SOLUTION INTRAMUSCULAR; INTRAVENOUS ONCE
Status: DISCONTINUED | OUTPATIENT
Start: 2022-05-30 | End: 2022-05-30 | Stop reason: HOSPADM

## 2022-05-30 RX ORDER — SODIUM CHLORIDE 0.9 % (FLUSH) 0.9 %
10 SYRINGE (ML) INJECTION AS NEEDED
Status: DISCONTINUED | OUTPATIENT
Start: 2022-05-30 | End: 2022-05-30 | Stop reason: HOSPADM

## 2022-05-30 RX ORDER — POTASSIUM CHLORIDE 750 MG/1
40 CAPSULE, EXTENDED RELEASE ORAL ONCE
Status: DISCONTINUED | OUTPATIENT
Start: 2022-05-30 | End: 2022-05-30 | Stop reason: HOSPADM

## 2022-05-31 ENCOUNTER — HOSPITAL ENCOUNTER (OUTPATIENT)
Dept: RADIATION ONCOLOGY | Facility: HOSPITAL | Age: 77
Discharge: HOME | End: 2022-05-31

## 2022-05-31 VITALS — WEIGHT: 126.2 LBS | BODY MASS INDEX: 21.66 KG/M2

## 2022-05-31 PROCEDURE — 77387 GUIDANCE FOR RADJ TX DLVR: CPT | Performed by: RADIOLOGY

## 2022-05-31 PROCEDURE — 77412 RADIATION TX DELIVERY LVL 3: CPT | Performed by: RADIOLOGY

## 2022-06-01 ENCOUNTER — HOSPITAL ENCOUNTER (OUTPATIENT)
Dept: RADIATION ONCOLOGY | Facility: HOSPITAL | Age: 77
Setting detail: RADIATION/ONCOLOGY SERIES
Discharge: HOME | End: 2022-06-01

## 2022-06-01 ENCOUNTER — HOSPITAL ENCOUNTER (OUTPATIENT)
Dept: RADIATION ONCOLOGY | Facility: HOSPITAL | Age: 77
Discharge: HOME | End: 2022-06-01

## 2022-06-01 ENCOUNTER — PATIENT OUTREACH (OUTPATIENT)
Dept: CASE MANAGEMENT | Facility: OTHER | Age: 77
End: 2022-06-01

## 2022-06-01 PROCEDURE — 77412 RADIATION TX DELIVERY LVL 3: CPT | Performed by: RADIOLOGY

## 2022-06-01 PROCEDURE — 77387 GUIDANCE FOR RADJ TX DLVR: CPT | Performed by: RADIOLOGY

## 2022-06-01 NOTE — OUTREACH NOTE
AMBULATORY CASE MANAGEMENT NOTE    Name and Relationship of Patient/Support Person: Cindy Sage - Self    Patient Outreach    [facility] ED visit 5/30/2022. Impression Chest pain unspecified, Adverse effect of radiation, Bilateral neoplasm in breast.  Patient states she is doing well, no additional chest pain episodes, denied ACM need.  Service declined.      BUBBA HERRERA  Ambulatory Case Management    6/1/2022, 12:41 EDT

## 2022-06-02 ENCOUNTER — HOSPITAL ENCOUNTER (OUTPATIENT)
Dept: RADIATION ONCOLOGY | Facility: HOSPITAL | Age: 77
Discharge: HOME | End: 2022-06-02

## 2022-06-02 PROCEDURE — 77412 RADIATION TX DELIVERY LVL 3: CPT | Performed by: RADIOLOGY

## 2022-06-02 PROCEDURE — 77387 GUIDANCE FOR RADJ TX DLVR: CPT | Performed by: RADIOLOGY

## 2022-06-06 ENCOUNTER — OFFICE VISIT (OUTPATIENT)
Dept: INTERNAL MEDICINE | Facility: CLINIC | Age: 77
End: 2022-06-06

## 2022-06-06 ENCOUNTER — LAB (OUTPATIENT)
Dept: LAB | Facility: HOSPITAL | Age: 77
End: 2022-06-06

## 2022-06-06 VITALS
DIASTOLIC BLOOD PRESSURE: 66 MMHG | SYSTOLIC BLOOD PRESSURE: 134 MMHG | RESPIRATION RATE: 18 BRPM | TEMPERATURE: 96.8 F | HEIGHT: 64 IN | BODY MASS INDEX: 21.61 KG/M2 | HEART RATE: 60 BPM | WEIGHT: 126.6 LBS

## 2022-06-06 DIAGNOSIS — E87.6 HYPOKALEMIA: ICD-10-CM

## 2022-06-06 DIAGNOSIS — I10 ESSENTIAL HYPERTENSION: Primary | ICD-10-CM

## 2022-06-06 LAB
BUN SERPL-MCNC: 8 MG/DL (ref 8–23)
BUN/CREAT SERPL: 9.9 (ref 7–25)
CALCIUM SERPL-MCNC: 9.2 MG/DL (ref 8.6–10.5)
CHLORIDE SERPL-SCNC: 101 MMOL/L (ref 98–107)
CO2 SERPL-SCNC: 23 MMOL/L (ref 22–29)
CREAT SERPL-MCNC: 0.81 MG/DL (ref 0.57–1)
EGFRCR SERPLBLD CKD-EPI 2021: 74.4 ML/MIN/1.73
GLUCOSE SERPL-MCNC: 102 MG/DL (ref 65–99)
POTASSIUM SERPL-SCNC: 4.1 MMOL/L (ref 3.5–5.2)
SODIUM SERPL-SCNC: 139 MMOL/L (ref 136–145)

## 2022-06-06 PROCEDURE — 99213 OFFICE O/P EST LOW 20 MIN: CPT | Performed by: NURSE PRACTITIONER

## 2022-06-06 NOTE — PROGRESS NOTES
Patient Name: Cindy Sage  : 1945   MRN: [id number]     Chief Complaint:    Chief Complaint   Patient presents with   • Chest Pain     [facility] ER follow up 22       History of Present Illness: Cindy Sage is a 77 y.o. female presents to clinic for follow-up from an ER visit on 2022.  Patient was seen for evaluation of chest pain.  She had recently completed radiation to her chest wall secondary to breast cancer.  Work-up was negative.  Since discharge she has not had any more chest pain.  She states she has had some slight fatigue on and off but this seems to be improving.  She has finished 15 of 15 of her radiation.  Her potassium was low in the emergency department.  She is taking 15 mEq of potassium daily.  She denies any fever, chills, cough or congestion.  She does not have complaints of palpitations, chest pains or shortness of breath      Hypertension  She has not checked her blood pressure at home.  They state that they are taking their medication as prescribed. They are not having medication side effects.  Does not have any complaints of dizziness or lightheadedness.  She is taking hydrochlorothiazide 25 mg daily.        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  Takes .          Subjective     Review of System: Review of Systems   Constitutional: Positive for fatigue (improving). Negative for fever.   HENT: Negative for congestion and rhinorrhea.    Respiratory: Negative for cough, shortness of breath and wheezing.    Cardiovascular: Negative for chest pain and  "palpitations.   Gastrointestinal: Negative for abdominal pain, diarrhea, nausea and vomiting.   Genitourinary: Negative for dysuria.   Musculoskeletal: Negative for myalgias.   Skin: Negative for rash.   Neurological: Negative for headaches.   Hematological: Negative for adenopathy.   Psychiatric/Behavioral: Negative for dysphoric mood.        Medications:     Current Outpatient Medications:   •  acetaminophen (TYLENOL) 325 MG tablet, Take 2 tablets by mouth Every 6 (Six) Hours. (Patient taking differently: Take 650 mg by mouth Every 6 (Six) Hours As Needed.), Disp: , Rfl:   •  anastrozole (ARIMIDEX) 1 MG tablet, Take 1 tablet by mouth Daily., Disp: 30 tablet, Rfl: 11  •  aspirin 81 MG chewable tablet, Chew 1 tablet Daily., Disp: 90 tablet, Rfl: 3  •  Cholecalciferol (VITAMIN D3 GUMMIES ADULT PO), Take  by mouth Daily., Disp: , Rfl:   •  clopidogrel (PLAVIX) 75 MG tablet, Hold while taking Xarelto. Resume taking 1 tablet by mouth daily when off of Xarelto, Disp: 30 tablet, Rfl: 5  •  hydroCHLOROthiazide (HYDRODIURIL) 25 MG tablet, Take 25 mg by mouth Daily., Disp: , Rfl:   •  potassium chloride (KAYCIEL) 20 mEq/15 mL solution, Take 15 mL by mouth Daily., Disp: 450 mL, Rfl: 5  •  simvastatin (ZOCOR) 40 MG tablet, Take 1 tablet by mouth once daily, Disp: 90 tablet, Rfl: 1    Allergies:   Allergies   Allergen Reactions   • Strawberry Extract Swelling       Objective     Physical Exam:   Vital Signs:   Vitals:    06/06/22 0954   BP: 134/66   BP Location: Right arm   Patient Position: Sitting   Cuff Size: Adult   Pulse: 60   Resp: 18   Temp: 96.8 °F (36 °C)   TempSrc: Infrared   Weight: 57.4 kg (126 lb 9.6 oz)   Height: 162.6 cm (64\")   PainSc: 0-No pain     Body mass index is 21.73 kg/m². BMI is within normal parameters. No other follow-up for BMI required.      Physical Exam  Constitutional:       General: She is not in acute distress.     Appearance: She is not ill-appearing.   HENT:      Head: Normocephalic. "   Cardiovascular:      Rate and Rhythm: Normal rate and regular rhythm.      Heart sounds: Normal heart sounds. No murmur heard.  Pulmonary:      Breath sounds: Normal breath sounds.   Abdominal:      General: Bowel sounds are normal.      Tenderness: There is no abdominal tenderness.   Musculoskeletal:         General: No swelling or tenderness. Normal range of motion.   Lymphadenopathy:      Cervical: No cervical adenopathy.   Skin:     General: Skin is warm and dry.   Neurological:      General: No focal deficit present.      Mental Status: She is oriented to person, place, and time.   Psychiatric:         Mood and Affect: Mood normal.         Assessment / Plan      Assessment/Plan:   Diagnoses and all orders for this visit:    1. Essential hypertension (Primary)    2. Hypokalemia  -     Basic metabolic panel; Future  -     Basic metabolic panel     Explained and discussed patient's condition and plan of care.  Discussed when to follow-up.  Discussed possible red flags and how to follow-up with those.  Viewed patient's medications and discussed common side effects. Patient to continue current medications as advised.  Be compliant with medications. Patient to let me know if they have any worsening, no improvement, does not tolerate medication, or any future concerns about treatment. ER for emergencies.  Patient verbalized an understanding and agreement with plan of care.              Follow Up: We will discuss plans after results.  Return in about 8 weeks (around 8/1/2022) for Annual.    CHAPARRO Werner  [facility]

## 2022-06-20 NOTE — RADIATION COMPLETION NOTES
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 female with new diagnosis of bilateral ER positive HER2 negative stage Ia breast cancers.  Left breast medial lumpectomy revealed invasive ductal carcinoma, Darrius grade, 1.2 cm in greatest dimension with a positive superior margin extended margins were taken and negative.  She also underwent reexcision on a different date with no residual tumor.  Left breast \"DCIS\" lumpectomy revealed invasive ductal carcinoma, [place] grade 1 that was 4 mm in greatest dimension and associated with intermediate grade DCIS.  Right breast lumpectomy was positive for invasive ductal carcinoma, Darrius grade 1 with 3 mm of residual tumor.  No lymph nodes were taken.  She was started on anastrozole by Dr. Sofia Fam and IV chemotherapy was not recommended.   She received radiotherapy in our department as follows:    TREATMENT COURSE:   -2022 the right breast received 40 Gray in 15 fractions with 10 MV photons  -2022 the left breast received 40 Gray in 15 fractions with 10 MV photons  TOLERANCE:   She had no difficulty tolerating treatment.  During therapy she did go to the emergency department with chest tightness and cardiac work-up was negative.  Her skin remained healthy throughout treatment.  She had no breast or upper extremity " lymphedema.      DISPOSITION:  At the completion of therapy an appointment was made for her to return on 07/13/2022 at 10:30 AM.  She knows to call if she has any problems sooner.        Cate Kerr MD    Dictated using dragon dictation

## 2022-06-29 ENCOUNTER — TELEPHONE (OUTPATIENT)
Dept: INTERNAL MEDICINE | Facility: CLINIC | Age: 77
End: 2022-06-29

## 2022-06-29 RX ORDER — HYDROCHLOROTHIAZIDE 25 MG/1
25 TABLET ORAL DAILY
Qty: 90 TABLET | Refills: 1 | Status: SHIPPED | OUTPATIENT
Start: 2022-06-29 | End: 2022-12-23

## 2022-06-29 NOTE — TELEPHONE ENCOUNTER
Spoke with patient daughter, appointment scheduled for tomorrow at 12:15pm with CHAPARRO Nascimento.  Verbalized appreciation.

## 2022-06-30 ENCOUNTER — OFFICE VISIT (OUTPATIENT)
Dept: INTERNAL MEDICINE | Facility: CLINIC | Age: 77
End: 2022-06-30

## 2022-06-30 VITALS
HEART RATE: 76 BPM | HEIGHT: 64 IN | DIASTOLIC BLOOD PRESSURE: 74 MMHG | WEIGHT: 126.6 LBS | BODY MASS INDEX: 21.61 KG/M2 | SYSTOLIC BLOOD PRESSURE: 126 MMHG | RESPIRATION RATE: 16 BRPM | TEMPERATURE: 96.8 F

## 2022-06-30 DIAGNOSIS — S90.421A BLISTER OF GREAT TOE OF RIGHT FOOT, INITIAL ENCOUNTER: Primary | ICD-10-CM

## 2022-06-30 PROCEDURE — 87205 SMEAR GRAM STAIN: CPT | Performed by: NURSE PRACTITIONER

## 2022-06-30 PROCEDURE — 10140 I&D HMTMA SEROMA/FLUID COLLJ: CPT | Performed by: NURSE PRACTITIONER

## 2022-06-30 PROCEDURE — 87070 CULTURE OTHR SPECIMN AEROBIC: CPT | Performed by: NURSE PRACTITIONER

## 2022-06-30 RX ORDER — CEPHALEXIN 500 MG/1
500 CAPSULE ORAL 2 TIMES DAILY
Qty: 20 CAPSULE | Refills: 0 | Status: SHIPPED | OUTPATIENT
Start: 2022-06-30 | End: 2022-08-11

## 2022-06-30 NOTE — PROGRESS NOTES
Patient Name: Cindy Sage  : 1945   MRN: [id number]     Chief Complaint:    Chief Complaint   Patient presents with   • Blister     Large Blister on right great toe        History of Present Illness: Cindy Sage is a 77 y.o. female presents to clinic with large blister on right great toe after wearing clogs. She drained it with sterile needle and the fluid has returned. It is not painful.     Subjective     Review of System: Review of Systems   Constitutional: Negative for activity change, chills and fever.   Respiratory: Negative for shortness of breath.    Cardiovascular: Negative for chest pain and leg swelling.   Gastrointestinal: Negative for abdominal distention.   Musculoskeletal: Negative for gait problem.   Skin:        Blister on right great toe.         Medications:     Current Outpatient Medications:   •  acetaminophen (TYLENOL) 325 MG tablet, Take 2 tablets by mouth Every 6 (Six) Hours. (Patient taking differently: Take 650 mg by mouth Every 6 (Six) Hours As Needed.), Disp: , Rfl:   •  anastrozole (ARIMIDEX) 1 MG tablet, Take 1 tablet by mouth Daily., Disp: 30 tablet, Rfl: 11  •  aspirin 81 MG chewable tablet, Chew 1 tablet Daily., Disp: 90 tablet, Rfl: 3  •  Cholecalciferol (VITAMIN D3 GUMMIES ADULT PO), Take  by mouth Daily., Disp: , Rfl:   •  clopidogrel (PLAVIX) 75 MG tablet, Hold while taking Xarelto. Resume taking 1 tablet by mouth daily when off of Xarelto, Disp: 30 tablet, Rfl: 5  •  hydroCHLOROthiazide (HYDRODIURIL) 25 MG tablet, Take 1 tablet by mouth Daily., Disp: 90 tablet, Rfl: 1  •  potassium chloride (KAYCIEL) 20 mEq/15 mL solution, Take 15 mL by mouth Daily., Disp: 450 mL, Rfl: 5  •  simvastatin (ZOCOR) 40 MG tablet, Take 1 tablet by mouth once daily, Disp: 90 tablet, Rfl: 1  •  cephalexin (Keflex) 500 MG capsule, Take 1 capsule by mouth 2 (Two) Times a Day., Disp: 20 capsule, Rfl: 0    Allergies:   Allergies   Allergen Reactions   • Strawberry Extract Swelling  "      Objective     Physical Exam:   Vital Signs:   Vitals:    06/30/22 1222   BP: 126/74   BP Location: Right arm   Patient Position: Sitting   Cuff Size: Adult   Pulse: 76   Resp: 16   Temp: 96.8 °F (36 °C)   TempSrc: Infrared   Weight: 57.4 kg (126 lb 9.6 oz)   Height: 162.6 cm (64\")   PainSc: 0-No pain     Body mass index is 21.73 kg/m². BMI is within normal parameters. No other follow-up for BMI required.      Physical Exam  Constitutional:       Appearance: She is not ill-appearing.   Musculoskeletal:      Right lower leg: No edema.      Left lower leg: No edema.   Feet:      Comments: Large blister on great toe. Slight erythema of right toe. Positive pulses; no foot edema.       Incision & Drainage    Date/Time: 6/30/2022 12:56 PM  Performed by: Pily Adan APRN  Authorized by: Pily Adan APRN   Consent: Verbal consent obtained.  Risks and benefits: risks, benefits and alternatives were discussed  Consent given by: patient  Patient identity confirmed: verbally with patient  Type: bulla  Scalpel size: 11  Incision type: single straight  Drainage: serous  Drainage amount: scant  Wound treatment: wound left open  Patient tolerance: patient tolerated the procedure well with no immediate complications  Comments: Cleaned area with alcohol. Antibiotic ointment applied. Covered with large band-aid. Culture obtained.         Assessment / Plan      Assessment/Plan:   Diagnoses and all orders for this visit:    1. Blister of great toe of right foot, initial encounter (Primary)  -     Ambulatory Referral to Podiatry  -     cephalexin (Keflex) 500 MG capsule; Take 1 capsule by mouth 2 (Two) Times a Day.  Dispense: 20 capsule; Refill: 0  -     Incision & Drainage  -     Wound Culture - Drainage, Toe, Right; Future      Explained and discussed patient's condition and plan of care.  Discussed when to follow-up.  Discussed possible red flags and how to follow-up with those.  Antibiotics if worsening as above. " It does not appear infected at this time. Continue to soak in Epsom salts twice a day.  Patient to let me know if they have any worsening, no improvement, does not tolerate medication, or any future concerns about treatment. ER for emergencies.  Patient verbalized an understanding and agreement with plan of care.    Follow Up:   F/u with podiatry; ER if worsening over the holiday  CHAPARRO [facility]

## 2022-07-03 LAB
BACTERIA SPEC AEROBE CULT: NORMAL
GRAM STN SPEC: NORMAL

## 2022-07-13 ENCOUNTER — HOSPITAL ENCOUNTER (OUTPATIENT)
Dept: RADIATION ONCOLOGY | Facility: HOSPITAL | Age: 77
Setting detail: RADIATION/ONCOLOGY SERIES
Discharge: HOME | End: 2022-07-13

## 2022-07-15 DIAGNOSIS — I66.01 STENOSIS OF RIGHT MIDDLE CEREBRAL ARTERY: ICD-10-CM

## 2022-07-15 DIAGNOSIS — G45.9 TIA (TRANSIENT ISCHEMIC ATTACK): ICD-10-CM

## 2022-07-18 RX ORDER — CLOPIDOGREL BISULFATE 75 MG/1
TABLET ORAL
Qty: 90 TABLET | Refills: 0 | Status: SHIPPED | OUTPATIENT
Start: 2022-07-18 | End: 2022-09-30

## 2022-07-20 ENCOUNTER — OFFICE VISIT (OUTPATIENT)
Dept: GYNECOLOGIC ONCOLOGY | Facility: CLINIC | Age: 77
End: 2022-07-20

## 2022-07-20 ENCOUNTER — LAB (OUTPATIENT)
Dept: LAB | Facility: HOSPITAL | Age: 77
End: 2022-07-20

## 2022-07-20 VITALS
HEART RATE: 74 BPM | WEIGHT: 126.4 LBS | OXYGEN SATURATION: 97 % | TEMPERATURE: 97.9 F | DIASTOLIC BLOOD PRESSURE: 53 MMHG | RESPIRATION RATE: 15 BRPM | SYSTOLIC BLOOD PRESSURE: 126 MMHG | BODY MASS INDEX: 21.7 KG/M2

## 2022-07-20 DIAGNOSIS — Z85.3 HISTORY OF BILATERAL BREAST CANCER: ICD-10-CM

## 2022-07-20 DIAGNOSIS — Z85.42 HISTORY OF ENDOMETRIAL CANCER: Primary | ICD-10-CM

## 2022-07-20 DIAGNOSIS — Z51.81 VISIT FOR MONITORING ARIMIDEX THERAPY: ICD-10-CM

## 2022-07-20 DIAGNOSIS — Z79.811 VISIT FOR MONITORING ARIMIDEX THERAPY: ICD-10-CM

## 2022-07-20 LAB — CANCER AG125 SERPL QL: 13.3 U/ML (ref 0–38.1)

## 2022-07-20 PROCEDURE — 86304 IMMUNOASSAY TUMOR CA 125: CPT | Performed by: NURSE PRACTITIONER

## 2022-07-20 PROCEDURE — 36415 COLL VENOUS BLD VENIPUNCTURE: CPT | Performed by: NURSE PRACTITIONER

## 2022-07-20 PROCEDURE — 99213 OFFICE O/P EST LOW 20 MIN: CPT | Performed by: NURSE PRACTITIONER

## 2022-07-20 NOTE — PROGRESS NOTES
GYN ONCOLOGY CANCER SURVEILLANCE FOLLOW-UP    Cindy Sage  [id number]  1945    Subjective   Chief Complaint: Uterine Cancer (No gyn complaints) and Follow-up (Arimidex maintenance)        History of present illness:     Cindy Sage is a 77 y.o. year old female who is here today for ongoing surveillance of Endometrial Cancer, recurrent, see Cancer History. 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 She is now s/p left breast lumpectomy and breast radiation. She is well recovered and pleased with her care. She started maintenance Arimidex for both endometrial cancer and breast cancer in 4/2022 and is doing well on medication thus far.      She reports she is feeling very well today and has no complaints. She denies vaginal bleeding, pelvic pain, and changes in bowel or bladder function. She is accompanied by her daughter.        Cancer History:   Oncology/Hematology History   Endometrial cancer (HCC)   3/26/2016 Imaging    CT in ER for acute onset postmenopausal bleeding revealed mass in the lower uterine segment. Gyn Oncology called to evaluate.     3/29/2016 Biopsy    Endometrial biopsy in office, pathology revealed complex atypical hyperplasia with stromal breakdown.        4/1/2016 Initial Diagnosis    Endometrial cancer     4/19/2016 Surgery    RATLH, BSO, LND to several millimeters of residual disease.   Stage IIIC2     5/20/2016 - 11/15/2016 Chemotherapy    Carboplatin AUC 6, Paclitaxel 175 mg/m2 x 6 cycles in sandwich fashion with radiation in between cycles #3 and #4      - 9/2/2016 Radiation    Radiation completed. Pelvis and periaortic nodes received a total of 45 Gy in 25 fractions during sandwich therapy. This was followed by vaginal brachytherapy: upper vagina treated utilizing cylinder and high dose rate iridium-192 to 18 Gy in 3 fractions.      11/21/2016 Imaging    Post-treatment PET/CT negative for disease      12/8/2021 Imaging    CT scan chest abdomen and pelvis: Large bowel obstruction, left chest wall mass     12/10/2021 Surgery    Exploratory laparotomy, colonic resection, side-to-side anastomosis for large bowel obstruction    Pathology showed metastatic adenocarcinoma at the colon.          1/10/2022 Molecular Testing    CARIS results:  MMR proficient/MSI stable--level 2 benefit pembrolizumab + lenvima  TMB high--level 2 benefit pembrolizumab alone  ER/MN+--level 3 benefit endocrine therapy  BRCA 1/2 negative     4/29/2022 Imaging    CT abdomen pelvis:  1. No evidence of abdominopelvic metastatic disease  2. Cholelithiasis  3. Colonic diverticulosis  4. 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           The current medication list and allergy list were reviewed and reconciled.     Past Medical History, Past Surgical History, Social History, Family History have been reviewed and are without significant changes except as mentioned.      Review of Systems   Constitutional: Negative.    Gastrointestinal: Negative.    Genitourinary: Negative.          Objective   Physical Exam  Vital Signs: /53   Pulse 74   Temp 97.9 °F (36.6 °C)   Resp 15   Wt 57.3 kg (126 lb 6.4 oz)   LMP  (LMP Unknown)   SpO2 97%   BMI 21.70 kg/m²   Vitals:    07/20/22 1333   PainSc: 0-No pain           General Appearance:  alert, cooperative, no apparent distress,  appears stated age and normal weight   Neurologic/Psychiatric: A&O x 3, gait steady, appropriate affect   HEENT:  Normocephalic, without obvious abnormality, mucous membranes moist   Abdomen:   Soft, non-tender, non-distended and no organomegaly   Lymph nodes: No cervical, supraclavicular, inguinal adenopathy noted   Pelvic: External Genitalia  atrophic, without lesions  Vagina  is pale, atrophic.  and shortened vaginal vault.  Vaginal Cuff  Female Vaginal Cuff: smooth, intact, without visible lesions and changes consistent with previous radiation  Uterus  surgically absent and no palpable masses  Ovaries  surgically absent bilaterally  Parametria  smooth  Rectovaginal  Female rectovaginal: deferred     ECOG score: 0             Result Review :   The following data was reviewed by: CHAPARRO Hand on 07/20/2022:  Last imaging study was CT abdomen pelvis 4/29/2022.   Tumor marker:     Date Value Ref Range Status   11/17/2021 27.4 0.0 - 38.1 U/mL Final   05/13/2021 16.6 0.0 - 38.1 U/mL Final   11/12/2020 14.4 0.0 - 38.1 U/mL Final       PHQ-9 Total Score:      Procedure Note:            Assessment and Plan:    Diagnoses and all orders for this visit:    1. History of endometrial cancer (Primary)  -         2. Visit for monitoring Arimidex therapy    3. History of bilateral breast cancer        There is no evidence of disease upon today's exam. She will be notified of CA-125 results upon their return. Plan to continue every 3 month cancer surveillance visits until 2 years from last treatment (last surgery in 12/2021 for recurrence). She is doing well on her Arimidex hormone blockade without significant side effects or complaints. She is understanding to call with any changes in pelvic symptoms or general GYN concerns at any time between regularly scheduled visits.     Follow-up with breast cancer team as scheduled. Continue Arimidex as prescribed for both malignancies.     Pain assessment was  performed today as a part of patient’s care.  For patients with pain related to surgery, gynecologic malignancy or cancer treatment, the plan is as noted in the assessment/plan.  For patients with pain not related to these issues, they are to seek any further needed care from a more appropriate provider, such as PCP.      Follow-up:     Return to clinic in 3 months for ongoing cancer surveillance.      Electronically signed by CHAPARRO Hand on 07/28/22 at 09:49 EDT

## 2022-07-21 ENCOUNTER — TELEPHONE (OUTPATIENT)
Dept: GYNECOLOGIC ONCOLOGY | Facility: CLINIC | Age: 77
End: 2022-07-21

## 2022-07-21 NOTE — TELEPHONE ENCOUNTER
----- Message from CHAPARRO Hand sent at 7/21/2022  8:22 AM EDT -----  Please notify CA-125 low/stable. Thanks!      Called patient and stated that her CA-125 is a 13.3 which is stable.

## 2022-07-29 ENCOUNTER — TRANSCRIBE ORDERS (OUTPATIENT)
Dept: ADMINISTRATIVE | Facility: HOSPITAL | Age: 77
End: 2022-07-29

## 2022-07-29 DIAGNOSIS — R92.8 ABNORMAL MAMMOGRAM: Primary | ICD-10-CM

## 2022-08-11 ENCOUNTER — OFFICE VISIT (OUTPATIENT)
Dept: INTERNAL MEDICINE | Facility: CLINIC | Age: 77
End: 2022-08-11

## 2022-08-11 ENCOUNTER — LAB (OUTPATIENT)
Dept: LAB | Facility: HOSPITAL | Age: 77
End: 2022-08-11

## 2022-08-11 VITALS
WEIGHT: 127 LBS | TEMPERATURE: 97.1 F | DIASTOLIC BLOOD PRESSURE: 68 MMHG | HEART RATE: 76 BPM | HEIGHT: 64 IN | BODY MASS INDEX: 21.68 KG/M2 | RESPIRATION RATE: 16 BRPM | SYSTOLIC BLOOD PRESSURE: 144 MMHG

## 2022-08-11 DIAGNOSIS — R82.998 LEUKOCYTES IN URINE: ICD-10-CM

## 2022-08-11 DIAGNOSIS — E78.5 HYPERLIPIDEMIA, UNSPECIFIED HYPERLIPIDEMIA TYPE: ICD-10-CM

## 2022-08-11 DIAGNOSIS — R31.9 HEMATURIA, UNSPECIFIED TYPE: ICD-10-CM

## 2022-08-11 DIAGNOSIS — I10 ESSENTIAL HYPERTENSION: Primary | ICD-10-CM

## 2022-08-11 DIAGNOSIS — R80.9 PROTEINURIA, UNSPECIFIED TYPE: Primary | ICD-10-CM

## 2022-08-11 DIAGNOSIS — I10 ESSENTIAL HYPERTENSION: ICD-10-CM

## 2022-08-11 DIAGNOSIS — K21.9 GASTROESOPHAGEAL REFLUX DISEASE WITHOUT ESOPHAGITIS: ICD-10-CM

## 2022-08-11 DIAGNOSIS — R80.9 PROTEINURIA, UNSPECIFIED TYPE: ICD-10-CM

## 2022-08-11 LAB
ALBUMIN SERPL-MCNC: 4.5 G/DL (ref 3.5–5.2)
ALBUMIN/GLOB SERPL: 2.3 G/DL
ALP SERPL-CCNC: 129 U/L (ref 39–117)
ALT SERPL-CCNC: 16 U/L (ref 1–33)
AST SERPL-CCNC: 22 U/L (ref 1–32)
BASOPHILS # BLD AUTO: 0.02 10*3/MM3 (ref 0–0.2)
BASOPHILS NFR BLD AUTO: 0.5 % (ref 0–1.5)
BILIRUB BLD-MCNC: ABNORMAL MG/DL
BILIRUB SERPL-MCNC: 0.6 MG/DL (ref 0–1.2)
BUN SERPL-MCNC: 10 MG/DL (ref 8–23)
BUN/CREAT SERPL: 10.8 (ref 7–25)
CALCIUM SERPL-MCNC: 9.4 MG/DL (ref 8.6–10.5)
CHLORIDE SERPL-SCNC: 97 MMOL/L (ref 98–107)
CHOLEST SERPL-MCNC: 198 MG/DL (ref 0–200)
CLARITY, POC: ABNORMAL
CO2 SERPL-SCNC: 26.4 MMOL/L (ref 22–29)
COLOR UR: YELLOW
CREAT SERPL-MCNC: 0.93 MG/DL (ref 0.57–1)
EGFRCR SERPLBLD CKD-EPI 2021: 63 ML/MIN/1.73
EOSINOPHIL # BLD AUTO: 0.06 10*3/MM3 (ref 0–0.4)
EOSINOPHIL NFR BLD AUTO: 1.4 % (ref 0.3–6.2)
ERYTHROCYTE [DISTWIDTH] IN BLOOD BY AUTOMATED COUNT: 17.2 % (ref 12.3–15.4)
EXPIRATION DATE: ABNORMAL
GLOBULIN SER CALC-MCNC: 2 GM/DL
GLUCOSE SERPL-MCNC: 108 MG/DL (ref 65–99)
GLUCOSE UR STRIP-MCNC: NEGATIVE MG/DL
HCT VFR BLD AUTO: 37.1 % (ref 34–46.6)
HDLC SERPL-MCNC: 113 MG/DL (ref 40–60)
HGB BLD-MCNC: 11.7 G/DL (ref 12–15.9)
IMM GRANULOCYTES # BLD AUTO: 0.02 10*3/MM3 (ref 0–0.05)
IMM GRANULOCYTES NFR BLD AUTO: 0.5 % (ref 0–0.5)
KETONES UR QL: ABNORMAL
LDLC SERPL CALC-MCNC: 68 MG/DL (ref 0–100)
LEUKOCYTE EST, POC: ABNORMAL
LYMPHOCYTES # BLD AUTO: 0.52 10*3/MM3 (ref 0.7–3.1)
LYMPHOCYTES NFR BLD AUTO: 12.1 % (ref 19.6–45.3)
Lab: ABNORMAL
MCH RBC QN AUTO: 28 PG (ref 26.6–33)
MCHC RBC AUTO-ENTMCNC: 31.5 G/DL (ref 31.5–35.7)
MCV RBC AUTO: 88.8 FL (ref 79–97)
MONOCYTES # BLD AUTO: 0.43 10*3/MM3 (ref 0.1–0.9)
MONOCYTES NFR BLD AUTO: 10 % (ref 5–12)
NEUTROPHILS # BLD AUTO: 3.24 10*3/MM3 (ref 1.7–7)
NEUTROPHILS NFR BLD AUTO: 75.5 % (ref 42.7–76)
NITRITE UR-MCNC: NEGATIVE MG/ML
NRBC BLD AUTO-RTO: 0.2 /100 WBC (ref 0–0.2)
PH UR: 6 [PH] (ref 5–8)
PLATELET # BLD AUTO: 229 10*3/MM3 (ref 140–450)
POTASSIUM SERPL-SCNC: 4.1 MMOL/L (ref 3.5–5.2)
PROT SERPL-MCNC: 6.5 G/DL (ref 6–8.5)
PROT UR STRIP-MCNC: ABNORMAL MG/DL
RBC # BLD AUTO: 4.18 10*6/MM3 (ref 3.77–5.28)
RBC # UR STRIP: ABNORMAL /UL
SODIUM SERPL-SCNC: 138 MMOL/L (ref 136–145)
SP GR UR: 1.02 (ref 1–1.03)
TRIGL SERPL-MCNC: 97 MG/DL (ref 0–150)
TSH SERPL DL<=0.005 MIU/L-ACNC: 1.4 UIU/ML (ref 0.27–4.2)
UROBILINOGEN UR QL: NORMAL
VLDLC SERPL CALC-MCNC: 17 MG/DL (ref 5–40)
WBC # BLD AUTO: 4.29 10*3/MM3 (ref 3.4–10.8)

## 2022-08-11 PROCEDURE — 99214 OFFICE O/P EST MOD 30 MIN: CPT | Performed by: INTERNAL MEDICINE

## 2022-08-11 PROCEDURE — 81003 URINALYSIS AUTO W/O SCOPE: CPT | Performed by: INTERNAL MEDICINE

## 2022-08-11 NOTE — PROGRESS NOTES
Chief Complaint   Patient presents with   • Extended follow up       History of Present Illness    The patient presents for a follow-up related to hypertension. The patient reports that she has had no headaches, chest pain, dyspnea, edema, syncope, blurred vision or palpitations. She states that she is taking her medication as prescribed. She is not having medication side effects.    The patient presents for a follow-up related to hyperlipidemia. She is following a low fat diet. She reports that she is exercising. She is taking simvastatin. The patient is taking her medication as prescribed. She reports no medication side effects, including muscle cramps, abdominal pain, headaches or weakness. She denies orthopnea, paroxysmal nocturnal dyspnea or dyspnea on exertion.    The patient presents for a follow-up related to GERD. The patient is not on medication for her gastroesophageal reflux. She reports no abdominal pain, belching, diarrhea, dysphagia, early satiety, heartburn, hoarseness, nausea, odynophagia, rectal bleeding, vomiting or weight loss. The GERD has no known aggravating factors.    Review of Systems    CONSTITUTIONAL- Denies Fever, Chills, Sweats, Fatigue, Weakness or Malaise.    PULMONARY- Denies Wheezing, Sputum Production, Cough, Hemoptysis or Pleuritic Chest Pain.    GASTROINTESTINAL- Denies Constipation.    CARDIOVASCULAR- Denies Claudication or Irregular Heart Beat.    Medications      Current Outpatient Medications:   •  acetaminophen (TYLENOL) 325 MG tablet, Take 2 tablets by mouth Every 6 (Six) Hours. (Patient taking differently: Take 650 mg by mouth Every 6 (Six) Hours As Needed.), Disp: , Rfl:   •  anastrozole (ARIMIDEX) 1 MG tablet, Take 1 tablet by mouth Daily., Disp: 30 tablet, Rfl: 11  •  aspirin 81 MG chewable tablet, Chew 1 tablet Daily., Disp: 90 tablet, Rfl: 3  •  Cholecalciferol (VITAMIN D3 GUMMIES ADULT PO), Take  by mouth Daily., Disp: , Rfl:   •  clopidogrel (PLAVIX) 75 MG tablet,  "Take 1 tablet by mouth once daily, Disp: 90 tablet, Rfl: 0  •  hydroCHLOROthiazide (HYDRODIURIL) 25 MG tablet, Take 1 tablet by mouth Daily., Disp: 90 tablet, Rfl: 1  •  potassium chloride (KAYCIEL) 20 mEq/15 mL solution, Take 15 mL by mouth Daily., Disp: 450 mL, Rfl: 5  •  simvastatin (ZOCOR) 40 MG tablet, Take 1 tablet by mouth once daily, Disp: 90 tablet, Rfl: 1     Allergies    Allergies   Allergen Reactions   • Strawberry Extract Swelling       Problem List    Patient Active Problem List   Diagnosis   • Hyperlipidemia   • Hypertension   • Ischemic stroke (HCC)   • Endometrial cancer (HCC)   • GERD (gastroesophageal reflux disease)   • Hx: UTI (urinary tract infection)   • History of endometrial cancer   • TIA (transient ischemic attack)   • Stenosis of right middle cerebral artery   • Musculoskeletal neck pain   • Large bowel obstruction s/p colonic resection by Dr. 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    Physical Examination    /68 (BP Location: Left arm, Patient Position: Sitting, Cuff Size: Adult)   Pulse 76   Temp 97.1 °F (36.2 °C) (Infrared)   Resp 16   Ht 161.9 cm (63.75\")   Wt 57.6 kg (127 lb)   LMP  (LMP Unknown)   Breastfeeding No   BMI 21.97 kg/m²     HEENT: Head- Normocephalic Atraumatic. Facies- Within normal limits. Pinnas- Normal texture and shape bilaterally. Canals- Normal bilaterally. TMs- Normal bilaterally. Nares- Patent bilaterally. Nasal Septum- is normal. There is no tenderness to palpation over the frontal or maxillary sinuses. Lids- Normal bilaterally. Conjunctiva- Clear bilaterally. Sclera- Anicteric bilaterally. Oropharynx- Moist with no lesions. Tonsils- No enlargement, erythema or exudate.    Neck: Thyroid- non enlarged, symmetric and has no nodules. No bruits are detected. ROM- " Normal Range of Motion with no rigidity.    Lungs: Auscultation- Clear to auscultation bilaterally. There are no retractions, clubbing or cyanosis. The Expiratory to Inspiratory ratio is equal.    Lymph Nodes: Cervical- no enlarged lymph nodes noted. Clavicular- no enlarged supraclavicular lymph nodes noted. Axillary- no enlarged axillary lymph nodes noted. Inguinal- no enlarged inguinal lymph nodes noted.    Cardiovascular: There are no carotid bruits. Heart- Normal Rate with Regular rhythm and no murmurs. There are no gallops. There are no rubs. In the lower extremities there is no edema. The upper extremities do not have edema.    Abdomen: Soft, benign, non-tender with no masses, hernias, organomegaly or scars.    Breast: Normal contours bilaterally with no masses, discharge, skin changes or lumps. Some scarring palpable left breast. There are no scars noted.    Impression and Assessment    Essential Hypertension.    Hyperlipidemia.    Gastroesophageal Reflux Disease.    Plan    Gastroesophageal Reflux Disease Plan: The condition is stable. No change is needed in the current plan.    Essential Hypertension Plan: The patient was instructed to continue the current medications.    Hyperlipidemia Plan: The patient was instructed to exercise daily, eat a low fat diet and continue her medications.    Diagnoses and all orders for this visit:    1. Essential hypertension (Primary)  -     CBC & Differential; Future  -     Comprehensive Metabolic Panel; Future  -     TSH; Future  -     POC Urinalysis Dipstick, Automated; Future  -     TSH  -     Comprehensive Metabolic Panel  -     CBC & Differential    2. Hyperlipidemia, unspecified hyperlipidemia type  -     Comprehensive Metabolic Panel; Future  -     Lipid Panel; Future  -     Lipid Panel  -     Comprehensive Metabolic Panel    3. Gastroesophageal reflux disease without esophagitis          Return to Office    The patient was instructed to return for follow-up in 6  months. The patient was instructed to return sooner if the condition changes, worsens, or does not resolve.

## 2022-08-14 LAB
BACTERIA UR CULT: NORMAL
BACTERIA UR CULT: NORMAL

## 2022-09-07 RX ORDER — POTASSIUM CHLORIDE 20MEQ/15ML
LIQUID (ML) ORAL
Qty: 450 ML | Refills: 3 | Status: SHIPPED | OUTPATIENT
Start: 2022-09-07 | End: 2023-01-05

## 2022-09-08 RX ORDER — POTASSIUM CHLORIDE 20MEQ/15ML
LIQUID (ML) ORAL
Qty: 450 ML | Refills: 3 | Status: CANCELLED | OUTPATIENT
Start: 2022-09-08

## 2022-09-08 NOTE — TELEPHONE ENCOUNTER
This prescription was sent yesterday to pharmacy with confirmation of receipt received.      Spoke with pharmacy, states they did receive and that it is filled and awaiting .     Spoke with patient, informed of above.  Verbalized understanding and appreciation.  States she called the pharmacy twice today to check on it and they told her they did not have it and that she needed to call us.  Apologized for her trouble but they assured us that it was ready and awaiting .

## 2022-09-08 NOTE — TELEPHONE ENCOUNTER
Caller: Cindy Sage    Relationship: Self    Best call back number: [phone]    Requested Prescriptions:   Requested Prescriptions     Pending Prescriptions Disp Refills   • potassium chloride (KAYCIEL) 20 mEq/15 mL solution 450 mL 3      Pharmacy where request should be sent: [facility] [address] [place] [address] [phone] [place] [phone] FX     Additional details provided by patient:     PATIENT STATED THAT SHE WAS TOLD TO CONTACT HER DOCTOR BY THE PHARMACY REGARDING THE MEDICATION LISTED ABOVE TO HAVE IT REFILLED    Kaleigh Shook Rep   09/08/22 13:23 EDT

## 2022-09-30 DIAGNOSIS — G45.9 TIA (TRANSIENT ISCHEMIC ATTACK): ICD-10-CM

## 2022-09-30 DIAGNOSIS — I66.01 STENOSIS OF RIGHT MIDDLE CEREBRAL ARTERY: ICD-10-CM

## 2022-09-30 RX ORDER — CLOPIDOGREL BISULFATE 75 MG/1
TABLET ORAL
Qty: 90 TABLET | Refills: 0 | Status: SHIPPED | OUTPATIENT
Start: 2022-09-30 | End: 2023-01-11

## 2022-10-19 ENCOUNTER — LAB (OUTPATIENT)
Dept: LAB | Facility: HOSPITAL | Age: 77
End: 2022-10-19

## 2022-10-19 ENCOUNTER — OFFICE VISIT (OUTPATIENT)
Dept: GYNECOLOGIC ONCOLOGY | Facility: CLINIC | Age: 77
End: 2022-10-19

## 2022-10-19 VITALS
SYSTOLIC BLOOD PRESSURE: 154 MMHG | HEART RATE: 84 BPM | DIASTOLIC BLOOD PRESSURE: 68 MMHG | TEMPERATURE: 97.8 F | OXYGEN SATURATION: 98 % | BODY MASS INDEX: 22.24 KG/M2 | WEIGHT: 130.3 LBS | HEIGHT: 64 IN | RESPIRATION RATE: 14 BRPM

## 2022-10-19 DIAGNOSIS — C54.1 ENDOMETRIAL CANCER: ICD-10-CM

## 2022-10-19 DIAGNOSIS — K59.01 SLOW TRANSIT CONSTIPATION: ICD-10-CM

## 2022-10-19 DIAGNOSIS — C80.1 RECURRENT CANCER: ICD-10-CM

## 2022-10-19 DIAGNOSIS — C54.1 ENDOMETRIAL CANCER: Primary | ICD-10-CM

## 2022-10-19 LAB — CANCER AG125 SERPL QL: 15.5 U/ML (ref 0–38.1)

## 2022-10-19 PROCEDURE — 86304 IMMUNOASSAY TUMOR CA 125: CPT

## 2022-10-19 PROCEDURE — 99213 OFFICE O/P EST LOW 20 MIN: CPT | Performed by: OBSTETRICS & GYNECOLOGY

## 2022-10-19 PROCEDURE — 36415 COLL VENOUS BLD VENIPUNCTURE: CPT

## 2022-10-19 NOTE — PROGRESS NOTES
Cindy Sage  [id number]  1945      Reason for visit:  Surveillance for recurrent endometrial cancer, history of breast cancer    History of present illness:  The patient is a 77 y.o. year old female who presents today for treatment and evaluation of the above issues.    Cindy Sage is a 77 y.o. year old female who is here today for ongoing surveillance of Endometrial Cancer, recurrent, see Cancer History. She was diagnosed with ER/NC positive, Her2 negative invasive ductal carcinoma of the left breast in 2/2022. She is now s/p left breast lumpectomy and breast radiation. She is well recovered and pleased with her care. She started maintenance Arimidex for both endometrial cancer and breast cancer in 4/2022 and is doing well on medication thus far.        Today, she reports she is feeling very well today. She has normal energy and appetite. She denies nausea/vomiting. She does report constipation (bowel movement every 2-3 days) with some straining. She does not take stool softeners or laxatives. Denies blood in stool. Reports a sore spot in her LUQ only when she pushes on it. This does not bother her otherwise. She denies vaginal bleeding, pelvic pain, and changes in bladder function. Walking on the treadmill at home, working her way up. Uses her walker only in long hallways and open spaces. She is accompanied by her daughter.       Oncologic History:  Oncology/Hematology History   Endometrial cancer (HCC)   3/26/2016 Imaging    CT in ER for acute onset postmenopausal bleeding revealed mass in the lower uterine segment. Gyn Oncology called to evaluate.     3/29/2016 Biopsy    Endometrial biopsy in office, pathology revealed complex atypical hyperplasia with stromal breakdown.        4/1/2016 Initial Diagnosis    Endometrial cancer     4/19/2016 Surgery    RATLH, BSO, LND to several millimeters of residual disease.   Stage IIIC2     5/20/2016 - 11/15/2016 Chemotherapy    Carboplatin AUC 6,  Paclitaxel 175 mg/m2 x 6 cycles in sandwich fashion with radiation in between cycles #3 and #4      - 9/2/2016 Radiation    Radiation completed. Pelvis and periaortic nodes received a total of 45 Gy in 25 fractions during sandwich therapy. This was followed by vaginal brachytherapy: upper vagina treated utilizing cylinder and high dose rate iridium-192 to 18 Gy in 3 fractions.      11/21/2016 Imaging    Post-treatment PET/CT negative for disease     12/8/2021 Imaging    CT scan chest abdomen and pelvis: Large bowel obstruction, left chest wall mass     12/10/2021 Surgery    Exploratory laparotomy, colonic resection, side-to-side anastomosis for large bowel obstruction    Pathology showed metastatic adenocarcinoma at the colon.          1/10/2022 Molecular Testing    CARIS results:  MMR proficient/MSI stable--level 2 benefit pembrolizumab + lenvima  TMB high--level 2 benefit pembrolizumab alone  ER/AK+--level 3 benefit endocrine therapy  BRCA 1/2 negative     4/29/2022 Imaging    CT abdomen pelvis:  1. No evidence of abdominopelvic metastatic disease  2. Cholelithiasis  3. Colonic diverticulosis  4. Status post partial colon resection and hysterectomy     Bilateral malignant neoplasm of breast in female, estrogen receptor positive (HCC)   4/13/2022 Initial Diagnosis    Bilateral malignant neoplasm of breast in female, estrogen receptor positive (HCC)     4/13/2022 Cancer Staged    Staging form: Breast, AJCC 8th Edition  - Pathologic: Stage IA (pT1c, pN0, cM0, G2, ER+, AK+, HER2-) - Signed by Sofia Fam MD on 4/13/2022 5/12/2022 - 6/2/2022 Radiation    Radiation OncologyTreatment Course:  Cindy Sage received 4005 cGy in 15 fractions to bilateral breasts via External Beam Radiation - EBRT.           Past Medical History:   Diagnosis Date   • Bilateral malignant neoplasm of breast in female, estrogen receptor positive (HCC) 4/13/2022   • Drug therapy 2016    For endometrial CA in 2016   • Elevated  "cholesterol    • Endometrial cancer (HCC) 04/2016    Stage IIIC2    • GERD (gastroesophageal reflux disease)    • HTN (hypertension)    • Hx of radiation therapy 2016    For endometrial CA 2016   • Stroke (HCC) 02/2016    No residual deficits.  Takes .       Past Surgical History:   Procedure Laterality Date   • APPENDECTOMY      Ex lap   • BREAST BIOPSY Left 2016   • BREAST LUMPECTOMY Bilateral 2022    2-Left; 1-Right   • COLON SURGERY N/A 12/10/2021    Exploratory Laparotamy; Transverse Colon resect w/ re-anastimosis.   • COLOSTOMY N/A 12/10/2021    Procedure: EXPLORATORY LAPAROTOMY, TRANSVERSE COLONIC RESECTION, REANASTIMOSIS, AND MOBILIZATION OF SPLENIC FLEXURE;  Surgeon: Daja Ma MD;  Location: [facility];  Service: Gynecology Oncology;  Laterality: N/A;   • FOOT SURGERY     • OOPHORECTOMY     • TONSILLECTOMY     • TOTAL LAPAROSCOPIC HYSTERECTOMY SALPINGO OOPHORECTOMY Bilateral 04/19/2016    RATLH, BSO, LND       MEDICATIONS: The current medication list was reviewed with the patient and updated in the EMR this date per the Medical Assistant. Medication dosages and frequencies were confirmed to be accurate.      Allergies:  is allergic to strawberry extract.    Social History:   Social History     Socioeconomic History   • Marital status:    Tobacco Use   • Smoking status: Never   • Smokeless tobacco: Never   Vaping Use   • Vaping Use: Never used   Substance and Sexual Activity   • Alcohol use: Yes   • Drug use: Never   • Sexual activity: Defer     Review of Systems      Physical Exam    Vitals:    10/19/22 1306   BP: 154/68   Pulse: 84   Resp: 14   Temp: 97.8 °F (36.6 °C)   TempSrc: Temporal   SpO2: 98%   Weight: 59.1 kg (130 lb 4.8 oz)   Height: 161.9 cm (63.74\")   PainSc: 0-No pain       Body mass index is 22.55 kg/m².  Wt Readings from Last 3 Encounters:   10/19/22 59.1 kg (130 lb 4.8 oz)   08/11/22 57.6 kg (127 lb)   07/20/22 57.3 kg (126 lb 6.4 oz)       GENERAL: Alert, " well-appearing female appearing her stated age who is in no apparent distress.   HEENT: Sclera anicteric. Head normocephalic, atraumatic. Mucus membranes moist.   NECK: Trachea midline, supple.  BREASTS: Deferred  CARDIOVASCULAR: Normal rate, regular rhythm, no murmurs, rubs, or gallops.  No peripheral edema.  RESPIRATORY: Clear to auscultation bilaterally, normal respiratory effort on room air  GASTROINTESTINAL:  Abdomen is soft, minimally tender at single point in left upper quadrant, non-distended, no rebound or guarding, no masses, or hernias. No HSM.  Healed vertical midline incision.  SKIN:  Warm, dry, well-perfused.  All visible areas intact.  No rashes, lesions, ulcers.  PSYCHIATRIC: AO x3, with appropriate affect, normal thought processes.  NEUROLOGIC: No focal deficits.  Moves extremities well.  MUSCULOSKELETAL: Normal gait and station.   EXTREMITIES:   No cyanosis, clubbing, symmetric.  LYMPHATICS:  No cervical or inguinal adenopathy noted.     PELVIC exam:    External genitalia are free from lesion. On speculum examination, the vaginal cuff was intact and no lesions were appreciated.  On bimanual examination, no fullness was appreciated.  Uterus, cervix and adnexa were absent.  There was no significant tenderness.  Rectovaginal exam was deferred.    ECOG PS 0    PROCEDURES:  none    Diagnostic Data:    No Images in the past 120 days found..    Lab Results   Component Value Date    WBC 4.29 08/11/2022    HGB 11.7 (L) 08/11/2022    HCT 37.1 08/11/2022    MCV 88.8 08/11/2022     08/11/2022    NEUTROABS 3.24 08/11/2022    GLUCOSE 108 (H) 08/11/2022    BUN 10 08/11/2022    CREATININE 0.93 08/11/2022    EGFRIFNONA 51 (L) 01/26/2022    EGFRIFAFRI 62 01/26/2022     08/11/2022    K 4.1 08/11/2022    CL 97 (L) 08/11/2022    CO2 26.4 08/11/2022    MG 1.9 12/15/2021    PHOS 3.8 12/15/2021    CALCIUM 9.4 08/11/2022    ALBUMIN 4.50 08/11/2022    AST 22 08/11/2022    ALT 16 08/11/2022    BILITOT 0.6  08/11/2022     Lab Results   Component Value Date     13.3 07/20/2022     27.4 11/17/2021     16.6 05/13/2021     14.4 11/12/2020     16.3 05/12/2020     16.4 09/26/2019     11.2 03/20/2019     11.2 09/18/2018     10.5 06/14/2018     8.3 05/24/2017         Assessment & Plan   This is a 77 y.o. woman with recurrent endometrial cancer and Stage IA breast cancer presenting for follow up.    Encounter Diagnoses   Name Primary?   • Endometrial cancer (HCC) Yes   • Recurrent endometrial cancer (HCC)    • Slow transit constipation        Recurrent endometrial cancer in the setting of new Stage IA breast cancer  - ER positive  - Symptoms stable, new constipation.   -  today  - Continue anastrazole for maintenance  - Last scan 6 months ago: will repeat   - OTC stool softeners & laxatives for constipation.  We discussed CT scan in the context of change in bowel function as patient has had prior bowel resection with reanastomosis.  There is always the possibility of a stricture or recurrent tumor.  Option of CT scan now versus waiting and rescanning if symptoms persist was discussed and patient would like to go ahead and get scans done now.    Pain assessment was performed today as a part of patient’s care.  For patients with pain related to surgery, gynecologic malignancy or cancer treatment, the plan is as noted in the assessment/plan.  For patients with pain not related to these issues, they are to seek any further needed care from a more appropriate provider, such as PCP.      Orders Placed This Encounter   Procedures   • CT Abdomen Pelvis With Contrast     Standing Status:   Future     Standing Expiration Date:   10/19/2023     Order Specific Question:   Will Oral Contrast be needed for this procedure?     Answer:   Yes   •      Standing Status:   Future     Number of Occurrences:   1     Standing Expiration Date:   10/19/2023     Order Specific Question:    Release to patient     Answer:   Routine Release       FOLLOW UP: 3 months  I spent 26 minutes caring for Cindy on this date of service. This time includes time spent by me in the following activities: preparing for the visit, reviewing tests, performing a medically appropriate examination and/or evaluation, counseling and educating the patient/family/caregiver, ordering medications, tests, or procedures, referring and communicating with other health care professionals and documenting information in the medical record    Patient was seen and examined with Dr. Velazquez,  resident, who performed portions of the examination and documentation for this patient's care under my direct supervision.  I agree with the above documentation and plan.    Daja Ma MD  10/19/22  16:54 EDT

## 2022-10-31 ENCOUNTER — APPOINTMENT (OUTPATIENT)
Dept: CT IMAGING | Facility: HOSPITAL | Age: 77
End: 2022-10-31

## 2022-11-02 ENCOUNTER — APPOINTMENT (OUTPATIENT)
Dept: CT IMAGING | Facility: HOSPITAL | Age: 77
End: 2022-11-02

## 2022-11-02 ENCOUNTER — HOSPITAL ENCOUNTER (OUTPATIENT)
Dept: CT IMAGING | Facility: HOSPITAL | Age: 77
Discharge: HOME | End: 2022-11-02
Admitting: OBSTETRICS & GYNECOLOGY

## 2022-11-02 DIAGNOSIS — C54.1 ENDOMETRIAL CANCER: ICD-10-CM

## 2022-11-02 LAB — CREAT BLDA-MCNC: 1 MG/DL (ref 0.6–1.3)

## 2022-11-02 PROCEDURE — 25010000002 IOPAMIDOL 61 % SOLUTION: Performed by: OBSTETRICS & GYNECOLOGY

## 2022-11-02 PROCEDURE — 82565 ASSAY OF CREATININE: CPT

## 2022-11-02 PROCEDURE — 74177 CT ABD & PELVIS W/CONTRAST: CPT

## 2022-11-02 RX ADMIN — IOPAMIDOL 85 ML: 612 INJECTION, SOLUTION INTRAVENOUS at 11:39

## 2022-11-03 ENCOUNTER — TELEPHONE (OUTPATIENT)
Dept: GYNECOLOGIC ONCOLOGY | Facility: CLINIC | Age: 77
End: 2022-11-03

## 2022-11-03 NOTE — TELEPHONE ENCOUNTER
----- Message from Daja Ma MD sent at 11/2/2022  4:55 PM EDT -----  Please let patient know that CT scan shows no evidence of recurrent disease.  See detailed report below.  Thank you      ----- Message -----  From: Interface, Rad Results [place] In  Sent: 11/2/2022   3:54 PM EDT  To: Daja Ma MD      Called patient and notified of no recurrence in CT scan. Patient understood.

## 2022-11-10 ENCOUNTER — APPOINTMENT (OUTPATIENT)
Dept: CT IMAGING | Facility: HOSPITAL | Age: 77
End: 2022-11-10

## 2022-11-10 ENCOUNTER — APPOINTMENT (OUTPATIENT)
Dept: MRI IMAGING | Facility: HOSPITAL | Age: 77
End: 2022-11-10

## 2022-11-10 ENCOUNTER — HOSPITAL ENCOUNTER (EMERGENCY)
Facility: HOSPITAL | Age: 77
Discharge: HOME | End: 2022-11-10
Attending: EMERGENCY MEDICINE | Admitting: EMERGENCY MEDICINE

## 2022-11-10 ENCOUNTER — HOSPITAL ENCOUNTER (OUTPATIENT)
Facility: HOSPITAL | Age: 77
LOS: 1 days | Discharge: HOME | End: 2022-11-11
Attending: STUDENT IN AN ORGANIZED HEALTH CARE EDUCATION/TRAINING PROGRAM | Admitting: INTERNAL MEDICINE

## 2022-11-10 VITALS
DIASTOLIC BLOOD PRESSURE: 67 MMHG | BODY MASS INDEX: 22.36 KG/M2 | TEMPERATURE: 97.9 F | WEIGHT: 131 LBS | HEIGHT: 64 IN | SYSTOLIC BLOOD PRESSURE: 139 MMHG | OXYGEN SATURATION: 99 % | HEART RATE: 69 BPM | RESPIRATION RATE: 17 BRPM

## 2022-11-10 DIAGNOSIS — G45.9 TIA (TRANSIENT ISCHEMIC ATTACK): ICD-10-CM

## 2022-11-10 DIAGNOSIS — G45.9 TIA (TRANSIENT ISCHEMIC ATTACK): Primary | ICD-10-CM

## 2022-11-10 DIAGNOSIS — H53.452 PERIPHERAL VISUAL FIELD DEFECT OF LEFT EYE: Primary | ICD-10-CM

## 2022-11-10 DIAGNOSIS — N39.0 ACUTE UTI (URINARY TRACT INFECTION): ICD-10-CM

## 2022-11-10 DIAGNOSIS — E87.6 HYPOKALEMIA: ICD-10-CM

## 2022-11-10 LAB
ALBUMIN SERPL-MCNC: 4.4 G/DL (ref 3.5–5.2)
ALBUMIN/GLOB SERPL: 1.6 G/DL
ALP SERPL-CCNC: 112 U/L (ref 39–117)
ALT SERPL W P-5'-P-CCNC: 11 U/L (ref 1–33)
ANION GAP SERPL CALCULATED.3IONS-SCNC: 14 MMOL/L (ref 5–15)
AST SERPL-CCNC: 16 U/L (ref 1–32)
BASOPHILS # BLD AUTO: 0.03 10*3/MM3 (ref 0–0.2)
BASOPHILS NFR BLD AUTO: 0.6 % (ref 0–1.5)
BILIRUB SERPL-MCNC: 0.7 MG/DL (ref 0–1.2)
BUN BLDA-MCNC: 9 MG/DL (ref 8–26)
BUN SERPL-MCNC: 9 MG/DL (ref 8–23)
BUN/CREAT SERPL: 10.5 (ref 7–25)
CA-I BLDA-SCNC: 1.18 MMOL/L (ref 1.2–1.32)
CALCIUM SPEC-SCNC: 9.3 MG/DL (ref 8.6–10.5)
CHLORIDE BLDA-SCNC: 99 MMOL/L (ref 98–109)
CHLORIDE SERPL-SCNC: 99 MMOL/L (ref 98–107)
CO2 BLDA-SCNC: 25 MMOL/L (ref 24–29)
CO2 SERPL-SCNC: 24 MMOL/L (ref 22–29)
CREAT BLDA-MCNC: 0.9 MG/DL (ref 0.6–1.3)
CREAT SERPL-MCNC: 0.86 MG/DL (ref 0.57–1)
DEPRECATED RDW RBC AUTO: 51.3 FL (ref 37–54)
EGFRCR SERPLBLD CKD-EPI 2021: 65.6 ML/MIN/1.73
EGFRCR SERPLBLD CKD-EPI 2021: 69.2 ML/MIN/1.73
EOSINOPHIL # BLD AUTO: 0.05 10*3/MM3 (ref 0–0.4)
EOSINOPHIL NFR BLD AUTO: 1 % (ref 0.3–6.2)
ERYTHROCYTE [DISTWIDTH] IN BLOOD BY AUTOMATED COUNT: 15.6 % (ref 12.3–15.4)
GLOBULIN UR ELPH-MCNC: 2.7 GM/DL
GLUCOSE BLDC GLUCOMTR-MCNC: 101 MG/DL (ref 70–130)
GLUCOSE SERPL-MCNC: 97 MG/DL (ref 65–99)
HCT VFR BLD AUTO: 35.7 % (ref 34–46.6)
HCT VFR BLDA CALC: 35 % (ref 38–51)
HGB BLD-MCNC: 11.2 G/DL (ref 12–15.9)
HGB BLDA-MCNC: 11.9 G/DL (ref 12–17)
IMM GRANULOCYTES # BLD AUTO: 0.01 10*3/MM3 (ref 0–0.05)
IMM GRANULOCYTES NFR BLD AUTO: 0.2 % (ref 0–0.5)
LYMPHOCYTES # BLD AUTO: 0.54 10*3/MM3 (ref 0.7–3.1)
LYMPHOCYTES NFR BLD AUTO: 11.1 % (ref 19.6–45.3)
MCH RBC QN AUTO: 28.3 PG (ref 26.6–33)
MCHC RBC AUTO-ENTMCNC: 31.4 G/DL (ref 31.5–35.7)
MCV RBC AUTO: 90.2 FL (ref 79–97)
MONOCYTES # BLD AUTO: 0.36 10*3/MM3 (ref 0.1–0.9)
MONOCYTES NFR BLD AUTO: 7.4 % (ref 5–12)
NEUTROPHILS NFR BLD AUTO: 3.86 10*3/MM3 (ref 1.7–7)
NEUTROPHILS NFR BLD AUTO: 79.7 % (ref 42.7–76)
NRBC BLD AUTO-RTO: 0 /100 WBC (ref 0–0.2)
PA ADP PRP-ACNC: 136 PRU
PLATELET # BLD AUTO: 248 10*3/MM3 (ref 140–450)
PMV BLD AUTO: 8.7 FL (ref 6–12)
POTASSIUM BLDA-SCNC: 3.3 MMOL/L (ref 3.5–4.9)
POTASSIUM SERPL-SCNC: 3.4 MMOL/L (ref 3.5–5.2)
PROT SERPL-MCNC: 7.1 G/DL (ref 6–8.5)
QT INTERVAL: 408 MS
QTC INTERVAL: 461 MS
RBC # BLD AUTO: 3.96 10*6/MM3 (ref 3.77–5.28)
SODIUM BLD-SCNC: 137 MMOL/L (ref 138–146)
SODIUM SERPL-SCNC: 137 MMOL/L (ref 136–145)
WBC NRBC COR # BLD: 4.85 10*3/MM3 (ref 3.4–10.8)

## 2022-11-10 PROCEDURE — 99283 EMERGENCY DEPT VISIT LOW MDM: CPT | Performed by: NURSE PRACTITIONER

## 2022-11-10 PROCEDURE — 99283 EMERGENCY DEPT VISIT LOW MDM: CPT

## 2022-11-10 PROCEDURE — 0042T HC CT CEREBRAL PERFUSION W/WO CONTRAST: CPT

## 2022-11-10 PROCEDURE — 70551 MRI BRAIN STEM W/O DYE: CPT

## 2022-11-10 PROCEDURE — 93005 ELECTROCARDIOGRAM TRACING: CPT | Performed by: EMERGENCY MEDICINE

## 2022-11-10 PROCEDURE — 80047 BASIC METABLC PNL IONIZED CA: CPT

## 2022-11-10 PROCEDURE — 70498 CT ANGIOGRAPHY NECK: CPT

## 2022-11-10 PROCEDURE — 85576 BLOOD PLATELET AGGREGATION: CPT | Performed by: NURSE PRACTITIONER

## 2022-11-10 PROCEDURE — 80053 COMPREHEN METABOLIC PANEL: CPT | Performed by: EMERGENCY MEDICINE

## 2022-11-10 PROCEDURE — 85014 HEMATOCRIT: CPT

## 2022-11-10 PROCEDURE — 70496 CT ANGIOGRAPHY HEAD: CPT

## 2022-11-10 PROCEDURE — 0 IOPAMIDOL PER 1 ML: Performed by: EMERGENCY MEDICINE

## 2022-11-10 PROCEDURE — 99284 EMERGENCY DEPT VISIT MOD MDM: CPT

## 2022-11-10 PROCEDURE — 36415 COLL VENOUS BLD VENIPUNCTURE: CPT

## 2022-11-10 PROCEDURE — 85025 COMPLETE CBC W/AUTO DIFF WBC: CPT | Performed by: EMERGENCY MEDICINE

## 2022-11-10 PROCEDURE — 70450 CT HEAD/BRAIN W/O DYE: CPT

## 2022-11-10 RX ORDER — LEVETIRACETAM 10 MG/ML
1000 INJECTION INTRAVASCULAR ONCE
Status: COMPLETED | OUTPATIENT
Start: 2022-11-10 | End: 2022-11-11

## 2022-11-10 RX ADMIN — IOPAMIDOL 115 ML: 755 INJECTION, SOLUTION INTRAVENOUS at 11:56

## 2022-11-11 ENCOUNTER — READMISSION MANAGEMENT (OUTPATIENT)
Dept: CALL CENTER | Facility: HOSPITAL | Age: 77
End: 2022-11-11

## 2022-11-11 ENCOUNTER — APPOINTMENT (OUTPATIENT)
Dept: NEUROLOGY | Facility: HOSPITAL | Age: 77
End: 2022-11-11

## 2022-11-11 ENCOUNTER — TELEPHONE (OUTPATIENT)
Dept: NEUROLOGY | Facility: CLINIC | Age: 77
End: 2022-11-11

## 2022-11-11 VITALS
OXYGEN SATURATION: 99 % | RESPIRATION RATE: 16 BRPM | WEIGHT: 131 LBS | DIASTOLIC BLOOD PRESSURE: 65 MMHG | HEIGHT: 64 IN | SYSTOLIC BLOOD PRESSURE: 141 MMHG | HEART RATE: 63 BPM | BODY MASS INDEX: 22.36 KG/M2 | TEMPERATURE: 98.2 F

## 2022-11-11 PROBLEM — D64.9 ANEMIA: Status: ACTIVE | Noted: 2022-11-11

## 2022-11-11 PROBLEM — H53.40 VISUAL FIELD DEFECT: Status: ACTIVE | Noted: 2022-11-11

## 2022-11-11 PROBLEM — E87.6 HYPOKALEMIA: Status: ACTIVE | Noted: 2022-11-11

## 2022-11-11 LAB
ALBUMIN SERPL-MCNC: 4 G/DL (ref 3.5–5.2)
ALBUMIN/GLOB SERPL: 1.5 G/DL
ALP SERPL-CCNC: 102 U/L (ref 39–117)
ALT SERPL W P-5'-P-CCNC: 9 U/L (ref 1–33)
ANION GAP SERPL CALCULATED.3IONS-SCNC: 14 MMOL/L (ref 5–15)
APTT PPP: 23.7 SECONDS (ref 60–90)
AST SERPL-CCNC: 12 U/L (ref 1–32)
BACTERIA UR QL AUTO: ABNORMAL /HPF
BASOPHILS # BLD AUTO: 0.02 10*3/MM3 (ref 0–0.2)
BASOPHILS NFR BLD AUTO: 0.4 % (ref 0–1.5)
BILIRUB SERPL-MCNC: 0.7 MG/DL (ref 0–1.2)
BILIRUB UR QL STRIP: NEGATIVE
BUN SERPL-MCNC: 16 MG/DL (ref 8–23)
BUN/CREAT SERPL: 17.6 (ref 7–25)
CALCIUM SPEC-SCNC: 9.3 MG/DL (ref 8.6–10.5)
CHLORIDE SERPL-SCNC: 97 MMOL/L (ref 98–107)
CHOLEST SERPL-MCNC: 156 MG/DL (ref 0–200)
CLARITY UR: ABNORMAL
CO2 SERPL-SCNC: 24 MMOL/L (ref 22–29)
COLOR UR: YELLOW
CREAT SERPL-MCNC: 0.91 MG/DL (ref 0.57–1)
CRP SERPL-MCNC: <0.3 MG/DL (ref 0–0.5)
DEPRECATED RDW RBC AUTO: 50.5 FL (ref 37–54)
EGFRCR SERPLBLD CKD-EPI 2021: 64.7 ML/MIN/1.73
EOSINOPHIL # BLD AUTO: 0.04 10*3/MM3 (ref 0–0.4)
EOSINOPHIL NFR BLD AUTO: 0.8 % (ref 0.3–6.2)
ERYTHROCYTE [DISTWIDTH] IN BLOOD BY AUTOMATED COUNT: 15.6 % (ref 12.3–15.4)
ERYTHROCYTE [SEDIMENTATION RATE] IN BLOOD: 10 MM/HR (ref 0–30)
FERRITIN SERPL-MCNC: 13.85 NG/ML (ref 13–150)
FOLATE SERPL-MCNC: 7.28 NG/ML (ref 4.78–24.2)
GLOBULIN UR ELPH-MCNC: 2.6 GM/DL
GLUCOSE BLDC GLUCOMTR-MCNC: 71 MG/DL (ref 70–130)
GLUCOSE BLDC GLUCOMTR-MCNC: 94 MG/DL (ref 70–130)
GLUCOSE SERPL-MCNC: 114 MG/DL (ref 65–99)
GLUCOSE UR STRIP-MCNC: NEGATIVE MG/DL
HBA1C MFR BLD: 5.3 % (ref 4.8–5.6)
HCT VFR BLD AUTO: 33.7 % (ref 34–46.6)
HDLC SERPL-MCNC: 87 MG/DL (ref 40–60)
HGB BLD-MCNC: 10.6 G/DL (ref 12–15.9)
HGB UR QL STRIP.AUTO: ABNORMAL
HYALINE CASTS UR QL AUTO: ABNORMAL /LPF
IMM GRANULOCYTES # BLD AUTO: 0.03 10*3/MM3 (ref 0–0.05)
IMM GRANULOCYTES NFR BLD AUTO: 0.6 % (ref 0–0.5)
INR PPP: 0.97 (ref 0.84–1.13)
IRON 24H UR-MRATE: 58 MCG/DL (ref 37–145)
IRON 24H UR-MRATE: 58 MCG/DL (ref 37–145)
IRON SATN MFR SERPL: 13 % (ref 20–50)
KETONES UR QL STRIP: ABNORMAL
LDLC SERPL CALC-MCNC: 51 MG/DL (ref 0–100)
LDLC/HDLC SERPL: 0.55 {RATIO}
LEUKOCYTE ESTERASE UR QL STRIP.AUTO: ABNORMAL
LYMPHOCYTES # BLD AUTO: 0.53 10*3/MM3 (ref 0.7–3.1)
LYMPHOCYTES NFR BLD AUTO: 10.3 % (ref 19.6–45.3)
MAGNESIUM SERPL-MCNC: 1.9 MG/DL (ref 1.6–2.4)
MCH RBC QN AUTO: 28 PG (ref 26.6–33)
MCHC RBC AUTO-ENTMCNC: 31.5 G/DL (ref 31.5–35.7)
MCV RBC AUTO: 89.2 FL (ref 79–97)
MONOCYTES # BLD AUTO: 0.46 10*3/MM3 (ref 0.1–0.9)
MONOCYTES NFR BLD AUTO: 8.9 % (ref 5–12)
NEUTROPHILS NFR BLD AUTO: 4.09 10*3/MM3 (ref 1.7–7)
NEUTROPHILS NFR BLD AUTO: 79 % (ref 42.7–76)
NITRITE UR QL STRIP: NEGATIVE
NRBC BLD AUTO-RTO: 0 /100 WBC (ref 0–0.2)
PH UR STRIP.AUTO: 6 [PH] (ref 5–8)
PLATELET # BLD AUTO: 215 10*3/MM3 (ref 140–450)
PMV BLD AUTO: 8.6 FL (ref 6–12)
POTASSIUM SERPL-SCNC: 3 MMOL/L (ref 3.5–5.2)
POTASSIUM SERPL-SCNC: 4.8 MMOL/L (ref 3.5–5.2)
PROT SERPL-MCNC: 6.6 G/DL (ref 6–8.5)
PROT UR QL STRIP: NEGATIVE
PROTHROMBIN TIME: 12.8 SECONDS (ref 11.4–14.4)
RBC # BLD AUTO: 3.78 10*6/MM3 (ref 3.77–5.28)
RBC # UR STRIP: ABNORMAL /HPF
REF LAB TEST METHOD: ABNORMAL
RETICS # AUTO: 0.1 10*6/MM3 (ref 0.02–0.13)
RETICS/RBC NFR AUTO: 2.57 % (ref 0.7–1.9)
SODIUM SERPL-SCNC: 135 MMOL/L (ref 136–145)
SP GR UR STRIP: 1.05 (ref 1–1.03)
SQUAMOUS #/AREA URNS HPF: ABNORMAL /HPF
TIBC SERPL-MCNC: 462 MCG/DL (ref 298–536)
TRANSFERRIN SERPL-MCNC: 310 MG/DL (ref 200–360)
TRIGL SERPL-MCNC: 104 MG/DL (ref 0–150)
UROBILINOGEN UR QL STRIP: ABNORMAL
VIT B12 BLD-MCNC: 171 PG/ML (ref 211–946)
VLDLC SERPL-MCNC: 18 MG/DL (ref 5–40)
WBC # UR STRIP: ABNORMAL /HPF
WBC NRBC COR # BLD: 5.17 10*3/MM3 (ref 3.4–10.8)

## 2022-11-11 PROCEDURE — 99214 OFFICE O/P EST MOD 30 MIN: CPT | Performed by: NURSE PRACTITIONER

## 2022-11-11 PROCEDURE — 99215 OFFICE O/P EST HI 40 MIN: CPT | Performed by: INTERNAL MEDICINE

## 2022-11-11 PROCEDURE — G0378 HOSPITAL OBSERVATION PER HR: HCPCS

## 2022-11-11 PROCEDURE — 87077 CULTURE AEROBIC IDENTIFY: CPT | Performed by: NURSE PRACTITIONER

## 2022-11-11 PROCEDURE — 82607 VITAMIN B-12: CPT | Performed by: NURSE PRACTITIONER

## 2022-11-11 PROCEDURE — 97165 OT EVAL LOW COMPLEX 30 MIN: CPT

## 2022-11-11 PROCEDURE — 84466 ASSAY OF TRANSFERRIN: CPT | Performed by: NURSE PRACTITIONER

## 2022-11-11 PROCEDURE — 82746 ASSAY OF FOLIC ACID SERUM: CPT | Performed by: NURSE PRACTITIONER

## 2022-11-11 PROCEDURE — 80053 COMPREHEN METABOLIC PANEL: CPT | Performed by: STUDENT IN AN ORGANIZED HEALTH CARE EDUCATION/TRAINING PROGRAM

## 2022-11-11 PROCEDURE — 84132 ASSAY OF SERUM POTASSIUM: CPT | Performed by: INTERNAL MEDICINE

## 2022-11-11 PROCEDURE — 85730 THROMBOPLASTIN TIME PARTIAL: CPT | Performed by: STUDENT IN AN ORGANIZED HEALTH CARE EDUCATION/TRAINING PROGRAM

## 2022-11-11 PROCEDURE — 87186 SC STD MICRODIL/AGAR DIL: CPT | Performed by: NURSE PRACTITIONER

## 2022-11-11 PROCEDURE — 0 LEVETIRACETAM IN NACL 0.75% 1000 MG/100ML SOLUTION: Performed by: STUDENT IN AN ORGANIZED HEALTH CARE EDUCATION/TRAINING PROGRAM

## 2022-11-11 PROCEDURE — 97161 PT EVAL LOW COMPLEX 20 MIN: CPT

## 2022-11-11 PROCEDURE — 85652 RBC SED RATE AUTOMATED: CPT | Performed by: INTERNAL MEDICINE

## 2022-11-11 PROCEDURE — 63710000001 POTASSIUM CHLORIDE 10 MEQ CAPSULE CONTROLLED-RELEASE: Performed by: NURSE PRACTITIONER

## 2022-11-11 PROCEDURE — 81001 URINALYSIS AUTO W/SCOPE: CPT | Performed by: STUDENT IN AN ORGANIZED HEALTH CARE EDUCATION/TRAINING PROGRAM

## 2022-11-11 PROCEDURE — 96374 THER/PROPH/DIAG INJ IV PUSH: CPT

## 2022-11-11 PROCEDURE — 95816 EEG AWAKE AND DROWSY: CPT

## 2022-11-11 PROCEDURE — G0008 ADMIN INFLUENZA VIRUS VAC: HCPCS | Performed by: INTERNAL MEDICINE

## 2022-11-11 PROCEDURE — 90662 IIV NO PRSV INCREASED AG IM: CPT | Performed by: INTERNAL MEDICINE

## 2022-11-11 PROCEDURE — A9270 NON-COVERED ITEM OR SERVICE: HCPCS | Performed by: NURSE PRACTITIONER

## 2022-11-11 PROCEDURE — 82962 GLUCOSE BLOOD TEST: CPT

## 2022-11-11 PROCEDURE — 87086 URINE CULTURE/COLONY COUNT: CPT | Performed by: NURSE PRACTITIONER

## 2022-11-11 PROCEDURE — 83036 HEMOGLOBIN GLYCOSYLATED A1C: CPT | Performed by: NURSE PRACTITIONER

## 2022-11-11 PROCEDURE — 85610 PROTHROMBIN TIME: CPT | Performed by: STUDENT IN AN ORGANIZED HEALTH CARE EDUCATION/TRAINING PROGRAM

## 2022-11-11 PROCEDURE — 96361 HYDRATE IV INFUSION ADD-ON: CPT

## 2022-11-11 PROCEDURE — 83735 ASSAY OF MAGNESIUM: CPT | Performed by: NURSE PRACTITIONER

## 2022-11-11 PROCEDURE — 80061 LIPID PANEL: CPT | Performed by: NURSE PRACTITIONER

## 2022-11-11 PROCEDURE — 85045 AUTOMATED RETICULOCYTE COUNT: CPT | Performed by: NURSE PRACTITIONER

## 2022-11-11 PROCEDURE — 92523 SPEECH SOUND LANG COMPREHEN: CPT

## 2022-11-11 PROCEDURE — 85025 COMPLETE CBC W/AUTO DIFF WBC: CPT | Performed by: STUDENT IN AN ORGANIZED HEALTH CARE EDUCATION/TRAINING PROGRAM

## 2022-11-11 PROCEDURE — 82728 ASSAY OF FERRITIN: CPT | Performed by: NURSE PRACTITIONER

## 2022-11-11 PROCEDURE — 83540 ASSAY OF IRON: CPT | Performed by: NURSE PRACTITIONER

## 2022-11-11 PROCEDURE — 25010000002 INFLUENZA VAC HIGH-DOSE QUAD 0.7 ML SUSPENSION PREFILLED SYRINGE: Performed by: INTERNAL MEDICINE

## 2022-11-11 PROCEDURE — 86140 C-REACTIVE PROTEIN: CPT | Performed by: INTERNAL MEDICINE

## 2022-11-11 RX ORDER — ACETAMINOPHEN 325 MG/1
650 TABLET ORAL EVERY 6 HOURS PRN
Start: 2022-11-11

## 2022-11-11 RX ORDER — MAGNESIUM SULFATE HEPTAHYDRATE 40 MG/ML
2 INJECTION, SOLUTION INTRAVENOUS AS NEEDED
Status: DISCONTINUED | OUTPATIENT
Start: 2022-11-11 | End: 2022-11-11 | Stop reason: HOSPADM

## 2022-11-11 RX ORDER — POTASSIUM CHLORIDE 7.45 MG/ML
10 INJECTION INTRAVENOUS
Status: DISCONTINUED | OUTPATIENT
Start: 2022-11-11 | End: 2022-11-11 | Stop reason: HOSPADM

## 2022-11-11 RX ORDER — MAGNESIUM SULFATE HEPTAHYDRATE 40 MG/ML
4 INJECTION, SOLUTION INTRAVENOUS AS NEEDED
Status: DISCONTINUED | OUTPATIENT
Start: 2022-11-11 | End: 2022-11-11 | Stop reason: HOSPADM

## 2022-11-11 RX ORDER — LEVETIRACETAM 250 MG/1
250 TABLET ORAL EVERY 12 HOURS SCHEDULED
Status: DISCONTINUED | OUTPATIENT
Start: 2022-11-11 | End: 2022-11-11 | Stop reason: HOSPADM

## 2022-11-11 RX ORDER — POTASSIUM CHLORIDE 1.5 G/1.77G
40 POWDER, FOR SOLUTION ORAL AS NEEDED
Status: DISCONTINUED | OUTPATIENT
Start: 2022-11-11 | End: 2022-11-11 | Stop reason: HOSPADM

## 2022-11-11 RX ORDER — ASPIRIN 300 MG/1
300 SUPPOSITORY RECTAL DAILY
Status: DISCONTINUED | OUTPATIENT
Start: 2022-11-11 | End: 2022-11-11 | Stop reason: HOSPADM

## 2022-11-11 RX ORDER — LEVETIRACETAM 250 MG/1
250 TABLET ORAL EVERY 12 HOURS SCHEDULED
Qty: 60 TABLET | Refills: 0 | Status: SHIPPED | OUTPATIENT
Start: 2022-11-11 | End: 2022-11-30

## 2022-11-11 RX ORDER — ASPIRIN 325 MG
325 TABLET ORAL DAILY
Qty: 90 TABLET | Refills: 0 | Status: SHIPPED | OUTPATIENT
Start: 2022-11-12 | End: 2023-02-14 | Stop reason: DRUGHIGH

## 2022-11-11 RX ORDER — ASPIRIN 325 MG
325 TABLET ORAL DAILY
Status: DISCONTINUED | OUTPATIENT
Start: 2022-11-11 | End: 2022-11-11 | Stop reason: HOSPADM

## 2022-11-11 RX ORDER — CLOPIDOGREL BISULFATE 75 MG/1
75 TABLET ORAL DAILY
Status: DISCONTINUED | OUTPATIENT
Start: 2022-11-11 | End: 2022-11-11 | Stop reason: HOSPADM

## 2022-11-11 RX ORDER — POTASSIUM CHLORIDE 750 MG/1
40 CAPSULE, EXTENDED RELEASE ORAL AS NEEDED
Status: DISCONTINUED | OUTPATIENT
Start: 2022-11-11 | End: 2022-11-11 | Stop reason: HOSPADM

## 2022-11-11 RX ADMIN — SODIUM CHLORIDE, POTASSIUM CHLORIDE, SODIUM LACTATE AND CALCIUM CHLORIDE 1000 ML: 600; 310; 30; 20 INJECTION, SOLUTION INTRAVENOUS at 00:29

## 2022-11-11 RX ADMIN — ASPIRIN 325 MG: 325 TABLET ORAL at 08:33

## 2022-11-11 RX ADMIN — POTASSIUM CHLORIDE 40 MEQ: 750 CAPSULE, EXTENDED RELEASE ORAL at 13:41

## 2022-11-11 RX ADMIN — POTASSIUM CHLORIDE 40 MEQ: 1.5 POWDER, FOR SOLUTION ORAL at 03:38

## 2022-11-11 RX ADMIN — CLOPIDOGREL BISULFATE 75 MG: 75 TABLET ORAL at 08:33

## 2022-11-11 RX ADMIN — LEVETIRACETAM 1000 MG: 10 INJECTION INTRAVASCULAR at 00:28

## 2022-11-11 RX ADMIN — POTASSIUM CHLORIDE 40 MEQ: 750 CAPSULE, EXTENDED RELEASE ORAL at 08:37

## 2022-11-11 RX ADMIN — INFLUENZA A VIRUS A/VICTORIA/2570/2019 IVR-215 (H1N1) ANTIGEN (FORMALDEHYDE INACTIVATED), INFLUENZA A VIRUS A/DARWIN/9/2021 SAN-010 (H3N2) ANTIGEN (FORMALDEHYDE INACTIVATED), INFLUENZA B VIRUS B/PHUKET/3073/2013 ANTIGEN (FORMALDEHYDE INACTIVATED), AND INFLUENZA B VIRUS B/MICHIGAN/01/2021 ANTIGEN (FORMALDEHYDE INACTIVATED) 0.7 ML: 60; 60; 60; 60 INJECTION, SUSPENSION INTRAMUSCULAR at 15:47

## 2022-11-11 NOTE — TELEPHONE ENCOUNTER
I SPOKE WITH TONIO AND LET HER KNOW RAFAEL SAID TO OFFER HER THE 11/30/2022 AT CENTER LOCATION AND SHE SAID PT WAS STILL WAITING ON DISCHARGE AND SHE WILL LET PT KNOW THE APPT DETAILS AND LOCATION

## 2022-11-11 NOTE — TELEPHONE ENCOUNTER
The only thing I see is a fit in for November 30th at 930am at [facility] location-make sure family knows the address for this visit. ThanksAndra

## 2022-11-11 NOTE — TELEPHONE ENCOUNTER
Caller: TONIO     Relationship to patient:     Best call back number:[phone]    New or established patient?  [] New  [x] Established    Date of discharge:11.11.22    Facility discharged from: [facility]     Diagnosis/Symptoms: VISUAL FIELD DEFECT     Length of stay (If applicable): 1 DAY    Specialty Only: Did you see a [facility] provider?    [x] Yes  [] No  If so, who? JENKINS/ ANDREINA /    SHE IS ARFAEL ZAPATA PT  NEED TO KNOW IF  CAN GET APPT IN TWO WEEKS PER DR HOPKINS? (TONIO FROM DISCHARGE SAID THAT WAS WHAT HE ASKED FOR?    PLEASE ADVISE

## 2022-11-12 NOTE — OUTREACH NOTE
Prep Survey    Flowsheet Row Responses   [facility] patient discharged from? [place]   Is LACE score < 7 ? No   Emergency Room discharge w/ pulse ox? No   Eligibility [facility]   Date of Admission 11/10/22   Date of Discharge 11/11/22   Discharge Disposition Home or Self Care   Discharge diagnosis Visual field defect   Does the patient have one of the following disease processes/diagnoses(primary or secondary)? Other   Does the patient have Home health ordered? No   Is there a DME ordered? No   Prep survey completed? Yes          BRITTANY CRUZ - Registered Nurse

## 2022-11-13 LAB — BACTERIA SPEC AEROBE CULT: ABNORMAL

## 2022-11-14 ENCOUNTER — TRANSITIONAL CARE MANAGEMENT TELEPHONE ENCOUNTER (OUTPATIENT)
Dept: CALL CENTER | Facility: HOSPITAL | Age: 77
End: 2022-11-14

## 2022-11-14 NOTE — CONSULTS
Patient does not meet diabetes education order criteria, therefore patient was not seen for diabetes education at this time.  Please re consult as needed.    Verbal consent obtained for telephone visit. Total length of call: 20 min    HPI:    This is a 22 year old female patient,  who was called today for OB Intake visit. Patient reports positive pregnancy test at home.     Obstetrical history and OB Demographics updated to the best of this nurse's ability based on patient report. PHQ-9 depression screening and routine Domestic Abuse screening completed. All immediate questions and concerns answered.    FOOD SECURITY SCREENING QUESTIONS:    The next two questions are to help us understand your food security.  If you are feeling you need any assistance in this area, we have resources available to support you today.    Hunger Vital Signs:  Within the past 12 months we worried whether our food would run out before we got money to buy more. Never  Within the past 12 months the food we bought just didn't last and we didn't have money to get more. Never    Last menstrual period is reported as Patient's last menstrual period was 2022 (approximate). early miscarriage potential. ZACK based on LMP is Estimated Date of Delivery: Data Unavailable.  Her cycles are regular.  Her last menstrual period was abnormal, cramping very heavy, potential early loss.   Since her LMP, she has experienced  nausea, fatigue, pelvic pain and urinary frequency.       OBSTETRIC HISTORY:    OB History    Para Term  AB Living   1 0 0 0 0 0   SAB IAB Ectopic Multiple Live Births   0 0 0 0 0      # Outcome Date GA Lbr [place]/2nd Weight Sex Delivery Anes PTL Lv   1 Current                Age of first pregnancy: 22  Previous OB Provider: n/a  Previous Delivering Clinic: n/a  Release of Records: n/a    Current delivery plan: [facility]  Preferred OB Provider: Jose Aviles MD  Current Primary Care Provider: Gemini Alvarado NP  Pediatrician: will consider options    Additional History: n/a    Have you travelled during the pregnancy?No  Have your sexual partner(s) travelled during the  pregnancy?No      HISTORY:   Planned Pregnancy: Yes  Marital Status:   Occupation:  provider, full time  Living in Household: Spouse    Father of the baby is involved.   Family and father of baby is supportive of current pregnancy.  Past Medical History of Father of Baby:No significant medical history    Past History:  Her past medical history   Past Medical History:   Diagnosis Date     Anxiety disorder      Pneumonia     History of left lower lobe pneumonia times two.   .      Her past surgical history:   Past Surgical History:   Procedure Laterality Date     NO HISTORY OF SURGERY         She has a history of  no prior pregnancies    Since her last LMP she denies use of alcohol, tobacco and street drugs.    Pap smear history:   Last 3 Pap Results:   PAP (no units)   Date Value   10/30/2020 NIL       STD/STI history: No STD history    STD/STI symptoms: no noticeable symptoms     Past medical, surgical, social and family history were reviewed and updated in EPIC.    Medications reviewed by this nurse. Current medication list:  Current Outpatient Medications   Medication Sig Dispense Refill     Prenatal Vit-Fe Fumarate-FA (PRENATAL MULTIVITAMIN W/IRON) 27-0.8 MG tablet Take 1 tablet by mouth daily 90 tablet 3     progesterone (PROMETRIUM) 200 MG capsule Place 2 capsules (400 mg) vaginally 2 times daily for 30 days 120 capsule 1     The following medications were recommended to be discontinued due to Pregnancy Category D status: ibuprofen  Patient informed to contact her primary care provider as soon as possible to discuss a safer alternative.    Risk factors:  Moderate and moderately severe risks (consult with OB/Gyn)  Previous fetal or  demise: No  History of  delivery: No  History of heart disease Class I: No  Severe anemia, unresponsive to iron therapy: No  Pelvic mass or neoplasm: No  Previous : No  Hyper/hypothyroidism: No  History of postpartum hemorrhage requiring  transfusion:No  History of Placenta Accreta: No    High Risk (Pregnancy managed by OB/Gyn)  Multiple pregnancy: No  Pre-gestational diabetes: No  Chronic Hypertension: No  Renal Failure: No  Heart disease, class II or greater: No  Rh Isoimmunization: No  Chronic active hepatitis: No  Convulsive disorder, poorly controlled: No  Isoimmune thrombocytopenia: No  Pre-term premature rupture of membranes: No  Lupus or other autoimmune disorder: No  Human Immunodeficiency Virus: No      ASSESSMENT/PLAN:     22 year old , Unknown of pregnancy with ZACK of Not found.    Per standing orders and scope of practice of this nurse, patient will have the following orders placed and completed prior to initial OB visit with the appropriate provider:    --early ultrasound for dating and viability ordered for 6+ weeks gestation based on LMP    --Quantitative Beta HCG and progesterone monitoring if indicated    Counseling given:     - Recommended weight gain for pregnancy: 25-35 lbs.   BMI < 18.5  28-40 lbs   18.5 - 24.9 25-35   25 - 29.9 15-25   > 30  < 15       PLAN/PATIENT INSTRUCTIONS:    Normal exercise.  Normal sexual activity.  Prenatal vitamins.  Anticipated weight gain.    follow-up appointment with Dr. Jose Aviles MD for pre-lorena care and take multivitamin or pre- vitamins    Harriet Keys RN.................................................. 2022 2:38 PM

## 2022-11-14 NOTE — OUTREACH NOTE
Call Center TCM Note    Flowsheet Row Responses   [facility] patient discharged from? [place]   Does the patient have one of the following disease processes/diagnoses(primary or secondary)? Other   TCM attempt successful? No   Unsuccessful attempts Attempt 2          Elvia Dang RN    11/14/2022, 15:00 EST

## 2022-11-14 NOTE — OUTREACH NOTE
Call Center TCM Note    Flowsheet Row Responses   [facility] patient discharged from? [place]   Does the patient have one of the following disease processes/diagnoses(primary or secondary)? Other   TCM attempt successful? No   Unsuccessful attempts Attempt 1          Elvia Dang RN    11/14/2022, 10:58 EST

## 2022-11-15 ENCOUNTER — TRANSITIONAL CARE MANAGEMENT TELEPHONE ENCOUNTER (OUTPATIENT)
Dept: CALL CENTER | Facility: HOSPITAL | Age: 77
End: 2022-11-15

## 2022-11-15 NOTE — OUTREACH NOTE
Call Center TCM Note    Flowsheet Row Responses   [facility] patient discharged from? [place]   Does the patient have one of the following disease processes/diagnoses(primary or secondary)? Other   TCM attempt successful? Yes   Call start time 0848   Call end time 0850   Discharge diagnosis Visual field defect   Meds reviewed with patient/caregiver? Yes   Is the patient having any side effects they believe may be caused by any medication additions or changes? No   Does the patient have all medications ordered at discharge? Yes   Is the patient taking all medications as directed (includes completed medication regime)? Yes   Comments Pt had a scheduled FU apt today with her PCP  and canceled,  Neuro apt on 11/30/22-11/16/22 eye apt   Does the patient have an appointment with their PCP within 7 days of discharge? No   Nursing Interventions Routed TCM call to PCP office, PCP office requested to make appointment - message sent   Has home health visited the patient within 72 hours of discharge? N/A   Psychosocial issues? No   Did the patient receive a copy of their discharge instructions? Yes   Nursing interventions Reviewed instructions with patient   What is the patient's perception of their health status since discharge? Improving   Is the patient/caregiver able to teach back signs and symptoms related to disease process for when to call PCP? Yes   Is the patient/caregiver able to teach back signs and symptoms related to disease process for when to call 911? Yes   Is the patient/caregiver able to teach back the hierarchy of who to call/visit for symptoms/problems? PCP, Specialist, Home health nurse, Urgent Care, ED, 911 Yes   If the patient is a current smoker, are they able to teach back resources for cessation? Not a smoker   TCM call completed? Yes   Call end time 0850          Zainab Grier RN    11/15/2022, 08:52 EST

## 2022-11-16 ENCOUNTER — TELEPHONE (OUTPATIENT)
Dept: INTERNAL MEDICINE | Facility: CLINIC | Age: 77
End: 2022-11-16

## 2022-11-16 NOTE — TELEPHONE ENCOUNTER
Caller: DR CARPENTER    Relationship: Other    Best call back number: [phone]    What is the best time to reach you: ANY TIME    Who are you requesting to speak with (clinical staff, provider,  specific staff member): CLINICAL STAFF    Do you know the name of the person who called: DR CARPENTER    What was the call regarding: CALLER STATES THAT THE PATIENT WAS SEEN FOR AN EYE EXAM TODAY. HE HAS CONCERNS FOR THE PATIENT BEING PUT ON SEIZURE MEDICATION AFTER HER VISIT TO THE ER LAST WEEK AND WANTED TO DISCUSS WITH DR JACOBO.    Do you require a callback: YES

## 2022-11-17 ENCOUNTER — TELEPHONE (OUTPATIENT)
Dept: INTERNAL MEDICINE | Facility: CLINIC | Age: 77
End: 2022-11-17

## 2022-11-22 ENCOUNTER — READMISSION MANAGEMENT (OUTPATIENT)
Dept: CALL CENTER | Facility: HOSPITAL | Age: 77
End: 2022-11-22

## 2022-11-22 NOTE — OUTREACH NOTE
Medical Week 2 Survey    Flowsheet Row Responses   [facility] patient discharged from? [place]   Does the patient have one of the following disease processes/diagnoses(primary or secondary)? Other   Week 2 attempt successful? Yes   Call start time 1234   Discharge diagnosis Visual field defect   Call end time 1236   Person spoke with today (if not patient) and relationship patient   Meds reviewed with patient/caregiver? Yes   Is the patient having any side effects they believe may be caused by any medication additions or changes? No   Does the patient have all medications ordered at discharge? Yes   Is the patient taking all medications as directed (includes completed medication regime)? Yes   Does the patient have a primary care provider?  Yes   Does the patient have an appointment with their PCP within 7 days of discharge? Yes   Has the patient kept scheduled appointments due by today? Yes   Comments Neuro apt on 11/30/22   Has home health visited the patient within 72 hours of discharge? N/A   Psychosocial issues? No   Did the patient receive a copy of their discharge instructions? Yes   Nursing interventions Reviewed instructions with patient   What is the patient's perception of their health status since discharge? Improving   Is the patient/caregiver able to teach back signs and symptoms related to disease process for when to call PCP? Yes   Is the patient/caregiver able to teach back signs and symptoms related to disease process for when to call 911? Yes   Is the patient/caregiver able to teach back the hierarchy of who to call/visit for symptoms/problems? PCP, Specialist, Home health nurse, Urgent Care, ED, 911 Yes   If the patient is a current smoker, are they able to teach back resources for cessation? Not a smoker   Week 2 Call Completed? Yes   Graduated Yes   Did the patient feel the follow up calls were helpful during their recovery period? Yes   Was the number of calls appropriate? Yes    Graduated/Revoked comments Doing well. Has followup on 11/30/2022 with Neurology           SANTANA SHER - Registered Nurse

## 2022-11-30 ENCOUNTER — OFFICE VISIT (OUTPATIENT)
Dept: NEUROLOGY | Facility: CLINIC | Age: 77
End: 2022-11-30

## 2022-11-30 VITALS
SYSTOLIC BLOOD PRESSURE: 126 MMHG | BODY MASS INDEX: 22.71 KG/M2 | OXYGEN SATURATION: 98 % | DIASTOLIC BLOOD PRESSURE: 68 MMHG | WEIGHT: 133 LBS | HEIGHT: 64 IN

## 2022-11-30 DIAGNOSIS — Z86.73 HISTORY OF STROKE: ICD-10-CM

## 2022-11-30 DIAGNOSIS — I66.01 STENOSIS OF RIGHT MIDDLE CEREBRAL ARTERY: ICD-10-CM

## 2022-11-30 DIAGNOSIS — E53.8 VITAMIN B12 DEFICIENCY: ICD-10-CM

## 2022-11-30 DIAGNOSIS — H02.402 PTOSIS OF LEFT EYELID: ICD-10-CM

## 2022-11-30 DIAGNOSIS — G43.109 OCULAR MIGRAINE: Primary | ICD-10-CM

## 2022-11-30 PROCEDURE — 99214 OFFICE O/P EST MOD 30 MIN: CPT | Performed by: NURSE PRACTITIONER

## 2022-11-30 RX ORDER — CHOLECALCIFEROL (VITAMIN D3) 125 MCG
500 CAPSULE ORAL DAILY
Qty: 90 TABLET | Refills: 1 | Status: SHIPPED | OUTPATIENT
Start: 2022-11-30

## 2022-11-30 NOTE — PROGRESS NOTES
Subjective:     Patient ID: Cindy Sage is a 77 y.o. female.    CC:   Chief Complaint   Patient presents with   • Eye Problem     Vision change twice on 11/10/2022       HPI:   History of Present Illness   Today 11/30/2022-  This is a 77-year-old female who presents for [facility] follow-up. She was previously seen in our clinic on 06/24/2020 following a TIA with right MCA and PCA stenosis which was actually diagnosed on 02/28/2019. She also previously had a stroke around 2016. She has not followed up until today's date. On 11/10/2022, she was admitted to [facility] for a visual field deficit. She was writing a check, by report, and had an episode of blurred vision and flashing lights. The episode lasted about 10 minutes. Previous symptoms were similar to TIA in 2019. She was initially evaluated in the ER, and then she had another episode of vision change, and presented back to the hospital. She did have a CT scan done. She was started on Keppra with a loading dose. It was thought that she may have experienced a focal seizure or ocular migraine. She did undergo CT of the head, showing chronic changes with small old right parietal infarct. No evidence of acute abnormality. CT cerebral perfusion inpatient showed no evidence of ischemia. CTA of the neck did show mild calcific atherosclerosis of the ICA bilaterally, but less than 50 percent. 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 MRI of the brain showed no evidence of acute infarct, hemorrhage, or mass. She was loaded with 1 g of Keppra in the ER. They did consult neurology stroke team. They continued her on aspirin 325mg and Plavix 75mg and high dose statin daily. During hospitalization, she did undergo EEG on 11/11/2022 which was normal in the awake and light drowsy states, with no  "focal epileptiform activity seen.    Today, she confirms she was hospitalized approximately 2 weeks ago. She reports she was seeing a \"strobe light\" inside of her left eye. She states that the last time this occurred, she was seeing more light, and ignored this for 3 to 4 weeks when she had her first stroke. She states she was told that if this ever occurred again, to do something right away. She confirms she immediately went to the hospital, but was sent home. She then went back to the hospital after being home for approximately 1 hour because she began experiencing the same symptoms again. She reports someone called the hospital because she was unsure if she should go back in, but was recommended to do so. She reports they \"did not find anything.\"    Her daughter states she took her mother to see Dr. Eugene Dickey, optometry, the Wednesday after her hospitalization. She states Dr. Dickey suggested the gel that is behind the eye may have detached, which is common in patients her age, and may have caused her symptoms. She reports she does not want to be on Keppra if she does not have to be, and notes Dr. Dickey believed she did not need to be on seizure medication. She states she wanted to stop taking the Keppra, but read that you have to be careful coming off of this, so she decided to continue taking this for now. At this time, she has been taking Keppra 250mg twice a day for 19 days. She confirms she does not have a history of seizures. She denies experiencing any confusion, slurred speech, headache, numbness, tingling, weakness, or any other symptoms. She confirms she saw CHAPARRO Emmanuel, during her hospitalization. She denies experiencing any more episodes at this time. She denies feeling like her left eyelid is drooped. She reports she has not seen an ophthalmologist at this time.    She reports the previous episodes of vision changes lasted for approximately 3 to 4 minutes. She states the Keppra makes " her very fatigued. She denies experiencing any jerking or biting of her tongue. She notes she had no symptoms similar to what she pictured a seizure to be like in her head. Her daughter notes the possibility of her symptoms being a migraine were also brought up. Her daughter states the circumstances surrounding the beginning of her mother's symptoms lead her to believe her mother was experiencing a migraine. She states she was sitting in a chair writing checks, when she began having difficulty seeing due to the bright sun flashing. She notes she does not know what a migraine feels like. She states during the 1 hour she was home, she sat down in her chair and turned the television off, as she did not want to watch or hear it, and only wanted to sit in the quiet. She notes her last episode of vision changes previously occurred when she had a stroke, and has not occurred for many years up until her recent episode 2 weeks ago.     She reports she was changed from a baby aspirin to a regular aspirin during her hospitalization. She had been on 81mg aspirin for years prior to recent hospital visit. She denies missing any doses of her aspirin. She reports she previously took 325 mg of aspirin for quite a while in 2019, but was back on her 81 mg dose by 2020. She denies experiencing any weakness or shortness of breath.    She confirms she continues to take Plavix and simvastatin. She reports no one has ever mentioned that she has a low vitamin B12 level, and states this was not discussed during her hospitalization. She states she has an appointment with Dr. Carrillo in 02/2023.    Current outpatient and discharge medications have been reconciled for the patient.  Reviewed by: CHAPARRO Landon    Prior stroke and neurology history and workup from [id number]:  She reports her memory is very good. She completed High school and took a few college classes. She worked as a  before retiring. Independent in [employer].     On 2/28/2019 she started having acute vision changes with flashes of lights around her left eye as well as left hand heaviness.  She had a very minimal right-sided headache.  She felt like her symptoms were similar to her stroke she had in 2016.  She presented to the ER for further evaluation.  MRI of the brain was negative for any acute intracranial abnormality and did show a few small scattered old right sided lacunar infarcts in the frontal and parietal region and this was reviewed today in office and does have significant atrophy slightly advanced for her age.  She denies any memory issues and her  concurs..  She also had an MRA of the neck which was unremarkable.  MRA of the head did show markedly attenuated flow signal in the right posterior cerebral arteries as well as significant stenosis in the right and left.  She also had stenosis in the right M2 branches.  The remainder of the MRI of the brain was normal. She did have an echocardiogram as well with no evidence of PFO or ASD.  Showed some trace to mild mitral regurgitation and trace tricuspid regurgitation with a normal ejection fraction of 60%.  he also had Holter Monitor which was essentially normal with a very brief run of SVT.     The following portions of the patient's history were reviewed and updated as appropriate: allergies, current medications, past family history, past medical history, past social history, past surgical history and problem list.    Past Medical History:   Diagnosis Date   • Bilateral malignant neoplasm of breast in female, estrogen receptor positive (HCC) 4/13/2022   • Drug therapy 2016    For endometrial CA in 2016   • Elevated cholesterol    • Endometrial cancer (HCC) 04/2016    Stage IIIC2    • GERD (gastroesophageal reflux disease)    • HTN (hypertension)    • Hx of radiation therapy 2016    For endometrial CA 2016   • Stroke (HCC) 02/2016    No residual deficits.  Takes .       Past Surgical History:    Procedure Laterality Date   • APPENDECTOMY      Ex lap   • BREAST BIOPSY Left 2016   • BREAST LUMPECTOMY Bilateral 2022    2-Left; 1-Right   • COLON SURGERY N/A 12/10/2021    Exploratory Laparotamy; Transverse Colon resect w/ re-anastimosis.   • COLOSTOMY N/A 12/10/2021    Procedure: EXPLORATORY LAPAROTOMY, TRANSVERSE COLONIC RESECTION, REANASTIMOSIS, AND MOBILIZATION OF SPLENIC FLEXURE;  Surgeon: Daja Ma MD;  Location: [facility];  Service: Gynecology Oncology;  Laterality: N/A;   • FOOT SURGERY     • OOPHORECTOMY     • TONSILLECTOMY     • TOTAL LAPAROSCOPIC HYSTERECTOMY SALPINGO OOPHORECTOMY Bilateral 04/19/2016    RATLH, BSO, LND       Social History     Socioeconomic History   • Marital status:    Tobacco Use   • Smoking status: Never   • Smokeless tobacco: Never   Vaping Use   • Vaping Use: Never used   Substance and Sexual Activity   • Alcohol use: Yes   • Drug use: Never   • Sexual activity: Defer       Family History   Problem Relation Age of Onset   • Arthritis Mother    • Breast cancer Mother         age unknown    • Osteoporosis Mother    • Cancer Mother    • Alcohol abuse Father    • Stroke Maternal Aunt    • Breast cancer Sister    • Ovarian cancer Neg Hx    • Endometrial cancer Neg Hx           Current Outpatient Medications:   •  acetaminophen (TYLENOL) 325 MG tablet, Take 2 tablets by mouth Every 6 (Six) Hours As Needed for Mild Pain., Disp: , Rfl:   •  anastrozole (ARIMIDEX) 1 MG tablet, Take 1 tablet by mouth Daily., Disp: 30 tablet, Rfl: 11  •  aspirin 325 MG tablet, Take 1 tablet by mouth Daily., Disp: 90 tablet, Rfl: 0  •  Cholecalciferol (VITAMIN D3 GUMMIES ADULT PO), Take  by mouth Daily., Disp: , Rfl:   •  clopidogrel (PLAVIX) 75 MG tablet, Take 1 tablet by mouth once daily, Disp: 90 tablet, Rfl: 0  •  hydroCHLOROthiazide (HYDRODIURIL) 25 MG tablet, Take 1 tablet by mouth Daily., Disp: 90 tablet, Rfl: 1  •  potassium chloride (KAYCIEL) 20 mEq/15 mL solution, TAKE 15 ML BY  "MOUTH  ONCE DAILY, Disp: 450 mL, Rfl: 3  •  simvastatin (ZOCOR) 40 MG tablet, Take 1 tablet by mouth once daily, Disp: 90 tablet, Rfl: 1  •  vitamin B-12 (CYANOCOBALAMIN) 500 MCG tablet, Take 1 tablet by mouth Daily., Disp: 90 tablet, Rfl: 1     Review of Systems   Eyes: Positive for visual disturbance.   Musculoskeletal: Negative for gait problem.   Neurological: Negative for seizures and headaches.   Psychiatric/Behavioral: Negative for behavioral problems, confusion, decreased concentration and sleep disturbance. The patient is not nervous/anxious.    All other systems reviewed and are negative.       Objective:  /68   Ht 162.6 cm (64\")   Wt 60.3 kg (133 lb)   LMP  (LMP Unknown)   SpO2 98%   BMI 22.83 kg/m²     Neurologic Exam     Mental Status   Oriented to person, place, and time.   Registration: recalls 3 of 3 objects. Recall at 5 minutes: recalls 3 of 3 objects. Follows 3 step commands.   Attention: normal. Concentration: normal.   Speech: speech is normal   Level of consciousness: alert  Knowledge: good. Able to perform simple calculations.   Able to name object. Able to read. Able to repeat. Able to write. Normal comprehension.     Cranial Nerves   Cranial nerves II through XII intact.     CN VII   Left facial weakness: mild left upper eyelid ptosis noted today, subtle.    Motor Exam   Muscle bulk: normal  Overall muscle tone: normal    Strength   Strength 5/5 throughout.     Gait, Coordination, and Reflexes     Gait  Gait: normal    Coordination   Finger to nose coordination: normal    Tremor   Resting tremor: absent  Intention tremor: absent  Action tremor: absent    Reflexes   Right : 2+  Left : 2+      Physical Exam  Constitutional:       Appearance: Normal appearance.   Neurological:      Mental Status: She is alert and oriented to person, place, and time.      Cranial Nerves: Cranial nerves 2-12 are intact.      Motor: Motor strength is normal.      Coordination: Finger-Nose-Finger " Test normal.      Gait: Gait is intact.   Psychiatric:         Mood and Affect: Mood and affect normal.         Speech: Speech normal.         Behavior: Behavior normal.         Thought Content: Thought content normal.         Cognition and Memory: Cognition and memory normal.         Judgment: Judgment normal.     Today, baseline MMSE is a 30 out of 30.    Results:  Hemoglobin A1c was 5.3 percent.   Vitamin B12 was 171 pg/mL, and folate was normal.  In hospital.    Assessment/Plan:       Diagnoses and all orders for this visit:    1. Ocular migraine (Primary)  Comments:  nurtec if needed samples provided  Orders:  -     Ambulatory Referral to Ophthalmology    2. Stenosis of right middle cerebral artery  Comments:  continue aspirin, ok to go back to 81mg and continue plavix and simvastatin    3. Vitamin B12 deficiency  Comments:  recheck level with PCP next visit-will start otc replacement for now  Orders:  -     vitamin B-12 (CYANOCOBALAMIN) 500 MCG tablet; Take 1 tablet by mouth Daily.  Dispense: 90 tablet; Refill: 1    4. Ptosis of left eyelid  -     Ambulatory Referral to Ophthalmology    5. History of stroke  -     Ambulatory Referral to Ophthalmology         I did discuss her entire case with Dr. Lulu Gamble, my collaborating physician today. She recommended that we discontinue the Keppra as she had no seizure-like activity or symptoms, and so the patient is aware that she can stop this since she has only been on it for 19 days. She most likely has experienced ocular migraines. She does have some mild left eyelid ptosis, which she nor her family have noticed. I am going to refer her to ophthalmology for further evaluation with her history of stroke. She has no weakness, double vision, or other symptoms concerning for myasthenia at this time. She had been on aspirin 81 mg daily along with Plavix 75 mg and simvastatin 40 mg. I originally did recommend she complete at least 30 days of the aspirin 325 mg, and if  she has no other episodes of vision changes, she can go back down to the 81 mg since she has had some increased bruising with the higher dosing. I discussed this further with Dr. Gamble after visit and she felt with a normal P2Y12 result and no stroke, she could resume the aspirin 81mg daily with the plavix and statin so we are going to notify her and her daughter by phone if this update. She would continue these medications lifelong. We are going to follow up with her in 6 months for reevaluation of symptoms. We did discuss signs and symptoms of stroke and seizures, and when to present back to the ER. I did provide her with 4 samples of Nurtec ODT 75 mg to take at onset of visual symptoms. She and her daughter verbalized understanding, and agree with plan. She did have a baseline MMSE today of 30 out of 30, but does not have any memory issues.    Reviewed medications, potential side effects and signs and symptoms to report. Discussed risk versus benefits of treatment plan with patient and/or family-including medications, labs and radiology that may be ordered. Addressed questions and concerns during visit. Patient and/or family verbalized understanding and agree with plan.    AS THE PROVIDER, I PERSONALLY WORE PPE DURING ENTIRE FACE TO FACE ENCOUNTER IN CLINIC WITH THE PATIENT. PATIENT ALSO WORE PPE DURING ENTIRE FACE TO FACE ENCOUNTER EXCEPT FOR A MAX OF 30 SECONDS DURING NEUROLOGICAL EVALUATION OF CRANIAL NERVES AND THEN MASK WAS PLACED BACK OVER PATIENT FACE FOR REMAINDER OF VISIT. I WASHED MY HANDS BEFORE AND AFTER VISIT.    Discussed signs and symptoms of stroke and when to call 911. Instructed to follow a low fat diet including the Mediterranean diet. Instructed to take all medications daily as prescribed. Encouraged 30 minutes of exercise 3-4 times a week as tolerated. Stay well hydrated. Discussed potential side effects of new medications and signs and symptoms to report. If patient is currently using  tobacco products, smoking cessation was encouraged. Reviewed stroke risk factors and stroke prevention plan. Patient and/or family verbalizes understanding and agrees with plan.     During this visit the following were done:  Labs Reviewed [x]   P2Y12 136 showing adequate response to Plavix per stroke neurology notes  Labs Ordered []    Radiology Reports Reviewed [x]    Radiology Ordered []    PCP Records Reviewed []    Referring Provider Records Reviewed []    ER Records Reviewed [x]    Hospital Records Reviewed [x]    History Obtained From Family [x]  dtr  Radiology Images Reviewed [x]    Other Reviewed [x]    Records Requested []        Transcribed from ambient dictation for CHAPARRO Landon by Juanita Mukherjee.  11/30/22   16:34 EST    Patient or patient representative verbalized consent to the visit recording.  I have personally performed the services described in this document as transcribed by the above individual, and it is both accurate and complete.  CHAPARRO Landon  11/30/2022  17:16 EST

## 2022-12-06 ENCOUNTER — TELEPHONE (OUTPATIENT)
Dept: NEUROLOGY | Facility: CLINIC | Age: 77
End: 2022-12-06

## 2022-12-06 NOTE — TELEPHONE ENCOUNTER
----- Message from CHAPARRO Landon sent at 11/30/2022  5:11 PM EST -----  Please call and let ashley and her daughter know that I spoke with Dr. Gamble in regards to her aspirin and she felt like she could continue the 81mg aspirin along with the plavix and cholesterol medicine and did not have to complete the full month of the high dose aspirin. Just wanted to update them on this. Thanks, Andra

## 2022-12-23 RX ORDER — HYDROCHLOROTHIAZIDE 25 MG/1
TABLET ORAL
Qty: 90 TABLET | Refills: 0 | Status: SHIPPED | OUTPATIENT
Start: 2022-12-23 | End: 2023-03-27

## 2023-01-05 RX ORDER — POTASSIUM CHLORIDE 20MEQ/15ML
LIQUID (ML) ORAL
Qty: 450 ML | Refills: 0 | Status: SHIPPED | OUTPATIENT
Start: 2023-01-05 | End: 2023-02-06

## 2023-01-11 DIAGNOSIS — I66.01 STENOSIS OF RIGHT MIDDLE CEREBRAL ARTERY: ICD-10-CM

## 2023-01-11 DIAGNOSIS — G45.9 TIA (TRANSIENT ISCHEMIC ATTACK): ICD-10-CM

## 2023-01-11 RX ORDER — CLOPIDOGREL BISULFATE 75 MG/1
TABLET ORAL
Qty: 90 TABLET | Refills: 0 | Status: SHIPPED | OUTPATIENT
Start: 2023-01-11

## 2023-02-02 NOTE — PROGRESS NOTES
Cindy Sage  [id number]  1945      Reason for visit:  Surveillance for recurrent endometrial cancer, history of breast cancer    History of present illness:  The patient is a 78 y.o. year old female who presents today for treatment and evaluation of the above issues.    Cindy Sage is a 77 y.o. year old female who is here today for ongoing surveillance of Endometrial Cancer, recurrent, see Cancer History. 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 She is now s/p left breast lumpectomy and breast radiation. She is well recovered and pleased with her care. She started maintenance Arimidex for both endometrial cancer and breast cancer in 4/2022 and is doing well on medication thus far.   At patient's last visit, she had some constipation.  She been walking on the treadmill at home and using her walker and long hallways in open spaces.  Of note, she had TIA like symptoms and was evaluated at the emergency room with an extensive radiologic work-up all of which was negative aside from stenosis of right middle cerebral artery for which she takes 81 mg aspirin, Plavix, and simvastatin.  She followed up with neurology with a working diagnosis of ocular migraine and was referred to ophthalmology for ptosis of left eyelid in the context of history of stroke.    Today, she reports good energy.  She reports normal appetite, rewsolved constipation, and no nausea/vomiting.  She reports no vaginal bleeding.    Mammogram is due in 2 weeks.  She notes no recent colonoscopy and is unsure when her last colonoscopy was.  Similarly, she is unsure about DEXA.  She follows with Dr. Carrillo has follow-up in June of this year.  She is accompanied by her daughter as usual.  She brought in her world famous caramel corn which is always a treat.    Oncologic History:  Oncology/Hematology History   Endometrial cancer (HCC)   3/26/2016 Imaging    CT in ER  for acute onset postmenopausal bleeding revealed mass in the lower uterine segment. Gyn Oncology called to evaluate.     3/29/2016 Biopsy    Endometrial biopsy in office, pathology revealed complex atypical hyperplasia with stromal breakdown.        4/1/2016 Initial Diagnosis    Endometrial cancer     4/19/2016 Surgery    RATLH, BSO, LND to several millimeters of residual disease.   Stage IIIC2     5/20/2016 - 11/15/2016 Chemotherapy    Carboplatin AUC 6, Paclitaxel 175 mg/m2 x 6 cycles in sandwich fashion with radiation in between cycles #3 and #4      - 9/2/2016 Radiation    Radiation completed. Pelvis and periaortic nodes received a total of 45 Gy in 25 fractions during sandwich therapy. This was followed by vaginal brachytherapy: upper vagina treated utilizing cylinder and high dose rate iridium-192 to 18 Gy in 3 fractions.      11/21/2016 Imaging    Post-treatment PET/CT negative for disease     12/8/2021 Imaging    CT scan chest abdomen and pelvis: Large bowel obstruction, left chest wall mass     12/10/2021 Surgery    Exploratory laparotomy, colonic resection, side-to-side anastomosis for large bowel obstruction    Pathology showed metastatic adenocarcinoma at the colon.          1/10/2022 Molecular Testing    CARIS results:  MMR proficient/MSI stable--level 2 benefit pembrolizumab + lenvima  TMB high--level 2 benefit pembrolizumab alone  ER/AK+--level 3 benefit endocrine therapy  BRCA 1/2 negative     4/29/2022 Imaging    CT abdomen pelvis:  1. No evidence of abdominopelvic metastatic disease  2. Cholelithiasis  3. Colonic diverticulosis  4. Status post partial colon resection and hysterectomy     Bilateral malignant neoplasm of breast in female, estrogen receptor positive (HCC)   4/13/2022 Initial Diagnosis    Bilateral malignant neoplasm of breast in female, estrogen receptor positive (HCC)     4/13/2022 Cancer Staged    Staging form: Breast, AJCC 8th Edition  - Pathologic: Stage IA (pT1c, pN0, cM0, G2,  ER+, DC+, HER2-) - Signed by Sofia Fam MD on 4/13/2022 5/12/2022 - 6/2/2022 Radiation    Radiation OncologyTreatment Course:  Cindy Sage received 4005 cGy in 15 fractions to bilateral breasts via External Beam Radiation - EBRT.           Past Medical History:   Diagnosis Date   • Bilateral malignant neoplasm of breast in female, estrogen receptor positive (HCC) 4/13/2022   • Drug therapy 2016    For endometrial CA in 2016   • Elevated cholesterol    • Endometrial cancer (HCC) 04/2016    Stage IIIC2    • GERD (gastroesophageal reflux disease)    • HTN (hypertension)    • Hx of radiation therapy 2016    For endometrial CA 2016   • Stroke (HCC) 02/2016    No residual deficits.  Takes .       Past Surgical History:   Procedure Laterality Date   • APPENDECTOMY      Ex lap   • BREAST BIOPSY Left 2016   • BREAST LUMPECTOMY Bilateral 2022    2-Left; 1-Right   • COLON SURGERY N/A 12/10/2021    Exploratory Laparotamy; Transverse Colon resect w/ re-anastimosis.   • COLOSTOMY N/A 12/10/2021    Procedure: EXPLORATORY LAPAROTOMY, TRANSVERSE COLONIC RESECTION, REANASTIMOSIS, AND MOBILIZATION OF SPLENIC FLEXURE;  Surgeon: Daja Ma MD;  Location: [facility];  Service: Gynecology Oncology;  Laterality: N/A;   • FOOT SURGERY     • OOPHORECTOMY     • TONSILLECTOMY     • TOTAL LAPAROSCOPIC HYSTERECTOMY SALPINGO OOPHORECTOMY Bilateral 04/19/2016    RATLH, BSO, LND       MEDICATIONS: The current medication list was reviewed with the patient and updated in the EMR this date per the Medical Assistant. Medication dosages and frequencies were confirmed to be accurate.      Allergies:  is allergic to strawberry extract.    Social History:   Social History     Socioeconomic History   • Marital status:    Tobacco Use   • Smoking status: Never   • Smokeless tobacco: Never   Vaping Use   • Vaping Use: Never used   Substance and Sexual Activity   • Alcohol use: Yes   • Drug use: Never   • Sexual activity:  "Defer     Review of Systems  Please refer to history of present illness.  Review of systems otherwise negative.    Physical Exam    Vitals:    02/03/23 0917   BP: 164/70   Pulse: 68   Resp: 17   Temp: 97.1 °F (36.2 °C)   TempSrc: Temporal   SpO2: 98%   Weight: 60.7 kg (133 lb 12.8 oz)   Height: 162.6 cm (64.02\")   PainSc: 0-No pain       Body mass index is 22.96 kg/m².  Wt Readings from Last 3 Encounters:   02/03/23 60.7 kg (133 lb 12.8 oz)   11/30/22 60.3 kg (133 lb)   11/10/22 59.4 kg (131 lb)     GENERAL: Alert, well-appearing female appearing her stated age who is in no apparent distress.   HEENT: Sclera anicteric. Head normocephalic, atraumatic. Mucus membranes moist.   NECK: Trachea midline, supple.  BREASTS: Deferred  CARDIOVASCULAR: Normal rate, regular rhythm, no murmurs, rubs, or gallops.  No peripheral edema.  RESPIRATORY: Clear to auscultation bilaterally, normal respiratory effort on room air  GASTROINTESTINAL:  Abdomen is soft, nontender, non-distended, no rebound or guarding, no masses, or hernias. No HSM.  Healed vertical midline incision.  SKIN:  Warm, dry, well-perfused.  All visible areas intact.  No rashes, lesions, ulcers.  PSYCHIATRIC: AO x3, with appropriate affect, normal thought processes.  NEUROLOGIC: No focal deficits.  Moves extremities well.  MUSCULOSKELETAL: Normal gait and station.   EXTREMITIES:   No cyanosis, clubbing, symmetric.  LYMPHATICS:  No cervical or inguinal adenopathy noted.     PELVIC exam:    External genitalia are free from lesion. On speculum examination, the vaginal cuff was intact and no lesions were appreciated.  Postradiation changes noted.  On bimanual examination, no fullness was appreciated.  Postradiation changes noted.  Uterus, cervix and adnexa were absent.  There was no significant tenderness.  Rectovaginal exam was deferred.    ECOG PS 0    PROCEDURES:  none    Diagnostic Data:    11/2/22 CT A/P IMPRESSION:  1. No acute findings are seen within the abdomen " or pelvis. No hernia is  identified. No CT explanation for the patient's left lower quadrant  pain.  2. Surgical changes of the transverse colon.  3. Uncomplicated colonic diverticulosis.  4. Presumed left breast postoperative seroma appears diminished in size,  but is incompletely included in the imaging field of view.  5. Small esophageal hiatal hernia.    Lab Results   Component Value Date    WBC 5.17 11/11/2022    HGB 10.6 (L) 11/11/2022    HCT 33.7 (L) 11/11/2022    MCV 89.2 11/11/2022     11/11/2022    NEUTROABS 4.09 11/11/2022    GLUCOSE 114 (H) 11/11/2022    BUN 16 11/11/2022    CREATININE 0.91 11/11/2022    EGFRIFNONA 51 (L) 01/26/2022    EGFRIFAFRI 62 01/26/2022     (L) 11/11/2022    K 4.8 11/11/2022    CL 97 (L) 11/11/2022    CO2 24.0 11/11/2022    MG 1.9 11/11/2022    PHOS 3.8 12/15/2021    CALCIUM 9.3 11/11/2022    ALBUMIN 4.00 11/11/2022    AST 12 11/11/2022    ALT 9 11/11/2022    BILITOT 0.7 11/11/2022     Lab Results   Component Value Date     15.5 10/19/2022     13.3 07/20/2022     27.4 11/17/2021     16.6 05/13/2021     14.4 11/12/2020     16.3 05/12/2020     16.4 09/26/2019     11.2 03/20/2019     11.2 09/18/2018     10.5 06/14/2018         Assessment & Plan   This is a 78 y.o. woman with history of recurrent endometrial cancer and Stage IA breast cancer presenting for follow up.    Encounter Diagnosis   Name Primary?   • Endometrial cancer (HCC) Yes     Recurrent endometrial cancer in the setting of new Stage IA breast cancer, DAVION  - ER positive; Continue anastrazole for maintenance  -  today  - Last CT as above  - OTC stool softeners & laxatives for constipation.      Routine health screening  -Mammogram in 2 weeks  -Patient encouraged to discuss pros and cons of colon cancer screening in her particular case (prior reanastomosis, cancer, radiation exposure) with primary care provider as well as other routine screenings such as  GEOVANNA.  She verbalized understanding.    Pain assessment was performed today as a part of patient’s care.  For patients with pain related to surgery, gynecologic malignancy or cancer treatment, the plan is as noted in the assessment/plan.  For patients with pain not related to these issues, they are to seek any further needed care from a more appropriate provider, such as PCP.      Orders Placed This Encounter   Procedures   •      Standing Status:   Future     Number of Occurrences:   1     Standing Expiration Date:   2/3/2024     Order Specific Question:   Release to patient     Answer:   Routine Release     FOLLOW UP: 3 months    I spent 20 minutes caring for Cindy on this date of service. This time includes time spent by me in the following activities: preparing for the visit, reviewing tests, performing a medically appropriate examination and/or evaluation, counseling and educating the patient/family/caregiver, ordering medications, tests, or procedures, referring and communicating with other health care professionals and documenting information in the medical record    Electronically signed by Daja Ma MD, 02/03/23, 10:01 AM EST.

## 2023-02-03 ENCOUNTER — LAB (OUTPATIENT)
Dept: LAB | Facility: HOSPITAL | Age: 78
End: 2023-02-03
Payer: MEDICARE

## 2023-02-03 ENCOUNTER — OFFICE VISIT (OUTPATIENT)
Dept: GYNECOLOGIC ONCOLOGY | Facility: CLINIC | Age: 78
End: 2023-02-03
Payer: MEDICARE

## 2023-02-03 VITALS
HEIGHT: 64 IN | RESPIRATION RATE: 17 BRPM | OXYGEN SATURATION: 98 % | WEIGHT: 133.8 LBS | DIASTOLIC BLOOD PRESSURE: 70 MMHG | TEMPERATURE: 97.1 F | SYSTOLIC BLOOD PRESSURE: 164 MMHG | BODY MASS INDEX: 22.84 KG/M2 | HEART RATE: 68 BPM

## 2023-02-03 DIAGNOSIS — C54.1 ENDOMETRIAL CANCER: ICD-10-CM

## 2023-02-03 DIAGNOSIS — C54.1 ENDOMETRIAL CANCER: Primary | ICD-10-CM

## 2023-02-03 LAB — CANCER AG125 SERPL QL: 12.6 U/ML (ref 0–38.1)

## 2023-02-03 PROCEDURE — 86304 IMMUNOASSAY TUMOR CA 125: CPT

## 2023-02-03 PROCEDURE — 36415 COLL VENOUS BLD VENIPUNCTURE: CPT

## 2023-02-03 PROCEDURE — 99213 OFFICE O/P EST LOW 20 MIN: CPT | Performed by: OBSTETRICS & GYNECOLOGY

## 2023-02-06 ENCOUNTER — TELEPHONE (OUTPATIENT)
Dept: GYNECOLOGIC ONCOLOGY | Facility: CLINIC | Age: 78
End: 2023-02-06
Payer: MEDICARE

## 2023-02-06 RX ORDER — POTASSIUM CHLORIDE 20MEQ/15ML
LIQUID (ML) ORAL
Qty: 450 ML | Refills: 0 | Status: SHIPPED | OUTPATIENT
Start: 2023-02-06 | End: 2023-03-06

## 2023-02-06 NOTE — TELEPHONE ENCOUNTER
----- Message from Daja Ma MD sent at 2/4/2023  1:04 PM EST -----  Please notify patient of normal Ca1 25.  Thank you    ----- Message -----  From: Lab, Background User  Sent: 2/3/2023   3:00 PM EST  To: Daja Ma MD

## 2023-02-07 RX ORDER — POTASSIUM CHLORIDE 20MEQ/15ML
LIQUID (ML) ORAL
Qty: 450 ML | Refills: 0 | OUTPATIENT
Start: 2023-02-07

## 2023-02-14 ENCOUNTER — OFFICE VISIT (OUTPATIENT)
Dept: INTERNAL MEDICINE | Facility: CLINIC | Age: 78
End: 2023-02-14
Payer: MEDICARE

## 2023-02-14 VITALS
SYSTOLIC BLOOD PRESSURE: 138 MMHG | WEIGHT: 135 LBS | DIASTOLIC BLOOD PRESSURE: 64 MMHG | HEART RATE: 64 BPM | RESPIRATION RATE: 16 BRPM | BODY MASS INDEX: 23.16 KG/M2 | TEMPERATURE: 98.4 F

## 2023-02-14 DIAGNOSIS — E78.5 HYPERLIPIDEMIA, UNSPECIFIED HYPERLIPIDEMIA TYPE: ICD-10-CM

## 2023-02-14 DIAGNOSIS — Z12.11 SCREENING FOR COLON CANCER: ICD-10-CM

## 2023-02-14 DIAGNOSIS — Z23 IMMUNIZATION DUE: ICD-10-CM

## 2023-02-14 DIAGNOSIS — I10 ESSENTIAL HYPERTENSION: Primary | ICD-10-CM

## 2023-02-14 DIAGNOSIS — K21.9 GASTROESOPHAGEAL REFLUX DISEASE WITHOUT ESOPHAGITIS: ICD-10-CM

## 2023-02-14 PROCEDURE — 90677 PCV20 VACCINE IM: CPT | Performed by: INTERNAL MEDICINE

## 2023-02-14 PROCEDURE — 99214 OFFICE O/P EST MOD 30 MIN: CPT | Performed by: INTERNAL MEDICINE

## 2023-02-14 PROCEDURE — 36415 COLL VENOUS BLD VENIPUNCTURE: CPT | Performed by: INTERNAL MEDICINE

## 2023-02-14 PROCEDURE — G0009 ADMIN PNEUMOCOCCAL VACCINE: HCPCS | Performed by: INTERNAL MEDICINE

## 2023-02-14 RX ORDER — ASPIRIN 81 MG/1
81 TABLET ORAL DAILY
COMMUNITY

## 2023-02-14 NOTE — PROGRESS NOTES
Chief Complaint   Patient presents with   • Follow-up     6 month follow up chronic medical problems       History of Present Illness    The patient presents for a follow-up related to hypertension. The patient reports that she has had no headaches, chest pain, dyspnea, edema, syncope, blurred vision or palpitations. She states that she is taking her medication as prescribed. She is not having medication side effects.    The patient presents for a follow-up related to hyperlipidemia. She is following a low fat diet. She reports that she is exercising. She is taking simvastatin. The patient is taking her medication as prescribed. She reports no medication side effects, including muscle cramps, abdominal pain, headaches or weakness. She denies orthopnea, paroxysmal nocturnal dyspnea or dyspnea on exertion.    The patient presents for a follow-up related to GERD. The patient is not on medication for her gastroesophageal reflux. She reports no abdominal pain, belching, diarrhea, dysphagia, early satiety, heartburn, hoarseness, nausea, odynophagia, rectal bleeding, vomiting or weight loss. The GERD has no known aggravating factors.    Medications      Current Outpatient Medications:   •  acetaminophen (TYLENOL) 325 MG tablet, Take 2 tablets by mouth Every 6 (Six) Hours As Needed for Mild Pain., Disp: , Rfl:   •  anastrozole (ARIMIDEX) 1 MG tablet, Take 1 tablet by mouth Daily., Disp: 30 tablet, Rfl: 11  •  aspirin 81 MG EC tablet, Take 81 mg by mouth Daily., Disp: , Rfl:   •  Cholecalciferol (VITAMIN D3 GUMMIES ADULT PO), Take  by mouth Daily., Disp: , Rfl:   •  clopidogrel (PLAVIX) 75 MG tablet, Take 1 tablet by mouth once daily, Disp: 90 tablet, Rfl: 0  •  hydroCHLOROthiazide (HYDRODIURIL) 25 MG tablet, Take 1 tablet by mouth once daily, Disp: 90 tablet, Rfl: 0  •  potassium chloride (KAYCIEL) 20 mEq/15 mL solution, TAKE 15 ML BY MOUTH  ONCE DAILY, Disp: 450 mL, Rfl: 0  •  simvastatin (ZOCOR) 40 MG tablet, Take 1  tablet by mouth once daily, Disp: 90 tablet, Rfl: 1  •  vitamin B-12 (CYANOCOBALAMIN) 500 MCG tablet, Take 1 tablet by mouth Daily., Disp: 90 tablet, Rfl: 1     Allergies    Allergies   Allergen Reactions   • Strawberry Extract Swelling       Problem List    Patient Active Problem List   Diagnosis   • Hyperlipidemia   • Hypertension   • Ischemic stroke (HCC)   • Endometrial cancer (HCC)   • GERD (gastroesophageal reflux disease)   • Hx: UTI (urinary tract infection)   • History of endometrial cancer   • TIA (transient ischemic attack)   • Stenosis of right middle cerebral artery   • Musculoskeletal neck pain   • Large bowel obstruction s/p colonic resection by Dr. Ma 12/10/21   • Recurrent endometrial cancer (HCC)   • Bilateral malignant neoplasm of breast in female, estrogen receptor positive (HCC)   • Slow transit constipation   • Visual field defect   • Hypokalemia   • Anemia   • Ocular migraine   • Vitamin B12 deficiency   • Ptosis of left eyelid   • History of stroke       Medications, Allergies, Problems List and Past History were reviewed and updated.    Physical Examination    /64 (BP Location: Left arm, Patient Position: Sitting, Cuff Size: Adult)   Pulse 64   Temp 98.4 °F (36.9 °C) (Infrared)   Resp 16   Wt 61.2 kg (135 lb)   LMP  (LMP Unknown)   BMI 23.16 kg/m²     HEENT: Facies- Within normal limits. Lids- Normal bilaterally. Conjunctiva- Clear bilaterally. Sclera- Anicteric bilaterally.    Neck: Thyroid- non enlarged, symmetric and has no nodules. No bruits are detected.    Lungs: Auscultation- Clear to auscultation bilaterally. There are no retractions, clubbing or cyanosis. The Expiratory to Inspiratory ratio is equal.    Cardiovascular: There are no carotid bruits. Heart- Normal Rate with Regular rhythm and no murmurs. There are no gallops. There are no rubs. In the lower extremities there is no edema. The upper extremities do not have edema.    Impression and  Assessment    Encounter for Screening for Malignant Neoplasm of the Colon.    Encounter for Immunization Administration.    Essential Hypertension.    Hyperlipidemia.    Gastroesophageal Reflux Disease.    Plan    Gastroesophageal Reflux Disease Plan: The patient was instructed to continue the current medications.    Essential Hypertension Plan: The patient was instructed to continue the current medications.    Hyperlipidemia Plan: The patient was instructed to exercise daily, eat a low fat diet and continue her medications.    The following was ordered for screening and health maintenance: Colonoscopy.    Counseled regarding immunizations and applicable VIS given.    Immunizations Ordered and Administered: Prevnar 20.    Diagnoses and all orders for this visit:    1. Essential hypertension (Primary)  -     CBC & Differential; Future  -     Comprehensive Metabolic Panel; Future    2. Hyperlipidemia, unspecified hyperlipidemia type  -     Comprehensive Metabolic Panel; Future  -     Lipid Panel; Future    3. Gastroesophageal reflux disease without esophagitis    4. Screening for colon cancer  -     Ambulatory Referral For Screening Colonoscopy    5. Immunization due  -     Pneumococcal Conjugate Vaccine 20-Valent All        Return to Office    The patient was instructed to return for follow-up in 6 months. The patient was instructed to return sooner if the condition changes, worsens, or does not resolve.

## 2023-02-15 LAB
ALBUMIN SERPL-MCNC: 4.3 G/DL (ref 3.5–5.2)
ALBUMIN/GLOB SERPL: 1.9 G/DL
ALP SERPL-CCNC: 134 U/L (ref 39–117)
ALT SERPL-CCNC: 12 U/L (ref 1–33)
AST SERPL-CCNC: 19 U/L (ref 1–32)
BASOPHILS # BLD AUTO: 0.02 10*3/MM3 (ref 0–0.2)
BASOPHILS NFR BLD AUTO: 0.5 % (ref 0–1.5)
BILIRUB SERPL-MCNC: 0.5 MG/DL (ref 0–1.2)
BUN SERPL-MCNC: 9 MG/DL (ref 8–23)
BUN/CREAT SERPL: 9.4 (ref 7–25)
CALCIUM SERPL-MCNC: 9.6 MG/DL (ref 8.6–10.5)
CHLORIDE SERPL-SCNC: 99 MMOL/L (ref 98–107)
CHOLEST SERPL-MCNC: 175 MG/DL (ref 0–200)
CO2 SERPL-SCNC: 28.2 MMOL/L (ref 22–29)
CREAT SERPL-MCNC: 0.96 MG/DL (ref 0.57–1)
EGFRCR SERPLBLD CKD-EPI 2021: 60.3 ML/MIN/1.73
EOSINOPHIL # BLD AUTO: 0.1 10*3/MM3 (ref 0–0.4)
EOSINOPHIL NFR BLD AUTO: 2.5 % (ref 0.3–6.2)
ERYTHROCYTE [DISTWIDTH] IN BLOOD BY AUTOMATED COUNT: 14.8 % (ref 12.3–15.4)
GLOBULIN SER CALC-MCNC: 2.3 GM/DL
GLUCOSE SERPL-MCNC: 104 MG/DL (ref 65–99)
HCT VFR BLD AUTO: 33 % (ref 34–46.6)
HDLC SERPL-MCNC: 101 MG/DL (ref 40–60)
HGB BLD-MCNC: 10.3 G/DL (ref 12–15.9)
IMM GRANULOCYTES # BLD AUTO: 0.01 10*3/MM3 (ref 0–0.05)
IMM GRANULOCYTES NFR BLD AUTO: 0.2 % (ref 0–0.5)
LDLC SERPL CALC-MCNC: 51 MG/DL (ref 0–100)
LYMPHOCYTES # BLD AUTO: 0.58 10*3/MM3 (ref 0.7–3.1)
LYMPHOCYTES NFR BLD AUTO: 14.3 % (ref 19.6–45.3)
MCH RBC QN AUTO: 26.5 PG (ref 26.6–33)
MCHC RBC AUTO-ENTMCNC: 31.2 G/DL (ref 31.5–35.7)
MCV RBC AUTO: 84.8 FL (ref 79–97)
MONOCYTES # BLD AUTO: 0.38 10*3/MM3 (ref 0.1–0.9)
MONOCYTES NFR BLD AUTO: 9.3 % (ref 5–12)
NEUTROPHILS # BLD AUTO: 2.98 10*3/MM3 (ref 1.7–7)
NEUTROPHILS NFR BLD AUTO: 73.2 % (ref 42.7–76)
NRBC BLD AUTO-RTO: 0 /100 WBC (ref 0–0.2)
PLATELET # BLD AUTO: 239 10*3/MM3 (ref 140–450)
POTASSIUM SERPL-SCNC: 4.7 MMOL/L (ref 3.5–5.2)
PROT SERPL-MCNC: 6.6 G/DL (ref 6–8.5)
RBC # BLD AUTO: 3.89 10*6/MM3 (ref 3.77–5.28)
SODIUM SERPL-SCNC: 137 MMOL/L (ref 136–145)
TRIGL SERPL-MCNC: 139 MG/DL (ref 0–150)
VLDLC SERPL CALC-MCNC: 23 MG/DL (ref 5–40)
WBC # BLD AUTO: 4.07 10*3/MM3 (ref 3.4–10.8)

## 2023-02-23 ENCOUNTER — HOSPITAL ENCOUNTER (OUTPATIENT)
Dept: MAMMOGRAPHY | Facility: HOSPITAL | Age: 78
Discharge: HOME | End: 2023-02-23
Admitting: SURGERY
Payer: MEDICARE

## 2023-02-23 DIAGNOSIS — R92.8 ABNORMAL MAMMOGRAM: ICD-10-CM

## 2023-02-23 PROCEDURE — G0279 TOMOSYNTHESIS, MAMMO: HCPCS

## 2023-02-23 PROCEDURE — 77066 DX MAMMO INCL CAD BI: CPT

## 2023-02-23 PROCEDURE — 77066 DX MAMMO INCL CAD BI: CPT | Performed by: RADIOLOGY

## 2023-02-23 PROCEDURE — G0279 TOMOSYNTHESIS, MAMMO: HCPCS | Performed by: RADIOLOGY

## 2023-03-06 RX ORDER — POTASSIUM CHLORIDE 20MEQ/15ML
LIQUID (ML) ORAL
Qty: 450 ML | Refills: 5 | Status: SHIPPED | OUTPATIENT
Start: 2023-03-06

## 2023-03-27 RX ORDER — HYDROCHLOROTHIAZIDE 25 MG/1
TABLET ORAL
Qty: 90 TABLET | Refills: 0 | Status: SHIPPED | OUTPATIENT
Start: 2023-03-27

## 2023-03-29 ENCOUNTER — TELEPHONE (OUTPATIENT)
Dept: GASTROENTEROLOGY | Facility: CLINIC | Age: 78
End: 2023-03-29
Payer: MEDICARE

## 2023-03-29 NOTE — TELEPHONE ENCOUNTER
March 29, 2023 [address] [place] [address]  Phone: [phone]  Fax: [phone]                        Cindy Celaya Blancatiffany  [address] [place] [id number]  1945           Patient will be having a a colonoscopy by Dr. Sha Verdugo on April 26, 2023. The patient is needing cardiac clearance due to being on blood thinners. Please complete the following and fax back to the office.      Patient's was last seen in the office on:_____________________     Procedure/Test Performed:     _____ Stress Test       _____ Echocardiogram    _____ EKG     _____ Heart Cath     Based on the above test results and/or clinical evaluation it is my recommendation:     ____ Patient may proceed with the scheduled surgical procedure with an acceptable cardiac risk.     ____ Patient CAN NOT stop Plavix, Effient, Ticlid, Aspirin, Coumadin, Pradaxa, Xarelto, Eliquis, Savaysa, or Brilinta prior to procedure.      ____ Patient CAN stop Plavix, Effient, Ticlid, Aspirin, Coumadin, Pradaxa, Xarelto, Eliquis, Savaysa, or Brilinta  ______ days prior to procedure.     Please sign and date below.     Signature________________________________________   Date:_________________      Thank You        Sha Verdugo M.D.

## 2023-03-30 NOTE — TELEPHONE ENCOUNTER
Ms. Sage is an acceptable risk for a colonoscopy and may hold her plavix for 5 days prior to the the procedure.  Jose Carrillo MD  07:35 EDT  03/30/23

## 2023-03-30 NOTE — TELEPHONE ENCOUNTER
I TRIED TO CALL PATIENT TO GIVE SURGICAL CLEARANCE INFORMATION. LINE BUSY. I WILL TRY AGAIN LATER.

## 2023-04-04 DIAGNOSIS — C50.912 BILATERAL MALIGNANT NEOPLASM OF BREAST IN FEMALE, ESTROGEN RECEPTOR POSITIVE, UNSPECIFIED SITE OF BREAST: Primary | ICD-10-CM

## 2023-04-04 DIAGNOSIS — Z17.0 BILATERAL MALIGNANT NEOPLASM OF BREAST IN FEMALE, ESTROGEN RECEPTOR POSITIVE, UNSPECIFIED SITE OF BREAST: Primary | ICD-10-CM

## 2023-04-04 DIAGNOSIS — C50.911 BILATERAL MALIGNANT NEOPLASM OF BREAST IN FEMALE, ESTROGEN RECEPTOR POSITIVE, UNSPECIFIED SITE OF BREAST: Primary | ICD-10-CM

## 2023-04-04 RX ORDER — ANASTROZOLE 1 MG/1
1 TABLET ORAL DAILY
Qty: 30 TABLET | Refills: 0 | Status: SHIPPED | OUTPATIENT
Start: 2023-04-04

## 2023-04-11 DIAGNOSIS — I66.01 STENOSIS OF RIGHT MIDDLE CEREBRAL ARTERY: ICD-10-CM

## 2023-04-11 DIAGNOSIS — G45.9 TIA (TRANSIENT ISCHEMIC ATTACK): ICD-10-CM

## 2023-04-11 RX ORDER — CLOPIDOGREL BISULFATE 75 MG/1
TABLET ORAL
Qty: 90 TABLET | Refills: 1 | Status: SHIPPED | OUTPATIENT
Start: 2023-04-11

## 2023-04-19 RX ORDER — SODIUM, POTASSIUM,MAG SULFATES 17.5-3.13G
1 SOLUTION, RECONSTITUTED, ORAL ORAL TAKE AS DIRECTED
Qty: 354 ML | Refills: 0 | Status: SHIPPED | OUTPATIENT
Start: 2023-04-19

## 2023-04-26 ENCOUNTER — OUTSIDE FACILITY SERVICE (OUTPATIENT)
Dept: GASTROENTEROLOGY | Facility: CLINIC | Age: 78
End: 2023-04-26
Payer: MEDICARE

## 2023-04-26 DIAGNOSIS — K56.699 COLON STRICTURE: ICD-10-CM

## 2023-04-26 DIAGNOSIS — K66.0 COLONIC ADHESIONS: Primary | ICD-10-CM

## 2023-04-26 PROCEDURE — G0105 COLORECTAL SCRN; HI RISK IND: HCPCS | Performed by: INTERNAL MEDICINE

## 2023-04-27 ENCOUNTER — HOSPITAL ENCOUNTER (OUTPATIENT)
Dept: GENERAL RADIOLOGY | Facility: HOSPITAL | Age: 78
Discharge: HOME | End: 2023-04-27
Payer: MEDICARE

## 2023-04-27 DIAGNOSIS — K66.0 COLONIC ADHESIONS: ICD-10-CM

## 2023-04-27 DIAGNOSIS — K56.699 COLON STRICTURE: ICD-10-CM

## 2023-04-27 PROCEDURE — 0 DIATRIZOATE MEGLUMINE & SODIUM PER 1 ML: Performed by: INTERNAL MEDICINE

## 2023-04-27 PROCEDURE — 74270 X-RAY XM COLON 1CNTRST STD: CPT

## 2023-04-27 RX ADMIN — DIATRIZOATE MEGLUMINE AND DIATRIZOATE SODIUM 480 ML: 660; 100 LIQUID ORAL; RECTAL at 08:52

## 2023-04-28 ENCOUNTER — TELEPHONE (OUTPATIENT)
Dept: GASTROENTEROLOGY | Facility: CLINIC | Age: 78
End: 2023-04-28
Payer: MEDICARE

## 2023-04-28 NOTE — TELEPHONE ENCOUNTER
I spoke with Ms. Sage this afternoon.  There was no evidence for stricture or significant lesions in the colon.  The patient does have known diverticular disease.  I stated that at this time she she will not need any further studies of the colon.  She is agreeable.

## 2023-05-02 DIAGNOSIS — C50.911 BILATERAL MALIGNANT NEOPLASM OF BREAST IN FEMALE, ESTROGEN RECEPTOR POSITIVE, UNSPECIFIED SITE OF BREAST: ICD-10-CM

## 2023-05-02 DIAGNOSIS — C50.912 BILATERAL MALIGNANT NEOPLASM OF BREAST IN FEMALE, ESTROGEN RECEPTOR POSITIVE, UNSPECIFIED SITE OF BREAST: ICD-10-CM

## 2023-05-02 DIAGNOSIS — Z17.0 BILATERAL MALIGNANT NEOPLASM OF BREAST IN FEMALE, ESTROGEN RECEPTOR POSITIVE, UNSPECIFIED SITE OF BREAST: ICD-10-CM

## 2023-05-02 RX ORDER — ANASTROZOLE 1 MG/1
1 TABLET ORAL DAILY
Qty: 30 TABLET | Refills: 0 | Status: SHIPPED | OUTPATIENT
Start: 2023-05-02

## 2023-05-02 NOTE — PROGRESS NOTES
Cindy Sage  [id number]  1945      Reason for visit:  Surveillance for recurrent endometrial cancer, history of breast cancer    History of present illness:  The patient is a 78 y.o. year old female who presents today for treatment and evaluation of the above issues.    Cindy Sage is a 77 y.o. year old female who is here today for ongoing surveillance of Endometrial Cancer, recurrent, see Cancer History. 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 She is now s/p left breast lumpectomy and breast radiation. She is well recovered and pleased with her care. She started maintenance Arimidex for both endometrial cancer and breast cancer in 4/2022 and is doing well on medication thus far.     Today, she reports good energy.  She reports normal appetite, rewsolved constipation, and no nausea/vomiting.  She reports no vaginal bleeding.    Mammogram .  She notes no recent colonoscopy and is unsure when her last colonoscopy was.  Similarly, she is unsure about DEXA.  She follows with Dr. Carrillo has follow-up in June of this year.  She is accompanied by her daughter as usual.  She brought in her world famous caramel corn which is always a treat.    Oncologic History:  Oncology/Hematology History   Endometrial cancer   3/26/2016 Imaging    CT in ER for acute onset postmenopausal bleeding revealed mass in the lower uterine segment. Gyn Oncology called to evaluate.     3/29/2016 Biopsy    Endometrial biopsy in office, pathology revealed complex atypical hyperplasia with stromal breakdown.        4/1/2016 Initial Diagnosis    Endometrial cancer     4/19/2016 Surgery    RATLH, BSO, LND to several millimeters of residual disease.   Stage IIIC2     5/20/2016 - 11/15/2016 Chemotherapy    Carboplatin AUC 6, Paclitaxel 175 mg/m2 x 6 cycles in sandwich fashion with radiation in between cycles #3 and #4      - 9/2/2016 Radiation    Radiation  completed. Pelvis and periaortic nodes received a total of 45 Gy in 25 fractions during sandwich therapy. This was followed by vaginal brachytherapy: upper vagina treated utilizing cylinder and high dose rate iridium-192 to 18 Gy in 3 fractions.      11/21/2016 Imaging    Post-treatment PET/CT negative for disease     12/8/2021 Imaging    CT scan chest abdomen and pelvis: Large bowel obstruction, left chest wall mass     12/10/2021 Surgery    Exploratory laparotomy, colonic resection, side-to-side anastomosis for large bowel obstruction    Pathology showed metastatic adenocarcinoma at the colon.          1/10/2022 Molecular Testing    CARIS results:  MMR proficient/MSI stable--level 2 benefit pembrolizumab + lenvima  TMB high--level 2 benefit pembrolizumab alone  ER/AR+--level 3 benefit endocrine therapy  BRCA 1/2 negative     4/29/2022 Imaging    CT abdomen pelvis:  1. No evidence of abdominopelvic metastatic disease  2. Cholelithiasis  3. Colonic diverticulosis  4. Status post partial colon resection and hysterectomy     Bilateral malignant neoplasm of breast in female, estrogen receptor positive   4/13/2022 Initial Diagnosis    Bilateral malignant neoplasm of breast in female, estrogen receptor positive (HCC)     4/13/2022 Cancer Staged    Staging form: Breast, AJCC 8th Edition  - Pathologic: Stage IA (pT1c, pN0, cM0, G2, ER+, AR+, HER2-) - Signed by Sofia Fam MD on 4/13/2022 5/12/2022 - 6/2/2022 Radiation    Radiation OncologyTreatment Course:  Cindy Sage received 4005 cGy in 15 fractions to bilateral breasts via External Beam Radiation - EBRT.           Past Medical History:   Diagnosis Date   • Bilateral malignant neoplasm of breast in female, estrogen receptor positive 4/13/2022   • Drug therapy 2016    For endometrial CA in 2016   • Elevated cholesterol    • Endometrial cancer 04/2016    Stage IIIC2    • GERD (gastroesophageal reflux disease)    • HTN (hypertension)    • Hx of radiation  "therapy 2016    For endometrial CA 2016   • Stroke 02/2016    No residual deficits.  Takes .       Past Surgical History:   Procedure Laterality Date   • APPENDECTOMY      Ex lap   • BREAST BIOPSY Left 2016   • BREAST LUMPECTOMY Bilateral 2022    2-Left; 1-Right   • COLON SURGERY N/A 12/10/2021    Exploratory Laparotamy; Transverse Colon resect w/ re-anastimosis.   • COLOSTOMY N/A 12/10/2021    Procedure: EXPLORATORY LAPAROTOMY, TRANSVERSE COLONIC RESECTION, REANASTIMOSIS, AND MOBILIZATION OF SPLENIC FLEXURE;  Surgeon: Daja Ma MD;  Location: [facility];  Service: Gynecology Oncology;  Laterality: N/A;   • FOOT SURGERY     • OOPHORECTOMY     • TONSILLECTOMY     • TOTAL LAPAROSCOPIC HYSTERECTOMY SALPINGO OOPHORECTOMY Bilateral 04/19/2016    RATLH, BSO, LND       MEDICATIONS: The current medication list was reviewed with the patient and updated in the EMR this date per the Medical Assistant. Medication dosages and frequencies were confirmed to be accurate.      Allergies:  is allergic to strawberry extract.    Social History:   Social History     Socioeconomic History   • Marital status:    Tobacco Use   • Smoking status: Never   • Smokeless tobacco: Never   Vaping Use   • Vaping Use: Never used   Substance and Sexual Activity   • Alcohol use: Yes   • Drug use: Never   • Sexual activity: Defer     Review of Systems  Please refer to history of present illness.  Review of systems otherwise negative.    Physical Exam    Vitals:    05/03/23 1041   BP: (!) 182/73   Pulse: 55   Resp: 18   Temp: 97.1 °F (36.2 °C)   TempSrc: Temporal   Weight: 62.4 kg (137 lb 9.6 oz)   Height: 162.6 cm (64\")   PainSc: 0-No pain       Body mass index is 23.62 kg/m².  Wt Readings from Last 3 Encounters:   05/03/23 62.4 kg (137 lb 9.6 oz)   02/14/23 61.2 kg (135 lb)   02/03/23 60.7 kg (133 lb 12.8 oz)     GENERAL: Alert, well-appearing female appearing her stated age who is in no apparent distress.   HEENT: Sclera " anicteric. Head normocephalic, atraumatic. Mucus membranes moist.   NECK: Trachea midline, supple.  BREASTS: Deferred  CARDIOVASCULAR: Normal rate, regular rhythm, no murmurs, rubs, or gallops.  No peripheral edema.  RESPIRATORY: Clear to auscultation bilaterally, normal respiratory effort on room air  GASTROINTESTINAL:  Abdomen is soft, nontender, non-distended, no rebound or guarding, no masses, or hernias. No HSM.  Healed vertical midline incision.  SKIN:  Warm, dry, well-perfused.  All visible areas intact.  No rashes, lesions, ulcers.  PSYCHIATRIC: AO x3, with appropriate affect, normal thought processes.  NEUROLOGIC: No focal deficits.  Moves extremities well.  MUSCULOSKELETAL: Normal gait and station.   EXTREMITIES:   No cyanosis, clubbing, symmetric.  LYMPHATICS:  No cervical or inguinal adenopathy noted.     PELVIC exam:    External genitalia are free from lesion. On speculum examination, the vaginal cuff was intact and no lesions were appreciated.  Postradiation changes noted.  On bimanual examination, no fullness was appreciated.  Postradiation changes noted.  Uterus, cervix and adnexa were absent.  There was no significant tenderness.  Rectovaginal exam was deferred.    ECOG PS 0    PROCEDURES:  none    Diagnostic Data:    11/2/22 CT A/P IMPRESSION:  1. No acute findings are seen within the abdomen or pelvis. No hernia is  identified. No CT explanation for the patient's left lower quadrant  pain.  2. Surgical changes of the transverse colon.  3. Uncomplicated colonic diverticulosis.  4. Presumed left breast postoperative seroma appears diminished in size,  but is incompletely included in the imaging field of view.  5. Small esophageal hiatal hernia.    Lab Results   Component Value Date    WBC 4.07 02/14/2023    HGB 10.3 (L) 02/14/2023    HCT 33.0 (L) 02/14/2023    MCV 84.8 02/14/2023     02/14/2023    NEUTROABS 2.98 02/14/2023    GLUCOSE 104 (H) 02/14/2023    BUN 9 02/14/2023    CREATININE 0.96  02/14/2023    EGFRIFNONA 51 (L) 01/26/2022    EGFRIFAFRI 62 01/26/2022     02/14/2023    K 4.7 02/14/2023    CL 99 02/14/2023    CO2 28.2 02/14/2023    MG 1.9 11/11/2022    PHOS 3.8 12/15/2021    CALCIUM 9.6 02/14/2023    ALBUMIN 4.3 02/14/2023    AST 19 02/14/2023    ALT 12 02/14/2023    BILITOT 0.5 02/14/2023     Lab Results   Component Value Date     12.6 02/03/2023     15.5 10/19/2022     13.3 07/20/2022     27.4 11/17/2021     16.6 05/13/2021     14.4 11/12/2020     16.3 05/12/2020     16.4 09/26/2019     11.2 03/20/2019     11.2 09/18/2018         Assessment & Plan   This is a 78 y.o. woman with history of recurrent endometrial cancer and Stage IA breast cancer presenting for follow up.    Encounter Diagnoses   Name Primary?   • Endometrial cancer Yes   • Recurrent endometrial cancer (HCC)      Recurrent endometrial cancer in the setting of new Stage IA breast cancer, DAVION  - ER positive; Continue anastrazole for maintenance  -  ordered  - Repeat CT scan ordered.  - OTC stool softeners & laxatives for constipation.      Routine health screening  -Mammogram 2/23/23 BIRAD3  -S/P Colonoscopy 4/26/23, Barium enema negative for stricture    Pain assessment was performed today as a part of patient’s care.  For patients with pain related to surgery, gynecologic malignancy or cancer treatment, the plan is as noted in the assessment/plan.  For patients with pain not related to these issues, they are to seek any further needed care from a more appropriate provider, such as PCP.      Orders Placed This Encounter   Procedures   • CT Chest With Contrast     Standing Status:   Future     Standing Expiration Date:   5/3/2024     Order Specific Question:   Release to patient     Answer:   Routine Release   • CT Abdomen Pelvis With Contrast     Standing Status:   Future     Standing Expiration Date:   5/2/2024     Order Specific Question:   Will Oral Contrast be  needed for this procedure?     Answer:   Yes   •      Standing Status:   Future     Number of Occurrences:   1     Standing Expiration Date:   5/3/2024     Order Specific Question:   Release to patient     Answer:   Routine Release     FOLLOW UP: 3 months    I spent 20 minutes caring for Cindy on this date of service. This time includes time spent by me in the following activities: preparing for the visit, reviewing tests, performing a medically appropriate examination and/or evaluation, counseling and educating the patient/family/caregiver, ordering medications, tests, or procedures and documenting information in the medical record    Patient was seen and examined with Dr. Emerson,  resident, who performed portions of the examination and documentation for this patient's care under my direct supervision.  I agree with the above documentation and plan.    Daja Ma MD  05/03/23  14:52 EDT

## 2023-05-03 ENCOUNTER — LAB (OUTPATIENT)
Dept: LAB | Facility: HOSPITAL | Age: 78
End: 2023-05-03
Payer: MEDICARE

## 2023-05-03 ENCOUNTER — OFFICE VISIT (OUTPATIENT)
Dept: GYNECOLOGIC ONCOLOGY | Facility: CLINIC | Age: 78
End: 2023-05-03
Payer: MEDICARE

## 2023-05-03 VITALS
RESPIRATION RATE: 18 BRPM | BODY MASS INDEX: 23.49 KG/M2 | SYSTOLIC BLOOD PRESSURE: 182 MMHG | HEART RATE: 55 BPM | HEIGHT: 64 IN | DIASTOLIC BLOOD PRESSURE: 73 MMHG | WEIGHT: 137.6 LBS | TEMPERATURE: 97.1 F

## 2023-05-03 DIAGNOSIS — C54.1 ENDOMETRIAL CANCER: Primary | ICD-10-CM

## 2023-05-03 DIAGNOSIS — C80.1 RECURRENT CANCER: ICD-10-CM

## 2023-05-03 DIAGNOSIS — C54.1 ENDOMETRIAL CANCER: ICD-10-CM

## 2023-05-03 LAB — CANCER AG125 SERPL QL: 12.9 U/ML (ref 0–38.1)

## 2023-05-03 PROCEDURE — 36415 COLL VENOUS BLD VENIPUNCTURE: CPT

## 2023-05-03 PROCEDURE — 86304 IMMUNOASSAY TUMOR CA 125: CPT

## 2023-05-04 ENCOUNTER — TELEPHONE (OUTPATIENT)
Dept: GYNECOLOGIC ONCOLOGY | Facility: CLINIC | Age: 78
End: 2023-05-04
Payer: MEDICARE

## 2023-05-05 ENCOUNTER — TRANSCRIBE ORDERS (OUTPATIENT)
Dept: MAMMOGRAPHY | Facility: HOSPITAL | Age: 78
End: 2023-05-05
Payer: MEDICARE

## 2023-05-05 DIAGNOSIS — C50.411 MALIGNANT NEOPLASM OF UPPER-OUTER QUADRANT OF RIGHT FEMALE BREAST, UNSPECIFIED ESTROGEN RECEPTOR STATUS: Primary | ICD-10-CM

## 2023-05-30 DIAGNOSIS — Z17.0 BILATERAL MALIGNANT NEOPLASM OF BREAST IN FEMALE, ESTROGEN RECEPTOR POSITIVE, UNSPECIFIED SITE OF BREAST: ICD-10-CM

## 2023-05-30 DIAGNOSIS — C50.912 BILATERAL MALIGNANT NEOPLASM OF BREAST IN FEMALE, ESTROGEN RECEPTOR POSITIVE, UNSPECIFIED SITE OF BREAST: ICD-10-CM

## 2023-05-30 DIAGNOSIS — C50.911 BILATERAL MALIGNANT NEOPLASM OF BREAST IN FEMALE, ESTROGEN RECEPTOR POSITIVE, UNSPECIFIED SITE OF BREAST: ICD-10-CM

## 2023-05-30 RX ORDER — ANASTROZOLE 1 MG/1
TABLET ORAL
Refills: 0 | OUTPATIENT
Start: 2023-05-30

## 2023-06-02 ENCOUNTER — HOSPITAL ENCOUNTER (OUTPATIENT)
Dept: CT IMAGING | Facility: HOSPITAL | Age: 78
Discharge: HOME | End: 2023-06-02

## 2023-06-02 DIAGNOSIS — Z17.0 BILATERAL MALIGNANT NEOPLASM OF BREAST IN FEMALE, ESTROGEN RECEPTOR POSITIVE, UNSPECIFIED SITE OF BREAST: ICD-10-CM

## 2023-06-02 DIAGNOSIS — C54.1 ENDOMETRIAL CANCER: ICD-10-CM

## 2023-06-02 DIAGNOSIS — C80.1 RECURRENT CANCER: ICD-10-CM

## 2023-06-02 DIAGNOSIS — C50.912 BILATERAL MALIGNANT NEOPLASM OF BREAST IN FEMALE, ESTROGEN RECEPTOR POSITIVE, UNSPECIFIED SITE OF BREAST: ICD-10-CM

## 2023-06-02 DIAGNOSIS — C50.911 BILATERAL MALIGNANT NEOPLASM OF BREAST IN FEMALE, ESTROGEN RECEPTOR POSITIVE, UNSPECIFIED SITE OF BREAST: ICD-10-CM

## 2023-06-02 PROCEDURE — 71260 CT THORAX DX C+: CPT

## 2023-06-02 PROCEDURE — 25510000001 IOPAMIDOL 61 % SOLUTION: Performed by: OBSTETRICS & GYNECOLOGY

## 2023-06-02 PROCEDURE — 74177 CT ABD & PELVIS W/CONTRAST: CPT

## 2023-06-02 RX ADMIN — IOPAMIDOL 95 ML: 612 INJECTION, SOLUTION INTRAVENOUS at 16:52

## 2023-06-02 NOTE — TELEPHONE ENCOUNTER
Caller: Cindy Sage    Relationship: Self    Best call back number:  [phone]    Requested Prescriptions:   Requested Prescriptions     Pending Prescriptions Disp Refills   • anastrozole (ARIMIDEX) 1 MG tablet 30 tablet 0     Sig: Take 1 tablet by mouth Daily. MUST MAKE A FOLLOW UP APPOINTMENT FOR ANY FUTURE REFILLS [phone]        Pharmacy where request should be sent: [place] [phone] [phone] [facility]  [phone]     Last office visit with prescribing clinician: 5/3/2023   Last telemedicine visit with prescribing clinician: Visit date not found   Next office visit with prescribing clinician: 8/4/2023     Does the patient have less than a 3 day supply:  [] Yes  [x] No    Would you like a call back once the refill request has been completed: [x] Yes [] No    If the office needs to give you a call back, can they leave a voicemail: [x] Yes [] No    Leta You   06/02/23 10:23 EDT

## 2023-06-05 DIAGNOSIS — Z17.0 BILATERAL MALIGNANT NEOPLASM OF BREAST IN FEMALE, ESTROGEN RECEPTOR POSITIVE, UNSPECIFIED SITE OF BREAST: ICD-10-CM

## 2023-06-05 DIAGNOSIS — C50.912 BILATERAL MALIGNANT NEOPLASM OF BREAST IN FEMALE, ESTROGEN RECEPTOR POSITIVE, UNSPECIFIED SITE OF BREAST: ICD-10-CM

## 2023-06-05 DIAGNOSIS — C50.911 BILATERAL MALIGNANT NEOPLASM OF BREAST IN FEMALE, ESTROGEN RECEPTOR POSITIVE, UNSPECIFIED SITE OF BREAST: ICD-10-CM

## 2023-06-05 RX ORDER — ANASTROZOLE 1 MG/1
1 TABLET ORAL DAILY
Qty: 30 TABLET | Refills: 0 | OUTPATIENT
Start: 2023-06-05

## 2023-06-06 ENCOUNTER — TELEPHONE (OUTPATIENT)
Dept: GYNECOLOGIC ONCOLOGY | Facility: CLINIC | Age: 78
End: 2023-06-06
Payer: MEDICARE

## 2023-06-06 RX ORDER — ANASTROZOLE 1 MG/1
1 TABLET ORAL DAILY
Qty: 30 TABLET | Refills: 11 | Status: SHIPPED | OUTPATIENT
Start: 2023-06-06

## 2023-06-06 NOTE — TELEPHONE ENCOUNTER
I called patient regarding CT scan.  Left message.  She has a diagnostic mammogram scheduled for November.  Looks like a seroma of the left breast has some residual changes on imaging, no evidence of recurrent/metastatic cancer.  Electronically signed by Daja Ma MD, 06/06/23, 10:17 AM EDT.

## 2023-07-27 DIAGNOSIS — I66.01 STENOSIS OF RIGHT MIDDLE CEREBRAL ARTERY: ICD-10-CM

## 2023-07-27 DIAGNOSIS — G45.9 TIA (TRANSIENT ISCHEMIC ATTACK): ICD-10-CM

## 2023-07-28 RX ORDER — SIMVASTATIN 40 MG
TABLET ORAL
Qty: 90 TABLET | Refills: 1 | Status: SHIPPED | OUTPATIENT
Start: 2023-07-28

## 2023-08-04 ENCOUNTER — LAB (OUTPATIENT)
Dept: LAB | Facility: HOSPITAL | Age: 78
End: 2023-08-04
Payer: MEDICARE

## 2023-08-04 ENCOUNTER — OFFICE VISIT (OUTPATIENT)
Dept: GYNECOLOGIC ONCOLOGY | Facility: CLINIC | Age: 78
End: 2023-08-04
Payer: MEDICARE

## 2023-08-04 VITALS
BODY MASS INDEX: 22.67 KG/M2 | OXYGEN SATURATION: 98 % | TEMPERATURE: 97.3 F | HEIGHT: 64 IN | WEIGHT: 132.8 LBS | SYSTOLIC BLOOD PRESSURE: 150 MMHG | HEART RATE: 72 BPM | DIASTOLIC BLOOD PRESSURE: 65 MMHG | RESPIRATION RATE: 18 BRPM

## 2023-08-04 DIAGNOSIS — C80.1 RECURRENT CANCER: ICD-10-CM

## 2023-08-04 DIAGNOSIS — C54.1 ENDOMETRIAL CANCER: Primary | ICD-10-CM

## 2023-08-04 DIAGNOSIS — C54.1 ENDOMETRIAL CANCER: ICD-10-CM

## 2023-08-04 LAB — CANCER AG125 SERPL QL: 12.8 U/ML (ref 0–38.1)

## 2023-08-04 PROCEDURE — 1126F AMNT PAIN NOTED NONE PRSNT: CPT | Performed by: OBSTETRICS & GYNECOLOGY

## 2023-08-04 PROCEDURE — 3078F DIAST BP <80 MM HG: CPT | Performed by: OBSTETRICS & GYNECOLOGY

## 2023-08-04 PROCEDURE — 3077F SYST BP >= 140 MM HG: CPT | Performed by: OBSTETRICS & GYNECOLOGY

## 2023-08-04 PROCEDURE — 36415 COLL VENOUS BLD VENIPUNCTURE: CPT

## 2023-08-04 PROCEDURE — 1159F MED LIST DOCD IN RCRD: CPT | Performed by: OBSTETRICS & GYNECOLOGY

## 2023-08-04 PROCEDURE — 1160F RVW MEDS BY RX/DR IN RCRD: CPT | Performed by: OBSTETRICS & GYNECOLOGY

## 2023-08-04 PROCEDURE — 99213 OFFICE O/P EST LOW 20 MIN: CPT | Performed by: OBSTETRICS & GYNECOLOGY

## 2023-08-04 PROCEDURE — 86304 IMMUNOASSAY TUMOR CA 125: CPT

## 2023-08-07 ENCOUNTER — TELEPHONE (OUTPATIENT)
Dept: GYNECOLOGIC ONCOLOGY | Facility: CLINIC | Age: 78
End: 2023-08-07
Payer: MEDICARE

## 2023-08-07 NOTE — TELEPHONE ENCOUNTER
----- Message from Daja Ma MD sent at 8/6/2023 10:15 AM EDT -----  Please notify patient of normal ca 125- thanks!    ----- Message -----  From: Lab, Background User  Sent: 8/4/2023   7:16 PM EDT  To: Daja Ma MD

## 2023-08-07 NOTE — TELEPHONE ENCOUNTER
Patient notified of Providers comments for Lab results with verbalized understanding and thank you.

## 2023-08-16 ENCOUNTER — OFFICE VISIT (OUTPATIENT)
Dept: INTERNAL MEDICINE | Facility: CLINIC | Age: 78
End: 2023-08-16
Payer: MEDICARE

## 2023-08-16 VITALS
BODY MASS INDEX: 22.71 KG/M2 | DIASTOLIC BLOOD PRESSURE: 72 MMHG | WEIGHT: 133 LBS | HEIGHT: 64 IN | RESPIRATION RATE: 16 BRPM | HEART RATE: 64 BPM | SYSTOLIC BLOOD PRESSURE: 158 MMHG | TEMPERATURE: 97.8 F

## 2023-08-16 DIAGNOSIS — E78.5 HYPERLIPIDEMIA, UNSPECIFIED HYPERLIPIDEMIA TYPE: ICD-10-CM

## 2023-08-16 DIAGNOSIS — Z00.00 PHYSICAL EXAM: ICD-10-CM

## 2023-08-16 DIAGNOSIS — Z00.00 MEDICARE ANNUAL WELLNESS VISIT, SUBSEQUENT: Primary | ICD-10-CM

## 2023-08-16 DIAGNOSIS — R31.9 HEMATURIA, UNSPECIFIED TYPE: ICD-10-CM

## 2023-08-16 DIAGNOSIS — R82.998 LEUKOCYTES IN URINE: ICD-10-CM

## 2023-08-16 DIAGNOSIS — K21.9 GASTROESOPHAGEAL REFLUX DISEASE WITHOUT ESOPHAGITIS: ICD-10-CM

## 2023-08-16 DIAGNOSIS — I10 ESSENTIAL HYPERTENSION: ICD-10-CM

## 2023-08-16 LAB
BILIRUB BLD-MCNC: NEGATIVE MG/DL
CLARITY, POC: ABNORMAL
COLOR UR: ABNORMAL
EXPIRATION DATE: ABNORMAL
GLUCOSE UR STRIP-MCNC: NEGATIVE MG/DL
KETONES UR QL: NEGATIVE
LEUKOCYTE EST, POC: ABNORMAL
Lab: ABNORMAL
NITRITE UR-MCNC: NEGATIVE MG/ML
PH UR: 8 [PH] (ref 5–8)
PROT UR STRIP-MCNC: NEGATIVE MG/DL
RBC # UR STRIP: ABNORMAL /UL
SP GR UR: 1.01 (ref 1–1.03)
UROBILINOGEN UR QL: NORMAL

## 2023-08-16 RX ORDER — OMEPRAZOLE 20 MG/1
20 CAPSULE, DELAYED RELEASE ORAL DAILY
COMMUNITY

## 2023-08-16 RX ORDER — LOSARTAN POTASSIUM 25 MG/1
25 TABLET ORAL DAILY
Qty: 30 TABLET | Refills: 5 | Status: SHIPPED | OUTPATIENT
Start: 2023-08-16

## 2023-08-16 NOTE — PATIENT INSTRUCTIONS
Medicare Wellness  Personal Prevention Plan of Service     Date of Office Visit:    Encounter Provider:  Jose Carrillo MD  Place of Service:  [facility]  Patient Name: Cindy Sage  :  1945    As part of the Medicare Wellness portion of your visit today, we are providing you with this personalized preventive plan of services (PPPS). This plan is based upon recommendations of the [place] Preventive Services Task Force (USPSTF) and the Advisory Committee on Immunization Practices (ACIP).    This lists the preventive care services that should be considered, and provides dates of when you are due. Items listed as completed are up-to-date and do not require any further intervention.    Health Maintenance   Topic Date Due    TDAP/TD VACCINES (1 - Tdap) Never done    ZOSTER VACCINE (1 of 2) Never done    ANNUAL WELLNESS VISIT  2020    COVID-19 Vaccine (5 - Pfizer risk series) 2022    DXA SCAN  2022    INFLUENZA VACCINE  10/01/2023    LIPID PANEL  2024    MAMMOGRAM  2024    COLORECTAL CANCER SCREENING  2033    HEPATITIS C SCREENING  Completed    Pneumococcal Vaccine 65+  Completed       Orders Placed This Encounter   Procedures    Comprehensive Metabolic Panel     Standing Status:   Future     Standing Expiration Date:   2024     Order Specific Question:   Release to patient     Answer:   Routine Release [[id number]]    Lipid Panel     Standing Status:   Future     Standing Expiration Date:   2024     Order Specific Question:   Release to patient     Answer:   Routine Release [[id number]]    TSH     Standing Status:   Future     Standing Expiration Date:   2024     Order Specific Question:   Release to patient     Answer:   Routine Release [[id number]]    POC Urinalysis Dipstick, Automated     Standing Status:   Future     Order Specific Question:   Release to patient     Answer:   Routine  Release [[id number]]    CBC & Differential     Standing Status:   Future     Standing Expiration Date:   8/16/2024     Order Specific Question:   Manual Differential     Answer:   No     Order Specific Question:   Release to patient     Answer:   Routine Release [[id number]]       Return in about 6 weeks (around 9/27/2023) for Blood Pressure Check.          Advance Care Planning and Advance Directives     You make decisions on a daily basis - decisions about where you want to live, your career, your home, your life. Perhaps one of the most important decisions you face is your choice for future medical care. Take time to talk with your family and your healthcare team and start planning today.  Advance Care Planning is a process that can help you:  Understand possible future healthcare decisions in light of your own experiences  Reflect on those decision in light of your goals and values  Discuss your decisions with those closest to you and the healthcare professionals that care for you  Make a plan by creating a document that reflects your wishes    Surrogate Decision Maker  In the event of a medical emergency, which has left you unable to communicate or to make your own decisions, you would need someone to make decisions for you.  It is important to discuss your preferences for medical treatment with this person while you are in good health.     Qualities of a surrogate decision maker:  Willing to take on this role and responsibility  Knows what you want for future medical care  Willing to follow your wishes even if they don't agree with them  Able to make difficult medical decisions under stressful circumstances    Advance Directives  These are legal documents you can create that will guide your healthcare team and decision maker(s) when needed. These documents can be stored in the electronic medical record.    Living Will - a legal document to guide your care if you have a terminal condition or a serious illness  and are unable to communicate. States vary by statute in document names/types, but most forms may include one or more of the following:        -  Directions regarding life-prolonging treatments        -  Directions regarding artificially provided nutrition/hydration        -  Choosing a healthcare decision maker        -  Direction regarding organ/tissue donation    Durable Power of  for Healthcare - this document names an -in-fact to make medical decisions for you, but it may also allow this person to make personal and financial decisions for you. Please seek the advice of an  if you need this type of document.    **Advance Directives are not required and no one may discriminate against you if you do not sign one.    Medical Orders  Many states allow specific forms/orders signed by your physician to record your wishes for medical treatment in your current state of health. This form, signed in personal communication with your physician, addresses resuscitation and other medical interventions that you may or may not want.      For more information or to schedule a time with a [facility] Advance Care Planning Facilitator contact: [email or web address] or call [phone] and someone will contact you directly.

## 2023-08-16 NOTE — PROGRESS NOTES
Medications      Current Outpatient Medications:     acetaminophen (TYLENOL) 325 MG tablet, Take 2 tablets by mouth Every 6 (Six) Hours As Needed for Mild Pain., Disp: , Rfl:     anastrozole (ARIMIDEX) 1 MG tablet, Take 1 tablet by mouth Daily., Disp: 30 tablet, Rfl: 11    aspirin 81 MG EC tablet, Take 1 tablet by mouth Daily., Disp: , Rfl:     Cholecalciferol (VITAMIN D3 GUMMIES ADULT PO), Take  by mouth Daily., Disp: , Rfl:     clopidogrel (PLAVIX) 75 MG tablet, Take 1 tablet by mouth once daily, Disp: 90 tablet, Rfl: 1    hydroCHLOROthiazide (HYDRODIURIL) 25 MG tablet, Take 1 tablet by mouth once daily, Disp: 90 tablet, Rfl: 0    omeprazole (priLOSEC) 20 MG capsule, Take 1 capsule by mouth Daily., Disp: , Rfl:     potassium chloride (KAYCIEL) 20 mEq/15 mL solution, TAKE 15 ML BY MOUTH ONCE DAILY, Disp: 450 mL, Rfl: 5    simvastatin (ZOCOR) 40 MG tablet, Take 1 tablet by mouth once daily, Disp: 90 tablet, Rfl: 1    vitamin B-12 (CYANOCOBALAMIN) 500 MCG tablet, Take 1 tablet by mouth Daily., Disp: 90 tablet, Rfl: 1     Allergies    Allergies   Allergen Reactions    Strawberry Extract Swelling       Problem List    Patient Active Problem List   Diagnosis    Hyperlipidemia    Hypertension    Ischemic stroke    Endometrial cancer    GERD (gastroesophageal reflux disease)    Hx: UTI (urinary tract infection)    History of endometrial cancer    TIA (transient ischemic attack)    Stenosis of right middle cerebral artery    Musculoskeletal neck pain    Large bowel obstruction s/p colonic resection by Dr. Ma 12/10/21    Recurrent cancer    Bilateral malignant neoplasm of breast in female, estrogen receptor positive    Slow transit constipation    Visual field defect    Hypokalemia    Anemia    Ocular migraine    Vitamin B12 deficiency    Ptosis of left eyelid    History of stroke       Medications, Allergies, Problems List and Past History were reviewed and updated.    Physical Examination    /72 (BP  "Location: Left arm, Patient Position: Sitting, Cuff Size: Adult)   Pulse 64   Temp 97.8 øF (36.6 øC) (Infrared)   Resp 16   Ht 162.6 cm (64\")   Wt 60.3 kg (133 lb)   LMP  (LMP Unknown)   BMI 22.83 kg/mý     "

## 2023-08-16 NOTE — PROGRESS NOTES
The ABCs of the Annual Wellness Visit  Subsequent Medicare Wellness Visit    Subjective      Cindy Sage is a 78 y.o. female who presents for a Subsequent Medicare Wellness Visit.    The following portions of the patient's history were reviewed and   updated as appropriate: allergies, current medications, past family history, past medical history, past social history, past surgical history, and problem list.    Compared to one year ago, the patient feels her physical   health is the same.    Compared to one year ago, the patient feels her mental   health is the same.    Recent Hospitalizations:  She was not admitted to the hospital during the last year.       Current Medical Providers:  Patient Care Team:  Jose Carrillo MD as PCP - General (Internal Medicine)  Jose Carrillo MD Hayne, Marta S, MD as Consulting Physician (Radiation Oncology)  Zayra Georges APRN as Nurse Practitioner (Nurse Practitioner)  Abigail Muse MD as Referring Physician (General Surgery)  Sofia Fam MD as Consulting Physician (Hematology and Oncology)  Daja Ma MD as Consulting Physician (Gynecology)  Lito Mackenzie MD (Podiatry)  Andra Pat APRN as Nurse Practitioner (Nurse Practitioner)    Outpatient Medications Prior to Visit   Medication Sig Dispense Refill    acetaminophen (TYLENOL) 325 MG tablet Take 2 tablets by mouth Every 6 (Six) Hours As Needed for Mild Pain.      anastrozole (ARIMIDEX) 1 MG tablet Take 1 tablet by mouth Daily. 30 tablet 11    aspirin 81 MG EC tablet Take 1 tablet by mouth Daily.      Cholecalciferol (VITAMIN D3 GUMMIES ADULT PO) Take  by mouth Daily.      clopidogrel (PLAVIX) 75 MG tablet Take 1 tablet by mouth once daily 90 tablet 1    hydroCHLOROthiazide (HYDRODIURIL) 25 MG tablet Take 1 tablet by mouth once daily 90 tablet 0    omeprazole (priLOSEC) 20 MG capsule Take 1 capsule by mouth Daily.      potassium chloride (KAYCIEL) 20 mEq/15 mL  solution TAKE 15 ML BY MOUTH ONCE DAILY 450 mL 5    simvastatin (ZOCOR) 40 MG tablet Take 1 tablet by mouth once daily 90 tablet 1    vitamin B-12 (CYANOCOBALAMIN) 500 MCG tablet Take 1 tablet by mouth Daily. 90 tablet 1    sodium-potassium-magnesium sulfates (Suprep Bowel Prep Kit) 17.5-3.13-1.6 GM/177ML solution oral solution Take 1 bottle by mouth Take As Directed. Follow instructions that were mailed to your home. If you didn't receive these call [phone]. 354 mL 0     No facility-administered medications prior to visit.       No opioid medication identified on active medication list. I have reviewed chart for other potential  high risk medication/s and harmful drug interactions in the elderly.        Aspirin is on active medication list. Aspirin use is indicated based on review of current medical condition/s. Pros and cons of this therapy have been discussed today. Benefits of this medication outweigh potential harm.  Patient has been encouraged to continue taking this medication.  .      Patient Active Problem List   Diagnosis    Hyperlipidemia    Hypertension    Ischemic stroke    Endometrial cancer    GERD (gastroesophageal reflux disease)    Hx: UTI (urinary tract infection)    History of endometrial cancer    TIA (transient ischemic attack)    Stenosis of right middle cerebral artery    Musculoskeletal neck pain    Large bowel obstruction s/p colonic resection by Dr. Ma 12/10/21    Recurrent cancer    Bilateral malignant neoplasm of breast in female, estrogen receptor positive    Slow transit constipation    Visual field defect    Hypokalemia    Anemia    Ocular migraine    Vitamin B12 deficiency    Ptosis of left eyelid    History of stroke     Advance Care Planning   Advance Care Planning     Advance Directive is not on file.  ACP discussion was held with the patient during this visit. Patient has an advance directive (not in EMR), copy requested.     Objective    Vitals:    08/16/23 1009  "  BP: 158/72   BP Location: Left arm   Patient Position: Sitting   Cuff Size: Adult   Pulse: 64   Resp: 16   Temp: 97.8 øF (36.6 øC)   TempSrc: Infrared   Weight: 60.3 kg (133 lb)   Height: 162.6 cm (64\")   PainSc: 0-No pain     Estimated body mass index is 22.83 kg/mý as calculated from the following:    Height as of this encounter: 162.6 cm (64\").    Weight as of this encounter: 60.3 kg (133 lb).    BMI is within normal parameters. No other follow-up for BMI required.    Finger Rub Hearing{Test (right ear):failed  Finger Rub Hearing{Test (left ear):passed    Does the patient have evidence of cognitive impairment?   No            HEALTH RISK ASSESSMENT    Smoking Status:  Social History     Tobacco Use   Smoking Status Never   Smokeless Tobacco Never     Alcohol Consumption:  Social History     Substance and Sexual Activity   Alcohol Use Yes     Fall Risk Screen:    STEADI Fall Risk Assessment was completed, and patient is at LOW risk for falls.Assessment completed on:2023    Depression Screenin/16/2023    10:15 AM   PHQ-2/PHQ-9 Depression Screening   Little Interest or Pleasure in Doing Things 0-->not at all   Feeling Down, Depressed or Hopeless 0-->not at all   PHQ-9: Brief Depression Severity Measure Score 0       Health Habits and Functional and Cognitive Screenin/16/2023    10:14 AM   Functional & Cognitive Status   Do you have difficulty preparing food and eating? No   Do you have difficulty bathing yourself, getting dressed or grooming yourself? No   Do you have difficulty using the toilet? No   Do you have difficulty moving around from place to place? Yes   Do you have trouble with steps or getting out of a bed or a chair? No   Current Diet Well Balanced Diet   Dental Exam Not up to date   Eye Exam Not up to date   Exercise (times per week) 0 times per week   Current Exercises Include No Regular Exercise   Do you need help using the phone?  No   Are you deaf or do you have serious " difficulty hearing?  No   Do you need help to go to places out of walking distance? Yes   Do you need help shopping? No   Do you need help preparing meals?  No   Do you need help with housework?  No   Do you need help with laundry? No   Do you need help taking your medications? No   Do you need help managing money? No   Do you ever drive or ride in a car without wearing a seat belt? No   Have you felt unusual stress, anger or loneliness in the last month? No   Who do you live with? Spouse   If you need help, do you have trouble finding someone available to you? No   Have you been bothered in the last four weeks by sexual problems? No   Do you have difficulty concentrating, remembering or making decisions? No       Age-appropriate Screening Schedule:  Refer to the list below for future screening recommendations based on patient's age, sex and/or medical conditions. Orders for these recommended tests are listed in the plan section. The patient has been provided with a written plan.    Health Maintenance   Topic Date Due    TDAP/TD VACCINES (1 - Tdap) Never done    ZOSTER VACCINE (1 of 2) Never done    ANNUAL WELLNESS VISIT  06/13/2020    COVID-19 Vaccine (5 - Pfizer risk series) 11/08/2022    DXA SCAN  12/28/2022    INFLUENZA VACCINE  10/01/2023    LIPID PANEL  02/14/2024    MAMMOGRAM  02/23/2024    COLORECTAL CANCER SCREENING  04/26/2033    HEPATITIS C SCREENING  Completed    Pneumococcal Vaccine 65+  Completed                  CMS Preventative Services Quick Reference  Risk Factors Identified During Encounter:    Immunizations Discussed/Encouraged: Hepatitis A Vaccine/Series, Shingrix, and COVID19    The above risks/problems have been discussed with the patient.  Pertinent information has been shared with the patient in the After Visit Summary.    There are no diagnoses linked to this encounter.    Follow Up:   Next Medicare Wellness visit to be scheduled in 1 year.      An After Visit Summary and PPPS were made  available to the patient.

## 2023-08-17 LAB
ALBUMIN SERPL-MCNC: 4.6 G/DL (ref 3.5–5.2)
ALBUMIN/GLOB SERPL: 2.1 G/DL
ALP SERPL-CCNC: 111 U/L (ref 39–117)
ALT SERPL-CCNC: 16 U/L (ref 1–33)
AST SERPL-CCNC: 18 U/L (ref 1–32)
BASOPHILS # BLD AUTO: 0.02 10*3/MM3 (ref 0–0.2)
BASOPHILS NFR BLD AUTO: 0.8 % (ref 0–1.5)
BILIRUB SERPL-MCNC: 0.8 MG/DL (ref 0–1.2)
BUN SERPL-MCNC: 8 MG/DL (ref 8–23)
BUN/CREAT SERPL: 8.2 (ref 7–25)
CALCIUM SERPL-MCNC: 10 MG/DL (ref 8.6–10.5)
CHLORIDE SERPL-SCNC: 96 MMOL/L (ref 98–107)
CHOLEST SERPL-MCNC: 193 MG/DL (ref 0–200)
CO2 SERPL-SCNC: 25.4 MMOL/L (ref 22–29)
CREAT SERPL-MCNC: 0.97 MG/DL (ref 0.57–1)
EGFRCR SERPLBLD CKD-EPI 2021: 59.6 ML/MIN/1.73
EOSINOPHIL # BLD AUTO: 0.03 10*3/MM3 (ref 0–0.4)
EOSINOPHIL NFR BLD AUTO: 1.2 % (ref 0.3–6.2)
ERYTHROCYTE [DISTWIDTH] IN BLOOD BY AUTOMATED COUNT: 15.7 % (ref 12.3–15.4)
GLOBULIN SER CALC-MCNC: 2.2 GM/DL
GLUCOSE SERPL-MCNC: 116 MG/DL (ref 65–99)
HCT VFR BLD AUTO: 34.3 % (ref 34–46.6)
HDLC SERPL-MCNC: 104 MG/DL (ref 40–60)
HGB BLD-MCNC: 11.5 G/DL (ref 12–15.9)
IMM GRANULOCYTES # BLD AUTO: 0.01 10*3/MM3 (ref 0–0.05)
IMM GRANULOCYTES NFR BLD AUTO: 0.4 % (ref 0–0.5)
LDLC SERPL CALC-MCNC: 60 MG/DL (ref 0–100)
LYMPHOCYTES # BLD AUTO: 0.41 10*3/MM3 (ref 0.7–3.1)
LYMPHOCYTES NFR BLD AUTO: 15.9 % (ref 19.6–45.3)
MCH RBC QN AUTO: 29.3 PG (ref 26.6–33)
MCHC RBC AUTO-ENTMCNC: 33.5 G/DL (ref 31.5–35.7)
MCV RBC AUTO: 87.5 FL (ref 79–97)
MONOCYTES # BLD AUTO: 0.36 10*3/MM3 (ref 0.1–0.9)
MONOCYTES NFR BLD AUTO: 14 % (ref 5–12)
NEUTROPHILS # BLD AUTO: 1.75 10*3/MM3 (ref 1.7–7)
NEUTROPHILS NFR BLD AUTO: 67.7 % (ref 42.7–76)
NRBC BLD AUTO-RTO: 0.4 /100 WBC (ref 0–0.2)
PLATELET # BLD AUTO: 186 10*3/MM3 (ref 140–450)
POTASSIUM SERPL-SCNC: 4 MMOL/L (ref 3.5–5.2)
PROT SERPL-MCNC: 6.8 G/DL (ref 6–8.5)
RBC # BLD AUTO: 3.92 10*6/MM3 (ref 3.77–5.28)
SODIUM SERPL-SCNC: 134 MMOL/L (ref 136–145)
TRIGL SERPL-MCNC: 186 MG/DL (ref 0–150)
TSH SERPL DL<=0.005 MIU/L-ACNC: 1.6 UIU/ML (ref 0.27–4.2)
VLDLC SERPL CALC-MCNC: 29 MG/DL (ref 5–40)
WBC # BLD AUTO: 2.58 10*3/MM3 (ref 3.4–10.8)

## 2023-08-21 LAB
BACTERIA #/AREA URNS HPF: NORMAL /HPF
BACTERIA UR CULT: NORMAL
BACTERIA UR CULT: NORMAL
CASTS URNS MICRO: NORMAL
CRYSTALS URNS MICRO: NORMAL
EPI CELLS #/AREA URNS HPF: NORMAL /HPF
RBC #/AREA URNS HPF: NORMAL /HPF
WBC #/AREA URNS HPF: NORMAL /HPF

## 2023-08-28 DIAGNOSIS — E53.8 VITAMIN B12 DEFICIENCY: ICD-10-CM

## 2023-08-28 RX ORDER — CHOLECALCIFEROL (VITAMIN D3) 125 MCG
CAPSULE ORAL
Qty: 90 TABLET | Refills: 1 | Status: SHIPPED | OUTPATIENT
Start: 2023-08-28

## 2023-08-31 RX ORDER — POTASSIUM CHLORIDE 20MEQ/15ML
LIQUID (ML) ORAL
Qty: 450 ML | Refills: 5 | Status: SHIPPED | OUTPATIENT
Start: 2023-08-31

## 2023-08-31 NOTE — TELEPHONE ENCOUNTER
Caller: Cindy Sage    Relationship: Self    Best call back number:  [facility]     Requested Prescriptions:   Requested Prescriptions     Pending Prescriptions Disp Refills    potassium chloride (KAYCIEL) 20 mEq/15 mL solution 450 mL 5        Pharmacy where request should be sent: [facility] [address] [place] [address] [phone] [place] [phone] FX     Last office visit with prescribing clinician: 8/16/2023   Last telemedicine visit with prescribing clinician: Visit date not found   Next office visit with prescribing clinician: 9/28/2023     Additional details provided by patient:     Does the patient have less than a 3 day supply:  [x] Yes  [] No    Would you like a call back once the refill request has been completed: [] Yes [] No    If the office needs to give you a call back, can they leave a voicemail: [] Yes [] No    Kaleigh Ball   08/31/23 09:59 EDT

## 2023-09-21 RX ORDER — HYDROCHLOROTHIAZIDE 25 MG/1
TABLET ORAL
Qty: 90 TABLET | Refills: 1 | Status: SHIPPED | OUTPATIENT
Start: 2023-09-21

## 2023-10-10 DIAGNOSIS — G45.9 TIA (TRANSIENT ISCHEMIC ATTACK): ICD-10-CM

## 2023-10-10 DIAGNOSIS — I66.01 STENOSIS OF RIGHT MIDDLE CEREBRAL ARTERY: ICD-10-CM

## 2023-10-10 RX ORDER — CLOPIDOGREL BISULFATE 75 MG/1
TABLET ORAL
Qty: 90 TABLET | Refills: 0 | Status: SHIPPED | OUTPATIENT
Start: 2023-10-10

## 2023-10-26 NOTE — TELEPHONE ENCOUNTER
VINCENT for Ronald at [facility] that I was returning his call and to please call back.  Ofc. # given.      Just need to inform that Dr Sparks has placed the order.    Statement Selected

## 2024-01-03 ENCOUNTER — OFFICE VISIT (OUTPATIENT)
Dept: GYNECOLOGIC ONCOLOGY | Facility: CLINIC | Age: 79
End: 2024-01-03
Payer: MEDICARE

## 2024-01-03 VITALS
HEART RATE: 79 BPM | OXYGEN SATURATION: 100 % | DIASTOLIC BLOOD PRESSURE: 63 MMHG | SYSTOLIC BLOOD PRESSURE: 144 MMHG | HEIGHT: 64 IN | BODY MASS INDEX: 23.31 KG/M2 | TEMPERATURE: 97.6 F | RESPIRATION RATE: 18 BRPM | WEIGHT: 136.5 LBS

## 2024-01-03 DIAGNOSIS — Z79.890 H/O ONGOING TREATMENT WITH HORMONAL THERAPY: ICD-10-CM

## 2024-01-03 DIAGNOSIS — Z09 ENCOUNTER FOR FOLLOW-UP EXAMINATION AFTER COMPLETED TREATMENT FOR CONDITIONS OTHER THAN MALIGNANT NEOPLASM: ICD-10-CM

## 2024-01-03 DIAGNOSIS — C54.1 ENDOMETRIAL CANCER: Primary | ICD-10-CM

## 2024-01-03 PROCEDURE — 1126F AMNT PAIN NOTED NONE PRSNT: CPT | Performed by: OBSTETRICS & GYNECOLOGY

## 2024-01-03 PROCEDURE — 3078F DIAST BP <80 MM HG: CPT | Performed by: OBSTETRICS & GYNECOLOGY

## 2024-01-03 PROCEDURE — 99214 OFFICE O/P EST MOD 30 MIN: CPT | Performed by: OBSTETRICS & GYNECOLOGY

## 2024-01-03 PROCEDURE — 1159F MED LIST DOCD IN RCRD: CPT | Performed by: OBSTETRICS & GYNECOLOGY

## 2024-01-03 PROCEDURE — 1160F RVW MEDS BY RX/DR IN RCRD: CPT | Performed by: OBSTETRICS & GYNECOLOGY

## 2024-01-03 PROCEDURE — 3077F SYST BP >= 140 MM HG: CPT | Performed by: OBSTETRICS & GYNECOLOGY

## 2024-01-03 NOTE — PROGRESS NOTES
Cindy Sage  [id number]  1945      Reason for visit:  Surveillance for recurrent endometrial cancer, history of breast cancer    History of present illness:      Cindy Sage is a 78 y.o. year old female who is here today for ongoing surveillance of Endometrial Cancer, recurrent, see Cancer History. 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 She is now s/p left breast lumpectomy and breast radiation. She is well recovered and pleased with her care. She started maintenance Arimidex for both endometrial cancer and breast cancer in 4/2022 and is doing well on medication thus far. CT 6/2/23 showed seroma of the left breast with some residual changes on imaging, but no evidence of recurrent/metastatic cancer. Had to cancel mammogram scheduled in November 2023, rescheduled for February 2024.    Today, she reports good energy.  She reports normal appetite, resolved constipation, and no nausea/vomiting.  She uses a walker for stability while she is out of the home.  She is accompanied by her daughter as usual.      Colonoscopy 4/26/2023  Mammogram due 2/1/2024  DEXA scan is due    Oncologic History:  Oncology/Hematology History   Endometrial cancer   3/26/2016 Imaging    CT in ER for acute onset postmenopausal bleeding revealed mass in the lower uterine segment. Gyn Oncology called to evaluate.     3/29/2016 Biopsy    Endometrial biopsy in office, pathology revealed complex atypical hyperplasia with stromal breakdown.        4/1/2016 Initial Diagnosis    Endometrial cancer     4/19/2016 Surgery    RATLH, BSO, LND to several millimeters of residual disease.   Stage IIIC2     5/20/2016 - 11/15/2016 Chemotherapy    Carboplatin AUC 6, Paclitaxel 175 mg/m2 x 6 cycles in sandwich fashion with radiation in between cycles #3 and #4      - 9/2/2016 Radiation    Radiation completed. Pelvis and periaortic nodes received a total of 45 Gy in 25  fractions during sandwich therapy. This was followed by vaginal brachytherapy: upper vagina treated utilizing cylinder and high dose rate iridium-192 to 18 Gy in 3 fractions.      11/21/2016 Imaging    Post-treatment PET/CT negative for disease     12/8/2021 Imaging    CT scan chest abdomen and pelvis: Large bowel obstruction, left chest wall mass     12/10/2021 Surgery    Exploratory laparotomy, colonic resection, side-to-side anastomosis for large bowel obstruction    Pathology showed metastatic adenocarcinoma at the colon.          1/10/2022 Molecular Testing    CARIS results:  MMR proficient/MSI stable--level 2 benefit pembrolizumab + lenvima  TMB high--level 2 benefit pembrolizumab alone  ER/NJ+--level 3 benefit endocrine therapy  BRCA 1/2 negative     4/29/2022 Imaging    CT abdomen pelvis:  1. No evidence of abdominopelvic metastatic disease  2. Cholelithiasis  3. Colonic diverticulosis  4. Status post partial colon resection and hysterectomy     Bilateral malignant neoplasm of breast in female, estrogen receptor positive   4/13/2022 Initial Diagnosis    Bilateral malignant neoplasm of breast in female, estrogen receptor positive (HCC)     4/13/2022 Cancer Staged    Staging form: Breast, AJCC 8th Edition  - Pathologic: Stage IA (pT1c, pN0, cM0, G2, ER+, NJ+, HER2-) - Signed by Sofia Fam MD on 4/13/2022 5/12/2022 - 6/2/2022 Radiation    Radiation OncologyTreatment Course:  Cindy Sage received 4005 cGy in 15 fractions to bilateral breasts via External Beam Radiation - EBRT.           Past Medical History:   Diagnosis Date    Bilateral malignant neoplasm of breast in female, estrogen receptor positive 4/13/2022    Drug therapy 2016    For endometrial CA in 2016    Elevated cholesterol     Endometrial cancer 04/2016    Stage IIIC2     GERD (gastroesophageal reflux disease)     HTN (hypertension)     Hx of radiation therapy 2016    For endometrial CA 2016    Stroke 02/2016    No residual  "deficits.  Takes .       Past Surgical History:   Procedure Laterality Date    APPENDECTOMY      Ex lap    BREAST BIOPSY Left 2016    BREAST LUMPECTOMY Bilateral 2022    2-Left; 1-Right    COLON SURGERY N/A 12/10/2021    Exploratory Laparotamy; Transverse Colon resect w/ re-anastimosis.    COLOSTOMY N/A 12/10/2021    Procedure: EXPLORATORY LAPAROTOMY, TRANSVERSE COLONIC RESECTION, REANASTIMOSIS, AND MOBILIZATION OF SPLENIC FLEXURE;  Surgeon: Daja Ma MD;  Location: [facility];  Service: Gynecology Oncology;  Laterality: N/A;    FOOT SURGERY      OOPHORECTOMY      TONSILLECTOMY      TOTAL LAPAROSCOPIC HYSTERECTOMY SALPINGO OOPHORECTOMY Bilateral 04/19/2016    RATLH, BSO, LND       MEDICATIONS: The current medication list was reviewed with the patient and updated in the EMR this date per the Medical Assistant. Medication dosages and frequencies were confirmed to be accurate.      Allergies:  is allergic to strawberry extract.    Social History:   Social History     Socioeconomic History    Marital status:    Tobacco Use    Smoking status: Never    Smokeless tobacco: Never   Vaping Use    Vaping Use: Never used   Substance and Sexual Activity    Alcohol use: Yes    Drug use: Never    Sexual activity: Defer     Review of Systems  Please refer to history of present illness.  Review of systems otherwise negative.    Physical Exam    Vitals:    01/03/24 1030   BP: 144/63   Pulse: 79   Resp: 18   Temp: 97.6 °F (36.4 °C)   TempSrc: Temporal   SpO2: 100%   Weight: 61.9 kg (136 lb 8 oz)   Height: 162.6 cm (64\")   PainSc: 0-No pain         Body mass index is 23.43 kg/m².  Wt Readings from Last 3 Encounters:   01/03/24 61.9 kg (136 lb 8 oz)   08/16/23 60.3 kg (133 lb)   08/04/23 60.2 kg (132 lb 12.8 oz)     GENERAL: Alert, well-appearing female appearing her stated age who is in no apparent distress.   HEENT: Sclera anicteric. Head normocephalic, atraumatic. Mucus membranes moist.   NECK: Trachea midline, " supple.  BREASTS: Deferred  CARDIOVASCULAR: Normal rate, regular rhythm, no murmurs, rubs, or gallops.  No peripheral edema.  RESPIRATORY: Clear to auscultation bilaterally, normal respiratory effort on room air  GASTROINTESTINAL:  Abdomen is soft, nontender, non-distended, no rebound or guarding, no masses, or hernias. No HSM.  Healed vertical midline incision.  SKIN:  Warm, dry, well-perfused.  All visible areas intact.  No rashes, lesions, ulcers.  PSYCHIATRIC: AO x3, with appropriate affect, normal thought processes.  NEUROLOGIC: No focal deficits.  Moves extremities well.  MUSCULOSKELETAL: Normal gait and station.   EXTREMITIES:   No cyanosis, clubbing, symmetric.  LYMPHATICS:  No cervical or inguinal adenopathy noted.     PELVIC exam:  external genitalia free from lesion.  On speculum exam, vaginal cuff free from disease.  No blood noted.  Uterus, cervix, adnexa are absent.  No mass.. Radiation changes noted.  RV exam deferred.    ECOG PS 0    PROCEDURES:  none    Diagnostic Data:    CT Abdomen Pelvis With Contrast 06/02/2023    Narrative  CT CHEST W CONTRAST DIAGNOSTIC, CT ABDOMEN PELVIS W CONTRAST    Date of Exam: 6/2/2023 4:16 PM EDT    Indication: endometrial cancer.    Comparison: CT chest 12/8/2021, CT abdomen pelvis 11/2/2022.    Technique: Axial CT images were obtained of the chest after the uneventful intravenous administration of 95 mL Isovue-300.  Reconstructed coronal and sagittal images were also obtained. Automated exposure control and iterative construction methods were  used.      Findings:  Chest: There is no pathologic axillary adenopathy. The left breast soft tissues demonstrate some nonspecific persistent elongated soft tissue density measuring 3.5 x 1.6 cm, potentially reflecting some scarring at the site of prior surgery and seroma,  otherwise nonspecific. There is no pleural or pericardial effusion. There is no pathologic mediastinal or hilar lymphadenopathy. Nonaneurysmal,  atherosclerotic thoracic aorta. Evaluation of the lung fields demonstrates a subcentimeter nodule near the  left lung apex which is stable from 2021 comparison. There is otherwise no evidence of acute infectious process or distinct new suspicious pulmonary nodularity. The osseous structures demonstrate no evidence of acute fracture or aggressive osseous  lesion, with multilevel thoracic spondylosis change present.    Abdomen and pelvis: The body wall soft tissues demonstrate no acute or suspicious findings. Evaluation of the osseous structures demonstrates no evidence of acute fracture or aggressive osseous lesion, with multilevel mild spondylosis present. The liver  demonstrates diffuse low-attenuation consistent with steatosis, without suspicious focal parenchymal lesion. The spleen, pancreas and bilateral adrenal glands demonstrate homogeneous enhancement without suspicious focal lesion. The kidneys demonstrate  some stable mild atrophy, without new or suspicious findings. The gallbladder is normal. Small and large bowel loops are nondilated. Stable appearance of the anastomosis in the region of the mid transverse colon. Diverticulosis changes are again noted,  without acute inflammatory signs of diverticulosis. There is no free fluid or pneumoperitoneum. There is no new worrisome retroperitoneal lymphadenopathy, peritoneal nodularity or mesenteric nodularity. Mildly atherosclerotic, nonaneurysmal abdominal  aorta.    Impression  Impression:  Nonspecific somewhat elongated nodular soft tissue is noted within the soft tissues of the left breast at the site of prior seroma. This is favored to represent posttreatment change and scarring, with attention recommended on anticipated follow-up.    There is no evidence of recurrent or metastatic disease in the chest otherwise.    No acute findings or evidence of metastatic involvement in the abdomen and pelvis. The colonic anastomosis appears unchanged and there is  redemonstrated evidence of colonic diverticulosis, without acute inflammatory signs of diverticulitis.    Electronically Signed: Sheldon Joaquin  6/5/2023 4:32 PM EDT  Workstation ID: [id number]      CT Chest With Contrast Diagnostic 06/02/2023    Narrative  CT CHEST W CONTRAST DIAGNOSTIC, CT ABDOMEN PELVIS W CONTRAST    Date of Exam: 6/2/2023 4:16 PM EDT    Indication: endometrial cancer.    Comparison: CT chest 12/8/2021, CT abdomen pelvis 11/2/2022.    Technique: Axial CT images were obtained of the chest after the uneventful intravenous administration of 95 mL Isovue-300.  Reconstructed coronal and sagittal images were also obtained. Automated exposure control and iterative construction methods were  used.      Findings:  Chest: There is no pathologic axillary adenopathy. The left breast soft tissues demonstrate some nonspecific persistent elongated soft tissue density measuring 3.5 x 1.6 cm, potentially reflecting some scarring at the site of prior surgery and seroma,  otherwise nonspecific. There is no pleural or pericardial effusion. There is no pathologic mediastinal or hilar lymphadenopathy. Nonaneurysmal, atherosclerotic thoracic aorta. Evaluation of the lung fields demonstrates a subcentimeter nodule near the  left lung apex which is stable from 2021 comparison. There is otherwise no evidence of acute infectious process or distinct new suspicious pulmonary nodularity. The osseous structures demonstrate no evidence of acute fracture or aggressive osseous  lesion, with multilevel thoracic spondylosis change present.    Abdomen and pelvis: The body wall soft tissues demonstrate no acute or suspicious findings. Evaluation of the osseous structures demonstrates no evidence of acute fracture or aggressive osseous lesion, with multilevel mild spondylosis present. The liver  demonstrates diffuse low-attenuation consistent with steatosis, without suspicious focal parenchymal lesion. The spleen, pancreas and bilateral  adrenal glands demonstrate homogeneous enhancement without suspicious focal lesion. The kidneys demonstrate  some stable mild atrophy, without new or suspicious findings. The gallbladder is normal. Small and large bowel loops are nondilated. Stable appearance of the anastomosis in the region of the mid transverse colon. Diverticulosis changes are again noted,  without acute inflammatory signs of diverticulosis. There is no free fluid or pneumoperitoneum. There is no new worrisome retroperitoneal lymphadenopathy, peritoneal nodularity or mesenteric nodularity. Mildly atherosclerotic, nonaneurysmal abdominal  aorta.    Impression  Impression:  Nonspecific somewhat elongated nodular soft tissue is noted within the soft tissues of the left breast at the site of prior seroma. This is favored to represent posttreatment change and scarring, with attention recommended on anticipated follow-up.    There is no evidence of recurrent or metastatic disease in the chest otherwise.    No acute findings or evidence of metastatic involvement in the abdomen and pelvis. The colonic anastomosis appears unchanged and there is redemonstrated evidence of colonic diverticulosis, without acute inflammatory signs of diverticulitis.    Electronically Signed: Sheldon Joaquin  6/5/2023 4:32 PM EDT  Workstation ID: [id number]      Lab Results   Component Value Date    WBC 2.58 (L) 08/16/2023    HGB 11.5 (L) 08/16/2023    HCT 34.3 08/16/2023    MCV 87.5 08/16/2023     08/16/2023    NEUTROABS 1.75 08/16/2023    GLUCOSE 116 (H) 08/16/2023    BUN 8 08/16/2023    CREATININE 0.97 08/16/2023    EGFRIFNONA 51 (L) 01/26/2022    EGFRIFAFRI 62 01/26/2022     (L) 08/16/2023    K 4.0 08/16/2023    CL 96 (L) 08/16/2023    CO2 25.4 08/16/2023    MG 1.9 11/11/2022    PHOS 3.8 12/15/2021    CALCIUM 10.0 08/16/2023    ALBUMIN 4.6 08/16/2023    AST 18 08/16/2023    ALT 16 08/16/2023    BILITOT 0.8 08/16/2023     Lab Results   Component Value Date      12.8 08/04/2023     12.9 05/03/2023     12.6 02/03/2023     15.5 10/19/2022     13.3 07/20/2022     27.4 11/17/2021     16.6 05/13/2021     14.4 11/12/2020     16.3 05/12/2020     16.4 09/26/2019         Assessment & Plan   This is a 78 y.o. woman with history of recurrent endometrial cancer and history of Stage IA breast cancer presenting for follow up.    Encounter Diagnoses   Name Primary?    Endometrial cancer Yes    H/O ongoing treatment with hormonal therapy     Encounter for follow-up examination after completed treatment for conditions other than malignant neoplasm        Recurrent endometrial cancer in the setting of new Stage IA breast cancer, DAVION  - ER positive; Continue anastrazole for maintenance  -  ordered, most recent normal Aug 2023  - CT scan June 2023 DAVION, repeat ordered.  Sees primary care provider 1/15/2024 and CT scan to be done after that as she will have labs done at that time.  - OTC stool softeners & laxatives for constipation.   - DEXA scan due, this was ordered  -Planning on follow-up every 6 months since patient is approximately 2 years out from recurrence pending CT scan results.    Routine health screening  -Mammogram 2/23/23 [id number], had mammogram scheduled November 2023  -S/P Colonoscopy 4/26/23, Barium enema negative for stricture    Pain assessment was performed today as a part of patient’s care.  For patients with pain related to surgery, gynecologic malignancy or cancer treatment, the plan is as noted in the assessment/plan.  For patients with pain not related to these issues, they are to seek any further needed care from a more appropriate provider, such as PCP.      Orders Placed This Encounter   Procedures    CT Abdomen Pelvis With Contrast     Standing Status:   Future     Standing Expiration Date:   1/2/2025     Order Specific Question:   Will Oral Contrast be needed for this procedure?     Answer:   Yes    CT Chest With  Contrast     Standing Status:   Future     Standing Expiration Date:   1/3/2025     Order Specific Question:   Release to patient     Answer:   Routine Release [[id number]]    dexa bone density axial     Standing Status:   Future     Standing Expiration Date:   1/3/2025     Order Specific Question:   Is patient taking or have taken long term Glucocorticoid (steroids)?     Answer:   No     Order Specific Question:   Does the patient have rheumatoid arthritis?     Answer:   No     Order Specific Question:   Does the patient have secondary osteoporosis?     Answer:   No     Order Specific Question:   Reason for Exam:     Answer:   screening, age, hormone management     Order Specific Question:   Release to patient     Answer:   Routine Release [[id number]]     FOLLOW UP: 6 months    I spent 30 minutes caring for Cindy on this date of service. This time includes time spent by me in the following activities: preparing for the visit, reviewing tests, performing a medically appropriate examination and/or evaluation, counseling and educating the patient/family/caregiver, ordering medications, tests, or procedures, referring and communicating with other health care professionals, documenting information in the medical record, and care coordination    Daja Ma MD  01/03/24  17:30 EST

## 2024-01-15 ENCOUNTER — HOSPITAL ENCOUNTER (OUTPATIENT)
Dept: GENERAL RADIOLOGY | Facility: HOSPITAL | Age: 79
Discharge: HOME | End: 2024-01-15
Admitting: INTERNAL MEDICINE
Payer: MEDICARE

## 2024-01-15 ENCOUNTER — OFFICE VISIT (OUTPATIENT)
Dept: INTERNAL MEDICINE | Facility: CLINIC | Age: 79
End: 2024-01-15
Payer: MEDICARE

## 2024-01-15 VITALS
WEIGHT: 136 LBS | BODY MASS INDEX: 23.34 KG/M2 | HEART RATE: 76 BPM | TEMPERATURE: 98 F | SYSTOLIC BLOOD PRESSURE: 132 MMHG | DIASTOLIC BLOOD PRESSURE: 74 MMHG | RESPIRATION RATE: 16 BRPM

## 2024-01-15 DIAGNOSIS — G45.9 TIA (TRANSIENT ISCHEMIC ATTACK): ICD-10-CM

## 2024-01-15 DIAGNOSIS — M25.561 ACUTE PAIN OF RIGHT KNEE: ICD-10-CM

## 2024-01-15 DIAGNOSIS — R31.9 HEMATURIA, UNSPECIFIED TYPE: ICD-10-CM

## 2024-01-15 DIAGNOSIS — E53.8 VITAMIN B12 DEFICIENCY: ICD-10-CM

## 2024-01-15 DIAGNOSIS — R82.998 LEUKOCYTES IN URINE: ICD-10-CM

## 2024-01-15 DIAGNOSIS — I10 ESSENTIAL HYPERTENSION: ICD-10-CM

## 2024-01-15 DIAGNOSIS — K21.9 GASTROESOPHAGEAL REFLUX DISEASE WITHOUT ESOPHAGITIS: ICD-10-CM

## 2024-01-15 DIAGNOSIS — E78.5 HYPERLIPIDEMIA, UNSPECIFIED HYPERLIPIDEMIA TYPE: Primary | ICD-10-CM

## 2024-01-15 LAB
ALBUMIN SERPL-MCNC: 4.6 G/DL (ref 3.5–5.2)
ALBUMIN/GLOB SERPL: 2.1 G/DL
ALP SERPL-CCNC: 96 U/L (ref 39–117)
ALT SERPL W P-5'-P-CCNC: 11 U/L (ref 1–33)
ANION GAP SERPL CALCULATED.3IONS-SCNC: 17 MMOL/L (ref 5–15)
AST SERPL-CCNC: 18 U/L (ref 1–32)
BACTERIA UR QL AUTO: ABNORMAL /HPF
BASOPHILS # BLD AUTO: 0.02 10*3/MM3 (ref 0–0.2)
BASOPHILS NFR BLD AUTO: 0.4 % (ref 0–1.5)
BILIRUB BLD-MCNC: ABNORMAL MG/DL
BILIRUB SERPL-MCNC: 0.6 MG/DL (ref 0–1.2)
BUN SERPL-MCNC: 11 MG/DL (ref 8–23)
BUN/CREAT SERPL: 9.9 (ref 7–25)
CALCIUM SPEC-SCNC: 9.7 MG/DL (ref 8.6–10.5)
CHLORIDE SERPL-SCNC: 98 MMOL/L (ref 98–107)
CHOLEST SERPL-MCNC: 167 MG/DL (ref 0–200)
CLARITY, POC: ABNORMAL
CO2 SERPL-SCNC: 22 MMOL/L (ref 22–29)
COLOR UR: ABNORMAL
CREAT SERPL-MCNC: 1.11 MG/DL (ref 0.57–1)
DEPRECATED RDW RBC AUTO: 41.3 FL (ref 37–54)
EGFRCR SERPLBLD CKD-EPI 2021: 50.7 ML/MIN/1.73
EOSINOPHIL # BLD AUTO: 0.09 10*3/MM3 (ref 0–0.4)
EOSINOPHIL NFR BLD AUTO: 1.8 % (ref 0.3–6.2)
ERYTHROCYTE [DISTWIDTH] IN BLOOD BY AUTOMATED COUNT: 12.7 % (ref 12.3–15.4)
EXPIRATION DATE: ABNORMAL
GLOBULIN UR ELPH-MCNC: 2.2 GM/DL
GLUCOSE SERPL-MCNC: 96 MG/DL (ref 65–99)
GLUCOSE UR STRIP-MCNC: NEGATIVE MG/DL
HCT VFR BLD AUTO: 35.5 % (ref 34–46.6)
HDLC SERPL-MCNC: 99 MG/DL (ref 40–60)
HGB BLD-MCNC: 11.6 G/DL (ref 12–15.9)
HYALINE CASTS UR QL AUTO: ABNORMAL /LPF
IMM GRANULOCYTES # BLD AUTO: 0.02 10*3/MM3 (ref 0–0.05)
IMM GRANULOCYTES NFR BLD AUTO: 0.4 % (ref 0–0.5)
KETONES UR QL: NEGATIVE
LDLC SERPL CALC-MCNC: 45 MG/DL (ref 0–100)
LDLC/HDLC SERPL: 0.4 {RATIO}
LEUKOCYTE EST, POC: ABNORMAL
LYMPHOCYTES # BLD AUTO: 0.57 10*3/MM3 (ref 0.7–3.1)
LYMPHOCYTES NFR BLD AUTO: 11.6 % (ref 19.6–45.3)
Lab: ABNORMAL
MCH RBC QN AUTO: 29.9 PG (ref 26.6–33)
MCHC RBC AUTO-ENTMCNC: 32.7 G/DL (ref 31.5–35.7)
MCV RBC AUTO: 91.5 FL (ref 79–97)
MONOCYTES # BLD AUTO: 0.48 10*3/MM3 (ref 0.1–0.9)
MONOCYTES NFR BLD AUTO: 9.8 % (ref 5–12)
MUCOUS THREADS URNS QL MICRO: ABNORMAL /HPF
NEUTROPHILS NFR BLD AUTO: 3.72 10*3/MM3 (ref 1.7–7)
NEUTROPHILS NFR BLD AUTO: 76 % (ref 42.7–76)
NITRITE UR-MCNC: NEGATIVE MG/ML
NRBC BLD AUTO-RTO: 0.2 /100 WBC (ref 0–0.2)
PH UR: 6 [PH] (ref 5–8)
PLATELET # BLD AUTO: 251 10*3/MM3 (ref 140–450)
PMV BLD AUTO: 9.2 FL (ref 6–12)
POTASSIUM SERPL-SCNC: 3.7 MMOL/L (ref 3.5–5.2)
PROT SERPL-MCNC: 6.8 G/DL (ref 6–8.5)
PROT UR STRIP-MCNC: ABNORMAL MG/DL
RBC # BLD AUTO: 3.88 10*6/MM3 (ref 3.77–5.28)
RBC # UR STRIP: ABNORMAL /HPF
RBC # UR STRIP: ABNORMAL /UL
REF LAB TEST METHOD: ABNORMAL
SODIUM SERPL-SCNC: 137 MMOL/L (ref 136–145)
SP GR UR: 1.01 (ref 1–1.03)
SQUAMOUS #/AREA URNS HPF: ABNORMAL /HPF
TRIGL SERPL-MCNC: 140 MG/DL (ref 0–150)
UROBILINOGEN UR QL: NORMAL
VIT B12 BLD-MCNC: 1672 PG/ML (ref 211–946)
VLDLC SERPL-MCNC: 23 MG/DL (ref 5–40)
WBC # UR STRIP: ABNORMAL /HPF
WBC NRBC COR # BLD AUTO: 4.9 10*3/MM3 (ref 3.4–10.8)

## 2024-01-15 PROCEDURE — 81015 MICROSCOPIC EXAM OF URINE: CPT | Performed by: INTERNAL MEDICINE

## 2024-01-15 PROCEDURE — 85025 COMPLETE CBC W/AUTO DIFF WBC: CPT | Performed by: INTERNAL MEDICINE

## 2024-01-15 PROCEDURE — 80061 LIPID PANEL: CPT | Performed by: INTERNAL MEDICINE

## 2024-01-15 PROCEDURE — 87186 SC STD MICRODIL/AGAR DIL: CPT | Performed by: INTERNAL MEDICINE

## 2024-01-15 PROCEDURE — 81003 URINALYSIS AUTO W/O SCOPE: CPT | Performed by: INTERNAL MEDICINE

## 2024-01-15 PROCEDURE — 3075F SYST BP GE 130 - 139MM HG: CPT | Performed by: INTERNAL MEDICINE

## 2024-01-15 PROCEDURE — 82607 VITAMIN B-12: CPT | Performed by: INTERNAL MEDICINE

## 2024-01-15 PROCEDURE — 87086 URINE CULTURE/COLONY COUNT: CPT | Performed by: INTERNAL MEDICINE

## 2024-01-15 PROCEDURE — 73562 X-RAY EXAM OF KNEE 3: CPT

## 2024-01-15 PROCEDURE — 99214 OFFICE O/P EST MOD 30 MIN: CPT | Performed by: INTERNAL MEDICINE

## 2024-01-15 PROCEDURE — 80053 COMPREHEN METABOLIC PANEL: CPT | Performed by: INTERNAL MEDICINE

## 2024-01-15 PROCEDURE — 36415 COLL VENOUS BLD VENIPUNCTURE: CPT | Performed by: INTERNAL MEDICINE

## 2024-01-15 PROCEDURE — 87077 CULTURE AEROBIC IDENTIFY: CPT | Performed by: INTERNAL MEDICINE

## 2024-01-15 PROCEDURE — 3078F DIAST BP <80 MM HG: CPT | Performed by: INTERNAL MEDICINE

## 2024-01-15 RX ORDER — CLOPIDOGREL BISULFATE 75 MG/1
75 TABLET ORAL DAILY
Qty: 90 TABLET | Refills: 0 | Status: SHIPPED | OUTPATIENT
Start: 2024-01-15

## 2024-01-15 RX ORDER — CLOPIDOGREL BISULFATE 75 MG/1
75 TABLET ORAL DAILY
Qty: 90 TABLET | Refills: 1 | Status: CANCELLED | OUTPATIENT
Start: 2024-01-15

## 2024-01-15 NOTE — PROGRESS NOTES
Chief Complaint   Patient presents with    Follow-up     Follow up chronic medical problems    Right knee pain       History of Present Illness      The patient presents for a follow-up related to Vitamin B12 deficiency. The patient has no symptoms of a dry cough, a wet cough, wheezing, fever, myalgias, sweats, decreased appetite, chills, paresthesias, numbness or memory loss. Her energy level is normal. The patient is taking a vitamin B12 supplement in the form of an oral supplement.    The patient presents for a follow-up related to GERD. The patient is on omeprazole for her gastroesophageal reflux. The medication is taken on a regular basis and gives complete relief of the symptoms. She reports no abdominal pain, belching, chest pain, diarrhea, dysphagia, early satiety, heartburn, hoarseness, nausea, odynophagia, rectal bleeding, vomiting or weight loss. The GERD has no known aggravating factors.    The patient presents for a follow-up related to hyperlipidemia. She is following a low fat diet. She reports that she is exercising. She is taking simvastatin. The patient is taking her medication as prescribed. She reports no medication side effects, including muscle cramps, abdominal pain, headaches or weakness. She denies shortness of breath, orthopnea, paroxysmal nocturnal dyspnea, dyspnea on exertion, edema, palpitations or syncope.    The patient presents for a follow-up related to hypertension. The patient reports that she has had no headaches or blurred vision. She states that she is taking her medication as prescribed. She is not having medication side effects.    The patient presents with pain in the right knee of longer than one months duration. There is a history of trauma. The trauma is described as a fall onto knee. The trauma occurred six weeks ago.       The patient has no joint swelling. There is stiffness. The joint stiffness is present most of the time. The patient denies a history of overuse or  repetitive motion with the affected joints.    The joint pain is aggravated by motion and walking. The pain has no alleviating factors noted.    Medications      Current Outpatient Medications:     acetaminophen (TYLENOL) 325 MG tablet, Take 2 tablets by mouth Every 6 (Six) Hours As Needed for Mild Pain., Disp: , Rfl:     anastrozole (ARIMIDEX) 1 MG tablet, Take 1 tablet by mouth Daily., Disp: 30 tablet, Rfl: 11    aspirin 81 MG EC tablet, Take 1 tablet by mouth Daily., Disp: , Rfl:     Cholecalciferol (VITAMIN D3 GUMMIES ADULT PO), Take  by mouth Daily., Disp: , Rfl:     clopidogrel (PLAVIX) 75 MG tablet, Take 1 tablet by mouth Daily., Disp: 90 tablet, Rfl: 0    hydroCHLOROthiazide (HYDRODIURIL) 25 MG tablet, Take 1 tablet by mouth once daily, Disp: 90 tablet, Rfl: 1    losartan (Cozaar) 25 MG tablet, Take 1 tablet by mouth Daily., Disp: 30 tablet, Rfl: 5    omeprazole (priLOSEC) 20 MG capsule, Take 1 capsule by mouth Daily., Disp: , Rfl:     potassium chloride (KAYCIEL) 20 mEq/15 mL solution, TAKE 15 ML BY MOUTH ONCE DAILY Strength: 20 (10%), Disp: 450 mL, Rfl: 5    simvastatin (ZOCOR) 40 MG tablet, Take 1 tablet by mouth once daily, Disp: 90 tablet, Rfl: 1    vitamin B-12 (CYANOCOBALAMIN) 500 MCG tablet, Take 1 tablet by mouth once daily, Disp: 90 tablet, Rfl: 1     Allergies    Allergies   Allergen Reactions    Strawberry Extract Swelling       Problem List    Patient Active Problem List   Diagnosis    Hyperlipidemia    Hypertension    Ischemic stroke    Endometrial cancer    GERD (gastroesophageal reflux disease)    Hx: UTI (urinary tract infection)    History of endometrial cancer    TIA (transient ischemic attack)    Stenosis of right middle cerebral artery    Musculoskeletal neck pain    Large bowel obstruction s/p colonic resection by Dr. Ma 12/10/21    Recurrent cancer    Bilateral malignant neoplasm of breast in female, estrogen receptor positive    Slow transit constipation    Visual field defect     Hypokalemia    Anemia    Ocular migraine    Vitamin B12 deficiency    Ptosis of left eyelid    History of stroke       Medications, Allergies, Problems List and Past History were reviewed and updated.    Physical Examination    /74 (BP Location: Left arm, Patient Position: Sitting, Cuff Size: Adult)   Pulse 76   Temp 98 °F (36.7 °C) (Infrared)   Resp 16   Wt 61.7 kg (136 lb)   LMP  (LMP Unknown)   BMI 23.34 kg/m²       HEENT: Head- Normocephalic Atraumatic. Facies- Within normal limits. Pinnas- Normal texture and shape bilaterally. Canals- Normal bilaterally. TMs- Normal bilaterally. Nares- Patent bilaterally. Nasal Septum- is normal. There is no tenderness to palpation over the frontal or maxillary sinuses. Lids- Normal bilaterally. Conjunctiva- Clear bilaterally. Sclera- Anicteric bilaterally. Oropharynx- Moist with no lesions. Tonsils- No enlargement, erythema or exudate.    Neck: Thyroid- non enlarged, symmetric and has no nodules. No bruits are detected. ROM- Normal Range of Motion with no rigidity.    Lungs: Auscultation- Clear to auscultation bilaterally. There are no retractions, clubbing or cyanosis. The Expiratory to Inspiratory ratio is equal.    Lymph Nodes: Cervical- no enlarged lymph nodes noted. Clavicular- Deferred. Axillary- Deferred. Inguinal- Deferred.    Cardiovascular: There are no carotid bruits. Heart- Normal Rate with Regular rhythm and no murmurs. There are no gallops. There are no rubs. In the lower extremities there is no edema. The upper extremities do not have edema.    Abdomen: Soft, benign, non-tender with no masses, hernias, organomegaly or scars.    Knees: The right knee is symmetric with normal cem landmarks. There is no tenderness to palpation of the right knee. The right patella tracks midline. There is no patellar pain with quadriceps contraction (Grind Test)on the right. There is no pain over the right prepatellar bursa. The right sided Abduction Stress Test for  medial collateral ligaments is normal. The right sided Adduction Stress Test for lateral collateral ligaments is normal. The right sided Anterior Drawer Test is normal. The right sided Posterior Drawer Test is normal. Lachman Test is normal on the right. Cruz Test of the medial and lateral meniscus is normal on the right.    Impression and Assessment    Vitamin B12 Deficiency.    Gastroesophageal Reflux Disease.    Hyperlipidemia.    Essential Hypertension.    Right Knee Pain.    Plan    Gastroesophageal Reflux Disease Plan: The patient was instructed to continue the current medications.    Hyperlipidemia Plan: The patient was instructed to exercise daily, eat a low fat diet and continue her medications.    Essential Hypertension Plan: The patient was instructed to continue the current medications.    Right Knee Pain Plan: Acetaminophen may be used for fever or pain. She was instructed to apply ice to the area with caution to avoid cold thermal injury. Rest the affected area.    Vitamin B12 Deficiency Plan: The patient was instructed to continue the current medications.    Diagnoses and all orders for this visit:    1. Hyperlipidemia, unspecified hyperlipidemia type (Primary)  -     Comprehensive Metabolic Panel; Future  -     Comprehensive Metabolic Panel    2. Gastroesophageal reflux disease without esophagitis    3. Essential hypertension  -     CBC & Differential; Future  -     Comprehensive Metabolic Panel; Future  -     Lipid Panel; Future  -     POC Urinalysis Dipstick, Automated; Future  -     CBC & Differential  -     Comprehensive Metabolic Panel  -     Lipid Panel    4. Acute pain of right knee  -     XR Knee 3 View Right; Future    5. Vitamin B12 deficiency  -     CBC & Differential; Future  -     Vitamin B12; Future  -     CBC & Differential  -     Vitamin B12    6. TIA (transient ischemic attack)  -     clopidogrel (PLAVIX) 75 MG tablet; Take 1 tablet by mouth Daily.  Dispense: 90 tablet; Refill:  0          Return to Office    The patient was instructed to return for follow-up in 6 months. The patient was instructed to return sooner if the condition changes, worsens, or does not resolve.

## 2024-01-16 RX ORDER — NITROFURANTOIN 25; 75 MG/1; MG/1
100 CAPSULE ORAL 2 TIMES DAILY
Qty: 20 CAPSULE | Refills: 0 | Status: SHIPPED | OUTPATIENT
Start: 2024-01-16 | End: 2024-01-26

## 2024-01-17 LAB — BACTERIA SPEC AEROBE CULT: ABNORMAL

## 2024-01-18 RX ORDER — LEVOFLOXACIN 500 MG/1
500 TABLET, FILM COATED ORAL DAILY
Qty: 10 TABLET | Refills: 0 | Status: SHIPPED | OUTPATIENT
Start: 2024-01-18

## 2024-01-19 ENCOUNTER — HOSPITAL ENCOUNTER (OUTPATIENT)
Dept: CT IMAGING | Facility: HOSPITAL | Age: 79
Discharge: HOME | End: 2024-01-19
Payer: MEDICARE

## 2024-01-19 ENCOUNTER — TELEPHONE (OUTPATIENT)
Dept: GYNECOLOGIC ONCOLOGY | Facility: CLINIC | Age: 79
End: 2024-01-19
Payer: MEDICARE

## 2024-01-19 DIAGNOSIS — C54.1 ENDOMETRIAL CANCER: ICD-10-CM

## 2024-01-19 PROCEDURE — 74177 CT ABD & PELVIS W/CONTRAST: CPT

## 2024-01-19 PROCEDURE — 71260 CT THORAX DX C+: CPT

## 2024-01-19 PROCEDURE — 25510000001 IOPAMIDOL 61 % SOLUTION: Performed by: OBSTETRICS & GYNECOLOGY

## 2024-01-19 RX ADMIN — IOPAMIDOL 85 ML: 612 INJECTION, SOLUTION INTRAVENOUS at 13:10

## 2024-01-19 NOTE — TELEPHONE ENCOUNTER
----- Message from Daja Ma MD sent at 1/19/2024  2:15 PM EST -----  Please notify pt of stable lung nodule, no evidence of recurrent disease otherwise  Thanks!    ----- Message -----  From: Jett, Rad Results [place] In  Sent: 1/19/2024   2:13 PM EST  To: Daja Ma MD

## 2024-01-25 DIAGNOSIS — I10 ESSENTIAL HYPERTENSION: ICD-10-CM

## 2024-01-25 DIAGNOSIS — G45.9 TIA (TRANSIENT ISCHEMIC ATTACK): ICD-10-CM

## 2024-01-25 DIAGNOSIS — I66.01 STENOSIS OF RIGHT MIDDLE CEREBRAL ARTERY: ICD-10-CM

## 2024-01-25 RX ORDER — SIMVASTATIN 40 MG
TABLET ORAL
Qty: 90 TABLET | Refills: 1 | Status: SHIPPED | OUTPATIENT
Start: 2024-01-25

## 2024-01-25 RX ORDER — LOSARTAN POTASSIUM 25 MG/1
25 TABLET ORAL DAILY
Qty: 90 TABLET | Refills: 1 | Status: SHIPPED | OUTPATIENT
Start: 2024-01-25

## 2024-01-26 ENCOUNTER — HOSPITAL ENCOUNTER (OUTPATIENT)
Dept: BONE DENSITY | Facility: HOSPITAL | Age: 79
Discharge: HOME | End: 2024-01-26
Payer: MEDICARE

## 2024-01-26 DIAGNOSIS — Z09 ENCOUNTER FOR FOLLOW-UP EXAMINATION AFTER COMPLETED TREATMENT FOR CONDITIONS OTHER THAN MALIGNANT NEOPLASM: ICD-10-CM

## 2024-01-26 DIAGNOSIS — Z79.890 H/O ONGOING TREATMENT WITH HORMONAL THERAPY: ICD-10-CM

## 2024-02-01 ENCOUNTER — HOSPITAL ENCOUNTER (OUTPATIENT)
Dept: MAMMOGRAPHY | Facility: HOSPITAL | Age: 79
Discharge: HOME | End: 2024-02-01
Admitting: RADIOLOGY
Payer: MEDICARE

## 2024-02-01 DIAGNOSIS — R92.8 ABNORMAL MAMMOGRAM: ICD-10-CM

## 2024-02-01 PROCEDURE — 77066 DX MAMMO INCL CAD BI: CPT

## 2024-02-01 PROCEDURE — G0279 TOMOSYNTHESIS, MAMMO: HCPCS

## 2024-02-02 RX ORDER — POTASSIUM CHLORIDE 20MEQ/15ML
LIQUID (ML) ORAL
Qty: 450 ML | Refills: 3 | Status: SHIPPED | OUTPATIENT
Start: 2024-02-02

## 2024-02-20 ENCOUNTER — HOSPITAL ENCOUNTER (OUTPATIENT)
Dept: BONE DENSITY | Facility: HOSPITAL | Age: 79
Discharge: HOME | End: 2024-02-20
Admitting: OBSTETRICS & GYNECOLOGY
Payer: MEDICARE

## 2024-02-20 PROCEDURE — 77080 DXA BONE DENSITY AXIAL: CPT

## 2024-02-21 ENCOUNTER — TELEPHONE (OUTPATIENT)
Dept: GYNECOLOGIC ONCOLOGY | Facility: CLINIC | Age: 79
End: 2024-02-21
Payer: MEDICARE

## 2024-02-21 NOTE — TELEPHONE ENCOUNTER
RN called patient but there was no answer. Will attempt to call at a later time.  ----- Message from Daja Ma MD sent at 2/20/2024  4:17 PM EST -----  Please notify patient of osteoporosis and osteopenia findings on DEXA scan.  Typically, primary care provider would prescribe appropriate medication.  She should be on calcium supplement and vitamin D3  daily.  If she has any problems with her primary care doctor taking care of this for her, I can figure out what to do, but it would probably be better for her primary care to get involved.  Thanks    ----- Message -----  From: Interface, Rad Results [place] In  Sent: 2/20/2024   4:06 PM EST  To: Daja Ma MD

## 2024-02-21 NOTE — TELEPHONE ENCOUNTER
----- Message from Daja Ma MD sent at 2/20/2024  4:17 PM EST -----  Please notify patient of osteoporosis and osteopenia findings on DEXA scan.  Typically, primary care provider would prescribe appropriate medication.  She should be on calcium supplement and vitamin D3  daily.  If she has any problems with her primary care doctor taking care of this for her, I can figure out what to do, but it would probably be better for her primary care to get involved.  Thanks    ----- Message -----  From: Jett Rad Results [place] In  Sent: 2/20/2024   4:06 PM EST  To: Daja Ma MD

## 2024-02-22 ENCOUNTER — TELEPHONE (OUTPATIENT)
Dept: GYNECOLOGIC ONCOLOGY | Facility: CLINIC | Age: 79
End: 2024-02-22
Payer: MEDICARE

## 2024-02-22 NOTE — TELEPHONE ENCOUNTER
RN called patient and reported DEXA scan results. Patient reported that she is taking her calcium and vit D3 supplements and will follow up with her PCP.

## 2024-02-22 NOTE — TELEPHONE ENCOUNTER
----- Message from Daja Ma MD sent at 2/20/2024  4:17 PM EST -----  Please notify patient of osteoporosis and osteopenia findings on DEXA scan.  Typically, primary care provider would prescribe appropriate medication.  She should be on calcium supplement and vitamin D3  daily.  If she has any problems with her primary care doctor taking care of this for her, I can figure out what to do, but it would probably be better for her primary care to get involved.  Thanks    ----- Message -----  From: Jett Rad Results [place] In  Sent: 2/20/2024   4:06 PM EST  To: Daja Ma MD

## 2024-02-23 DIAGNOSIS — E53.8 VITAMIN B12 DEFICIENCY: ICD-10-CM

## 2024-02-23 RX ORDER — CHOLECALCIFEROL (VITAMIN D3) 125 MCG
CAPSULE ORAL
Qty: 90 TABLET | Refills: 0 | OUTPATIENT
Start: 2024-02-23

## 2024-03-22 RX ORDER — HYDROCHLOROTHIAZIDE 25 MG/1
TABLET ORAL
Qty: 90 TABLET | Refills: 0 | Status: SHIPPED | OUTPATIENT
Start: 2024-03-22

## 2024-04-10 DIAGNOSIS — G45.9 TIA (TRANSIENT ISCHEMIC ATTACK): ICD-10-CM

## 2024-04-10 RX ORDER — CLOPIDOGREL BISULFATE 75 MG/1
75 TABLET ORAL DAILY
Qty: 90 TABLET | Refills: 1 | Status: SHIPPED | OUTPATIENT
Start: 2024-04-10

## 2024-05-15 ENCOUNTER — OFFICE VISIT (OUTPATIENT)
Dept: INTERNAL MEDICINE | Facility: CLINIC | Age: 79
End: 2024-05-15
Payer: MEDICARE

## 2024-05-15 VITALS
TEMPERATURE: 97.6 F | DIASTOLIC BLOOD PRESSURE: 70 MMHG | RESPIRATION RATE: 16 BRPM | WEIGHT: 136 LBS | BODY MASS INDEX: 23.34 KG/M2 | HEART RATE: 74 BPM | SYSTOLIC BLOOD PRESSURE: 124 MMHG

## 2024-05-15 DIAGNOSIS — I10 PRIMARY HYPERTENSION: ICD-10-CM

## 2024-05-15 DIAGNOSIS — K21.9 GASTROESOPHAGEAL REFLUX DISEASE, UNSPECIFIED WHETHER ESOPHAGITIS PRESENT: ICD-10-CM

## 2024-05-15 DIAGNOSIS — E53.8 VITAMIN B12 DEFICIENCY: Primary | ICD-10-CM

## 2024-05-15 DIAGNOSIS — E78.5 HYPERLIPIDEMIA, UNSPECIFIED HYPERLIPIDEMIA TYPE: ICD-10-CM

## 2024-05-15 LAB
ALBUMIN SERPL-MCNC: 4.2 G/DL (ref 3.5–5.2)
ALBUMIN/GLOB SERPL: 1.9 G/DL
ALP SERPL-CCNC: 115 U/L (ref 39–117)
ALT SERPL W P-5'-P-CCNC: 15 U/L (ref 1–33)
ANION GAP SERPL CALCULATED.3IONS-SCNC: 12 MMOL/L (ref 5–15)
AST SERPL-CCNC: 17 U/L (ref 1–32)
BASOPHILS # BLD AUTO: 0.04 10*3/MM3 (ref 0–0.2)
BASOPHILS NFR BLD AUTO: 0.9 % (ref 0–1.5)
BILIRUB SERPL-MCNC: 0.4 MG/DL (ref 0–1.2)
BUN SERPL-MCNC: 9 MG/DL (ref 8–23)
BUN/CREAT SERPL: 10.2 (ref 7–25)
CALCIUM SPEC-SCNC: 9.3 MG/DL (ref 8.6–10.5)
CHLORIDE SERPL-SCNC: 101 MMOL/L (ref 98–107)
CHOLEST SERPL-MCNC: 151 MG/DL (ref 0–200)
CO2 SERPL-SCNC: 25 MMOL/L (ref 22–29)
CREAT SERPL-MCNC: 0.88 MG/DL (ref 0.57–1)
DEPRECATED RDW RBC AUTO: 47.1 FL (ref 37–54)
EGFRCR SERPLBLD CKD-EPI 2021: 66.5 ML/MIN/1.73
EOSINOPHIL # BLD AUTO: 0.21 10*3/MM3 (ref 0–0.4)
EOSINOPHIL NFR BLD AUTO: 4.6 % (ref 0.3–6.2)
ERYTHROCYTE [DISTWIDTH] IN BLOOD BY AUTOMATED COUNT: 14.5 % (ref 12.3–15.4)
GLOBULIN UR ELPH-MCNC: 2.2 GM/DL
GLUCOSE SERPL-MCNC: 98 MG/DL (ref 65–99)
HCT VFR BLD AUTO: 36 % (ref 34–46.6)
HDLC SERPL-MCNC: 97 MG/DL (ref 40–60)
HGB BLD-MCNC: 11.5 G/DL (ref 12–15.9)
IMM GRANULOCYTES # BLD AUTO: 0.05 10*3/MM3 (ref 0–0.05)
IMM GRANULOCYTES NFR BLD AUTO: 1.1 % (ref 0–0.5)
LDLC SERPL CALC-MCNC: 38 MG/DL (ref 0–100)
LDLC/HDLC SERPL: 0.38 {RATIO}
LYMPHOCYTES # BLD AUTO: 0.74 10*3/MM3 (ref 0.7–3.1)
LYMPHOCYTES NFR BLD AUTO: 16.4 % (ref 19.6–45.3)
MCH RBC QN AUTO: 28.8 PG (ref 26.6–33)
MCHC RBC AUTO-ENTMCNC: 31.9 G/DL (ref 31.5–35.7)
MCV RBC AUTO: 90 FL (ref 79–97)
MONOCYTES # BLD AUTO: 0.43 10*3/MM3 (ref 0.1–0.9)
MONOCYTES NFR BLD AUTO: 9.5 % (ref 5–12)
NEUTROPHILS NFR BLD AUTO: 3.05 10*3/MM3 (ref 1.7–7)
NEUTROPHILS NFR BLD AUTO: 67.5 % (ref 42.7–76)
PLAT MORPH BLD: NORMAL
PLATELET # BLD AUTO: 204 10*3/MM3 (ref 140–450)
PMV BLD AUTO: 9.5 FL (ref 6–12)
POTASSIUM SERPL-SCNC: 5.1 MMOL/L (ref 3.5–5.2)
PROT SERPL-MCNC: 6.4 G/DL (ref 6–8.5)
RBC # BLD AUTO: 4 10*6/MM3 (ref 3.77–5.28)
RBC MORPH BLD: NORMAL
SODIUM SERPL-SCNC: 138 MMOL/L (ref 136–145)
TRIGL SERPL-MCNC: 88 MG/DL (ref 0–150)
VIT B12 BLD-MCNC: 1601 PG/ML (ref 211–946)
VLDLC SERPL-MCNC: 16 MG/DL (ref 5–40)
WBC MORPH BLD: NORMAL
WBC NRBC COR # BLD AUTO: 4.52 10*3/MM3 (ref 3.4–10.8)

## 2024-05-15 PROCEDURE — 36415 COLL VENOUS BLD VENIPUNCTURE: CPT | Performed by: INTERNAL MEDICINE

## 2024-05-15 PROCEDURE — 80061 LIPID PANEL: CPT | Performed by: INTERNAL MEDICINE

## 2024-05-15 PROCEDURE — G2211 COMPLEX E/M VISIT ADD ON: HCPCS | Performed by: INTERNAL MEDICINE

## 2024-05-15 PROCEDURE — 82607 VITAMIN B-12: CPT | Performed by: INTERNAL MEDICINE

## 2024-05-15 PROCEDURE — 3074F SYST BP LT 130 MM HG: CPT | Performed by: INTERNAL MEDICINE

## 2024-05-15 PROCEDURE — 80053 COMPREHEN METABOLIC PANEL: CPT | Performed by: INTERNAL MEDICINE

## 2024-05-15 PROCEDURE — 1126F AMNT PAIN NOTED NONE PRSNT: CPT | Performed by: INTERNAL MEDICINE

## 2024-05-15 PROCEDURE — 3078F DIAST BP <80 MM HG: CPT | Performed by: INTERNAL MEDICINE

## 2024-05-15 PROCEDURE — 85025 COMPLETE CBC W/AUTO DIFF WBC: CPT | Performed by: INTERNAL MEDICINE

## 2024-05-15 PROCEDURE — 99214 OFFICE O/P EST MOD 30 MIN: CPT | Performed by: INTERNAL MEDICINE

## 2024-05-15 PROCEDURE — 85007 BL SMEAR W/DIFF WBC COUNT: CPT | Performed by: INTERNAL MEDICINE

## 2024-05-15 NOTE — PROGRESS NOTES
Chief Complaint   Patient presents with    Follow-up     3 month follow up for chronic medical conditions       History of Present Illness      The patient presents for a follow-up related to Vitamin B12 deficiency. There are no reports of blood loss. The patient has no symptoms of a dry cough, a wet cough, wheezing, fever, myalgias, sweats, decreased appetite, chills, paresthesias, numbness or memory loss. Her energy level is normal. The patient is taking a vitamin B12 supplement in the form of an oral supplement.    The patient presents for a follow-up related to hyperlipidemia. She is following a low fat diet. She reports that she is exercising. She is taking simvastatin. The patient is taking her medication as prescribed. She reports no medication side effects, including muscle cramps, abdominal pain, headaches or weakness. She denies chest pain, shortness of breath, orthopnea, paroxysmal nocturnal dyspnea, dyspnea on exertion, edema, palpitations or syncope.    The patient presents for a follow-up related to hypertension. The patient reports that she has had no headaches or blurred vision. She states that she is taking her medication as prescribed. She is not having medication side effects.    The patient presents for a follow-up related to GERD. The patient is on omeprazole for her gastroesophageal reflux. The medication is taken on a regular basis and gives complete relief of the symptoms. She reports no abdominal pain, belching, diarrhea, dysphagia, early satiety, heartburn, hoarseness, nausea, odynophagia, rectal bleeding, vomiting or weight loss. The GERD has no known aggravating factors.    Medications      Current Outpatient Medications:     acetaminophen (TYLENOL) 325 MG tablet, Take 2 tablets by mouth Every 6 (Six) Hours As Needed for Mild Pain., Disp: , Rfl:     anastrozole (ARIMIDEX) 1 MG tablet, Take 1 tablet by mouth Daily., Disp: 30 tablet, Rfl: 11    aspirin 81 MG EC tablet, Take 1 tablet by  mouth Daily., Disp: , Rfl:     Cholecalciferol (VITAMIN D3 GUMMIES ADULT PO), Take  by mouth Daily., Disp: , Rfl:     clopidogrel (PLAVIX) 75 MG tablet, Take 1 tablet by mouth once daily, Disp: 90 tablet, Rfl: 1    hydroCHLOROthiazide 25 MG tablet, Take 1 tablet by mouth once daily, Disp: 90 tablet, Rfl: 0    losartan (COZAAR) 25 MG tablet, Take 1 tablet by mouth once daily, Disp: 90 tablet, Rfl: 1    omeprazole (priLOSEC) 20 MG capsule, Take 1 capsule by mouth Daily., Disp: , Rfl:     potassium chloride (KAYCIEL) 20 mEq/15 mL solution, TAKE 15 ML BY MOUTH ONCE DAILY Strength: 20 (10%), Disp: 450 mL, Rfl: 3    simvastatin (ZOCOR) 40 MG tablet, Take 1 tablet by mouth once daily, Disp: 90 tablet, Rfl: 1    vitamin B-12 (CYANOCOBALAMIN) 500 MCG tablet, Take 1 tablet by mouth once daily, Disp: 90 tablet, Rfl: 1     Allergies    Allergies   Allergen Reactions    Strawberry Extract Swelling       Problem List    Patient Active Problem List   Diagnosis    Hyperlipidemia    Hypertension    Ischemic stroke    Endometrial cancer    GERD (gastroesophageal reflux disease)    Hx: UTI (urinary tract infection)    History of endometrial cancer    TIA (transient ischemic attack)    Stenosis of right middle cerebral artery    Musculoskeletal neck pain    Large bowel obstruction s/p colonic resection by Dr. Ma 12/10/21    Recurrent cancer    Bilateral malignant neoplasm of breast in female, estrogen receptor positive    Slow transit constipation    Visual field defect    Hypokalemia    Anemia    Ocular migraine    Vitamin B12 deficiency    Ptosis of left eyelid    History of stroke       Medications, Allergies, Problems List and Past History were reviewed and updated.    Physical Examination    /70 (BP Location: Left arm, Patient Position: Sitting, Cuff Size: Adult)   Pulse 74   Temp 97.6 °F (36.4 °C) (Infrared)   Resp 16   Wt 61.7 kg (136 lb)   LMP  (LMP Unknown)   BMI 23.34 kg/m²       HEENT: Facies- Within normal  limits. Lids- Normal bilaterally. Conjunctiva- Clear bilaterally. Sclera- Anicteric bilaterally.    Neck: Thyroid- non enlarged, symmetric and has no nodules. No bruits are detected.    Lungs: Auscultation- Clear to auscultation bilaterally. There are no retractions, clubbing or cyanosis. The Expiratory to Inspiratory ratio is equal.    Lymph Nodes: Cervical- no enlarged lymph nodes noted. Clavicular- Deferred. Axillary- Deferred. Inguinal- Deferred.    Cardiovascular: There are no carotid bruits. Heart- Normal Rate with Regular rhythm and no murmurs. There are no gallops. There are no rubs. In the lower extremities there is no edema. The upper extremities do not have edema.    Abdomen: Soft, benign, non-tender with no masses, hernias, organomegaly or scars.    Impression and Assessment    Vitamin B12 Deficiency.    Hyperlipidemia.    Essential Hypertension.    Gastroesophageal Reflux Disease.    Plan    Gastroesophageal Reflux Disease Plan: The patient was instructed to continue the current medications.    Hyperlipidemia Plan: The patient was instructed to exercise daily, eat a low fat diet and continue her medications.    Essential Hypertension Plan: The patient was instructed to continue the current medications.    Vitamin B12 Deficiency Plan: The patient was instructed to continue the current medications.    Diagnoses and all orders for this visit:    1. Vitamin B12 deficiency (Primary)  -     CBC & Differential; Future  -     Vitamin B12; Future  -     CBC & Differential  -     Vitamin B12    2. Hyperlipidemia, unspecified hyperlipidemia type  -     Comprehensive Metabolic Panel; Future  -     Lipid Panel; Future  -     Comprehensive Metabolic Panel  -     Lipid Panel    3. Primary hypertension  -     Comprehensive Metabolic Panel; Future  -     Comprehensive Metabolic Panel    4. Gastroesophageal reflux disease, unspecified whether esophagitis present        Return to Office    The patient was instructed to  return for follow-up in 6 months. The patient was instructed to return sooner if the condition changes, worsens, or does not resolve.

## 2024-05-20 ENCOUNTER — TELEPHONE (OUTPATIENT)
Dept: INTERNAL MEDICINE | Facility: CLINIC | Age: 79
End: 2024-05-20
Payer: MEDICARE

## 2024-05-20 DIAGNOSIS — Z11.1 SCREENING-PULMONARY TB: Primary | ICD-10-CM

## 2024-05-20 NOTE — TELEPHONE ENCOUNTER
Caller: Chitra WHITE    Relationship to patient: Emergency Contact    Best call back number: [phone]     Chief complaint:     Type of visit: TB SKIN TEST    Requested date:  AS SOON AS POSSIBLE    If rescheduling, when is the original appointment:      Additional notes:

## 2024-05-20 NOTE — TELEPHONE ENCOUNTER
Patient needs to have TB blood test in order to take care of grandson . Can you place this order ?

## 2024-05-23 DIAGNOSIS — C50.912 BILATERAL MALIGNANT NEOPLASM OF BREAST IN FEMALE, ESTROGEN RECEPTOR POSITIVE, UNSPECIFIED SITE OF BREAST: ICD-10-CM

## 2024-05-23 DIAGNOSIS — Z17.0 BILATERAL MALIGNANT NEOPLASM OF BREAST IN FEMALE, ESTROGEN RECEPTOR POSITIVE, UNSPECIFIED SITE OF BREAST: ICD-10-CM

## 2024-05-23 DIAGNOSIS — C50.911 BILATERAL MALIGNANT NEOPLASM OF BREAST IN FEMALE, ESTROGEN RECEPTOR POSITIVE, UNSPECIFIED SITE OF BREAST: ICD-10-CM

## 2024-05-23 NOTE — TELEPHONE ENCOUNTER
Associated Diagnoses: Bilateral malignant neoplasm of breast in female, estrogen receptor positive, unspecified site of breast [C50.911, Z17.0, C50.912]     Next Appt: 07/10/2024

## 2024-05-24 RX ORDER — ANASTROZOLE 1 MG/1
1 TABLET ORAL DAILY
Qty: 30 TABLET | Refills: 11 | Status: SHIPPED | OUTPATIENT
Start: 2024-05-24

## 2024-06-04 ENCOUNTER — LAB (OUTPATIENT)
Dept: INTERNAL MEDICINE | Facility: CLINIC | Age: 79
End: 2024-06-04
Payer: MEDICARE

## 2024-06-04 DIAGNOSIS — Z11.1 SCREENING-PULMONARY TB: ICD-10-CM

## 2024-06-04 PROCEDURE — 86480 TB TEST CELL IMMUN MEASURE: CPT | Performed by: INTERNAL MEDICINE

## 2024-06-06 LAB
GAMMA INTERFERON BACKGROUND BLD IA-ACNC: 0.02 IU/ML
M TB IFN-G BLD-IMP: NEGATIVE
M TB IFN-G CD4+ BCKGRND COR BLD-ACNC: 0.02 IU/ML
M TB IFN-G CD4+CD8+ BCKGRND COR BLD-ACNC: 0.01 IU/ML
MITOGEN IGNF BCKGRD COR BLD-ACNC: >10 IU/ML
SERVICE CMNT-IMP: NORMAL

## 2024-06-10 ENCOUNTER — TELEPHONE (OUTPATIENT)
Dept: INTERNAL MEDICINE | Facility: CLINIC | Age: 79
End: 2024-06-10
Payer: MEDICARE

## 2024-06-10 NOTE — TELEPHONE ENCOUNTER
Caller: Cindy Sage    Relationship: Self    Best call back number: [phone]     Caller requesting test results: YES    What test was performed: BLOOD WORK    When was the test performed: 06/04/2024    Where was the test performed: IN OFFICE    Additional notes: PATIENT WOULD LIKE A CALL BACK TO DISCUSS TEST RESULT. PATIENT WILL ALSO NEED A COPY.

## 2024-06-11 NOTE — TELEPHONE ENCOUNTER
Spoke with patient, explained that TB lab test was negative and copy of results were mailed to home address this morning.  Verbalized good understanding and great appreciation.

## 2024-06-24 RX ORDER — POTASSIUM CHLORIDE 20MEQ/15ML
LIQUID (ML) ORAL
Qty: 473 ML | Refills: 0 | Status: SHIPPED | OUTPATIENT
Start: 2024-06-24

## 2024-06-24 RX ORDER — HYDROCHLOROTHIAZIDE 25 MG/1
TABLET ORAL
Qty: 90 TABLET | Refills: 0 | Status: SHIPPED | OUTPATIENT
Start: 2024-06-24

## 2024-07-10 ENCOUNTER — OFFICE VISIT (OUTPATIENT)
Dept: GYNECOLOGIC ONCOLOGY | Facility: CLINIC | Age: 79
End: 2024-07-10
Payer: MEDICARE

## 2024-07-10 VITALS
RESPIRATION RATE: 18 BRPM | WEIGHT: 131.5 LBS | TEMPERATURE: 97.3 F | OXYGEN SATURATION: 98 % | HEIGHT: 64 IN | SYSTOLIC BLOOD PRESSURE: 160 MMHG | DIASTOLIC BLOOD PRESSURE: 97 MMHG | BODY MASS INDEX: 22.45 KG/M2 | HEART RATE: 54 BPM

## 2024-07-10 DIAGNOSIS — C54.1 ENDOMETRIAL CANCER: Primary | ICD-10-CM

## 2024-07-10 PROCEDURE — 1159F MED LIST DOCD IN RCRD: CPT | Performed by: OBSTETRICS & GYNECOLOGY

## 2024-07-10 PROCEDURE — 1160F RVW MEDS BY RX/DR IN RCRD: CPT | Performed by: OBSTETRICS & GYNECOLOGY

## 2024-07-10 PROCEDURE — 3080F DIAST BP >= 90 MM HG: CPT | Performed by: OBSTETRICS & GYNECOLOGY

## 2024-07-10 PROCEDURE — 1126F AMNT PAIN NOTED NONE PRSNT: CPT | Performed by: OBSTETRICS & GYNECOLOGY

## 2024-07-10 PROCEDURE — 3077F SYST BP >= 140 MM HG: CPT | Performed by: OBSTETRICS & GYNECOLOGY

## 2024-07-10 PROCEDURE — 99213 OFFICE O/P EST LOW 20 MIN: CPT | Performed by: OBSTETRICS & GYNECOLOGY

## 2024-07-10 NOTE — PROGRESS NOTES
Cindy Sage  [id number]  1945      Reason for visit:  Surveillance for recurrent endometrial cancer, history of breast cancer    History of present illness:      Cindy Sage is a 78 y.o. year old female who is here today for ongoing surveillance of Endometrial Cancer, recurrent, see Cancer History. 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 She is now s/p left breast lumpectomy and breast radiation. She is well recovered and pleased with her care. She started maintenance Arimidex for both endometrial cancer and breast cancer in 4/2022 and is doing well on medication thus far.   Last CT imaging for endometrial cancer 1/20/2024.    Today, patient notes no vaginal bleeding.  Using walker for long distances.  No change in bladder function.  She notes a 2-week history of increased flatulence.  She reports stools are normal in caliber but she does not understand why this change has happened.  She is not having significant pelvic pain.  She is accompanied by family as usual.    Colonoscopy 4/26/2023  Mammogram UTD 2/2024  DEXA scan is UTD 2/2024    Oncologic History:  Oncology/Hematology History   Endometrial cancer   3/26/2016 Imaging    CT in ER for acute onset postmenopausal bleeding revealed mass in the lower uterine segment. Gyn Oncology called to evaluate.     3/29/2016 Biopsy    Endometrial biopsy in office, pathology revealed complex atypical hyperplasia with stromal breakdown.        4/1/2016 Initial Diagnosis    Endometrial cancer     4/19/2016 Surgery    RATLH, BSO, LND to several millimeters of residual disease.   Stage IIIC2     5/20/2016 - 11/15/2016 Chemotherapy    Carboplatin AUC 6, Paclitaxel 175 mg/m2 x 6 cycles in sandwich fashion with radiation in between cycles #3 and #4      - 9/2/2016 Radiation    Radiation completed. Pelvis and periaortic nodes received a total of 45 Gy in 25 fractions during sandwich therapy.  This was followed by vaginal brachytherapy: upper vagina treated utilizing cylinder and high dose rate iridium-192 to 18 Gy in 3 fractions.      11/21/2016 Imaging    Post-treatment PET/CT negative for disease     12/8/2021 Imaging    CT scan chest abdomen and pelvis: Large bowel obstruction, left chest wall mass     12/10/2021 Surgery    Exploratory laparotomy, colonic resection, side-to-side anastomosis for large bowel obstruction    Pathology showed metastatic adenocarcinoma at the colon.          1/10/2022 Molecular Testing    CARIS results:  MMR proficient/MSI stable--level 2 benefit pembrolizumab + lenvima  TMB high--level 2 benefit pembrolizumab alone  ER/MO+--level 3 benefit endocrine therapy  BRCA 1/2 negative     4/29/2022 Imaging    CT abdomen pelvis:  1. No evidence of abdominopelvic metastatic disease  2. Cholelithiasis  3. Colonic diverticulosis  4. Status post partial colon resection and hysterectomy     Bilateral malignant neoplasm of breast in female, estrogen receptor positive   4/13/2022 Initial Diagnosis    Bilateral malignant neoplasm of breast in female, estrogen receptor positive (HCC)     4/13/2022 Cancer Staged    Staging form: Breast, AJCC 8th Edition  - Pathologic: Stage IA (pT1c, pN0, cM0, G2, ER+, MO+, HER2-) - Signed by Sofia Fam MD on 4/13/2022 5/12/2022 - 6/2/2022 Radiation    Radiation OncologyTreatment Course:  Cindy Sage received 4005 cGy in 15 fractions to bilateral breasts via External Beam Radiation - EBRT.           Past Medical History:   Diagnosis Date    Bilateral malignant neoplasm of breast in female, estrogen receptor positive 4/13/2022    Drug therapy 2016    For endometrial CA in 2016    Elevated cholesterol     Endometrial cancer 04/2016    Stage IIIC2     GERD (gastroesophageal reflux disease)     HTN (hypertension)     Hx of radiation therapy 2016    For endometrial CA 2016    Stroke 02/2016    No residual deficits.  Takes .       Past  "Surgical History:   Procedure Laterality Date    APPENDECTOMY      Ex lap    BREAST BIOPSY Left 2016    BREAST LUMPECTOMY Bilateral 2022    2-Left; 1-Right    COLON SURGERY N/A 12/10/2021    Exploratory Laparotamy; Transverse Colon resect w/ re-anastimosis.    COLOSTOMY N/A 12/10/2021    Procedure: EXPLORATORY LAPAROTOMY, TRANSVERSE COLONIC RESECTION, REANASTIMOSIS, AND MOBILIZATION OF SPLENIC FLEXURE;  Surgeon: Daja Ma MD;  Location: [facility];  Service: Gynecology Oncology;  Laterality: N/A;    FOOT SURGERY      OOPHORECTOMY      TONSILLECTOMY      TOTAL LAPAROSCOPIC HYSTERECTOMY SALPINGO OOPHORECTOMY Bilateral 04/19/2016    RATLH, BSO, LND       MEDICATIONS: The current medication list was reviewed with the patient and updated in the EMR this date per the Medical Assistant. Medication dosages and frequencies were confirmed to be accurate.      Allergies:  is allergic to strawberry extract.    Social History:   Social History     Socioeconomic History    Marital status:    Tobacco Use    Smoking status: Never     Passive exposure: Past    Smokeless tobacco: Never   Vaping Use    Vaping status: Never Used   Substance and Sexual Activity    Alcohol use: Yes    Drug use: Never    Sexual activity: Defer     Review of Systems  Please refer to history of present illness.  Review of systems otherwise negative.    Physical Exam    Vitals:    07/10/24 1027   BP: 160/97   Pulse: 54   Resp: 18   Temp: 97.3 °F (36.3 °C)   TempSrc: Temporal   SpO2: 98%   Weight: 59.6 kg (131 lb 8 oz)   Height: 162.6 cm (64\")   PainSc: 0-No pain     Body mass index is 22.57 kg/m².  Wt Readings from Last 3 Encounters:   07/10/24 59.6 kg (131 lb 8 oz)   05/15/24 61.7 kg (136 lb)   01/15/24 61.7 kg (136 lb)     GENERAL: Alert, well-appearing female appearing her stated age who is in no apparent distress.   HEENT: Sclera anicteric. Head normocephalic, atraumatic. Mucus membranes moist.   NECK: Trachea midline, supple.  BREASTS: " Deferred  CARDIOVASCULAR: Normal rate, regular rhythm, no murmurs, rubs, or gallops.  No peripheral edema.  RESPIRATORY: Clear to auscultation bilaterally, normal respiratory effort on room air  GASTROINTESTINAL:  Abdomen is soft, nontender, non-distended, no rebound or guarding, no masses, or hernias. No HSM.  Healed vertical midline incision.  SKIN:  Warm, dry, well-perfused.  All visible areas intact.  No rashes, lesions, ulcers.  PSYCHIATRIC: AO x3, with appropriate affect, normal thought processes.  NEUROLOGIC: No focal deficits.  Moves extremities well.  MUSCULOSKELETAL: Normal gait and station.   EXTREMITIES:   No cyanosis, clubbing, symmetric.  LYMPHATICS:  No cervical or inguinal adenopathy noted.     PELVIC exam:  external genitalia free from lesion.  On speculum exam, vaginal cuff free from disease.  No blood noted.  Uterus, cervix, adnexa are absent.  No mass.. Radiation changes noted.  RV exam deferred.    ECOG PS 0    PROCEDURES:  none    Diagnostic Data:      Lab Results   Component Value Date    WBC 4.52 05/15/2024    HGB 11.5 (L) 05/15/2024    HCT 36.0 05/15/2024    MCV 90.0 05/15/2024     05/15/2024    NEUTROABS 3.05 05/15/2024    GLUCOSE 98 05/15/2024    BUN 9 05/15/2024    CREATININE 0.88 05/15/2024    EGFRIFNONA 51 (L) 01/26/2022    EGFRIFAFRI 62 01/26/2022     05/15/2024    K 5.1 05/15/2024     05/15/2024    CO2 25.0 05/15/2024    MG 1.9 11/11/2022    PHOS 3.8 12/15/2021    CALCIUM 9.3 05/15/2024    ALBUMIN 4.2 05/15/2024    AST 17 05/15/2024    ALT 15 05/15/2024    BILITOT 0.4 05/15/2024     Lab Results   Component Value Date     12.8 08/04/2023     12.9 05/03/2023     12.6 02/03/2023     15.5 10/19/2022     13.3 07/20/2022     27.4 11/17/2021     16.6 05/13/2021     14.4 11/12/2020     16.3 05/12/2020     16.4 09/26/2019         Assessment & Plan   This is a 79 y.o. woman with history of recurrent endometrial cancer and  history of Stage IA breast cancer presenting for follow up.    Encounter Diagnosis   Name Primary?    Endometrial cancer Yes     Recurrent endometrial cancer in the setting of new Stage IA breast cancer, DAVION  - ER positive; Continue anastrazole for maintenance  -  ordered, most recent normal Aug 2023  - CT scan 1/2024    Routine health screening  UTD    Pain assessment was performed today as a part of patient’s care.  For patients with pain related to surgery, gynecologic malignancy or cancer treatment, the plan is as noted in the assessment/plan.  For patients with pain not related to these issues, they are to seek any further needed care from a more appropriate provider, such as PCP.      Orders Placed This Encounter   Procedures    CT Abdomen Pelvis With Contrast     Standing Status:   Future     Standing Expiration Date:   7/10/2025     Order Specific Question:   Will Oral Contrast be needed for this procedure?     Answer:   Yes     Order Specific Question:   Release to patient     Answer:   Routine Release [[id number]]     Order Specific Question:   Reason for Exam:     Answer:   history of recurrent endometrial cancer     FOLLOW UP: 6 months with APRN    I spent 21 minutes caring for Cindy on this date of service. This time includes time spent by me in the following activities: preparing for the visit, reviewing tests, performing a medically appropriate examination and/or evaluation, counseling and educating the patient/family/caregiver, referring and communicating with other health care professionals, documenting information in the medical record, and ordering test(s)      Daja Ma MD  07/11/24  17:30 EST

## 2024-07-24 DIAGNOSIS — G45.9 TIA (TRANSIENT ISCHEMIC ATTACK): ICD-10-CM

## 2024-07-24 DIAGNOSIS — I66.01 STENOSIS OF RIGHT MIDDLE CEREBRAL ARTERY: ICD-10-CM

## 2024-07-24 RX ORDER — SIMVASTATIN 40 MG
TABLET ORAL
Qty: 90 TABLET | Refills: 0 | Status: SHIPPED | OUTPATIENT
Start: 2024-07-24

## 2024-07-24 RX ORDER — POTASSIUM CHLORIDE 20MEQ/15ML
LIQUID (ML) ORAL
Qty: 473 ML | Refills: 0 | Status: SHIPPED | OUTPATIENT
Start: 2024-07-24

## 2024-07-31 ENCOUNTER — HOSPITAL ENCOUNTER (OUTPATIENT)
Dept: CT IMAGING | Facility: HOSPITAL | Age: 79
Discharge: HOME | End: 2024-07-31
Admitting: OBSTETRICS & GYNECOLOGY
Payer: MEDICARE

## 2024-07-31 DIAGNOSIS — C54.1 ENDOMETRIAL CANCER: ICD-10-CM

## 2024-07-31 PROCEDURE — 25510000001 IOPAMIDOL 61 % SOLUTION: Performed by: OBSTETRICS & GYNECOLOGY

## 2024-07-31 PROCEDURE — 74177 CT ABD & PELVIS W/CONTRAST: CPT

## 2024-07-31 RX ADMIN — IOPAMIDOL 85 ML: 612 INJECTION, SOLUTION INTRAVENOUS at 11:39

## 2024-08-02 ENCOUNTER — TELEPHONE (OUTPATIENT)
Dept: GYNECOLOGIC ONCOLOGY | Facility: CLINIC | Age: 79
End: 2024-08-02
Payer: MEDICARE

## 2024-08-02 NOTE — TELEPHONE ENCOUNTER
----- Message from Daja Ma sent at 8/1/2024  5:33 PM EDT -----  Please notify pt that CT looks good -thanks!  ----- Message -----  From: [facility] [place] [facility]  Sent: 8/1/2024   3:05 PM EDT  To: Daja Ma MD

## 2024-08-02 NOTE — TELEPHONE ENCOUNTER
Attempted to call pt to give results per Dr. Ma    The phone has a call screening system in place and is not accepting calls from our number    Unable to give results.

## 2024-08-05 DIAGNOSIS — I10 ESSENTIAL HYPERTENSION: ICD-10-CM

## 2024-08-05 RX ORDER — LOSARTAN POTASSIUM 25 MG/1
25 TABLET ORAL DAILY
Qty: 90 TABLET | Refills: 1 | Status: SHIPPED | OUTPATIENT
Start: 2024-08-05

## 2024-08-09 ENCOUNTER — HOSPITAL ENCOUNTER (OUTPATIENT)
Dept: MAMMOGRAPHY | Facility: HOSPITAL | Age: 79
Discharge: HOME | End: 2024-08-09
Payer: MEDICARE

## 2024-08-09 DIAGNOSIS — R92.8 ABNORMAL MAMMOGRAM: ICD-10-CM

## 2024-08-09 PROCEDURE — 77066 DX MAMMO INCL CAD BI: CPT

## 2024-08-17 ENCOUNTER — HOSPITAL ENCOUNTER (OUTPATIENT)
Facility: HOSPITAL | Age: 79
Setting detail: OBSERVATION
Discharge: HOME | End: 2024-08-19
Attending: EMERGENCY MEDICINE | Admitting: STUDENT IN AN ORGANIZED HEALTH CARE EDUCATION/TRAINING PROGRAM
Payer: MEDICARE

## 2024-08-17 ENCOUNTER — APPOINTMENT (OUTPATIENT)
Dept: CT IMAGING | Facility: HOSPITAL | Age: 79
End: 2024-08-17
Payer: MEDICARE

## 2024-08-17 DIAGNOSIS — N39.0 ACUTE UTI: ICD-10-CM

## 2024-08-17 DIAGNOSIS — K56.609 SMALL BOWEL OBSTRUCTION: Primary | ICD-10-CM

## 2024-08-17 PROBLEM — K56.600 PARTIAL SMALL BOWEL OBSTRUCTION: Status: ACTIVE | Noted: 2024-08-17

## 2024-08-17 LAB
ALBUMIN SERPL-MCNC: 4 G/DL (ref 3.5–5.2)
ALBUMIN/GLOB SERPL: 1.5 G/DL
ALP SERPL-CCNC: 87 U/L (ref 39–117)
ALT SERPL W P-5'-P-CCNC: 9 U/L (ref 1–33)
ANION GAP SERPL CALCULATED.3IONS-SCNC: 13 MMOL/L (ref 5–15)
AST SERPL-CCNC: 17 U/L (ref 1–32)
BACTERIA UR QL AUTO: ABNORMAL /HPF
BASOPHILS # BLD AUTO: 0.02 10*3/MM3 (ref 0–0.2)
BASOPHILS NFR BLD AUTO: 0.3 % (ref 0–1.5)
BILIRUB SERPL-MCNC: 0.7 MG/DL (ref 0–1.2)
BILIRUB UR QL STRIP: NEGATIVE
BUN SERPL-MCNC: 6 MG/DL (ref 8–23)
BUN/CREAT SERPL: 6.5 (ref 7–25)
CALCIUM SPEC-SCNC: 9.4 MG/DL (ref 8.6–10.5)
CHLORIDE SERPL-SCNC: 96 MMOL/L (ref 98–107)
CLARITY UR: CLEAR
CO2 SERPL-SCNC: 23 MMOL/L (ref 22–29)
COLOR UR: YELLOW
CREAT SERPL-MCNC: 0.92 MG/DL (ref 0.57–1)
D-LACTATE SERPL-SCNC: 1.6 MMOL/L (ref 0.5–2)
D-LACTATE SERPL-SCNC: 2.5 MMOL/L (ref 0.5–2)
DEPRECATED RDW RBC AUTO: 51.7 FL (ref 37–54)
EGFRCR SERPLBLD CKD-EPI 2021: 63.1 ML/MIN/1.73
EOSINOPHIL # BLD AUTO: 0.03 10*3/MM3 (ref 0–0.4)
EOSINOPHIL NFR BLD AUTO: 0.4 % (ref 0.3–6.2)
ERYTHROCYTE [DISTWIDTH] IN BLOOD BY AUTOMATED COUNT: 15.2 % (ref 12.3–15.4)
GLOBULIN UR ELPH-MCNC: 2.6 GM/DL
GLUCOSE SERPL-MCNC: 112 MG/DL (ref 65–99)
GLUCOSE UR STRIP-MCNC: NEGATIVE MG/DL
HCT VFR BLD AUTO: 35.1 % (ref 34–46.6)
HGB BLD-MCNC: 11.4 G/DL (ref 12–15.9)
HGB UR QL STRIP.AUTO: NEGATIVE
HYALINE CASTS UR QL AUTO: ABNORMAL /LPF
IMM GRANULOCYTES # BLD AUTO: 0.02 10*3/MM3 (ref 0–0.05)
IMM GRANULOCYTES NFR BLD AUTO: 0.3 % (ref 0–0.5)
KETONES UR QL STRIP: ABNORMAL
LEUKOCYTE ESTERASE UR QL STRIP.AUTO: ABNORMAL
LIPASE SERPL-CCNC: 37 U/L (ref 13–60)
LYMPHOCYTES # BLD AUTO: 0.33 10*3/MM3 (ref 0.7–3.1)
LYMPHOCYTES NFR BLD AUTO: 4.2 % (ref 19.6–45.3)
MCH RBC QN AUTO: 29.8 PG (ref 26.6–33)
MCHC RBC AUTO-ENTMCNC: 32.5 G/DL (ref 31.5–35.7)
MCV RBC AUTO: 91.9 FL (ref 79–97)
MONOCYTES # BLD AUTO: 0.45 10*3/MM3 (ref 0.1–0.9)
MONOCYTES NFR BLD AUTO: 5.8 % (ref 5–12)
NEUTROPHILS NFR BLD AUTO: 6.96 10*3/MM3 (ref 1.7–7)
NEUTROPHILS NFR BLD AUTO: 89 % (ref 42.7–76)
NITRITE UR QL STRIP: NEGATIVE
NRBC BLD AUTO-RTO: 0 /100 WBC (ref 0–0.2)
PH UR STRIP.AUTO: 7.5 [PH] (ref 5–8)
PLATELET # BLD AUTO: 190 10*3/MM3 (ref 140–450)
PMV BLD AUTO: 8.8 FL (ref 6–12)
POTASSIUM SERPL-SCNC: 3.5 MMOL/L (ref 3.5–5.2)
PROT SERPL-MCNC: 6.6 G/DL (ref 6–8.5)
PROT UR QL STRIP: ABNORMAL
QT INTERVAL: 428 MS
QTC INTERVAL: 481 MS
RBC # BLD AUTO: 3.82 10*6/MM3 (ref 3.77–5.28)
RBC # UR STRIP: ABNORMAL /HPF
REF LAB TEST METHOD: ABNORMAL
SODIUM SERPL-SCNC: 132 MMOL/L (ref 136–145)
SP GR UR STRIP: 1.02 (ref 1–1.03)
SQUAMOUS #/AREA URNS HPF: ABNORMAL /HPF
TROPONIN T SERPL HS-MCNC: 12 NG/L
UROBILINOGEN UR QL STRIP: ABNORMAL
WBC # UR STRIP: ABNORMAL /HPF
WBC NRBC COR # BLD AUTO: 7.81 10*3/MM3 (ref 3.4–10.8)

## 2024-08-17 PROCEDURE — G0378 HOSPITAL OBSERVATION PER HR: HCPCS

## 2024-08-17 PROCEDURE — 25010000002 ONDANSETRON PER 1 MG: Performed by: STUDENT IN AN ORGANIZED HEALTH CARE EDUCATION/TRAINING PROGRAM

## 2024-08-17 PROCEDURE — 85025 COMPLETE CBC W/AUTO DIFF WBC: CPT | Performed by: NURSE PRACTITIONER

## 2024-08-17 PROCEDURE — 87040 BLOOD CULTURE FOR BACTERIA: CPT | Performed by: STUDENT IN AN ORGANIZED HEALTH CARE EDUCATION/TRAINING PROGRAM

## 2024-08-17 PROCEDURE — 87088 URINE BACTERIA CULTURE: CPT | Performed by: NURSE PRACTITIONER

## 2024-08-17 PROCEDURE — 93005 ELECTROCARDIOGRAM TRACING: CPT | Performed by: NURSE PRACTITIONER

## 2024-08-17 PROCEDURE — 96372 THER/PROPH/DIAG INJ SC/IM: CPT

## 2024-08-17 PROCEDURE — 87086 URINE CULTURE/COLONY COUNT: CPT | Performed by: NURSE PRACTITIONER

## 2024-08-17 PROCEDURE — 96366 THER/PROPH/DIAG IV INF ADDON: CPT

## 2024-08-17 PROCEDURE — 99285 EMERGENCY DEPT VISIT HI MDM: CPT

## 2024-08-17 PROCEDURE — 25010000002 ENOXAPARIN PER 10 MG: Performed by: STUDENT IN AN ORGANIZED HEALTH CARE EDUCATION/TRAINING PROGRAM

## 2024-08-17 PROCEDURE — 25510000001 IOPAMIDOL 61 % SOLUTION: Performed by: EMERGENCY MEDICINE

## 2024-08-17 PROCEDURE — 83690 ASSAY OF LIPASE: CPT | Performed by: NURSE PRACTITIONER

## 2024-08-17 PROCEDURE — 87186 SC STD MICRODIL/AGAR DIL: CPT | Performed by: NURSE PRACTITIONER

## 2024-08-17 PROCEDURE — 84484 ASSAY OF TROPONIN QUANT: CPT | Performed by: NURSE PRACTITIONER

## 2024-08-17 PROCEDURE — 96365 THER/PROPH/DIAG IV INF INIT: CPT

## 2024-08-17 PROCEDURE — 96376 TX/PRO/DX INJ SAME DRUG ADON: CPT

## 2024-08-17 PROCEDURE — P9612 CATHETERIZE FOR URINE SPEC: HCPCS

## 2024-08-17 PROCEDURE — 25010000002 CEFTRIAXONE PER 250 MG: Performed by: NURSE PRACTITIONER

## 2024-08-17 PROCEDURE — 25810000003 SODIUM CHLORIDE 0.9 % SOLUTION: Performed by: STUDENT IN AN ORGANIZED HEALTH CARE EDUCATION/TRAINING PROGRAM

## 2024-08-17 PROCEDURE — 25010000002 POTASSIUM CHLORIDE 10 MEQ/100ML SOLUTION: Performed by: STUDENT IN AN ORGANIZED HEALTH CARE EDUCATION/TRAINING PROGRAM

## 2024-08-17 PROCEDURE — 80053 COMPREHEN METABOLIC PANEL: CPT | Performed by: NURSE PRACTITIONER

## 2024-08-17 PROCEDURE — 99222 1ST HOSP IP/OBS MODERATE 55: CPT | Performed by: STUDENT IN AN ORGANIZED HEALTH CARE EDUCATION/TRAINING PROGRAM

## 2024-08-17 PROCEDURE — 83605 ASSAY OF LACTIC ACID: CPT | Performed by: NURSE PRACTITIONER

## 2024-08-17 PROCEDURE — 74177 CT ABD & PELVIS W/CONTRAST: CPT

## 2024-08-17 PROCEDURE — 96367 TX/PROPH/DG ADDL SEQ IV INF: CPT

## 2024-08-17 PROCEDURE — 81001 URINALYSIS AUTO W/SCOPE: CPT | Performed by: EMERGENCY MEDICINE

## 2024-08-17 PROCEDURE — 96375 TX/PRO/DX INJ NEW DRUG ADDON: CPT

## 2024-08-17 RX ORDER — BISACODYL 10 MG
10 SUPPOSITORY, RECTAL RECTAL DAILY PRN
Status: DISCONTINUED | OUTPATIENT
Start: 2024-08-17 | End: 2024-08-19

## 2024-08-17 RX ORDER — SODIUM CHLORIDE 0.9 % (FLUSH) 0.9 %
10 SYRINGE (ML) INJECTION AS NEEDED
Status: DISCONTINUED | OUTPATIENT
Start: 2024-08-17 | End: 2024-08-19 | Stop reason: HOSPADM

## 2024-08-17 RX ORDER — AMOXICILLIN 250 MG
2 CAPSULE ORAL 2 TIMES DAILY
Status: DISCONTINUED | OUTPATIENT
Start: 2024-08-17 | End: 2024-08-19

## 2024-08-17 RX ORDER — CLOPIDOGREL BISULFATE 75 MG/1
75 TABLET ORAL DAILY
Status: DISCONTINUED | OUTPATIENT
Start: 2024-08-17 | End: 2024-08-19 | Stop reason: HOSPADM

## 2024-08-17 RX ORDER — ASPIRIN 81 MG/1
81 TABLET ORAL DAILY
Status: DISCONTINUED | OUTPATIENT
Start: 2024-08-17 | End: 2024-08-19 | Stop reason: HOSPADM

## 2024-08-17 RX ORDER — BISACODYL 5 MG/1
5 TABLET, DELAYED RELEASE ORAL DAILY PRN
Status: DISCONTINUED | OUTPATIENT
Start: 2024-08-17 | End: 2024-08-19

## 2024-08-17 RX ORDER — ATORVASTATIN CALCIUM 10 MG/1
10 TABLET, FILM COATED ORAL DAILY
Status: DISCONTINUED | OUTPATIENT
Start: 2024-08-17 | End: 2024-08-19 | Stop reason: HOSPADM

## 2024-08-17 RX ORDER — ONDANSETRON 4 MG/1
4 TABLET, ORALLY DISINTEGRATING ORAL EVERY 6 HOURS PRN
Status: DISCONTINUED | OUTPATIENT
Start: 2024-08-17 | End: 2024-08-19 | Stop reason: HOSPADM

## 2024-08-17 RX ORDER — ONDANSETRON 2 MG/ML
4 INJECTION INTRAMUSCULAR; INTRAVENOUS EVERY 6 HOURS PRN
Status: DISCONTINUED | OUTPATIENT
Start: 2024-08-17 | End: 2024-08-19 | Stop reason: HOSPADM

## 2024-08-17 RX ORDER — HYDROCHLOROTHIAZIDE 25 MG/1
25 TABLET ORAL DAILY
Status: DISCONTINUED | OUTPATIENT
Start: 2024-08-17 | End: 2024-08-19 | Stop reason: HOSPADM

## 2024-08-17 RX ORDER — ACETAMINOPHEN 160 MG/5ML
650 SOLUTION ORAL EVERY 4 HOURS PRN
Status: DISCONTINUED | OUTPATIENT
Start: 2024-08-17 | End: 2024-08-19 | Stop reason: HOSPADM

## 2024-08-17 RX ORDER — POLYETHYLENE GLYCOL 3350 17 G/17G
17 POWDER, FOR SOLUTION ORAL DAILY PRN
COMMUNITY

## 2024-08-17 RX ORDER — SODIUM CHLORIDE 9 MG/ML
100 INJECTION, SOLUTION INTRAVENOUS CONTINUOUS
Status: DISCONTINUED | OUTPATIENT
Start: 2024-08-17 | End: 2024-08-19

## 2024-08-17 RX ORDER — POTASSIUM CHLORIDE 20 MEQ/1
40 TABLET, EXTENDED RELEASE ORAL EVERY 4 HOURS
Status: DISCONTINUED | OUTPATIENT
Start: 2024-08-17 | End: 2024-08-17

## 2024-08-17 RX ORDER — ANASTROZOLE 1 MG/1
1 TABLET ORAL DAILY
Status: DISCONTINUED | OUTPATIENT
Start: 2024-08-17 | End: 2024-08-19 | Stop reason: HOSPADM

## 2024-08-17 RX ORDER — ENOXAPARIN SODIUM 100 MG/ML
40 INJECTION SUBCUTANEOUS DAILY
Status: DISCONTINUED | OUTPATIENT
Start: 2024-08-17 | End: 2024-08-19 | Stop reason: HOSPADM

## 2024-08-17 RX ORDER — SODIUM CHLORIDE 0.9 % (FLUSH) 0.9 %
10 SYRINGE (ML) INJECTION EVERY 12 HOURS SCHEDULED
Status: DISCONTINUED | OUTPATIENT
Start: 2024-08-17 | End: 2024-08-19 | Stop reason: HOSPADM

## 2024-08-17 RX ORDER — SODIUM CHLORIDE 9 MG/ML
40 INJECTION, SOLUTION INTRAVENOUS AS NEEDED
Status: DISCONTINUED | OUTPATIENT
Start: 2024-08-17 | End: 2024-08-19 | Stop reason: HOSPADM

## 2024-08-17 RX ORDER — POLYETHYLENE GLYCOL 3350 17 G/17G
17 POWDER, FOR SOLUTION ORAL DAILY PRN
Status: DISCONTINUED | OUTPATIENT
Start: 2024-08-17 | End: 2024-08-19

## 2024-08-17 RX ORDER — POTASSIUM CHLORIDE 7.45 MG/ML
10 INJECTION INTRAVENOUS
Status: COMPLETED | OUTPATIENT
Start: 2024-08-17 | End: 2024-08-18

## 2024-08-17 RX ORDER — LOSARTAN POTASSIUM 25 MG/1
25 TABLET ORAL DAILY
Status: DISCONTINUED | OUTPATIENT
Start: 2024-08-17 | End: 2024-08-19 | Stop reason: HOSPADM

## 2024-08-17 RX ORDER — ACETAMINOPHEN 325 MG/1
650 TABLET ORAL EVERY 4 HOURS PRN
Status: DISCONTINUED | OUTPATIENT
Start: 2024-08-17 | End: 2024-08-19 | Stop reason: HOSPADM

## 2024-08-17 RX ORDER — ACETAMINOPHEN 650 MG/1
650 SUPPOSITORY RECTAL EVERY 4 HOURS PRN
Status: DISCONTINUED | OUTPATIENT
Start: 2024-08-17 | End: 2024-08-19 | Stop reason: HOSPADM

## 2024-08-17 RX ADMIN — SODIUM CHLORIDE 100 ML/HR: 9 INJECTION, SOLUTION INTRAVENOUS at 16:48

## 2024-08-17 RX ADMIN — Medication 10 ML: at 16:51

## 2024-08-17 RX ADMIN — SENNOSIDES AND DOCUSATE SODIUM 2 TABLET: 50; 8.6 TABLET ORAL at 21:35

## 2024-08-17 RX ADMIN — IOPAMIDOL 80 ML: 612 INJECTION, SOLUTION INTRAVENOUS at 11:40

## 2024-08-17 RX ADMIN — ATORVASTATIN CALCIUM 10 MG: 10 TABLET, FILM COATED ORAL at 16:49

## 2024-08-17 RX ADMIN — ONDANSETRON 4 MG: 2 INJECTION INTRAMUSCULAR; INTRAVENOUS at 15:58

## 2024-08-17 RX ADMIN — POTASSIUM CHLORIDE 10 MEQ: 7.46 INJECTION, SOLUTION INTRAVENOUS at 21:33

## 2024-08-17 RX ADMIN — SODIUM CHLORIDE 1000 MG: 900 INJECTION INTRAVENOUS at 12:00

## 2024-08-17 RX ADMIN — ASPIRIN 81 MG: 81 TABLET, COATED ORAL at 16:49

## 2024-08-17 RX ADMIN — POTASSIUM CHLORIDE 10 MEQ: 7.46 INJECTION, SOLUTION INTRAVENOUS at 23:09

## 2024-08-17 RX ADMIN — CLOPIDOGREL BISULFATE 75 MG: 75 TABLET ORAL at 16:49

## 2024-08-17 RX ADMIN — ENOXAPARIN SODIUM 40 MG: 100 INJECTION SUBCUTANEOUS at 16:48

## 2024-08-17 RX ADMIN — POTASSIUM CHLORIDE 10 MEQ: 7.46 INJECTION, SOLUTION INTRAVENOUS at 18:28

## 2024-08-17 RX ADMIN — ONDANSETRON 4 MG: 2 INJECTION INTRAMUSCULAR; INTRAVENOUS at 23:09

## 2024-08-17 RX ADMIN — ANASTROZOLE 1 MG: 1 TABLET ORAL at 16:49

## 2024-08-17 NOTE — H&P
[facility] Medicine Services  HISTORY AND PHYSICAL    Patient Name: Cindy Sage  : 1945  MRN: [id number]  Primary Care Physician: Jose Carrillo MD  Date of admission: 2024      Subjective   Subjective     Chief Complaint:  Abdominal pain    HPI:  Cindy Sage is a 79 y.o. female with a history of endometrial cancer, breast cancer CVA, hypertension, who is presenting with acute onset abdominal pain.  She states her abdominal pain suddenly became severe this morning, she endorses a poor appetite but nausea is controlled and has not had any vomiting.  She states she has hide on the off issues with abdominal symptoms such as constipation and loose stools.  Denies any fevers chills or other systemic infectious type symptoms.  Denies any dysuria or changes in urinary habits.  No recent abdominal surgery      Personal History     Past Medical History:   Diagnosis Date    Bilateral malignant neoplasm of breast in female, estrogen receptor positive 2022    Drug therapy     For endometrial CA in 2016    Elevated cholesterol     Endometrial cancer 2016    Stage IIIC2     GERD (gastroesophageal reflux disease)     HTN (hypertension)     Hx of radiation therapy 2016    For endometrial CA 2016    Stroke 2016    No residual deficits.  Takes .         Oncology Problem List:  Bilateral malignant neoplasm of breast in female, estrogen receptor   positive (2022; Status: Active)  Recurrent cancer (12/15/2021; Status: Active)  Endometrial cancer (2016; Status: Active)    Oncology/Hematology History   Endometrial cancer   2016 Initial Diagnosis    Endometrial cancer     2016 Surgery    RATLH, BSO, LND to several millimeters of residual disease.   Stage IIIC2     2016 - 11/15/2016 Chemotherapy    Carboplatin AUC 6, Paclitaxel 175 mg/m2 x 6 cycles in sandwich fashion with radiation in between cycles #3 and #4      - 2016  Radiation    Radiation completed. Pelvis and periaortic nodes received a total of 45 Gy in 25 fractions during sandwich therapy. This was followed by vaginal brachytherapy: upper vagina treated utilizing cylinder and high dose rate iridium-192 to 18 Gy in 3 fractions.      12/10/2021 Surgery    Exploratory laparotomy, colonic resection, side-to-side anastomosis for large bowel obstruction    Pathology showed metastatic adenocarcinoma at the colon.          Bilateral malignant neoplasm of breast in female, estrogen receptor positive   4/13/2022 Initial Diagnosis    Bilateral malignant neoplasm of breast in female, estrogen receptor positive (HCC)     4/13/2022 Cancer Staged    Staging form: Breast, AJCC 8th Edition  - Pathologic: Stage IA (pT1c, pN0, cM0, G2, ER+, OK+, HER2-) - Signed by Sofia Fam MD on 4/13/2022 5/12/2022 - 6/2/2022 Radiation    Radiation OncologyTreatment Course:  Cindy Sage received 4005 cGy in 15 fractions to bilateral breasts via External Beam Radiation - EBRT.       Past Surgical History:   Procedure Laterality Date    APPENDECTOMY      Ex lap    BREAST BIOPSY Left 2016    BREAST LUMPECTOMY Bilateral 2022    2-Left; 1-Right    COLON SURGERY N/A 12/10/2021    Exploratory Laparotamy; Transverse Colon resect w/ re-anastimosis.    COLOSTOMY N/A 12/10/2021    Procedure: EXPLORATORY LAPAROTOMY, TRANSVERSE COLONIC RESECTION, REANASTIMOSIS, AND MOBILIZATION OF SPLENIC FLEXURE;  Surgeon: Daja Ma MD;  Location: [facility];  Service: Gynecology Oncology;  Laterality: N/A;    FOOT SURGERY      OOPHORECTOMY      TONSILLECTOMY      TOTAL LAPAROSCOPIC HYSTERECTOMY SALPINGO OOPHORECTOMY Bilateral 04/19/2016    YANN, ROGE, LND       Family History: family history includes Alcohol abuse in her father; Arthritis in her mother; Breast cancer in her mother and sister; Cancer in her mother; Osteoporosis in her mother; Stroke in her maternal aunt.     Social History:  reports that she  has never smoked. She has been exposed to tobacco smoke. She has never used smokeless tobacco. She reports current alcohol use. She reports that she does not use drugs.  Social History     Social History Narrative    Lives at home in [place] with . Completes all ADLs with no residual deficits from prior stroke       Medications:  Available home medication information reviewed.  Cholecalciferol, acetaminophen, anastrozole, aspirin, clopidogrel, hydroCHLOROthiazide, losartan, omeprazole, potassium chloride, simvastatin, and vitamin B-12    Allergies   Allergen Reactions    Strawberry Extract Swelling       Objective   Objective     Vital Signs:   Temp:  [99.2 °F (37.3 °C)] 99.2 °F (37.3 °C)  Heart Rate:  [76-94] 91  Resp:  [16] 16  BP: (127-152)/(53-70) 152/70       Physical Exam   Constitutional: No acute distress, awake, alert  HENT: NCAT, mucous membranes moist  Respiratory: Clear to auscultation bilaterally, respiratory effort normal   Cardiovascular: RRR, no murmurs, rubs, or gallops  Gastrointestinal: Positive bowel sounds, soft, some tenderness to palpation of LLQ, nondistended  Musculoskeletal: No bilateral ankle edema  Psychiatric: Appropriate affect, cooperative  Neurologic: Oriented x 3, no focal deficit, strength symmetric, speech clear, CN grossly intact  Skin: No rashes      Result Review:  I have personally reviewed the results from the time of this admission to 8/17/2024 13:41 EDT and agree with these findings:  [x]  Laboratory list / accordion  [x]  Microbiology  [x]  Radiology  [x]  EKG/Telemetry   []  Cardiology/Vascular   []  Pathology  []  Old records  []  Other:  Most notable findings include:   CMP ok, electrolytes wnl  Lactate elevated  WBC nl  Evidence of UTI on u/a  CT abd w mild/early developing distal SBO    LAB RESULTS:      Lab 08/17/24  1030   WBC 7.81   HEMOGLOBIN 11.4*   HEMATOCRIT 35.1   PLATELETS 190   NEUTROS ABS 6.96   IMMATURE GRANS (ABS) 0.02   LYMPHS ABS 0.33*   MONOS  ABS 0.45   EOS ABS 0.03   MCV 91.9   LACTATE 2.5*         Lab 08/17/24  1030   SODIUM 132*   POTASSIUM 3.5   CHLORIDE 96*   CO2 23.0   ANION GAP 13.0   BUN 6*   CREATININE 0.92   EGFR 63.1   GLUCOSE 112*   CALCIUM 9.4         Lab 08/17/24  1030   TOTAL PROTEIN 6.6   ALBUMIN 4.0   GLOBULIN 2.6   ALT (SGPT) 9   AST (SGOT) 17   BILIRUBIN 0.7   ALK PHOS 87   LIPASE 37         Lab 08/17/24  1030   HSTROP T 12                 UA          1/15/2024    12:35 1/15/2024    12:44 8/17/2024    11:04   Urinalysis   Squamous Epithelial Cells, UA 13-20   0-2    Specific Gravity, UA   1.016    Ketones, UA  Negative  15 mg/dL (1+)    Blood, UA   Negative    Leukocytes, UA  500 Aung/ul  Small (1+)    Nitrite, UA   Negative    RBC, UA 6-10   3-5    WBC, UA Too Numerous to Count   21-50    Bacteria, UA 3+   4+        Microbiology Results (last 10 days)       ** No results found for the last 240 hours. **            CT Abdomen Pelvis With Contrast    Result Date: 8/17/2024  CT ABDOMEN PELVIS W CONTRAST Date of Exam: 8/17/2024 11:38 AM EDT Indication: lower abd pain. Comparison: 7/31/2024 Technique: Axial CT images were obtained of the abdomen and pelvis following the uneventful intravenous administration of 80 mL Isovue-300. Reconstructed coronal and sagittal images were also obtained. Automated exposure control and iterative construction methods were used. Findings: Patient history today indicates lower abdominal pain for at least the past several days, some nausea and constipation. Prior history of colon cancer. Previous 7/31/2024 exam showed changes of multiple previous surgeries, and cholelithiasis, but no acute disease. Today's study shows fluid-filled proximal and mid small bowel in the upper range of normal diameter but not considered pathologically enlarged, the largest loops approximately 2.5 cm. There is a well-defined transition point in the right lower pelvis, image numbers 120 through 122, series 2, with some fecalization of  small bowel contents just proximal to this level, and markedly decompressed, airless distal ileum beyond it. Focal bowel wall thickening and enhancement at this level gives the impression  of a potential stricture, but no mass or inflammation is seen. Elsewhere, the included lower lungs appear clear of active disease. There is a small hiatal hernia. There is diffuse fatty liver change. Small gallstone is again seen in the otherwise normal-appearing gallbladder. Degree of pancreatic atrophy present is not unusual for patient's age. Spleen is not enlarged. Adrenal glands appear normal. There is expected renal cortical thinning for age. No upper abdominal free air, ascites or adenopathy is seen. Uterus and ovaries are not identified. There is a very small amount of free fluid in the cul-de-sac region, not considered normal in a post menopausal female, and perhaps related to the apparent low-level small bowel obstruction. Bladder is decompressed and normal. No mass or adenopathy is seen. Delayed venous phase images show no evidence of obstructive uropathy. Bladder distends normally with contrast. Bony structures appear to be intact..     Impression: Impression: 1. Findings suspicious for mild or early developing distal small bowel obstruction, with well-defined transition point in the right lower pelvis. 2. Small gallstone. No visible gallbladder inflammation or biliary ductal dilatation. 3. No other evidence of acute intra-abdominal or intrapelvic disease elsewhere. Electronically Signed: Adrián Ramirez MD  8/17/2024 12:09 PM EDT  Workstation ID: [id number]     Results for orders placed during the hospital encounter of 02/28/19    Adult Transthoracic Echo Complete W/ Cont if Necessary Per Protocol (With Agitated Saline)    Interpretation Summary  · Estimated EF = 60%.  · Left ventricular systolic function is normal.  · Trace to mild mitral regurgitation  · Trace tricuspid regurgitation  · No evidence of PFO or  ASD.      Assessment & Plan   Assessment & Plan       Partial small bowel obstruction    Hyperlipidemia    Hypertension    History of endometrial cancer    TIA (transient ischemic attack)    Bilateral malignant neoplasm of breast in female, estrogen receptor positive    Slow transit constipation    Cindy Sage is a 77 y/o F w hx of endometrial cancer, breast cancer (on maintenance treatment), HTN, HLD, GERD who is presenting with abdominal pain.  Imaging in the ED suspicious for mild/early developing distal small bowel obstruction with well-defined transition point in right lower pelvis also has some evidence of UTI although UA    Mild/developing distal small bowel obstruction  - Clinically appears overall stable with normotension, controlled abdominal pain  - Will plan for conservative measures for now with bowel rest, fluids, bowel regimen and pain control  - Keep n.p.o. for today  - Reassess abdominal exam and labs in the a.m. to see if can start on diet  - Not actively vomiting right now and nausea was pretty well-controlled so we will hold off on NG tube.  Abdominal exam also appears with a soft unguarded abdomen    UTI  - Started on ceftriaxone, continue and follow urine and blood cultures    Hypertension   Hyperlipidemia  -Hold on home antihypertensives as she is n.p.o. currently    History of CVA  - Okay to continue her home Plavix +ASA    Hx of endometrial cancer  Hx of breast cancer  --ok to cont her home anastrozole maintenance therapy      VTE Prophylaxis:  Pharmacologic VTE prophylaxis orders are signed & held.            CODE STATUS:    Code Status and Medical Interventions: CPR (Attempt to Resuscitate); Full Support   Ordered at: 08/17/24 1340     Code Status (Patient has no pulse and is not breathing):    CPR (Attempt to Resuscitate)     Medical Interventions (Patient has pulse or is breathing):    Full Support       Expected Discharge   Expected Discharge Date: 8/19/2024; Expected Discharge  Time:      Maria M Unger MD  08/17/24

## 2024-08-17 NOTE — Clinical Note
Level of Care: Telemetry [5]   Diagnosis: Partial small bowel obstruction [744668]   Admitting Physician: MAXINE VARELA [[id number]]

## 2024-08-17 NOTE — ED NOTES
Cindy Sage    Nursing Report ED to Floor:  Mental status: alert and oriented x4  Ambulatory status: uses walker at home  Oxygen Therapy:  room air  Cardiac Rhythm: not on tele, pulse rate regular  Admitted from: ED  Safety Concerns:  falls risk  Social Issues: n/a  ED Room #:  18    ED Nurse Phone Extension - [phone] or may call [phone].      HPI:   Chief Complaint   Patient presents with    Abdominal Pain       Past Medical History:  Past Medical History:   Diagnosis Date    Bilateral malignant neoplasm of breast in female, estrogen receptor positive 4/13/2022    Drug therapy 2016    For endometrial CA in 2016    Elevated cholesterol     Endometrial cancer 04/2016    Stage IIIC2     GERD (gastroesophageal reflux disease)     HTN (hypertension)     Hx of radiation therapy 2016    For endometrial CA 2016    Stroke 02/2016    No residual deficits.  Takes .        Past Surgical History:  Past Surgical History:   Procedure Laterality Date    APPENDECTOMY      Ex lap    BREAST BIOPSY Left 2016    BREAST LUMPECTOMY Bilateral 2022    2-Left; 1-Right    COLON SURGERY N/A 12/10/2021    Exploratory Laparotamy; Transverse Colon resect w/ re-anastimosis.    COLOSTOMY N/A 12/10/2021    Procedure: EXPLORATORY LAPAROTOMY, TRANSVERSE COLONIC RESECTION, REANASTIMOSIS, AND MOBILIZATION OF SPLENIC FLEXURE;  Surgeon: Daja Ma MD;  Location: [facility];  Service: Gynecology Oncology;  Laterality: N/A;    FOOT SURGERY      OOPHORECTOMY      TONSILLECTOMY      TOTAL LAPAROSCOPIC HYSTERECTOMY SALPINGO OOPHORECTOMY Bilateral 04/19/2016    ROGE LERNER, LND        Admitting Doctor:   Maria M Unger MD    Consulting Provider(s):  Consults       No orders found from 7/19/2024 to 8/18/2024.             Admitting Diagnosis:   The primary encounter diagnosis was Small bowel obstruction. A diagnosis of Acute UTI was also pertinent to this visit.    Most Recent Vitals:   Vitals:    08/17/24 1159 08/17/24 1200 08/17/24 1230  08/17/24 1300   BP:  133/59 134/56 152/70   BP Location:       Patient Position:       Pulse: 90 94 93 91   Resp:       Temp:       TempSrc:       SpO2: 93% 93% 94% 97%   Weight:       Height:           Active LDAs/IV Access:   Lines, Drains & Airways       Active LDAs       Name Placement date Placement time Site Days    Peripheral IV 08/17/24 0940 Anterior;Right Forearm 08/17/24  0940  Forearm  less than 1                    Labs (abnormal labs have a star):   Labs Reviewed   COMPREHENSIVE METABOLIC PANEL - Abnormal; Notable for the following components:       Result Value    Glucose 112 (*)     BUN 6 (*)     Sodium 132 (*)     Chloride 96 (*)     BUN/Creatinine Ratio 6.5 (*)     All other components within normal limits    Narrative:     GFR Normal >60  Chronic Kidney Disease <60  Kidney Failure <15    The GFR formula is only valid for adults with stable renal function between ages 18 and 70.   CBC WITH AUTO DIFFERENTIAL - Abnormal; Notable for the following components:    Hemoglobin 11.4 (*)     Neutrophil % 89.0 (*)     Lymphocyte % 4.2 (*)     Lymphocytes, Absolute 0.33 (*)     All other components within normal limits   LACTIC ACID, PLASMA - Abnormal; Notable for the following components:    Lactate 2.5 (*)     All other components within normal limits   URINALYSIS W/ MICROSCOPIC IF INDICATED (NO CULTURE) - Abnormal; Notable for the following components:    Ketones, UA 15 mg/dL (1+) (*)     Protein, UA Trace (*)     Leuk Esterase, UA Small (1+) (*)     All other components within normal limits   URINALYSIS, MICROSCOPIC ONLY - Abnormal; Notable for the following components:    RBC, UA 3-5 (*)     WBC, UA 21-50 (*)     Bacteria, UA 4+ (*)     All other components within normal limits   LIPASE - Normal   SINGLE HS TROPONIN T - Normal    Narrative:     High Sensitive Troponin T Reference Range:  <14.0 ng/L- Negative Female for AMI  <22.0 ng/L- Negative Male for AMI  >=14 - Abnormal Female indicating possible  myocardial injury.  >=22 - Abnormal Male indicating possible myocardial injury.   Clinicians would have to utilize clinical acumen, EKG, Troponin, and serial changes to determine if it is an Acute Myocardial Infarction or myocardial injury due to an underlying chronic condition.        URINE CULTURE   LACTIC ACID, REFLEX   CBC AND DIFFERENTIAL    Narrative:     The following orders were created for panel order CBC & Differential.  Procedure                               Abnormality         Status                     ---------                               -----------         ------                     CBC Auto Differential[[id number]]        Abnormal            Final result                 Please view results for these tests on the individual orders.       Meds Given in ED:   Medications   sodium chloride 0.9 % flush 10 mL (has no administration in time range)   cefTRIAXone (ROCEPHIN) 1,000 mg in sodium chloride 0.9 % 100 mL MBP (0 mg Intravenous Stopped 8/17/24 1233)   iopamidol (ISOVUE-300) 61 % injection 100 mL (80 mL Intravenous Given 8/17/24 1140)           Last NIH score:                                                          Dysphagia screening results:        Mikal Coma Scale:  No data recorded     CIWA:        Restraint Type:            Isolation Status:  No active isolations

## 2024-08-17 NOTE — ED PROVIDER NOTES
Subjective   History of Present Illness  Pleasant patient presents the ER for lower abdominal pain for the last several days perhaps longer.  She has had some nausea and some constipation.  EMS gave her some droperidol which did help.  She has a history of colon cancer and has seen Dr. Calvin in the past.  She tells me she does not think she has cancer any longer and she was just seeing her last month for repeat scans for follow-up.  She denies any chest pain difficulty breathing.        Review of Systems    Past Medical History:   Diagnosis Date    Bilateral malignant neoplasm of breast in female, estrogen receptor positive 4/13/2022    Drug therapy 2016    For endometrial CA in 2016    Elevated cholesterol     Endometrial cancer 04/2016    Stage IIIC2     GERD (gastroesophageal reflux disease)     HTN (hypertension)     Hx of radiation therapy 2016    For endometrial CA 2016    Stroke 02/2016    No residual deficits.  Takes .       Allergies   Allergen Reactions    Strawberry Extract Swelling       Past Surgical History:   Procedure Laterality Date    APPENDECTOMY      Ex lap    BREAST BIOPSY Left 2016    BREAST LUMPECTOMY Bilateral 2022    2-Left; 1-Right    COLON SURGERY N/A 12/10/2021    Exploratory Laparotamy; Transverse Colon resect w/ re-anastimosis.    COLOSTOMY N/A 12/10/2021    Procedure: EXPLORATORY LAPAROTOMY, TRANSVERSE COLONIC RESECTION, REANASTIMOSIS, AND MOBILIZATION OF SPLENIC FLEXURE;  Surgeon: Daja Ma MD;  Location: [facility];  Service: Gynecology Oncology;  Laterality: N/A;    FOOT SURGERY      OOPHORECTOMY      TONSILLECTOMY      TOTAL LAPAROSCOPIC HYSTERECTOMY SALPINGO OOPHORECTOMY Bilateral 04/19/2016    RATLH, BSO, LND       Family History   Problem Relation Age of Onset    Arthritis Mother     Breast cancer Mother         age unknown     Osteoporosis Mother     Cancer Mother     Alcohol abuse Father     Stroke Maternal Aunt     Breast cancer Sister     Ovarian cancer Neg  Hx     Endometrial cancer Neg Hx        Social History     Socioeconomic History    Marital status:    Tobacco Use    Smoking status: Never     Passive exposure: Past    Smokeless tobacco: Never   Vaping Use    Vaping status: Never Used   Substance and Sexual Activity    Alcohol use: Yes    Drug use: Never    Sexual activity: Defer           Objective   Physical Exam  Constitutional:       Appearance: She is well-developed.   HENT:      Head: Normocephalic and atraumatic.      Right Ear: External ear normal.      Left Ear: External ear normal.      Nose: Nose normal.   Eyes:      Conjunctiva/sclera: Conjunctivae normal.      Pupils: Pupils are equal, round, and reactive to light.   Cardiovascular:      Rate and Rhythm: Normal rate and regular rhythm.      Heart sounds: Normal heart sounds.   Pulmonary:      Effort: Pulmonary effort is normal.      Breath sounds: Normal breath sounds.   Abdominal:      General: Bowel sounds are normal.      Palpations: Abdomen is soft.      Tenderness:  in the suprapubic area   Musculoskeletal:         General: Normal range of motion.      Cervical back: Normal range of motion and neck supple.   Skin:     General: Skin is warm and dry.   Neurological:      Mental Status: She is alert and oriented to person, place, and time.   Psychiatric:         Behavior: Behavior normal.         Thought Content: Thought content normal.         Judgment: Judgment normal.         Procedures           ED Course                                 CT Abdomen Pelvis With Contrast   Final Result   Impression:      1. Findings suspicious for mild or early developing distal small bowel obstruction, with well-defined transition point in the right lower pelvis.      2. Small gallstone. No visible gallbladder inflammation or biliary ductal dilatation.      3. No other evidence of acute intra-abdominal or intrapelvic disease elsewhere.            Electronically Signed: Adrián Ramirez MD     8/17/2024 12:09 PM  EDT     Workstation ID: [id number]                    Medical Decision Making  Amount and/or Complexity of Data Reviewed  Labs: ordered.  Radiology: ordered.  ECG/medicine tests: ordered.    Risk  Prescription drug management.        Final diagnoses:   Small bowel obstruction   Acute UTI       ED Disposition  ED Disposition       ED Disposition   Decision to Admit    Condition   --    Comment   --               No follow-up provider specified.       Medication List      No changes were made to your prescriptions during this visit.            Harjit Madison, APRN  08/17/24 1306

## 2024-08-18 LAB
ALBUMIN SERPL-MCNC: 3.7 G/DL (ref 3.5–5.2)
ALBUMIN/GLOB SERPL: 2.1 G/DL
ALP SERPL-CCNC: 70 U/L (ref 39–117)
ALT SERPL W P-5'-P-CCNC: 6 U/L (ref 1–33)
ANION GAP SERPL CALCULATED.3IONS-SCNC: 11 MMOL/L (ref 5–15)
AST SERPL-CCNC: 13 U/L (ref 1–32)
BASOPHILS # BLD AUTO: 0.01 10*3/MM3 (ref 0–0.2)
BASOPHILS NFR BLD AUTO: 0.1 % (ref 0–1.5)
BILIRUB SERPL-MCNC: 0.6 MG/DL (ref 0–1.2)
BUN SERPL-MCNC: 6 MG/DL (ref 8–23)
BUN/CREAT SERPL: 6.4 (ref 7–25)
CALCIUM SPEC-SCNC: 8.5 MG/DL (ref 8.6–10.5)
CHLORIDE SERPL-SCNC: 100 MMOL/L (ref 98–107)
CO2 SERPL-SCNC: 23 MMOL/L (ref 22–29)
CREAT SERPL-MCNC: 0.94 MG/DL (ref 0.57–1)
DEPRECATED RDW RBC AUTO: 51 FL (ref 37–54)
EGFRCR SERPLBLD CKD-EPI 2021: 61.5 ML/MIN/1.73
EOSINOPHIL # BLD AUTO: 0 10*3/MM3 (ref 0–0.4)
EOSINOPHIL NFR BLD AUTO: 0 % (ref 0.3–6.2)
ERYTHROCYTE [DISTWIDTH] IN BLOOD BY AUTOMATED COUNT: 15.5 % (ref 12.3–15.4)
GLOBULIN UR ELPH-MCNC: 1.8 GM/DL
GLUCOSE SERPL-MCNC: 130 MG/DL (ref 65–99)
HCT VFR BLD AUTO: 32.5 % (ref 34–46.6)
HGB BLD-MCNC: 10.6 G/DL (ref 12–15.9)
IMM GRANULOCYTES # BLD AUTO: 0.02 10*3/MM3 (ref 0–0.05)
IMM GRANULOCYTES NFR BLD AUTO: 0.3 % (ref 0–0.5)
LYMPHOCYTES # BLD AUTO: 0.37 10*3/MM3 (ref 0.7–3.1)
LYMPHOCYTES NFR BLD AUTO: 5.4 % (ref 19.6–45.3)
MCH RBC QN AUTO: 29.6 PG (ref 26.6–33)
MCHC RBC AUTO-ENTMCNC: 32.6 G/DL (ref 31.5–35.7)
MCV RBC AUTO: 90.8 FL (ref 79–97)
MONOCYTES # BLD AUTO: 0.43 10*3/MM3 (ref 0.1–0.9)
MONOCYTES NFR BLD AUTO: 6.2 % (ref 5–12)
NEUTROPHILS NFR BLD AUTO: 6.08 10*3/MM3 (ref 1.7–7)
NEUTROPHILS NFR BLD AUTO: 88 % (ref 42.7–76)
NRBC BLD AUTO-RTO: 0 /100 WBC (ref 0–0.2)
PLATELET # BLD AUTO: 195 10*3/MM3 (ref 140–450)
PMV BLD AUTO: 8.8 FL (ref 6–12)
POTASSIUM SERPL-SCNC: 4.3 MMOL/L (ref 3.5–5.2)
POTASSIUM SERPL-SCNC: 4.6 MMOL/L (ref 3.5–5.2)
PROT SERPL-MCNC: 5.5 G/DL (ref 6–8.5)
RBC # BLD AUTO: 3.58 10*6/MM3 (ref 3.77–5.28)
SODIUM SERPL-SCNC: 134 MMOL/L (ref 136–145)
WBC NRBC COR # BLD AUTO: 6.91 10*3/MM3 (ref 3.4–10.8)

## 2024-08-18 PROCEDURE — 96376 TX/PRO/DX INJ SAME DRUG ADON: CPT

## 2024-08-18 PROCEDURE — 96372 THER/PROPH/DIAG INJ SC/IM: CPT

## 2024-08-18 PROCEDURE — 25810000003 SODIUM CHLORIDE 0.9 % SOLUTION: Performed by: STUDENT IN AN ORGANIZED HEALTH CARE EDUCATION/TRAINING PROGRAM

## 2024-08-18 PROCEDURE — 85025 COMPLETE CBC W/AUTO DIFF WBC: CPT | Performed by: STUDENT IN AN ORGANIZED HEALTH CARE EDUCATION/TRAINING PROGRAM

## 2024-08-18 PROCEDURE — 84132 ASSAY OF SERUM POTASSIUM: CPT | Performed by: STUDENT IN AN ORGANIZED HEALTH CARE EDUCATION/TRAINING PROGRAM

## 2024-08-18 PROCEDURE — 25010000002 ENOXAPARIN PER 10 MG: Performed by: STUDENT IN AN ORGANIZED HEALTH CARE EDUCATION/TRAINING PROGRAM

## 2024-08-18 PROCEDURE — 99232 SBSQ HOSP IP/OBS MODERATE 35: CPT | Performed by: STUDENT IN AN ORGANIZED HEALTH CARE EDUCATION/TRAINING PROGRAM

## 2024-08-18 PROCEDURE — 25010000002 ONDANSETRON PER 1 MG: Performed by: STUDENT IN AN ORGANIZED HEALTH CARE EDUCATION/TRAINING PROGRAM

## 2024-08-18 PROCEDURE — 25010000002 POTASSIUM CHLORIDE 10 MEQ/100ML SOLUTION: Performed by: STUDENT IN AN ORGANIZED HEALTH CARE EDUCATION/TRAINING PROGRAM

## 2024-08-18 PROCEDURE — 25010000002 CEFTRIAXONE PER 250 MG: Performed by: STUDENT IN AN ORGANIZED HEALTH CARE EDUCATION/TRAINING PROGRAM

## 2024-08-18 PROCEDURE — G0378 HOSPITAL OBSERVATION PER HR: HCPCS

## 2024-08-18 PROCEDURE — 80053 COMPREHEN METABOLIC PANEL: CPT | Performed by: STUDENT IN AN ORGANIZED HEALTH CARE EDUCATION/TRAINING PROGRAM

## 2024-08-18 RX ADMIN — ASPIRIN 81 MG: 81 TABLET, COATED ORAL at 08:56

## 2024-08-18 RX ADMIN — Medication 10 ML: at 20:58

## 2024-08-18 RX ADMIN — POTASSIUM CHLORIDE 10 MEQ: 7.46 INJECTION, SOLUTION INTRAVENOUS at 00:41

## 2024-08-18 RX ADMIN — ENOXAPARIN SODIUM 40 MG: 100 INJECTION SUBCUTANEOUS at 08:56

## 2024-08-18 RX ADMIN — SODIUM CHLORIDE 1000 MG: 900 INJECTION INTRAVENOUS at 15:02

## 2024-08-18 RX ADMIN — ANASTROZOLE 1 MG: 1 TABLET ORAL at 08:56

## 2024-08-18 RX ADMIN — ONDANSETRON 4 MG: 2 INJECTION INTRAMUSCULAR; INTRAVENOUS at 05:00

## 2024-08-18 RX ADMIN — SODIUM CHLORIDE 100 ML/HR: 9 INJECTION, SOLUTION INTRAVENOUS at 21:00

## 2024-08-18 RX ADMIN — CLOPIDOGREL BISULFATE 75 MG: 75 TABLET ORAL at 08:56

## 2024-08-18 RX ADMIN — ATORVASTATIN CALCIUM 10 MG: 10 TABLET, FILM COATED ORAL at 08:56

## 2024-08-18 RX ADMIN — SENNOSIDES AND DOCUSATE SODIUM 2 TABLET: 50; 8.6 TABLET ORAL at 08:55

## 2024-08-18 RX ADMIN — ACETAMINOPHEN 650 MG: 650 SOLUTION ORAL at 00:41

## 2024-08-18 RX ADMIN — Medication 10 ML: at 08:56

## 2024-08-18 NOTE — CASE MANAGEMENT/SOCIAL WORK
Discharge Planning Assessment  [facility]     Patient Name: Cindy Sage  MRN: [id number]  Today's Date: 8/18/2024    Admit Date: 8/17/2024    Plan: Home with Family   Discharge Needs Assessment       Row Name 08/18/24 1611       Living Environment    People in Home spouse;grandchild(joby)    Name(s) of People in Home Ty Sage-     Current Living Arrangements home    Primary Care Provided by self    Provides Primary Care For grandchild(joby)    Caregiving Concerns cares for adult grandson with Down Syndrome    Family Caregiver if Needed spouse    Family Caregiver Names Ty Sage-     Quality of Family Relationships involved;supportive;helpful    Able to Return to Prior Arrangements yes       Transition Planning    Patient/Family Anticipates Transition to home with family    Transportation Anticipated family or friend will provide       Discharge Needs Assessment    Readmission Within the Last 30 Days no previous admission in last 30 days    Equipment Currently Used at Home rollator    Concerns to be Addressed discharge planning    Current Discharge Risk chronically ill                   Discharge Plan       Row Name 08/18/24 1613       Plan    Plan Home with Family    Patient/Family in Agreement with Plan yes    Plan Comments I have spoken to Ms. Sage in her room today regarding discharge planning.  She states that she lives with her spouse in a home they share in [facility].  She states that her adult grandson with Down Syndrome also shares their home and they provide for his care.   is independent with activities of daily living and uses a rollator for stability.  She denies current receipt of home health/OP services and use of home oxygen.  I have confirmed that her PCP is Jose Carrillo MD and her insurance is [employer].  She anticipates returning home when medically ready for discharge and anticipates no needs.  She states that her  will be  available to provide assistance and transportation as needed.  CM will cont to follow plan of care and assist with discharge needs as recommendations become available.    Final Discharge Disposition Code 01 - home or self-care                  Continued Care and Services - Admitted Since 8/17/2024    No active coordination exists for this encounter.       Expected Discharge Date and Time       Expected Discharge Date Expected Discharge Time    Aug 19, 2024            Demographic Summary       Row Name 08/18/24 1610       General Information    Admission Type observation    Arrived From home    Referral Source admission list    Reason for Consult discharge planning    Preferred Language English       Contact Information    Permission Granted to Share Info With                    Functional Status       Row Name 08/18/24 1610       Functional Status, IADL    Medications independent    Meal Preparation independent    Housekeeping independent    Laundry independent    Shopping independent                   Psychosocial    No documentation.                  Abuse/Neglect    No documentation.                  Legal    No documentation.                  Substance Abuse    No documentation.                  Patient Forms       Row Name 08/18/24 1303       Patient Forms    Patient Observation Letter Delivered    Delivered to Patient    Method of delivery Telephone                      Reina Huerta RN

## 2024-08-19 ENCOUNTER — READMISSION MANAGEMENT (OUTPATIENT)
Dept: CALL CENTER | Facility: HOSPITAL | Age: 79
End: 2024-08-19
Payer: MEDICARE

## 2024-08-19 VITALS
RESPIRATION RATE: 16 BRPM | DIASTOLIC BLOOD PRESSURE: 73 MMHG | HEART RATE: 76 BPM | TEMPERATURE: 97.3 F | BODY MASS INDEX: 22.71 KG/M2 | WEIGHT: 133 LBS | SYSTOLIC BLOOD PRESSURE: 158 MMHG | HEIGHT: 64 IN | OXYGEN SATURATION: 98 %

## 2024-08-19 LAB
ANION GAP SERPL CALCULATED.3IONS-SCNC: 11 MMOL/L (ref 5–15)
BACTERIA SPEC AEROBE CULT: ABNORMAL
BUN SERPL-MCNC: 8 MG/DL (ref 8–23)
BUN/CREAT SERPL: 10 (ref 7–25)
CALCIUM SPEC-SCNC: 8.1 MG/DL (ref 8.6–10.5)
CHLORIDE SERPL-SCNC: 102 MMOL/L (ref 98–107)
CO2 SERPL-SCNC: 20 MMOL/L (ref 22–29)
CREAT SERPL-MCNC: 0.8 MG/DL (ref 0.57–1)
DEPRECATED RDW RBC AUTO: 53.4 FL (ref 37–54)
EGFRCR SERPLBLD CKD-EPI 2021: 74.6 ML/MIN/1.73
ERYTHROCYTE [DISTWIDTH] IN BLOOD BY AUTOMATED COUNT: 15.7 % (ref 12.3–15.4)
GLUCOSE SERPL-MCNC: 90 MG/DL (ref 65–99)
HCT VFR BLD AUTO: 29.2 % (ref 34–46.6)
HGB BLD-MCNC: 9.5 G/DL (ref 12–15.9)
MCH RBC QN AUTO: 30.2 PG (ref 26.6–33)
MCHC RBC AUTO-ENTMCNC: 32.5 G/DL (ref 31.5–35.7)
MCV RBC AUTO: 92.7 FL (ref 79–97)
PLATELET # BLD AUTO: 158 10*3/MM3 (ref 140–450)
PMV BLD AUTO: 9 FL (ref 6–12)
POTASSIUM SERPL-SCNC: 3.6 MMOL/L (ref 3.5–5.2)
POTASSIUM SERPL-SCNC: 4.9 MMOL/L (ref 3.5–5.2)
RBC # BLD AUTO: 3.15 10*6/MM3 (ref 3.77–5.28)
SODIUM SERPL-SCNC: 133 MMOL/L (ref 136–145)
WBC NRBC COR # BLD AUTO: 5.19 10*3/MM3 (ref 3.4–10.8)

## 2024-08-19 PROCEDURE — 85027 COMPLETE CBC AUTOMATED: CPT | Performed by: STUDENT IN AN ORGANIZED HEALTH CARE EDUCATION/TRAINING PROGRAM

## 2024-08-19 PROCEDURE — 96372 THER/PROPH/DIAG INJ SC/IM: CPT

## 2024-08-19 PROCEDURE — 97165 OT EVAL LOW COMPLEX 30 MIN: CPT

## 2024-08-19 PROCEDURE — 97530 THERAPEUTIC ACTIVITIES: CPT

## 2024-08-19 PROCEDURE — 25010000002 ENOXAPARIN PER 10 MG: Performed by: STUDENT IN AN ORGANIZED HEALTH CARE EDUCATION/TRAINING PROGRAM

## 2024-08-19 PROCEDURE — 84132 ASSAY OF SERUM POTASSIUM: CPT | Performed by: STUDENT IN AN ORGANIZED HEALTH CARE EDUCATION/TRAINING PROGRAM

## 2024-08-19 PROCEDURE — G0378 HOSPITAL OBSERVATION PER HR: HCPCS

## 2024-08-19 PROCEDURE — 99239 HOSP IP/OBS DSCHRG MGMT >30: CPT | Performed by: STUDENT IN AN ORGANIZED HEALTH CARE EDUCATION/TRAINING PROGRAM

## 2024-08-19 PROCEDURE — 25010000002 CEFTRIAXONE PER 250 MG: Performed by: STUDENT IN AN ORGANIZED HEALTH CARE EDUCATION/TRAINING PROGRAM

## 2024-08-19 PROCEDURE — 97161 PT EVAL LOW COMPLEX 20 MIN: CPT

## 2024-08-19 PROCEDURE — 80048 BASIC METABOLIC PNL TOTAL CA: CPT | Performed by: STUDENT IN AN ORGANIZED HEALTH CARE EDUCATION/TRAINING PROGRAM

## 2024-08-19 RX ORDER — AMOXICILLIN 250 MG
2 CAPSULE ORAL NIGHTLY PRN
Status: DISCONTINUED | OUTPATIENT
Start: 2024-08-19 | End: 2024-08-19 | Stop reason: HOSPADM

## 2024-08-19 RX ORDER — POTASSIUM CHLORIDE 20 MEQ/1
40 TABLET, EXTENDED RELEASE ORAL EVERY 4 HOURS
Status: COMPLETED | OUTPATIENT
Start: 2024-08-19 | End: 2024-08-19

## 2024-08-19 RX ORDER — BISACODYL 10 MG
10 SUPPOSITORY, RECTAL RECTAL DAILY PRN
Status: DISCONTINUED | OUTPATIENT
Start: 2024-08-19 | End: 2024-08-19 | Stop reason: HOSPADM

## 2024-08-19 RX ORDER — BISACODYL 5 MG/1
5 TABLET, DELAYED RELEASE ORAL DAILY PRN
Status: DISCONTINUED | OUTPATIENT
Start: 2024-08-19 | End: 2024-08-19 | Stop reason: HOSPADM

## 2024-08-19 RX ORDER — POLYETHYLENE GLYCOL 3350 17 G/17G
17 POWDER, FOR SOLUTION ORAL DAILY PRN
Status: DISCONTINUED | OUTPATIENT
Start: 2024-08-19 | End: 2024-08-19 | Stop reason: HOSPADM

## 2024-08-19 RX ADMIN — ATORVASTATIN CALCIUM 10 MG: 10 TABLET, FILM COATED ORAL at 08:20

## 2024-08-19 RX ADMIN — CLOPIDOGREL BISULFATE 75 MG: 75 TABLET ORAL at 08:20

## 2024-08-19 RX ADMIN — ENOXAPARIN SODIUM 40 MG: 100 INJECTION SUBCUTANEOUS at 08:20

## 2024-08-19 RX ADMIN — POTASSIUM CHLORIDE 40 MEQ: 1500 TABLET, EXTENDED RELEASE ORAL at 08:20

## 2024-08-19 RX ADMIN — SODIUM CHLORIDE 1000 MG: 900 INJECTION INTRAVENOUS at 11:54

## 2024-08-19 RX ADMIN — LOSARTAN POTASSIUM 25 MG: 25 TABLET, FILM COATED ORAL at 08:20

## 2024-08-19 RX ADMIN — ASPIRIN 81 MG: 81 TABLET, COATED ORAL at 08:20

## 2024-08-19 RX ADMIN — POTASSIUM CHLORIDE 40 MEQ: 1500 TABLET, EXTENDED RELEASE ORAL at 11:54

## 2024-08-19 RX ADMIN — SENNOSIDES AND DOCUSATE SODIUM 2 TABLET: 50; 8.6 TABLET ORAL at 08:20

## 2024-08-19 RX ADMIN — ANASTROZOLE 1 MG: 1 TABLET ORAL at 08:20

## 2024-08-19 RX ADMIN — Medication 10 ML: at 08:21

## 2024-08-19 NOTE — PLAN OF CARE
Goal Outcome Evaluation:  Plan of Care Reviewed With: patient           Outcome Evaluation: Pt demo ind with LBD, commode transfer,  toileting, and ambulating in room with rollator.  Pt does not demo any deficits which would require OT intervention at this time. Recommend home upon d/c.      Anticipated Discharge Disposition (OT): home

## 2024-08-19 NOTE — DISCHARGE SUMMARY
[facility]  DISCHARGE SUMMARY    Patient Name: Cindy Sage  : 1945  MRN: [id number]    Date of Admission: 2024  9:50 AM  Date of Discharge: 2024  Primary Care Physician: Jose Carrillo MD    Consults       No orders found from 2024 to 2024.            Hospital Course     Presenting Problem:     Active Hospital Problems    Diagnosis  POA    **Partial small bowel obstruction [K56.600]  Yes    Slow transit constipation [K59.01]  Yes    Bilateral malignant neoplasm of breast in female, estrogen receptor positive [C50.911, Z17.0, C50.912]  Not Applicable    TIA (transient ischemic attack) [G45.9]  Yes    History of endometrial cancer [Z85.42]  Not Applicable    Hypertension [I10]  Yes    Hyperlipidemia [E78.5]  Yes      Resolved Hospital Problems   No resolved problems to display.          Hospital Course:  Cindy Sage is a 79 y.o. female with PMHx of endometrial cancer, breast cancer (on maintenance treatment), HTN, HLD, GERD who presented with abdominal pain. Imaging in the ED suspicious for mild/early developing distal small bowel obstruction with well-defined transition point in right lower pelvis, also evidence of UTI. Early SBO resolved with conservative measures including fluids, bowel regimen and pain control. Patient was able to tolerate a regular diet. Urine culture grew E. coli, patient was treated with IV ceftriaxone x 3 days. Patient was discharged home with  with plan for close follow-up with PCP.    Mild / developing distal small bowel obstruction  -Symptoms improved  and   -Treated with conservative measures with fluids, bowel regimen and pain control.  -Tolerated regular diet on day of discharge.      Acute UTI  -Urine culture with E.Coli   -s/p IV ceftriaxone x3 days.      Hypertension - Continue home losartan, HCTZ.   Hyperlipidemia - Continue atorvastatin.   History of CVA - Continue home  ASA and Plavix.   Hx of endometrial and breast cancer - Continue home anastrozole maintenance therapy.    Discharge Follow Up Recommendations for outpatient labs/diagnostics:  -Follow-up with PCP in 1 week    Day of Discharge     HPI:   Patient reports that she had several episodes of diarrhea yesterday. Was able to eat broth for lunch and dinner. No nausea or abdominal pain today. Per nursing, patient tolerated regular diet for lunch.    Vital Signs:   Temp:  [97.3 °F (36.3 °C)-99.2 °F (37.3 °C)] 97.3 °F (36.3 °C)  Heart Rate:  [66-77] 66  Resp:  [16-20] 16  BP: (137-159)/(62-82) 149/69      Physical Exam:  Constitutional: Awake, alert, resting comfortably  HENT: NCAT, mucous membranes moist  Respiratory: Clear to auscultation bilaterally, respiratory effort normal   Cardiovascular: RRR, no murmurs, rubs, or gallops  Gastrointestinal: soft, nontender, nondistended  Musculoskeletal: No bilateral ankle edema  Psychiatric: Appropriate affect, cooperative  Neurologic: Alert and oriented x 3, no focal deficits, speech clear  Skin: No rashes    Pertinent  and/or Most Recent Results     LAB RESULTS:      Lab 08/19/24  0422 08/18/24  0646 08/17/24  1419 08/17/24  1030   WBC 5.19 6.91  --  7.81   HEMOGLOBIN 9.5* 10.6*  --  11.4*   HEMATOCRIT 29.2* 32.5*  --  35.1   PLATELETS 158 195  --  190   NEUTROS ABS  --  6.08  --  6.96   IMMATURE GRANS (ABS)  --  0.02  --  0.02   LYMPHS ABS  --  0.37*  --  0.33*   MONOS ABS  --  0.43  --  0.45   EOS ABS  --  0.00  --  0.03   MCV 92.7 90.8  --  91.9   LACTATE  --   --  1.6 2.5*         Lab 08/19/24  0422 08/18/24  0646 08/18/24  0005 08/17/24  1030   SODIUM 133* 134*  --  132*   POTASSIUM 3.6 4.6 4.3 3.5   CHLORIDE 102 100  --  96*   CO2 20.0* 23.0  --  23.0   ANION GAP 11.0 11.0  --  13.0   BUN 8 6*  --  6*   CREATININE 0.80 0.94  --  0.92   EGFR 74.6 61.5  --  63.1   GLUCOSE 90 130*  --  112*   CALCIUM 8.1* 8.5*  --  9.4         Lab 08/18/24  0646 08/17/24  1030   TOTAL PROTEIN 5.5*  6.6   ALBUMIN 3.7 4.0   GLOBULIN 1.8 2.6   ALT (SGPT) 6 9   AST (SGOT) 13 17   BILIRUBIN 0.6 0.7   ALK PHOS 70 87   LIPASE  --  37         Lab 08/17/24  1030   HSTROP T 12                 Brief Urine Lab Results  (Last result in the past 365 days)        Color   Clarity   Blood   Leuk Est   Nitrite   Protein   CREAT   Urine HCG        08/17/24 1104 Yellow   Clear   Negative   Small (1+)   Negative   Trace                 Microbiology Results (last 10 days)       Procedure Component Value - Date/Time    Blood Culture - Blood, Arm, Left [[id number]]  (Normal) Collected: 08/17/24 1419    Lab Status: Preliminary result Specimen: Blood from Arm, Left Updated: 08/18/24 1531     Blood Culture No growth at 24 hours    Narrative:      Aerobic Bottle Only    Less than seven (7) mL's of blood was collected.  Insufficient quantity may yield false negative results.    Blood Culture - Blood, Arm, Right [[id number]]  (Normal) Collected: 08/17/24 1419    Lab Status: Preliminary result Specimen: Blood from Arm, Right Updated: 08/18/24 1531     Blood Culture No growth at 24 hours    Narrative:      Less than seven (7) mL's of blood was collected.  Insufficient quantity may yield false negative results.    Urine Culture - Urine, Straight Cath [[id number]]  (Abnormal)  (Susceptibility) Collected: 08/17/24 1104    Lab Status: Final result Specimen: Urine from Straight Cath Updated: 08/19/24 0930     Urine Culture >100,000 CFU/mL Escherichia coli    Narrative:      Colonization of the urinary tract without infection is common. Treatment is discouraged unless the patient is symptomatic, pregnant, or undergoing an invasive urologic procedure.    Susceptibility        Escherichia coli      FRANSISCO      Amoxicillin + Clavulanate Susceptible      Ampicillin Resistant      Ampicillin + Sulbactam Intermediate      Cefazolin Susceptible      Cefepime Susceptible      Ceftazidime Susceptible      Ceftriaxone Susceptible      Gentamicin Susceptible       Levofloxacin Intermediate      Nitrofurantoin Susceptible      Piperacillin + Tazobactam Susceptible      Trimethoprim + Sulfamethoxazole Susceptible                                   CT Abdomen Pelvis With Contrast    Result Date: 8/17/2024  CT ABDOMEN PELVIS W CONTRAST Date of Exam: 8/17/2024 11:38 AM EDT Indication: lower abd pain. Comparison: 7/31/2024 Technique: Axial CT images were obtained of the abdomen and pelvis following the uneventful intravenous administration of 80 mL Isovue-300. Reconstructed coronal and sagittal images were also obtained. Automated exposure control and iterative construction methods were used. Findings: Patient history today indicates lower abdominal pain for at least the past several days, some nausea and constipation. Prior history of colon cancer. Previous 7/31/2024 exam showed changes of multiple previous surgeries, and cholelithiasis, but no acute disease. Today's study shows fluid-filled proximal and mid small bowel in the upper range of normal diameter but not considered pathologically enlarged, the largest loops approximately 2.5 cm. There is a well-defined transition point in the right lower pelvis, image numbers 120 through 122, series 2, with some fecalization of small bowel contents just proximal to this level, and markedly decompressed, airless distal ileum beyond it. Focal bowel wall thickening and enhancement at this level gives the impression  of a potential stricture, but no mass or inflammation is seen. Elsewhere, the included lower lungs appear clear of active disease. There is a small hiatal hernia. There is diffuse fatty liver change. Small gallstone is again seen in the otherwise normal-appearing gallbladder. Degree of pancreatic atrophy present is not unusual for patient's age. Spleen is not enlarged. Adrenal glands appear normal. There is expected renal cortical thinning for age. No upper abdominal free air, ascites or adenopathy is seen. Uterus and ovaries  are not identified. There is a very small amount of free fluid in the cul-de-sac region, not considered normal in a post menopausal female, and perhaps related to the apparent low-level small bowel obstruction. Bladder is decompressed and normal. No mass or adenopathy is seen. Delayed venous phase images show no evidence of obstructive uropathy. Bladder distends normally with contrast. Bony structures appear to be intact..     Impression: 1. Findings suspicious for mild or early developing distal small bowel obstruction, with well-defined transition point in the right lower pelvis. 2. Small gallstone. No visible gallbladder inflammation or biliary ductal dilatation. 3. No other evidence of acute intra-abdominal or intrapelvic disease elsewhere. Electronically Signed: Adrián Ramirez MD  8/17/2024 12:09 PM EDT  Workstation ID: [id number]    Mammo Diagnostic Bilateral With CAD    Result Date: 8/9/2024  EXAMINATION:MAMMO DIAGNOSTIC BILATERAL W CAD-  HISTORY: 79-year-old female who is status post bilateral breast conservation surgery in March 2022. No current complaint  TECHNIQUE: Standard 2D views of both breast  COMPARISON: Prior mammograms dating back to 12/28/2020  FINDINGS: There are scattered areas of fibroglandular density. Changes consistent with bilateral breast conservation therapy is present. No new suspicious masses microcalcifications or areas of architectural distortion are identified.      BI-RADS 2 benign finding  RECOMMENDATION: Patient will be due for annual screening mammography of both breasts using 2D 3D technique in 6 months time.    The standard false-negative rate of mammography is between 10% and 25%. Complex patterns or increased breast density will markedly elevate the false-negative rate of mammography.   A results letter, in lay terminology, will be given to the patient at the conclusion of the exam.  This report was finalized on 8/9/2024 11:22 AM by Dr. Sneha Marcelino MD.       Results for  orders placed during the hospital encounter of 01/26/22    Duplex Lower Extremity Art / Grafts - Right CAR    Interpretation Summary  · Normal ABIs bilaterally at 1.1 on the right and 1.1 on the left.  · Right lower extremity arteries: There is mild, nonobstructive atherosclerosis noted with multiphasic waveforms throughout.      Results for orders placed during the hospital encounter of 01/26/22    Duplex Lower Extremity Art / Grafts - Right CAR    Interpretation Summary  · Normal ABIs bilaterally at 1.1 on the right and 1.1 on the left.  · Right lower extremity arteries: There is mild, nonobstructive atherosclerosis noted with multiphasic waveforms throughout.      Results for orders placed during the hospital encounter of 02/28/19    Adult Transthoracic Echo Complete W/ Cont if Necessary Per Protocol (With Agitated Saline)    Interpretation Summary  · Estimated EF = 60%.  · Left ventricular systolic function is normal.  · Trace to mild mitral regurgitation  · Trace tricuspid regurgitation  · No evidence of PFO or ASD.      Plan for Follow-up of Pending Labs/Results: Hospitalist to follow  Pending Labs       Order Current Status    Blood Culture - Blood, Arm, Left Preliminary result    Blood Culture - Blood, Arm, Right Preliminary result          Discharge Details        Discharge Medications        Changes to Medications        Instructions Start Date   simvastatin 40 MG tablet  Commonly known as: ZOCOR  What changed: when to take this   Take 1 tablet by mouth once daily      vitamin B-12 500 MCG tablet  Commonly known as: CYANOCOBALAMIN  What changed: additional instructions   Take 1 tablet by mouth once daily             Continue These Medications        Instructions Start Date   acetaminophen 325 MG tablet  Commonly known as: TYLENOL   650 mg, Oral, Every 6 Hours PRN      anastrozole 1 MG tablet  Commonly known as: ARIMIDEX   1 mg, Oral, Daily      aspirin 81 MG EC tablet   81 mg, Oral, Daily, OTC       Cholecalciferol 25 MCG (1000 UT) chewable tablet   1,000 Units, Oral, Daily, OTC      clopidogrel 75 MG tablet  Commonly known as: PLAVIX   75 mg, Oral, Daily      hydroCHLOROthiazide 25 MG tablet   Take 1 tablet by mouth once daily      losartan 25 MG tablet  Commonly known as: COZAAR   25 mg, Oral, Daily      omeprazole 20 MG capsule  Commonly known as: priLOSEC   20 mg, Oral, Daily, OTC      polyethylene glycol 17 g packet  Commonly known as: MIRALAX   17 g, Oral, Daily PRN      potassium chloride 20 mEq/15 mL solution  Commonly known as: KAYCIEL   TAKE 15 ML BY MOUTH  ONCE DAILY               Allergies   Allergen Reactions    Strawberry Extract Swelling         Discharge Disposition:  Home or Self Care    Diet:  Hospital:  Diet Order   Procedures    Diet: Regular/House; Fluid Consistency: Thin (IDDSI 0)            Activity:      Restrictions or Other Recommendations:  N/A       CODE STATUS:    Code Status and Medical Interventions: CPR (Attempt to Resuscitate); Full Support   Ordered at: 08/17/24 1340     Code Status (Patient has no pulse and is not breathing):    CPR (Attempt to Resuscitate)     Medical Interventions (Patient has pulse or is breathing):    Full Support       Future Appointments   Date Time Provider Department Center   11/20/2024 10:15 AM Jose Carrillo MD [facility]   1/7/2025 10:30 AM Jess Ambrocio APRN [facility]       Additional Instructions for the Follow-ups that You Need to Schedule       Discharge Follow-up with PCP   As directed       Currently Documented PCP:    Jose Carrillo MD    PCP Phone Number:    [phone]     Follow Up Details: in 1 week                  Kelsea Astorga DO  08/19/24      Time Spent on Discharge:  I spent  45  minutes on this discharge activity which included: face-to-face encounter with the patient, reviewing the data in the system, coordination of the care with the nursing staff as well as consultants,  documentation, and entering orders.

## 2024-08-19 NOTE — PLAN OF CARE
Problem: Skin Injury Risk Increased  Goal: Skin Health and Integrity  Outcome: Ongoing, Progressing  Intervention: Optimize Skin Protection  Recent Flowsheet Documentation  Taken 8/19/2024 0400 by Jaylene Hebert RN  Head of Bed (HOB) Positioning: HOB at 30-45 degrees     Problem: Adult Inpatient Plan of Care  Goal: Plan of Care Review  Outcome: Ongoing, Progressing  Goal: Patient-Specific Goal (Individualized)  Outcome: Ongoing, Progressing  Goal: Absence of Hospital-Acquired Illness or Injury  Outcome: Ongoing, Progressing  Intervention: Identify and Manage Fall Risk  Recent Flowsheet Documentation  Taken 8/19/2024 0400 by Jaylene Hebert RN  Safety Promotion/Fall Prevention:   assistive device/personal items within reach   clutter free environment maintained   fall prevention program maintained   lighting adjusted   nonskid shoes/slippers when out of bed   room organization consistent   safety round/check completed   toileting scheduled  Intervention: Prevent Infection  Recent Flowsheet Documentation  Taken 8/19/2024 0400 by Jaylene Hebert RN  Infection Prevention:   environmental surveillance performed   equipment surfaces disinfected   hand hygiene promoted   rest/sleep promoted   single patient room provided  Goal: Optimal Comfort and Wellbeing  Outcome: Ongoing, Progressing  Goal: Readiness for Transition of Care  Outcome: Ongoing, Progressing     Problem: Hypertension Comorbidity  Goal: Blood Pressure in Desired Range  Outcome: Ongoing, Progressing  Intervention: Maintain Blood Pressure Management  Recent Flowsheet Documentation  Taken 8/19/2024 0400 by Jayelne Hebert RN  Medication Review/Management: medications reviewed     Problem: Fall Injury Risk  Goal: Absence of Fall and Fall-Related Injury  Outcome: Ongoing, Progressing  Intervention: Identify and Manage Contributors  Recent Flowsheet Documentation  Taken 8/19/2024 0400 by Jaylene Hebert RN  Medication Review/Management:  medications reviewed  Intervention: Promote Injury-Free Environment  Recent Flowsheet Documentation  Taken 8/19/2024 0400 by Jaylene Hebert, RN  Safety Promotion/Fall Prevention:   assistive device/personal items within reach   clutter free environment maintained   fall prevention program maintained   lighting adjusted   nonskid shoes/slippers when out of bed   room organization consistent   safety round/check completed   toileting scheduled   Goal Outcome Evaluation:

## 2024-08-19 NOTE — CASE MANAGEMENT/SOCIAL WORK
Continued Stay Note  [facility]     Patient Name: Cindy Sage  MRN: [id number]  Today's Date: 8/19/2024    Admit Date: 8/17/2024    Plan: Home with family   Discharge Plan       Row Name 08/19/24 1336       Plan    Plan Home with family    Patient/Family in Agreement with Plan yes    Plan Comments Discussed patient in MDR. Patient approaching medical readiness. CM spoke to patient and daughter at bedside. PT/OT recommend home. Her plan is home with family. Family will transport. She denies any discharge needs at this time. CM will continue to follow.    Final Discharge Disposition Code 01 - home or self-care                   Discharge Codes    No documentation.                 Expected Discharge Date and Time       Expected Discharge Date Expected Discharge Time    Aug 19, 2024               Tania Avendaño RN

## 2024-08-19 NOTE — THERAPY DISCHARGE NOTE
Patient Name: Cindy Sage  : 1945    MRN: [id number]                              Today's Date: 2024       Admit Date: 2024    Visit Dx:     ICD-10-CM ICD-9-CM   1. Small bowel obstruction  K56.609 560.9   2. Acute UTI  N39.0 599.0     Patient Active Problem List   Diagnosis    Hyperlipidemia    Hypertension    Ischemic stroke    Endometrial cancer    GERD (gastroesophageal reflux disease)    Hx: UTI (urinary tract infection)    History of endometrial cancer    TIA (transient ischemic attack)    Stenosis of right middle cerebral artery    Musculoskeletal neck pain    Large bowel obstruction s/p colonic resection by Dr. Ma 12/10/21    Recurrent cancer    Bilateral malignant neoplasm of breast in female, estrogen receptor positive    Slow transit constipation    Visual field defect    Hypokalemia    Anemia    Ocular migraine    Vitamin B12 deficiency    Ptosis of left eyelid    History of stroke    Partial small bowel obstruction     Past Medical History:   Diagnosis Date    Bilateral malignant neoplasm of breast in female, estrogen receptor positive 2022    Drug therapy 2016    For endometrial CA in 2016    Elevated cholesterol     Endometrial cancer 2016    Stage IIIC2     GERD (gastroesophageal reflux disease)     HTN (hypertension)     Hx of radiation therapy 2016    For endometrial CA 2016    Stroke 2016    No residual deficits.  Takes .     Past Surgical History:   Procedure Laterality Date    APPENDECTOMY      Ex lap    BREAST BIOPSY Left     BREAST LUMPECTOMY Bilateral     2-Left; 1-Right    COLON SURGERY N/A 12/10/2021    Exploratory Laparotamy; Transverse Colon resect w/ re-anastimosis.    COLOSTOMY N/A 12/10/2021    Procedure: EXPLORATORY LAPAROTOMY, TRANSVERSE COLONIC RESECTION, REANASTIMOSIS, AND MOBILIZATION OF SPLENIC FLEXURE;  Surgeon: Daja Ma MD;  Location: [facility];  Service: Gynecology Oncology;  Laterality: N/A;    FOOT SURGERY       OOPHORECTOMY      TONSILLECTOMY      TOTAL LAPAROSCOPIC HYSTERECTOMY SALPINGO OOPHORECTOMY Bilateral 04/19/2016    RATLH, BSO, LND      General Information       Row Name 08/19/24 0921          Physical Therapy Time and Intention    Document Type discharge evaluation/summary  -LO     Mode of Treatment individual therapy;physical therapy  -LO       Row Name 08/19/24 0921          General Information    Patient Profile Reviewed yes  -LO     Prior Level of Function independent:;all household mobility;community mobility;gait;transfer  uses a rollator when she goes to MD appts or to the hospital  -LO     Existing Precautions/Restrictions no known precautions/restrictions  -LO     Barriers to Rehab none identified  -LO       Row Name 08/19/24 0921          Living Environment    People in Home spouse;grandchild(joby)  -LO       Row Name 08/19/24 0921          Home Main Entrance    Number of Stairs, Main Entrance three  -LO     Stair Railings, Main Entrance railing on left side (ascending)  -LO       Row Name 08/19/24 0921          Stairs Within Home, Primary    Stairs, Within Home, Primary basement and 2nd story which pt reports she uses often  -LO     Number of Stairs, Within Home, Primary twelve  -LO     Stair Railings, Within Home, Primary railing on right side (ascending)  -LO       Row Name 08/19/24 0921          Cognition    Orientation Status (Cognition) oriented x 4  -LO       Row Name 08/19/24 0921          Safety Issues, Functional Mobility    Safety Issues Affecting Function (Mobility) other (see comments)  none  -LO     Impairments Affecting Function (Mobility) balance;sensation/sensory awareness  -LO               User Key  (r) = Recorded By, (t) = Taken By, (c) = Cosigned By      Initials Name Provider Type    Adriane Webb, PT Physical Therapist                   Mobility       Row Name 08/19/24 0924          Bed Mobility    Comment, (Bed Mobility) UIC  -LO       Row Name 08/19/24 0924           Transfers    Comment, (Transfers) sit<>stand rollator x2 Marly  -LO       Row Name 08/19/24 0924          Bed-Chair Transfer    Bed-Chair [place] (Transfers) not tested  -LO       Row Name 08/19/24 0924          Sit-Stand Transfer    Sit-Stand [place] (Transfers) modified independence  -LO     Assistive Device (Sit-Stand Transfers) walker, 4-wheeled  -LO       Row Name 08/19/24 0924          Gait/Stairs (Locomotion)    [place] Level (Gait) modified independence  -LO     Assistive Device (Gait) walker, 4-wheeled  -LO     Distance in Feet (Gait) 20  -LO     Bilateral Gait Deviations forward flexed posture  -LO     Comment, (Gait/Stairs) Ambulates with rollator walker Marly with mild forward flexed position, no evidence for losses of balance. Patient reports is her baseline.  -LO               User Key  (r) = Recorded By, (t) = Taken By, (c) = Cosigned By      Initials Name Provider Type    Adriane Webb PT Physical Therapist                   Obj/Interventions       Row Name 08/19/24 0926          Range of Motion Comprehensive    General Range of Motion bilateral lower extremity ROM WNL  -LO       Row Name 08/19/24 0926          Strength Comprehensive (MMT)    General Manual Muscle Testing (MMT) Assessment no strength deficits identified  -LO     Comment, General Manual Muscle Testing (MMT) Assessment BLE grossly 4+/5  -LO       Row Name 08/19/24 0926          Balance    Balance Assessment sitting static balance;sitting dynamic balance;standing dynamic balance;standing static balance  -LO     Static Sitting Balance independent  -LO     Dynamic Sitting Balance independent  -LO     Position, Sitting Balance unsupported;sitting in chair  -LO     Static Standing Balance modified independence  -LO     Dynamic Standing Balance modified independence  -LO     Position/Device Used, Standing Balance supported;walker, 4-wheeled  -LO     Comment, Balance rollator walker Marly  -LO               User Key  (r) =  Recorded By, (t) = Taken By, (c) = Cosigned By      Initials Name Provider Type    Adriane Webb, PT Physical Therapist                   Goals/Plan    No documentation.                  Clinical Impression       Row Name 08/19/24 0927          Pain    Pretreatment Pain Rating 0/10 - no pain  -LO     Posttreatment Pain Rating 0/10 - no pain  -LO       Row Name 08/19/24 0927          Plan of Care Review    Plan of Care Reviewed With patient  -LO     Outcome Evaluation PT eval completed. Patient presenting at [facility] for all functional mobility, strength, balance. No further IP PT warranted at this time. Recommend home at [place].  -LO       Row Name 08/19/24 0927          Therapy Assessment/Plan (PT)    Patient/Family Therapy Goals Statement (PT) home  -LO     Criteria for Skilled Interventions Met (PT) no;no problems identified which require skilled intervention;does not meet criteria for skilled intervention  -LO     Therapy Frequency (PT) evaluation only  -LO       Row Name 08/19/24 0927          Vital Signs    Pre Systolic BP Rehab 159  -LO     Pre Treatment Diastolic BP 82  -LO     O2 Delivery Pre Treatment room air  -LO     O2 Delivery Intra Treatment room air  -LO     O2 Delivery Post Treatment room air  -LO     Pre Patient Position Sitting  -LO     Intra Patient Position Standing  -LO     Post Patient Position Sitting  -LO       Row Name 08/19/24 0927          Positioning and Restraints    Pre-Treatment Position sitting in chair/recliner  -LO     Post Treatment Position chair  -LO     In Chair notified nsg;sitting;encouraged to call for assist;waffle cushion  -LO               User Key  (r) = Recorded By, (t) = Taken By, (c) = Cosigned By      Initials Name Provider Type    Adriane Webb, PT Physical Therapist                   Outcome Measures       Row Name 08/19/24 0930          How much help from another person do you currently need...    Turning from your back to your side while in flat bed without using  bedrails? 4  -LO     Moving from lying on back to sitting on the side of a flat bed without bedrails? 4  -LO     Moving to and from a bed to a chair (including a wheelchair)? 4  -LO     Standing up from a chair using your arms (e.g., wheelchair, bedside chair)? 4  -LO     Climbing 3-5 steps with a railing? 4  -LO     To walk in hospital room? 4  -LO     AM-PAC 6 Clicks Score (PT) 24  -LO     Highest Level of Mobility Goal 8 --> Walked 250 feet or more  -       Row Name 08/19/24 0930 08/19/24 0908       Functional Assessment    Outcome Measure Options AM-PAC 6 Clicks Basic Mobility (PT)  -LO AM-PAC 6 Clicks Daily Activity (OT)  -AC              User Key  (r) = Recorded By, (t) = Taken By, (c) = Cosigned By      Initials Name Provider Type    AC Sofia Nicole, OT Occupational Therapist    Adriane Webb, PT Physical Therapist                  Physical Therapy Education       Title: PT OT SLP Therapies (In Progress)       Topic: Physical Therapy (Done)       Point: Mobility training (Done)       Learning Progress Summary             Patient Acceptance, E, VU by  at 8/19/2024 0854    Comment: PT POC                         Point: Home exercise program (Done)       Learning Progress Summary             Patient Acceptance, E, VU by  at 8/19/2024 0854    Comment: PT POC                         Point: Body mechanics (Done)       Learning Progress Summary             Patient Acceptance, E, VU by  at 8/19/2024 0854    Comment: PT POC                         Point: Precautions (Done)       Learning Progress Summary             Patient Acceptance, E, VU by  at 8/19/2024 0854    Comment: PT POC                                         User Key       Initials Effective Dates Name Provider Type Discipline     06/16/21 -  Adriane Sher, PT Physical Therapist PT                  PT Recommendation and Plan     Plan of Care Reviewed With: patient  Outcome Evaluation: PT eval completed. Patient presenting at [facility] for all  functional mobility, strength, balance. No further IP PT warranted at this time. Recommend home at [place].     Time Calculation:   PT Evaluation Complexity  History, PT Evaluation Complexity: 1-2 personal factors and/or comorbidities  Examination of Body Systems (PT Eval Complexity): 1-2 elements  Clinical Presentation (PT Evaluation Complexity): evolving  Clinical Decision Making (PT Evaluation Complexity): low complexity  Overall Complexity (PT Evaluation Complexity): low complexity     PT Charges       Row Name 08/19/24 0854             Time Calculation    Start Time 0854  -LO      PT Received On 08/19/24  -LO      PT Goal Re-Cert Due Date 08/29/24  -LO         Timed Charges    94118 - Gait Training Minutes  6  -LO      98631 - PT Therapeutic Activity Minutes 8  -LO         Untimed Charges    PT Eval/Re-eval Minutes 37  -LO         Total Minutes    Timed Charges Total Minutes 14  -LO      Untimed Charges Total Minutes 37  -LO       Total Minutes 51  -LO                User Key  (r) = Recorded By, (t) = Taken By, (c) = Cosigned By      Initials Name Provider Type    LO Adriane Sher, PT Physical Therapist                  Therapy Charges for Today       Code Description Service Date Service Provider Modifiers Qty    63676188553 HC PT THERAPEUTIC ACT EA 15 MIN 8/19/2024 Adriane Sher, PT GP 1    01434995292 HC PT EVAL LOW COMPLEXITY 3 8/19/2024 Adriane Sher, PT GP 1            PT G-Codes  Outcome Measure Options: AM-PAC 6 Clicks Basic Mobility (PT)  AM-PAC 6 Clicks Score (PT): 24  AM-PAC 6 Clicks Score (OT): 24    PT Discharge Summary  Anticipated Discharge Disposition (PT): home    Adriane Sher PT  8/19/2024

## 2024-08-19 NOTE — OUTREACH NOTE
Prep Survey      Flowsheet Row Responses   [facility] patient discharged from? [place]   Is LACE score < 7 ? No   Eligibility [facility]   Date of Admission 08/17/24   Date of Discharge 08/19/24   Discharge Disposition Home or Self Care   Discharge diagnosis Partial small bowel obstruction   Does the patient have one of the following disease processes/diagnoses(primary or secondary)? Other   Does the patient have Home health ordered? No   Is there a DME ordered? No   Prep survey completed? Yes            Lashon VALDES - Registered Nurse

## 2024-08-19 NOTE — PLAN OF CARE
Problem: Skin Injury Risk Increased  Goal: Skin Health and Integrity  8/19/2024 1745 by Anant Cantu RN  Outcome: Met  8/19/2024 1025 by Anant Cantu RN  Outcome: Ongoing, Progressing  Intervention: Promote and Optimize Oral Intake  Recent Flowsheet Documentation  Taken 8/19/2024 1400 by Anant Cantu RN  Oral Nutrition Promotion:   rest periods promoted   safe use of adaptive equipment encouraged  Taken 8/19/2024 1200 by Anant Cantu RN  Oral Nutrition Promotion:   rest periods promoted   safe use of adaptive equipment encouraged  Taken 8/19/2024 0800 by Anant Cantu RN  Oral Nutrition Promotion:   rest periods promoted   safe use of adaptive equipment encouraged  Intervention: Optimize Skin Protection  Recent Flowsheet Documentation  Taken 8/19/2024 1400 by Anant Cantu RN  Pressure Reduction Techniques:   frequent weight shift encouraged   pressure points protected   rest period provided between sit times   weight shift assistance provided  Head of Bed (HOB) Positioning: [facility] elevated  Pressure Reduction Devices:   positioning supports utilized   pressure-redistributing mattress utilized  Skin Protection:   adhesive use limited   transparent dressing maintained   tubing/devices free from skin contact  Taken 8/19/2024 1200 by Anant Cantu RN  Pressure Reduction Techniques:   frequent weight shift encouraged   rest period provided between sit times   weight shift assistance provided  Head of Bed (HOB) Positioning: [facility] elevated  Pressure Reduction Devices:   positioning supports utilized   pressure-redistributing mattress utilized  Skin Protection:   adhesive use limited   tubing/devices free from skin contact  Taken 8/19/2024 1000 by Anant Cantu RN  Pressure Reduction Techniques:   frequent weight shift encouraged   heels elevated off bed   rest period provided between sit times   weight shift assistance provided  Head of Bed (HOB) Positioning: [facility] elevated  Pressure Reduction Devices:    positioning supports utilized   pressure-redistributing mattress utilized   specialty bed utilized  Skin Protection:   adhesive use limited   tubing/devices free from skin contact  Taken 8/19/2024 0800 by Anant Cantu RN  Pressure Reduction Techniques:   frequent weight shift encouraged   heels elevated off bed   pressure points protected   rest period provided between sit times   weight shift assistance provided  Head of Bed (HOB) Positioning: HOB elevated  Pressure Reduction Devices: (Waffle mattress applied)   positioning supports utilized   pressure-redistributing mattress utilized   specialty bed utilized   other (see comments)  Skin Protection:   adhesive use limited   tubing/devices free from skin contact     Problem: Adult Inpatient Plan of Care  Goal: Plan of Care Review  8/19/2024 1745 by Anant Cantu RN  Outcome: Met  8/19/2024 1025 by Anant Cantu RN  Outcome: Ongoing, Progressing  Flowsheets (Taken 8/19/2024 1025)  Progress: improving  Plan of Care Reviewed With: patient  Goal: Patient-Specific Goal (Individualized)  8/19/2024 1745 by Anant Cantu RN  Outcome: Met  8/19/2024 1025 by Anant Cantu RN  Outcome: Ongoing, Progressing  Goal: Absence of Hospital-Acquired Illness or Injury  8/19/2024 1745 by Anant Cantu RN  Outcome: Met  8/19/2024 1025 by Anant Cantu RN  Outcome: Ongoing, Progressing  Intervention: Identify and Manage Fall Risk  Recent Flowsheet Documentation  Taken 8/19/2024 1400 by Anant Cantu RN  Safety Promotion/Fall Prevention:   activity supervised   nonskid shoes/slippers when out of bed   room organization consistent   safety round/check completed  Taken 8/19/2024 1200 by Anant Cantu RN  Safety Promotion/Fall Prevention:   activity supervised   nonskid shoes/slippers when out of bed   room organization consistent   safety round/check completed  Taken 8/19/2024 1000 by Anant Cantu RN  Safety Promotion/Fall Prevention:   activity supervised   nonskid  shoes/slippers when out of bed   room organization consistent   safety round/check completed  Taken 8/19/2024 0800 by Anant Cantu RN  Safety Promotion/Fall Prevention:   activity supervised   nonskid shoes/slippers when out of bed   room organization consistent   safety round/check completed   assistive device/personal items within reach   clutter free environment maintained   fall prevention program maintained   mobility aid in [facility]  Intervention: Prevent Skin Injury  Recent Flowsheet Documentation  Taken 8/19/2024 1400 by Anant Cantu RN  Body Position:   position changed independently   weight shifting  Skin Protection:   adhesive use limited   transparent dressing maintained   tubing/devices free from skin contact  Taken 8/19/2024 1200 by Anant Cantu RN  Body Position:   position changed independently   weight shifting  Skin Protection:   adhesive use limited   tubing/devices free from skin contact  Taken 8/19/2024 1000 by Anant Cantu RN  Body Position:   position changed independently   weight shifting  Skin Protection:   adhesive use limited   tubing/devices free from skin contact  Taken 8/19/2024 0800 by Anant Cantu RN  Body Position:   position changed independently   weight shifting  Skin Protection:   adhesive use limited   tubing/devices free from skin contact  Intervention: Prevent and Manage VTE (Venous Thromboembolism) Risk  Recent Flowsheet Documentation  Taken 8/19/2024 1400 by Anant Cantu RN  Activity Management:   activity encouraged   up in chair  Taken 8/19/2024 1200 by Anant Cantu RN  Activity Management:   activity encouraged   up in chair   ambulated in room  Taken 8/19/2024 1000 by Anant Cantu RN  Activity Management:   activity encouraged   up in chair  Taken 8/19/2024 0800 by Anant Cantu RN  Activity Management:   activity encouraged   up in chair   ambulated in room  VTE Prevention/Management: (Patient is on Lovenox)   bilateral   sequential compression  devices off   other (see comments)  Intervention: Prevent Infection  Recent Flowsheet Documentation  Taken 8/19/2024 1400 by Anant Cantu RN  Infection Prevention:   environmental surveillance performed   equipment surfaces disinfected   hand hygiene promoted   personal protective equipment utilized   rest/sleep promoted   single patient room provided  Taken 8/19/2024 1200 by Anant Cantu RN  Infection Prevention:   environmental surveillance performed   equipment surfaces disinfected   hand hygiene promoted   personal protective equipment utilized   rest/sleep promoted   single patient room provided  Taken 8/19/2024 1000 by Anant Cantu RN  Infection Prevention:   environmental surveillance performed   equipment surfaces disinfected   hand hygiene promoted   personal protective equipment utilized   rest/sleep promoted   single patient room provided  Taken 8/19/2024 0800 by Anant Cantu RN  Infection Prevention:   environmental surveillance performed   equipment surfaces disinfected   hand hygiene promoted   personal protective equipment utilized   rest/sleep promoted   single patient room provided  Goal: Optimal Comfort and Wellbeing  8/19/2024 1745 by Anant Cantu RN  Outcome: Met  8/19/2024 1025 by Anant Cantu RN  Outcome: Ongoing, Progressing  Intervention: Provide Person-Centered Care  Recent Flowsheet Documentation  Taken 8/19/2024 0800 by Anant Cantu RN  Trust Relationship/Rapport:   care explained   choices provided   emotional support provided   empathic listening provided   questions answered   questions encouraged   reassurance provided   thoughts/feelings acknowledged  Goal: Readiness for Transition of Care  8/19/2024 1745 by Anant Cantu RN  Outcome: Met  8/19/2024 1025 by Anant Cantu RN  Outcome: Ongoing, Progressing     Problem: Hypertension Comorbidity  Goal: Blood Pressure in Desired Range  8/19/2024 1745 by Anant Cantu RN  Outcome: Met  8/19/2024 1025 by Pepe  MARK Ny  Outcome: Ongoing, Progressing  Intervention: Maintain Blood Pressure Management  Recent Flowsheet Documentation  Taken 8/19/2024 1400 by Anant Cantu RN  Syncope Management:   legs elevated   position changed slowly  Medication Review/Management: medications reviewed  Taken 8/19/2024 1200 by Anant Cantu RN  Syncope Management:   legs elevated   position changed slowly  Medication Review/Management: medications reviewed  Taken 8/19/2024 1000 by Anant Cantu RN  Syncope Management:   legs elevated   position changed slowly  Medication Review/Management: medications reviewed  Taken 8/19/2024 0800 by Anant Cantu RN  Syncope Management:   legs elevated   position changed slowly  Medication Review/Management:   medications reviewed   high-risk medications identified     Problem: Fall Injury Risk  Goal: Absence of Fall and Fall-Related Injury  8/19/2024 1745 by Anant Cantu RN  Outcome: Met  8/19/2024 1025 by Anant Cantu RN  Outcome: Ongoing, Progressing  Intervention: Identify and Manage Contributors  Recent Flowsheet Documentation  Taken 8/19/2024 1400 by Anant Cantu RN  Medication Review/Management: medications reviewed  Self-Care Promotion:   BADL personal objects within reach   BADL personal routines maintained   safe use of adaptive equipment encouraged  Taken 8/19/2024 1200 by Anant Cantu RN  Medication Review/Management: medications reviewed  Self-Care Promotion:   BADL personal objects within reach   BADL personal routines maintained   safe use of adaptive equipment encouraged  Taken 8/19/2024 1000 by Anant Cantu RN  Medication Review/Management: medications reviewed  Self-Care Promotion:   BADL personal objects within reach   BADL personal routines maintained   safe use of adaptive equipment encouraged  Taken 8/19/2024 0800 by Anant Cantu RN  Medication Review/Management:   medications reviewed   high-risk medications identified  Self-Care Promotion:   BADL  personal objects within reach   BADL personal routines maintained   safe use of adaptive equipment encouraged  Intervention: Promote Injury-Free Environment  Recent Flowsheet Documentation  Taken 8/19/2024 1400 by Anant Cantu RN  Safety Promotion/Fall Prevention:   activity supervised   nonskid shoes/slippers when out of bed   room organization consistent   safety round/check completed  Taken 8/19/2024 1200 by Anant Cantu RN  Safety Promotion/Fall Prevention:   activity supervised   nonskid shoes/slippers when out of bed   room organization consistent   safety round/check completed  Taken 8/19/2024 1000 by Anant Cantu, RN  Safety Promotion/Fall Prevention:   activity supervised   nonskid shoes/slippers when out of bed   room organization consistent   safety round/check completed  Taken 8/19/2024 0800 by Anant Cantu, RN  Safety Promotion/Fall Prevention:   activity supervised   nonskid shoes/slippers when out of bed   room organization consistent   safety round/check completed   assistive device/personal items within reach   clutter free environment maintained   fall prevention program maintained   mobility aid in reach   Goal Outcome Evaluation:  Plan of Care Reviewed With: patient        Progress: improving

## 2024-08-19 NOTE — THERAPY DISCHARGE NOTE
Acute Care - Occupational Therapy Discharge  [facility]    Patient Name: Cindy Sage  : 1945    MRN: [id number]                              Today's Date: 2024       Admit Date: 2024    Visit Dx:     ICD-10-CM ICD-9-CM   1. Small bowel obstruction  K56.609 560.9   2. Acute UTI  N39.0 599.0     Patient Active Problem List   Diagnosis    Hyperlipidemia    Hypertension    Ischemic stroke    Endometrial cancer    GERD (gastroesophageal reflux disease)    Hx: UTI (urinary tract infection)    History of endometrial cancer    TIA (transient ischemic attack)    Stenosis of right middle cerebral artery    Musculoskeletal neck pain    Large bowel obstruction s/p colonic resection by Dr. Ma 12/10/21    Recurrent cancer    Bilateral malignant neoplasm of breast in female, estrogen receptor positive    Slow transit constipation    Visual field defect    Hypokalemia    Anemia    Ocular migraine    Vitamin B12 deficiency    Ptosis of left eyelid    History of stroke    Partial small bowel obstruction     Past Medical History:   Diagnosis Date    Bilateral malignant neoplasm of breast in female, estrogen receptor positive 2022    Drug therapy 2016    For endometrial CA in 2016    Elevated cholesterol     Endometrial cancer 2016    Stage IIIC2     GERD (gastroesophageal reflux disease)     HTN (hypertension)     Hx of radiation therapy 2016    For endometrial CA 2016    Stroke 2016    No residual deficits.  Takes .     Past Surgical History:   Procedure Laterality Date    APPENDECTOMY      Ex lap    BREAST BIOPSY Left 2016    BREAST LUMPECTOMY Bilateral     2-Left; 1-Right    COLON SURGERY N/A 12/10/2021    Exploratory Laparotamy; Transverse Colon resect w/ re-anastimosis.    COLOSTOMY N/A 12/10/2021    Procedure: EXPLORATORY LAPAROTOMY, TRANSVERSE COLONIC RESECTION, REANASTIMOSIS, AND MOBILIZATION OF SPLENIC FLEXURE;  Surgeon: Daja Ma MD;  Location: [facility];   Service: Gynecology Oncology;  Laterality: N/A;    FOOT SURGERY      OOPHORECTOMY      TONSILLECTOMY      TOTAL LAPAROSCOPIC HYSTERECTOMY SALPINGO OOPHORECTOMY Bilateral 04/19/2016    YANN, ROGE, JENNYD      General Information       Row Name 08/19/24 0755          OT Time and Intention    Document Type discharge evaluation/summary  -     Mode of Treatment occupational therapy  -AC       Row Name 08/19/24 0755          General Information    Patient Profile Reviewed yes  -AC     Prior Level of Function independent:;all household mobility;community mobility;ADL's;home management;driving  uses a rollator when she goes to MD appts or to the hospital  -     Existing Precautions/Restrictions no known precautions/restrictions  -     Barriers to Rehab none identified  -       Row Name 08/19/24 0755          Occupational Profile    Environmental Supports and Barriers (Occupational Profile) walk in woer with shower seat, but has not been using shower seat  -       Row Name 08/19/24 0755          Living Environment    People in Home spouse;grandchild(joby)  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  -       Row Name 08/19/24 0755          Home Main Entrance    Number of Stairs, Main Entrance three  -AC     Stair Railings, Main Entrance railing on left side (ascending)  -       Row Name 08/19/24 0755          Stairs Within Home, Primary    Stairs, Within Home, Primary basement and 2nd story which pt reports she uses often  -     Number of Stairs, Within Home, Primary twelve  -AC     Stair Railings, Within Home, Primary railing on right side (ascending)  -       Row Name 08/19/24 0755          Cognition    Orientation Status (Cognition) oriented x 4  -               User Key  (r) = Recorded By, (t) = Taken By, (c) = Cosigned By      Initials Name Provider Type    AC Sofia Nicole OT Occupational Therapist                   Mobility/ADL's       Row Name 08/19/24 0902          Bed Mobility    Bed Mobility  supine-sit  -     Supine-Sit Haines (Bed Mobility) independent  -[facility] Name 08/19/24 0902          Functional Mobility    Functional Mobility- Ind. Level independent  -     Functional Mobility- Device walker, 4-wheeled  -     Functional Mobility-Distance (Feet) 20  -[facility] Name 08/19/24 0902          Activities of Daily Living    BADL Assessment/Intervention lower body dressing;feeding;grooming;toileting  -[facility] Name 08/19/24 0902          Lower Body Dressing Assessment/Training    Haines Level (Lower Body Dressing) don;socks;independent  -     Position (Lower Body Dressing) supported sitting  -[facility] Name 08/19/24 0902          Self-Feeding Assessment/Training    Haines Level (Feeding) feeding skills  -[facility] Name 08/19/24 0902          Grooming Assessment/Training    Haines Level (Grooming) hair care, combing/brushing;wash face, hands;independent  -     Position (Grooming) supported sitting  -AC       Row Name 08/19/24 0902          Toileting Assessment/Training    Haines Level (Toileting) adjust/manage clothing;perform perineal hygiene;independent  -     Assistive Devices (Toileting) commode;grab bar/safety frame  -     Position (Toileting) unsupported sitting;supported standing  -               User Key  (r) = Recorded By, (t) = Taken By, (c) = Cosigned By      Initials Name Provider Type     Sofia Nicole, OT Occupational Therapist                   Obj/Interventions       [facility] Name 08/19/24 0903          Sensory Assessment (Somatosensory)    Sensory Assessment (Somatosensory) UE sensation intact  -[facility] 08/19/24 0903          Vision Assessment/Intervention    Visual Impairment/Limitations corrective lenses full-time  -[facility] Name 08/19/24 0903          Range of Motion Comprehensive    General Range of Motion bilateral upper extremity ROM WNL  -[facility] Name 08/19/24 0903          Strength Comprehensive (MMT)     Comment, General Manual Muscle Testing (MMT) Assessment BUE grossly 4+/5  -AC       Row Name 08/19/24 0903          Balance    Balance Assessment sitting static balance;sitting dynamic balance;standing static balance;standing dynamic balance  -AC     Static Sitting Balance independent  -AC     Dynamic Sitting Balance independent  -AC     Position, Sitting Balance unsupported  -AC     Static Standing Balance independent  -AC     Dynamic Standing Balance independent  -AC     Position/Device Used, Standing Balance supported;walker, 4-wheeled  -AC               User Key  (r) = Recorded By, (t) = Taken By, (c) = Cosigned By      Initials Name Provider Type    AC Sofia Nicole, OT Occupational Therapist                   Goals/Plan    No documentation.                  Clinical Impression       [facility] Name 08/19/24 0904          Pain Assessment    Pretreatment Pain Rating 0/10 - no pain  -     Posttreatment Pain Rating 0/10 - no pain  -[facility] Name 08/19/24 0904          Plan of Care Review    Plan of Care Reviewed With patient  -     Outcome Evaluation Pt demo ind with LBD, commode transfer,  toileting, and ambulating in room with rollator.  Pt does not demo any deficits which would require OT intervention at this time. Recommend home upon d/c.  -[facility] Name 08/19/24 0904          Therapy Assessment/Plan (OT)    Patient/Family Therapy Goal Statement (OT) return home  -     Criteria for Skilled Therapeutic Interventions Met (OT) no;no problems identified which require skilled intervention  -     Therapy Frequency (OT) evaluation only  -     Predicted Duration of Therapy Intervention (OT) evl only  -[facility] Name 08/19/24 0904          Therapy Plan Review/Discharge Plan (OT)    Anticipated Discharge Disposition (OT) home  -[facility] Name 08/19/24 0904          Vital Signs    Post Systolic BP Rehab 159  -AC     Post Treatment Diastolic BP 82  -AC     Posttreatment Heart Rate (beats/min) 77  -AC      Post SpO2 (%) 96  -AC     O2 Delivery Post Treatment room air  -AC     Pre Patient Position Supine  -AC     Post Patient Position Sitting  -AC       Row Name 08/19/24 0904          Positioning and Restraints    Pre-Treatment Position in bed  -AC     Post Treatment Position chair  -AC     In Chair reclined;encouraged to call for assist;with nsg  RN reported he would give pt her callbell  -AC               User Key  (r) = Recorded By, (t) = Taken By, (c) = Cosigned By      Initials Name Provider Type    Sofia Mcpherson, OT Occupational Therapist                   Outcome Measures       Row Name 08/19/24 0908          How much help from another is currently needed...    Putting on and taking off regular lower body clothing? 4  -AC     Bathing (including washing, rinsing, and drying) 4  -AC     Toileting (which includes using toilet bed pan or urinal) 4  -AC     Putting on and taking off regular upper body clothing 4  -AC     Taking care of personal grooming (such as brushing teeth) 4  -AC     Eating meals 4  -AC     AM-PAC 6 Clicks Score (OT) 24  -       Row Name 08/19/24 0908          Functional Assessment    Outcome Measure Options AM-PAC 6 Clicks Daily Activity (OT)  -AC               User Key  (r) = Recorded By, (t) = Taken By, (c) = Cosigned By      Initials Name Provider Type    Sofia Mcpherson, OT Occupational Therapist                  Occupational Therapy Education       Title: PT OT SLP Therapies (In Progress)       Topic: Occupational Therapy (In Progress)       Point: ADL training (Done)       Description:   Instruct learner(s) on proper safety adaptation and remediation techniques during self care or transfers.   Instruct in proper use of assistive devices.                  Learning Progress Summary             Patient Acceptance, E, VU by JANELL at 8/19/2024 0908                         Point: Home exercise program (Not Started)       Description:   Instruct learner(s) on appropriate technique for  monitoring, assisting and/or progressing therapeutic exercises/activities.                  Learner Progress:  Not documented in this visit.              Point: Precautions (Not Started)       Description:   Instruct learner(s) on prescribed precautions during self-care and functional transfers.                  Learner Progress:  Not documented in this visit.              Point: Body mechanics (Not Started)       Description:   Instruct learner(s) on proper positioning and spine alignment during self-care, functional mobility activities and/or exercises.                  Learner Progress:  Not documented in this visit.                              User Key       Initials Effective Dates Name Provider Type Discipline     02/03/23 -  Sofia Nicole, OT Occupational Therapist OT                  OT Recommendation and Plan  Therapy Frequency (OT): evaluation only  Plan of Care Review  Plan of Care Reviewed With: patient  Outcome Evaluation: Pt demo ind with LBD, commode transfer,  toileting, and ambulating in room with rollator.  Pt does not demo any deficits which would require OT intervention at this time. Recommend home upon d/c.  Plan of Care Reviewed With: patient  Outcome Evaluation: Pt demo ind with LBD, commode transfer,  toileting, and ambulating in room with rollator.  Pt does not demo any deficits which would require OT intervention at this time. Recommend home upon d/c.     Time Calculation:   Evaluation Complexity (OT)  Review Occupational Profile/Medical/Therapy History Complexity: brief/low complexity  Assessment, Occupational Performance/Identification of Deficit Complexity: 1-3 performance deficits  Clinical Decision Making Complexity (OT): problem focused assessment/low complexity  Overall Complexity of Evaluation (OT): low complexity     Time Calculation- OT       Row Name 08/19/24 0755             Time Calculation- OT    OT Start Time 0755  -AC      OT Received On 08/19/24  -         Untimed  Charges    OT Eval/Re-eval Minutes 48  -AC         Total Minutes    Untimed Charges Total Minutes 48  -AC       Total Minutes 48  -AC                User Key  (r) = Recorded By, (t) = Taken By, (c) = Cosigned By      Initials Name Provider Type    AC Sofia Nicole, OT Occupational Therapist                  Therapy Charges for Today       Code Description Service Date Service Provider Modifiers Qty    [id number]  OT EVAL LOW COMPLEXITY 4 8/19/2024 Sofia Nicole OT GO 1               OT Discharge Summary  Anticipated Discharge Disposition (OT): home    Sofia Nicole OT  8/19/2024

## 2024-08-19 NOTE — PLAN OF CARE
Goal Outcome Evaluation:  Plan of Care Reviewed With: patient           Outcome Evaluation: PT eval completed. Patient presenting at [facility] ([place] for all functional mobility, strength, balance. No further IP PT warranted at this time. Recommend home at dc.      Anticipated Discharge Disposition (PT): home

## 2024-08-19 NOTE — PLAN OF CARE
Problem: Skin Injury Risk Increased  Goal: Skin Health and Integrity  Outcome: Ongoing, Progressing  Intervention: Promote and Optimize Oral Intake  Recent Flowsheet Documentation  Taken 8/19/2024 0800 by Anant Cantu RN  Oral Nutrition Promotion:   rest periods promoted   safe use of adaptive equipment encouraged  Intervention: Optimize Skin Protection  Recent Flowsheet Documentation  Taken 8/19/2024 1000 by Anant Cantu RN  Pressure Reduction Techniques:   frequent weight shift encouraged   heels elevated off bed   rest period provided between sit times   weight shift assistance provided  Head of Bed (HOB) Positioning: HOB elevated  Pressure Reduction Devices:   positioning supports utilized   pressure-redistributing mattress utilized   specialty bed utilized  Skin Protection:   adhesive use limited   tubing/devices free from skin contact  Taken 8/19/2024 0800 by Anant Cantu RN  Pressure Reduction Techniques:   frequent weight shift encouraged   heels elevated off bed   pressure points protected   rest period provided between sit times   weight shift assistance provided  Head of Bed (HOB) Positioning: HOB elevated  Pressure Reduction Devices: (Waffle mattress applied)   positioning supports utilized   pressure-redistributing mattress utilized   specialty bed utilized   other (see comments)  Skin Protection:   adhesive use limited   tubing/devices free from skin contact     Problem: Adult Inpatient Plan of Care  Goal: Plan of Care Review  Outcome: Ongoing, Progressing  Flowsheets (Taken 8/19/2024 1025)  Progress: improving  Plan of Care Reviewed With: patient  Goal: Patient-Specific Goal (Individualized)  Outcome: Ongoing, Progressing  Goal: Absence of Hospital-Acquired Illness or Injury  Outcome: Ongoing, Progressing  Intervention: Identify and Manage Fall Risk  Recent Flowsheet Documentation  Taken 8/19/2024 1000 by Anant Cantu RN  Safety Promotion/Fall Prevention:   activity supervised   nonskid  shoes/slippers when out of bed   room organization consistent   safety round/check completed  Taken 8/19/2024 0800 by Anant Cantu RN  Safety Promotion/Fall Prevention:   activity supervised   nonskid shoes/slippers when out of bed   room organization consistent   safety round/check completed   assistive device/personal items within reach   clutter free environment maintained   fall prevention program maintained   mobility aid in [facility]  Intervention: Prevent Skin Injury  Recent Flowsheet Documentation  Taken 8/19/2024 1000 by Anant Cantu RN  Body Position:   position changed independently   weight shifting  Skin Protection:   adhesive use limited   tubing/devices free from skin contact  Taken 8/19/2024 0800 by Anant Cantu RN  Body Position:   position changed independently   weight shifting  Skin Protection:   adhesive use limited   tubing/devices free from skin contact  Intervention: Prevent and Manage VTE (Venous Thromboembolism) Risk  Recent Flowsheet Documentation  Taken 8/19/2024 1000 by Anant Cantu RN  Activity Management:   activity encouraged   up in chair  Taken 8/19/2024 0800 by Anant Cantu RN  Activity Management:   activity encouraged   up in chair   ambulated in room  VTE Prevention/Management: (Patient is on Lovenox)   bilateral   sequential compression devices off   other (see comments)  Intervention: Prevent Infection  Recent Flowsheet Documentation  Taken 8/19/2024 1000 by Anant Cantu RN  Infection Prevention:   environmental surveillance performed   equipment surfaces disinfected   hand hygiene promoted   personal protective equipment utilized   rest/sleep promoted   single patient room provided  Taken 8/19/2024 0800 by Anant Cantu RN  Infection Prevention:   environmental surveillance performed   equipment surfaces disinfected   hand hygiene promoted   personal protective equipment utilized   rest/sleep promoted   single patient room provided  Goal: Optimal Comfort and  Wellbeing  Outcome: Ongoing, Progressing  Intervention: Provide Person-Centered Care  Recent Flowsheet Documentation  Taken 8/19/2024 0800 by Anant Cantu RN  Trust Relationship/Rapport:   care explained   choices provided   emotional support provided   empathic listening provided   questions answered   questions encouraged   reassurance provided   thoughts/feelings acknowledged  Goal: Readiness for Transition of Care  Outcome: Ongoing, Progressing     Problem: Hypertension Comorbidity  Goal: Blood Pressure in Desired Range  Outcome: Ongoing, Progressing  Intervention: Maintain Blood Pressure Management  Recent Flowsheet Documentation  Taken 8/19/2024 1000 by Anant Cantu RN  Syncope Management:   legs elevated   position changed slowly  Medication Review/Management: medications reviewed  Taken 8/19/2024 0800 by Anant Cantu RN  Syncope Management:   legs elevated   position changed slowly  Medication Review/Management:   medications reviewed   high-risk medications identified     Problem: Fall Injury Risk  Goal: Absence of Fall and Fall-Related Injury  Outcome: Ongoing, Progressing  Intervention: Identify and Manage Contributors  Recent Flowsheet Documentation  Taken 8/19/2024 1000 by Anant Cantu RN  Medication Review/Management: medications reviewed  Self-Care Promotion:   BADL personal objects within reach   BADL personal routines maintained   safe use of adaptive equipment encouraged  Taken 8/19/2024 0800 by Anant Cantu RN  Medication Review/Management:   medications reviewed   high-risk medications identified  Self-Care Promotion:   BADL personal objects within reach   BADL personal routines maintained   safe use of adaptive equipment encouraged  Intervention: Promote Injury-Free Environment  Recent Flowsheet Documentation  Taken 8/19/2024 1000 by Anant Cantu RN  Safety Promotion/Fall Prevention:   activity supervised   nonskid shoes/slippers when out of bed   room organization consistent    safety round/check completed  Taken 8/19/2024 0800 by Anant Cantu, RN  Safety Promotion/Fall Prevention:   activity supervised   nonskid shoes/slippers when out of bed   room organization consistent   safety round/check completed   assistive device/personal items within reach   clutter free environment maintained   fall prevention program maintained   mobility aid in reach   Goal Outcome Evaluation:  Plan of Care Reviewed With: patient        Progress: improving

## 2024-08-20 ENCOUNTER — TRANSITIONAL CARE MANAGEMENT TELEPHONE ENCOUNTER (OUTPATIENT)
Dept: CALL CENTER | Facility: HOSPITAL | Age: 79
End: 2024-08-20
Payer: MEDICARE

## 2024-08-20 NOTE — OUTREACH NOTE
Call Center TCM Note      Flowsheet Row Responses   [facility] patient discharged from? [facility]   Does the patient have one of the following disease processes/diagnoses(primary or secondary)? Other   TCM attempt successful? Yes  [No VR for PCP]   Call start time 1404   Call end time 1408   Discharge diagnosis Partial small bowel obstruction   Meds reviewed with patient/caregiver? Yes   Does the patient have all medications ordered at discharge? N/A   Comments 8/26/2024 10:15 AM  HOSPITAL FOLLOW UP   Does the patient have an appointment with their PCP within 7-14 days of discharge? Yes   Comments Patient reports doing well at home, since DC. Patient reports that she is tolerating po intake WNL, her baseline is to eat frequent small meals daily. Pt c/o ongoing liquid stool but denies N/V or abd pain. Patient educated to maintain hydration due to diarrhea, pt v/u. Patient denies dizziness or other issues at this time.   Did the patient receive a copy of their discharge instructions? Yes   Nursing interventions Reviewed instructions with patient   What is the patient's perception of their health status since discharge? Improving   Is the patient/caregiver able to teach back signs and symptoms related to disease process for when to call PCP? Yes   Is the patient/caregiver able to teach back signs and symptoms related to disease process for when to call 911? Yes   Is the patient/caregiver able to teach back the hierarchy of who to call/visit for symptoms/problems? PCP, Specialist, Home health nurse, Urgent Care, ED, 911 Yes   TCM call completed? Yes   Call end time 1408            Vilma Prince RN    8/20/2024, 14:09 EDT

## 2024-08-22 LAB
BACTERIA SPEC AEROBE CULT: NORMAL
BACTERIA SPEC AEROBE CULT: NORMAL

## 2024-08-26 ENCOUNTER — OFFICE VISIT (OUTPATIENT)
Dept: INTERNAL MEDICINE | Facility: CLINIC | Age: 79
End: 2024-08-26
Payer: MEDICARE

## 2024-08-26 VITALS
HEART RATE: 72 BPM | BODY MASS INDEX: 22.49 KG/M2 | RESPIRATION RATE: 18 BRPM | WEIGHT: 131 LBS | DIASTOLIC BLOOD PRESSURE: 62 MMHG | SYSTOLIC BLOOD PRESSURE: 128 MMHG | TEMPERATURE: 97.8 F

## 2024-08-26 DIAGNOSIS — D64.9 ANEMIA, UNSPECIFIED TYPE: ICD-10-CM

## 2024-08-26 DIAGNOSIS — K56.600 PARTIAL SMALL BOWEL OBSTRUCTION: Primary | ICD-10-CM

## 2024-08-26 DIAGNOSIS — E87.6 HYPOKALEMIA: ICD-10-CM

## 2024-08-26 DIAGNOSIS — N30.00 ACUTE CYSTITIS WITHOUT HEMATURIA: ICD-10-CM

## 2024-08-26 LAB
BASOPHILS # BLD AUTO: 0.03 10*3/MM3 (ref 0–0.2)
BASOPHILS NFR BLD AUTO: 0.7 % (ref 0–1.5)
BILIRUB BLD-MCNC: ABNORMAL MG/DL
CLARITY, POC: CLEAR
COLOR UR: YELLOW
DEPRECATED RDW RBC AUTO: 48.8 FL (ref 37–54)
EOSINOPHIL # BLD AUTO: 0.09 10*3/MM3 (ref 0–0.4)
EOSINOPHIL NFR BLD AUTO: 2.1 % (ref 0.3–6.2)
ERYTHROCYTE [DISTWIDTH] IN BLOOD BY AUTOMATED COUNT: 15 % (ref 12.3–15.4)
EXPIRATION DATE: ABNORMAL
GLUCOSE UR STRIP-MCNC: NEGATIVE MG/DL
HCT VFR BLD AUTO: 35.6 % (ref 34–46.6)
HGB BLD-MCNC: 11.4 G/DL (ref 12–15.9)
IMM GRANULOCYTES # BLD AUTO: 0.04 10*3/MM3 (ref 0–0.05)
IMM GRANULOCYTES NFR BLD AUTO: 0.9 % (ref 0–0.5)
KETONES UR QL: NEGATIVE
LEUKOCYTE EST, POC: ABNORMAL
LYMPHOCYTES # BLD AUTO: 0.58 10*3/MM3 (ref 0.7–3.1)
LYMPHOCYTES NFR BLD AUTO: 13.4 % (ref 19.6–45.3)
Lab: ABNORMAL
MCH RBC QN AUTO: 29.2 PG (ref 26.6–33)
MCHC RBC AUTO-ENTMCNC: 32 G/DL (ref 31.5–35.7)
MCV RBC AUTO: 91.3 FL (ref 79–97)
MONOCYTES # BLD AUTO: 0.48 10*3/MM3 (ref 0.1–0.9)
MONOCYTES NFR BLD AUTO: 11.1 % (ref 5–12)
NEUTROPHILS NFR BLD AUTO: 3.12 10*3/MM3 (ref 1.7–7)
NEUTROPHILS NFR BLD AUTO: 71.8 % (ref 42.7–76)
NITRITE UR-MCNC: NEGATIVE MG/ML
NRBC BLD AUTO-RTO: 0 /100 WBC (ref 0–0.2)
PH UR: 5 [PH] (ref 5–8)
PLATELET # BLD AUTO: 277 10*3/MM3 (ref 140–450)
PMV BLD AUTO: 8.7 FL (ref 6–12)
PROT UR STRIP-MCNC: ABNORMAL MG/DL
RBC # BLD AUTO: 3.9 10*6/MM3 (ref 3.77–5.28)
RBC # UR STRIP: ABNORMAL /UL
SP GR UR: 1.02 (ref 1–1.03)
UROBILINOGEN UR QL: NORMAL
WBC NRBC COR # BLD AUTO: 4.34 10*3/MM3 (ref 3.4–10.8)

## 2024-08-26 PROCEDURE — 99495 TRANSJ CARE MGMT MOD F2F 14D: CPT | Performed by: INTERNAL MEDICINE

## 2024-08-26 PROCEDURE — 1126F AMNT PAIN NOTED NONE PRSNT: CPT | Performed by: INTERNAL MEDICINE

## 2024-08-26 PROCEDURE — 85025 COMPLETE CBC W/AUTO DIFF WBC: CPT | Performed by: INTERNAL MEDICINE

## 2024-08-26 PROCEDURE — 87086 URINE CULTURE/COLONY COUNT: CPT | Performed by: INTERNAL MEDICINE

## 2024-08-26 PROCEDURE — 36415 COLL VENOUS BLD VENIPUNCTURE: CPT | Performed by: INTERNAL MEDICINE

## 2024-08-26 PROCEDURE — 3074F SYST BP LT 130 MM HG: CPT | Performed by: INTERNAL MEDICINE

## 2024-08-26 PROCEDURE — 1111F DSCHRG MED/CURRENT MED MERGE: CPT | Performed by: INTERNAL MEDICINE

## 2024-08-26 PROCEDURE — 3078F DIAST BP <80 MM HG: CPT | Performed by: INTERNAL MEDICINE

## 2024-08-26 PROCEDURE — 80053 COMPREHEN METABOLIC PANEL: CPT | Performed by: INTERNAL MEDICINE

## 2024-08-26 PROCEDURE — 81003 URINALYSIS AUTO W/O SCOPE: CPT | Performed by: INTERNAL MEDICINE

## 2024-08-26 RX ORDER — POTASSIUM CHLORIDE 20MEQ/15ML
LIQUID (ML) ORAL
Qty: 473 ML | Refills: 5 | Status: SHIPPED | OUTPATIENT
Start: 2024-08-26

## 2024-08-26 NOTE — PROGRESS NOTES
Chief Complaint   Patient presents with    Hospital follow up [facility] 8/17-8/19  Partial bowel obstruction       History of Present Illness    Hospital Course:  Cindy Sage is a 79 y.o. female with PMHx of endometrial cancer, breast cancer (on maintenance treatment), HTN, HLD, GERD who presented with abdominal pain. Imaging in the ED suspicious for mild/early developing distal small bowel obstruction with well-defined transition point in right lower pelvis, also evidence of UTI. Early SBO resolved with conservative measures including fluids, bowel regimen and pain control. Patient was able to tolerate a regular diet. Urine culture grew E. coli, patient was treated with IV ceftriaxone x 3 days. Patient was discharged home with  with plan for close follow-up with PCP.     Mild / developing distal small bowel obstruction  -Symptoms improved 8/18 and 8/19  -Treated with conservative measures with fluids, bowel regimen and pain control.  -Tolerated regular diet on day of discharge.      Acute UTI  -Urine culture with E.Coli   -s/p IV ceftriaxone x3 days.      Hypertension - Continue home losartan, HCTZ.   Hyperlipidemia - Continue atorvastatin.   History of CVA - Continue home ASA and Plavix.   Hx of endometrial and breast cancer - Continue home anastrozole maintenance therapy.      The patient presents to the office for a follow-up visit from a recent hospitalization. The patient was hospitalized from 17-Aug-2024 through 19-Aug-2024 with anemia, hypokalemia, a urinary tract infection and small bowel obstruction. The records have been received and reviewed. The medication list has been reconciled. The hospital stay was uncomplicated. Her in hospital treatment consisted of oral antibiotics, bowel rest and adjustments in the patients medication regimen.        Since leaving the hospital she reports that she is back to normal. She denies abdominal pain, itchy watery eyes, bone pain, chills, congestion, a dry  cough, a wet cough, decreased appetite, diarrhea, dysuria, ear drainage, ear pain, eye drainage, facial pain, fever, a headache, joint pain, myalgias, nasal discharge, nausea, neck stiffness, night sweats, a rash, sneezing, a sore throat, vomiting or wheezing. She also reports that she does not have chest pain, dyspnea, edema, syncope, blurred vision or palpitations.    The patient presents for a follow-up related to anemia. There are no reports of blood loss. The patient has no symptoms of fever, sweats, weight loss, fatigue or paresthesias. The patient's energy level is normal.    The patient presents for follow-up of a urinary tract infection. The patient denies gross hematuria, frequency, urgency, hesitancy, vaginal discharge, pelvic pain or flank pain. She completed her medication. She has not had a prior history of frequent, recurrent urinary tract infections.    Medications      Current Outpatient Medications:     acetaminophen (TYLENOL) 325 MG tablet, Take 2 tablets by mouth Every 6 (Six) Hours As Needed for Mild Pain. (Patient taking differently: Take 2 tablets by mouth Every 6 (Six) Hours As Needed for Mild Pain, Headache or Fever. OTC), Disp: , Rfl:     anastrozole (ARIMIDEX) 1 MG tablet, Take 1 tablet by mouth Daily., Disp: 30 tablet, Rfl: 11    aspirin 81 MG EC tablet, Take 1 tablet by mouth Daily. OTC, Disp: , Rfl:     Cholecalciferol 25 MCG (1000 UT) chewable tablet, Chew 1,000 Units Daily. OTC, Disp: , Rfl:     clopidogrel (PLAVIX) 75 MG tablet, Take 1 tablet by mouth once daily, Disp: 90 tablet, Rfl: 1    hydroCHLOROthiazide 25 MG tablet, Take 1 tablet by mouth once daily, Disp: 90 tablet, Rfl: 0    losartan (COZAAR) 25 MG tablet, Take 1 tablet by mouth once daily, Disp: 90 tablet, Rfl: 1    omeprazole (priLOSEC) 20 MG capsule, Take 1 capsule by mouth Daily. OTC, Disp: , Rfl:     polyethylene glycol (MIRALAX) 17 g packet, Take 17 g by mouth Daily As Needed (Constipation)., Disp: , Rfl:     potassium  chloride (KAYCIEL) 20 mEq/15 mL solution, TAKE 15 ML BY MOUTH  ONCE DAILY, Disp: 473 mL, Rfl: 5    simvastatin (ZOCOR) 40 MG tablet, Take 1 tablet by mouth once daily (Patient taking differently: Take 1 tablet by mouth Every Night.), Disp: 90 tablet, Rfl: 0    vitamin B-12 (CYANOCOBALAMIN) 500 MCG tablet, Take 1 tablet by mouth once daily (Patient taking differently: Take 1 tablet by mouth Daily. OTC), Disp: 90 tablet, Rfl: 1   Current outpatient and discharge medications have been reconciled for the patient.  Reviewed by: Jose Carrillo MD    Allergies    Allergies   Allergen Reactions    Strawberry Extract Swelling       Problem List    Patient Active Problem List   Diagnosis    Hyperlipidemia    Hypertension    Ischemic stroke    Endometrial cancer    GERD (gastroesophageal reflux disease)    Hx: UTI (urinary tract infection)    History of endometrial cancer    TIA (transient ischemic attack)    Stenosis of right middle cerebral artery    Musculoskeletal neck pain    Large bowel obstruction s/p colonic resection by Dr. Ma 12/10/21    Recurrent cancer    Bilateral malignant neoplasm of breast in female, estrogen receptor positive    Slow transit constipation    Visual field defect    Hypokalemia    Anemia    Ocular migraine    Vitamin B12 deficiency    Ptosis of left eyelid    History of stroke    Partial small bowel obstruction       Medications, Allergies, Problems List and Past History were reviewed and updated.    Physical Examination    /62 (BP Location: Right arm, Patient Position: Sitting, Cuff Size: Adult)   Pulse 72   Temp 97.8 °F (36.6 °C) (Infrared)   Resp 18   Wt 59.4 kg (131 lb)   LMP  (LMP Unknown)   BMI 22.49 kg/m²       HEENT: Head- Normocephalic Atraumatic. Facies- Within normal limits. Pinnas- Normal texture and shape bilaterally. Canals- Normal bilaterally. TMs- Normal bilaterally. Nares- Patent bilaterally. Nasal Septum- is normal. There is no tenderness to palpation over  the frontal or maxillary sinuses. Lids- Normal bilaterally. Conjunctiva- Clear bilaterally. Sclera- Anicteric bilaterally. Oropharynx- Moist with no lesions. Tonsils- No enlargement, erythema or exudate.    Neck: Thyroid- non enlarged, symmetric and has no nodules. No bruits are detected. ROM- Normal Range of Motion with no rigidity.    Lungs: Auscultation- Clear to auscultation bilaterally. There are no retractions, clubbing or cyanosis. The Expiratory to Inspiratory ratio is equal.    Lymph Nodes: Cervical- no enlarged lymph nodes noted. Clavicular- no enlarged supraclavicular lymph nodes noted. Axillary- no enlarged axillary lymph nodes noted. Inguinal- no enlarged inguinal lymph nodes noted.    Cardiovascular: There are no carotid bruits. Heart- Normal Rate with Regular rhythm and no murmurs. There are no gallops. There are no rubs. In the lower extremities there is no edema. The upper extremities do not have edema.    Abdomen: Soft, benign, non-tender with no masses, hernias, organomegaly or scars.    Diagnoses and all orders for this visit:    1. Partial small bowel obstruction (Primary)  -     CBC & Differential; Future  -     Comprehensive Metabolic Panel; Future    2. Acute cystitis without hematuria  -     Comprehensive Metabolic Panel; Future  -     Urine Culture - Urine, Urine, Clean Catch; Future  -     POC Urinalysis Dipstick, Automated; Future    3. Anemia, unspecified type  -     CBC & Differential; Future    4. Hypokalemia  -     potassium chloride (KAYCIEL) 20 mEq/15 mL solution; TAKE 15 ML BY MOUTH  ONCE DAILY  Dispense: 473 mL; Refill: 5      Transitional Care Management Certification  I certify that the following are true:  Communication was made within 2 business days of discharge.  Complexity of Medical Decision Making is moderate.  Face to face visit occurred within 7 days.    *Note: 19227 is for high complexity patients with a face to face visit within 7 days of discharge.  09121 is for high  complexity patients with a face to face on days 8-14 post discharge or medium complexity with face to face visit within 14 days post discharge.        Return to Office    The patient was instructed to return for follow-up as needed. The patient was instructed to return sooner if the condition changes, worsens, or does not resolve.

## 2024-08-27 LAB
ALBUMIN SERPL-MCNC: 4.2 G/DL (ref 3.5–5.2)
ALBUMIN/GLOB SERPL: 1.6 G/DL
ALP SERPL-CCNC: 81 U/L (ref 39–117)
ALT SERPL W P-5'-P-CCNC: 14 U/L (ref 1–33)
ANION GAP SERPL CALCULATED.3IONS-SCNC: 13.3 MMOL/L (ref 5–15)
AST SERPL-CCNC: 19 U/L (ref 1–32)
BILIRUB SERPL-MCNC: 0.5 MG/DL (ref 0–1.2)
BUN SERPL-MCNC: 6 MG/DL (ref 8–23)
BUN/CREAT SERPL: 5.7 (ref 7–25)
CALCIUM SPEC-SCNC: 9.9 MG/DL (ref 8.6–10.5)
CHLORIDE SERPL-SCNC: 100 MMOL/L (ref 98–107)
CO2 SERPL-SCNC: 23.7 MMOL/L (ref 22–29)
CREAT SERPL-MCNC: 1.06 MG/DL (ref 0.57–1)
EGFRCR SERPLBLD CKD-EPI 2021: 53.2 ML/MIN/1.73
GLOBULIN UR ELPH-MCNC: 2.7 GM/DL
GLUCOSE SERPL-MCNC: 96 MG/DL (ref 65–99)
POTASSIUM SERPL-SCNC: 4.6 MMOL/L (ref 3.5–5.2)
PROT SERPL-MCNC: 6.9 G/DL (ref 6–8.5)
SODIUM SERPL-SCNC: 137 MMOL/L (ref 136–145)

## 2024-08-28 LAB — BACTERIA SPEC AEROBE CULT: NORMAL

## 2024-09-19 RX ORDER — HYDROCHLOROTHIAZIDE 25 MG/1
TABLET ORAL
Qty: 90 TABLET | Refills: 0 | Status: SHIPPED | OUTPATIENT
Start: 2024-09-19

## 2024-10-14 DIAGNOSIS — G45.9 TIA (TRANSIENT ISCHEMIC ATTACK): ICD-10-CM

## 2024-10-14 RX ORDER — CLOPIDOGREL BISULFATE 75 MG/1
75 TABLET ORAL DAILY
Qty: 90 TABLET | Refills: 0 | Status: SHIPPED | OUTPATIENT
Start: 2024-10-14

## 2024-10-25 DIAGNOSIS — G45.9 TIA (TRANSIENT ISCHEMIC ATTACK): ICD-10-CM

## 2024-10-25 DIAGNOSIS — I66.01 STENOSIS OF RIGHT MIDDLE CEREBRAL ARTERY: ICD-10-CM

## 2024-10-27 RX ORDER — SIMVASTATIN 40 MG
TABLET ORAL
Qty: 90 TABLET | Refills: 0 | Status: SHIPPED | OUTPATIENT
Start: 2024-10-27

## 2024-11-11 NOTE — TELEPHONE ENCOUNTER
11/11/24 1525   Child Life   Location [facility]card[facility]   Interaction Intent Initial Assessment   Method in-person   Individuals Present Patient;Caregiver/Adult Family Member;Siblings/Child Family Members  (Pt's mother and three older siblings present)   Intervention Sibling/Child Family Member Support;Procedural Support   Procedure Support Comment CCLS met with pt and pt's family to assess coping needs and offer supportive interventions for pt's EKG. During EKG placement, pt sat on mother's lap and engaged in alternative focus with CCLS utilizing developmentally appropriate toys (i.e. baby einstein). Pt appropriately upset with sticker placement and difficult to redirect for remainder of EKG. Pt calmed quickly after with comfort from mother.   Sibling Support Comment CCLS provided developmentally appropriate toys (i.e. coloring) and juice/snacks to promote normalization in clinic setting. Mother appreciative of support.   Distress appropriate   Outcomes/Follow Up Continue to Follow/Support   Time Spent   Direct Patient Care 15   Indirect Patient Care 10   Total Time Spent (Calc) 25        Rx Refill Note  Requested Prescriptions     Pending Prescriptions Disp Refills    vitamin B-12 (CYANOCOBALAMIN) 500 MCG tablet [Pharmacy Med Name: Vitamin B-12 500 MCG Oral Tablet] 90 tablet 0     Sig: Take 1 tablet by mouth once daily      Last office visit with prescribing clinician: 11/30/2022   Last telemedicine visit with prescribing clinician: Visit date not found   Next office visit with prescribing clinician: 12/20/2023                         Would you like a call back once the refill request has been completed: [] Yes [] No    If the office needs to give you a call back, can they leave a voicemail: [] Yes [] No    Anaid Hu CMA  08/28/23, 13:46 EDT

## 2024-11-20 ENCOUNTER — OFFICE VISIT (OUTPATIENT)
Dept: INTERNAL MEDICINE | Facility: CLINIC | Age: 79
End: 2024-11-20
Payer: MEDICARE

## 2024-11-20 VITALS
WEIGHT: 133 LBS | TEMPERATURE: 97.7 F | HEART RATE: 68 BPM | HEIGHT: 64 IN | DIASTOLIC BLOOD PRESSURE: 62 MMHG | SYSTOLIC BLOOD PRESSURE: 134 MMHG | BODY MASS INDEX: 22.71 KG/M2 | RESPIRATION RATE: 18 BRPM

## 2024-11-20 DIAGNOSIS — I10 ESSENTIAL HYPERTENSION: ICD-10-CM

## 2024-11-20 DIAGNOSIS — E78.5 HYPERLIPIDEMIA, UNSPECIFIED HYPERLIPIDEMIA TYPE: ICD-10-CM

## 2024-11-20 DIAGNOSIS — Z00.00 PHYSICAL EXAM: ICD-10-CM

## 2024-11-20 DIAGNOSIS — K21.9 GASTROESOPHAGEAL REFLUX DISEASE WITHOUT ESOPHAGITIS: ICD-10-CM

## 2024-11-20 DIAGNOSIS — R82.90 ABNORMAL URINE: ICD-10-CM

## 2024-11-20 DIAGNOSIS — E53.8 VITAMIN B12 DEFICIENCY: ICD-10-CM

## 2024-11-20 DIAGNOSIS — Z00.00 MEDICARE ANNUAL WELLNESS VISIT, SUBSEQUENT: Primary | ICD-10-CM

## 2024-11-20 LAB
ALBUMIN SERPL-MCNC: 4.1 G/DL (ref 3.5–5.2)
ALBUMIN/GLOB SERPL: 1.6 G/DL
ALP SERPL-CCNC: 85 U/L (ref 39–117)
ALT SERPL W P-5'-P-CCNC: 11 U/L (ref 1–33)
ANION GAP SERPL CALCULATED.3IONS-SCNC: 12.4 MMOL/L (ref 5–15)
AST SERPL-CCNC: 19 U/L (ref 1–32)
BASOPHILS # BLD AUTO: 0.03 10*3/MM3 (ref 0–0.2)
BASOPHILS NFR BLD AUTO: 0.8 % (ref 0–1.5)
BILIRUB BLD-MCNC: NEGATIVE MG/DL
BILIRUB SERPL-MCNC: 0.7 MG/DL (ref 0–1.2)
BUN SERPL-MCNC: 7 MG/DL (ref 8–23)
BUN/CREAT SERPL: 7 (ref 7–25)
CALCIUM SPEC-SCNC: 9.1 MG/DL (ref 8.6–10.5)
CHLORIDE SERPL-SCNC: 96 MMOL/L (ref 98–107)
CHOLEST SERPL-MCNC: 166 MG/DL (ref 0–200)
CLARITY, POC: CLEAR
CO2 SERPL-SCNC: 24.6 MMOL/L (ref 22–29)
COLOR UR: YELLOW
CREAT SERPL-MCNC: 1 MG/DL (ref 0.57–1)
DEPRECATED RDW RBC AUTO: 42.9 FL (ref 37–54)
EGFRCR SERPLBLD CKD-EPI 2021: 57.1 ML/MIN/1.73
EOSINOPHIL # BLD AUTO: 0.09 10*3/MM3 (ref 0–0.4)
EOSINOPHIL NFR BLD AUTO: 2.5 % (ref 0.3–6.2)
ERYTHROCYTE [DISTWIDTH] IN BLOOD BY AUTOMATED COUNT: 13.7 % (ref 12.3–15.4)
EXPIRATION DATE: ABNORMAL
GLOBULIN UR ELPH-MCNC: 2.5 GM/DL
GLUCOSE SERPL-MCNC: 99 MG/DL (ref 65–99)
GLUCOSE UR STRIP-MCNC: NEGATIVE MG/DL
HCT VFR BLD AUTO: 33.6 % (ref 34–46.6)
HDLC SERPL-MCNC: 102 MG/DL (ref 40–60)
HGB BLD-MCNC: 11.1 G/DL (ref 12–15.9)
IMM GRANULOCYTES # BLD AUTO: 0.02 10*3/MM3 (ref 0–0.05)
IMM GRANULOCYTES NFR BLD AUTO: 0.5 % (ref 0–0.5)
KETONES UR QL: NEGATIVE
LDLC SERPL CALC-MCNC: 48 MG/DL (ref 0–100)
LDLC/HDLC SERPL: 0.46 {RATIO}
LEUKOCYTE EST, POC: ABNORMAL
LYMPHOCYTES # BLD AUTO: 0.57 10*3/MM3 (ref 0.7–3.1)
LYMPHOCYTES NFR BLD AUTO: 15.5 % (ref 19.6–45.3)
Lab: ABNORMAL
MCH RBC QN AUTO: 29 PG (ref 26.6–33)
MCHC RBC AUTO-ENTMCNC: 33 G/DL (ref 31.5–35.7)
MCV RBC AUTO: 87.7 FL (ref 79–97)
MONOCYTES # BLD AUTO: 0.45 10*3/MM3 (ref 0.1–0.9)
MONOCYTES NFR BLD AUTO: 12.3 % (ref 5–12)
NEUTROPHILS NFR BLD AUTO: 2.51 10*3/MM3 (ref 1.7–7)
NEUTROPHILS NFR BLD AUTO: 68.4 % (ref 42.7–76)
NITRITE UR-MCNC: NEGATIVE MG/ML
NRBC BLD AUTO-RTO: 0 /100 WBC (ref 0–0.2)
PH UR: 6 [PH] (ref 5–8)
PLATELET # BLD AUTO: 235 10*3/MM3 (ref 140–450)
PMV BLD AUTO: 8.9 FL (ref 6–12)
POTASSIUM SERPL-SCNC: 3.6 MMOL/L (ref 3.5–5.2)
PROT SERPL-MCNC: 6.6 G/DL (ref 6–8.5)
PROT UR STRIP-MCNC: NEGATIVE MG/DL
RBC # BLD AUTO: 3.83 10*6/MM3 (ref 3.77–5.28)
RBC # UR STRIP: ABNORMAL /UL
SODIUM SERPL-SCNC: 133 MMOL/L (ref 136–145)
SP GR UR: 1.01 (ref 1–1.03)
TRIGL SERPL-MCNC: 86 MG/DL (ref 0–150)
TSH SERPL DL<=0.05 MIU/L-ACNC: 1.44 UIU/ML (ref 0.27–4.2)
UROBILINOGEN UR QL: NORMAL
VIT B12 BLD-MCNC: 1223 PG/ML (ref 211–946)
VLDLC SERPL-MCNC: 16 MG/DL (ref 5–40)
WBC NRBC COR # BLD AUTO: 3.67 10*3/MM3 (ref 3.4–10.8)

## 2024-11-20 PROCEDURE — 80053 COMPREHEN METABOLIC PANEL: CPT | Performed by: INTERNAL MEDICINE

## 2024-11-20 PROCEDURE — G0439 PPPS, SUBSEQ VISIT: HCPCS | Performed by: INTERNAL MEDICINE

## 2024-11-20 PROCEDURE — 99397 PER PM REEVAL EST PAT 65+ YR: CPT | Performed by: INTERNAL MEDICINE

## 2024-11-20 PROCEDURE — 3075F SYST BP GE 130 - 139MM HG: CPT | Performed by: INTERNAL MEDICINE

## 2024-11-20 PROCEDURE — 3078F DIAST BP <80 MM HG: CPT | Performed by: INTERNAL MEDICINE

## 2024-11-20 PROCEDURE — 1126F AMNT PAIN NOTED NONE PRSNT: CPT | Performed by: INTERNAL MEDICINE

## 2024-11-20 PROCEDURE — 82607 VITAMIN B-12: CPT | Performed by: INTERNAL MEDICINE

## 2024-11-20 PROCEDURE — 87077 CULTURE AEROBIC IDENTIFY: CPT | Performed by: INTERNAL MEDICINE

## 2024-11-20 PROCEDURE — 87086 URINE CULTURE/COLONY COUNT: CPT | Performed by: INTERNAL MEDICINE

## 2024-11-20 PROCEDURE — 80061 LIPID PANEL: CPT | Performed by: INTERNAL MEDICINE

## 2024-11-20 PROCEDURE — 81001 URINALYSIS AUTO W/SCOPE: CPT | Performed by: INTERNAL MEDICINE

## 2024-11-20 PROCEDURE — 1170F FXNL STATUS ASSESSED: CPT | Performed by: INTERNAL MEDICINE

## 2024-11-20 PROCEDURE — 85025 COMPLETE CBC W/AUTO DIFF WBC: CPT | Performed by: INTERNAL MEDICINE

## 2024-11-20 PROCEDURE — 84443 ASSAY THYROID STIM HORMONE: CPT | Performed by: INTERNAL MEDICINE

## 2024-11-20 NOTE — PROGRESS NOTES
Subjective   The ABCs of the Annual Wellness Visit  Medicare Wellness Visit      Cindy Sage is a 79 y.o. patient who presents for a Medicare Wellness Visit.    The following portions of the patient's history were reviewed and   updated as appropriate: allergies, current medications, past family history, past medical history, past social history, past surgical history, and problem list.    Compared to one year ago, the patient's physical   health is the same.  Compared to one year ago, the patient's mental   health is the same.    Recent Hospitalizations:  This patient has had a [facility] admission record on file within the last 365 days.  Current Medical Providers:  Patient Care Team:  Jose Carrillo MD as PCP - General (Internal Medicine)  Jose Carrillo MD Hayne, Marta S, MD as Consulting Physician (Radiation Oncology)  Zayra Georges APRN as Nurse Practitioner (Nurse Practitioner)  Abigail Muse MD as Referring Physician (General Surgery)  Sofia Fam MD as Consulting Physician (Hematology and Oncology)  Daja Ma MD as Consulting Physician (Gynecology)  Lito Mackenzie DPM (Podiatry)  Andra Pat APRN as Nurse Practitioner (Nurse Practitioner)    Outpatient Medications Prior to Visit   Medication Sig Dispense Refill    acetaminophen (TYLENOL) 325 MG tablet Take 2 tablets by mouth Every 6 (Six) Hours As Needed for Mild Pain. (Patient taking differently: Take 2 tablets by mouth Every 6 (Six) Hours As Needed for Mild Pain, Headache or Fever. OTC)      anastrozole (ARIMIDEX) 1 MG tablet Take 1 tablet by mouth Daily. 30 tablet 11    aspirin 81 MG EC tablet Take 1 tablet by mouth Daily. OTC      Cholecalciferol 25 MCG (1000 UT) chewable tablet Chew 1,000 Units Daily. OTC      clopidogrel (PLAVIX) 75 MG tablet Take 1 tablet by mouth once daily 90 tablet 0    hydroCHLOROthiazide 25 MG tablet Take 1 tablet by mouth once daily 90 tablet 0    losartan  (COZAAR) 25 MG tablet Take 1 tablet by mouth once daily 90 tablet 1    omeprazole (priLOSEC) 20 MG capsule Take 1 capsule by mouth Daily. OTC      polyethylene glycol (MIRALAX) 17 g packet Take 17 g by mouth Daily As Needed (Constipation).      potassium chloride (KAYCIEL) 20 mEq/15 mL solution TAKE 15 ML BY MOUTH  ONCE DAILY 473 mL 5    simvastatin (ZOCOR) 40 MG tablet Take 1 tablet by mouth once daily 90 tablet 0    vitamin B-12 (CYANOCOBALAMIN) 500 MCG tablet Take 1 tablet by mouth once daily (Patient taking differently: Take 1 tablet by mouth Daily. OTC) 90 tablet 1     No facility-administered medications prior to visit.     No opioid medication identified on active medication list. I have reviewed chart for other potential  high risk medication/s and harmful drug interactions in the elderly.      Aspirin is on active medication list. Aspirin use is indicated based on review of current medical condition/s. Pros and cons of this therapy have been discussed today. Benefits of this medication outweigh potential harm.  Patient has been encouraged to continue taking this medication.  .      Patient Active Problem List   Diagnosis    Hyperlipidemia    Hypertension    Ischemic stroke    Endometrial cancer    GERD (gastroesophageal reflux disease)    Hx: UTI (urinary tract infection)    History of endometrial cancer    TIA (transient ischemic attack)    Stenosis of right middle cerebral artery    Musculoskeletal neck pain    Large bowel obstruction s/p colonic resection by Dr. Ma 12/10/21    Recurrent cancer    Bilateral malignant neoplasm of breast in female, estrogen receptor positive    Slow transit constipation    Visual field defect    Hypokalemia    Anemia    Ocular migraine    Vitamin B12 deficiency    Ptosis of left eyelid    History of stroke    Partial small bowel obstruction     Advance Care Planning Advance Directive is not on file.  ACP discussion was held with the patient during this visit. Patient  "has an advance directive (not in EMR), copy requested.            Objective   Vitals:    24 1010   BP: 134/62   BP Location: Left arm   Patient Position: Sitting   Cuff Size: Adult   Pulse: 68   Resp: 18   Temp: 97.7 °F (36.5 °C)   TempSrc: Infrared   Weight: 60.3 kg (133 lb)   Height: 162.6 cm (64\")   PainSc: 0-No pain       Estimated body mass index is 22.83 kg/m² as calculated from the following:    Height as of this encounter: 162.6 cm (64\").    Weight as of this encounter: 60.3 kg (133 lb).    BMI is within normal parameters. No other follow-up for BMI required.    Finger Rub Hearing{Test (right ear):failed  Finger Rub Hearing{Test (left ear):passed       Does the patient have evidence of cognitive impairment? No                                                                                               Health  Risk Assessment    Smoking Status:  Social History     Tobacco Use   Smoking Status Never    Passive exposure: Past   Smokeless Tobacco Never     Alcohol Consumption:  Social History     Substance and Sexual Activity   Alcohol Use Yes       Fall Risk Screen  STEADI Fall Risk Assessment was completed, and patient is at LOW risk for falls.Assessment completed on:2024    Depression Screening   Little interest or pleasure in doing things? Not at all   Feeling down, depressed, or hopeless? Not at all   PHQ-2 Total Score 0      Health Habits and Functional and Cognitive Screenin/20/2024    10:17 AM   Functional & Cognitive Status   Do you have difficulty preparing food and eating? No   Do you have difficulty bathing yourself, getting dressed or grooming yourself? No   Do you have difficulty using the toilet? No   Do you have difficulty moving around from place to place? No   Do you have trouble with steps or getting out of a bed or a chair? No   Current Diet Limited Junk Food   Dental Exam Up to date   Eye Exam Up to date   Exercise (times per week) 3 times per week   Current Exercises " Include Walking   Do you need help using the phone?  No   Are you deaf or do you have serious difficulty hearing?  No   Do you need help to go to places out of walking distance? Yes   Do you need help shopping? No   Do you need help preparing meals?  No   Do you need help with housework?  No   Do you need help with laundry? No   Do you need help taking your medications? No   Do you need help managing money? No   Do you ever drive or ride in a car without wearing a seat belt? No   Have you felt unusual stress, anger or loneliness in the last month? No   Who do you live with? Spouse   If you need help, do you have trouble finding someone available to you? No   Have you been bothered in the last four weeks by sexual problems? No   Do you have difficulty concentrating, remembering or making decisions? No           Age-appropriate Screening Schedule:  Refer to the list below for future screening recommendations based on patient's age, sex and/or medical conditions. Orders for these recommended tests are listed in the plan section. The patient has been provided with a written plan.    Health Maintenance List  Health Maintenance   Topic Date Due    TDAP/TD VACCINES (1 - Tdap) Never done    ZOSTER VACCINE (1 of 2) Never done    RSV Vaccine - Adults (1 - 1-dose 75+ series) Never done    ANNUAL WELLNESS VISIT  08/16/2024    COVID-19 Vaccine (7 - 2024-25 season) 12/11/2024    LIPID PANEL  05/15/2025    DXA SCAN  02/20/2026    COLORECTAL CANCER SCREENING  04/26/2033    INFLUENZA VACCINE  Completed    Pneumococcal Vaccine 65+  Completed    MAMMOGRAM  Discontinued                                                                                                                                                CMS Preventative Services Quick Reference  Risk Factors Identified During Encounter  Immunizations Discussed/Encouraged: Tdap, Shingrix, and RSV (Respiratory Syncytial Virus)    The above risks/problems have been discussed with  the patient.  Pertinent information has been shared with the patient in the After Visit Summary.  An After Visit Summary and PPPS were made available to the patient.    Diagnoses and all orders for this visit:    1. Medicare annual wellness visit, subsequent (Primary)    2. Physical exam    3. Essential hypertension  -     CBC & Differential; Future  -     Comprehensive Metabolic Panel; Future  -     TSH; Future  -     POC Urinalysis Dipstick, Automated; Future    4. Hyperlipidemia, unspecified hyperlipidemia type  -     Comprehensive Metabolic Panel; Future  -     Lipid Panel; Future    5. Vitamin B12 deficiency  -     CBC & Differential; Future  -     Vitamin B12; Future    6. Gastroesophageal reflux disease without esophagitis          Follow Up:   No follow-ups on file.

## 2024-11-20 NOTE — PROGRESS NOTES
Chief Complaint   Patient presents with    Medicare Wellness-subsequent       History of Present Illness      The patient presents for an established patient physical examination and health maintenance visit.    The patient presents for a follow-up related to Vitamin B12 deficiency. There are no reports of blood loss. The patient has no symptoms of a dry cough, a wet cough, wheezing, fever, nausea, vomiting, diarrhea, myalgias, abdominal pain, sweats, weight loss, decreased appetite, chills, paresthesias, numbness or memory loss. Her energy level is normal. The patient is taking a vitamin B12 supplement in the form of an oral supplement.    The patient presents for a follow-up related to hyperlipidemia. She is following a low fat diet. She reports that she is exercising. She is taking simvastatin. The patient is taking her medication as prescribed. She reports no medication side effects, including muscle cramps, abdominal pain, headaches or weakness. She denies chest pain, shortness of breath, orthopnea, paroxysmal nocturnal dyspnea, dyspnea on exertion, edema, palpitations or syncope.    The patient presents for a follow-up related to hypertension. The patient reports that she has had no headaches or blurred vision. She states that she is taking her medication as prescribed. She is not having medication side effects.    The patient presents for a follow-up related to GERD. The patient is on omeprazole for her gastroesophageal reflux. The medication is taken on a regular basis and gives complete relief of the symptoms. She reports no belching, dysphagia, early satiety, heartburn, hoarseness, odynophagia or rectal bleeding. The GERD has no known aggravating factors.    Medications      Current Outpatient Medications:     acetaminophen (TYLENOL) 325 MG tablet, Take 2 tablets by mouth Every 6 (Six) Hours As Needed for Mild Pain. (Patient taking differently: Take 2 tablets by mouth Every 6 (Six) Hours As Needed for Mild  Exam: Bilateral renal ultrasound



HISTORY: Microhematuria



COMPARISON: None





FINDINGS: 

Right kidney: Normal cortical echotexture. No hydronephrosis.

Right kidney measurements: 6.4 x 12.1 x 5.4 cm. 

Left kidney: Normal cortical echotexture. No hydronephrosis

Left kidney measurements 4.6 x 13.3 x 7.6 cm. 



Urinary bladder: Normal mucosa.





IMPRESSION: No hydronephrosis.



Reported By: Annette Dominguez 

Electronically Signed:  11/11/2019 3:00 PM Pain, Headache or Fever. OTC), Disp: , Rfl:     anastrozole (ARIMIDEX) 1 MG tablet, Take 1 tablet by mouth Daily., Disp: 30 tablet, Rfl: 11    aspirin 81 MG EC tablet, Take 1 tablet by mouth Daily. OTC, Disp: , Rfl:     Cholecalciferol 25 MCG (1000 UT) chewable tablet, Chew 1,000 Units Daily. OTC, Disp: , Rfl:     clopidogrel (PLAVIX) 75 MG tablet, Take 1 tablet by mouth once daily, Disp: 90 tablet, Rfl: 0    hydroCHLOROthiazide 25 MG tablet, Take 1 tablet by mouth once daily, Disp: 90 tablet, Rfl: 0    losartan (COZAAR) 25 MG tablet, Take 1 tablet by mouth once daily, Disp: 90 tablet, Rfl: 1    omeprazole (priLOSEC) 20 MG capsule, Take 1 capsule by mouth Daily. OTC, Disp: , Rfl:     polyethylene glycol (MIRALAX) 17 g packet, Take 17 g by mouth Daily As Needed (Constipation)., Disp: , Rfl:     potassium chloride (KAYCIEL) 20 mEq/15 mL solution, TAKE 15 ML BY MOUTH  ONCE DAILY, Disp: 473 mL, Rfl: 5    simvastatin (ZOCOR) 40 MG tablet, Take 1 tablet by mouth once daily, Disp: 90 tablet, Rfl: 0    vitamin B-12 (CYANOCOBALAMIN) 500 MCG tablet, Take 1 tablet by mouth once daily (Patient taking differently: Take 1 tablet by mouth Daily. OTC), Disp: 90 tablet, Rfl: 1     Allergies    Allergies   Allergen Reactions    Strawberry Extract Swelling       Problem List    Patient Active Problem List   Diagnosis    Hyperlipidemia    Hypertension    Ischemic stroke    Endometrial cancer    GERD (gastroesophageal reflux disease)    Hx: UTI (urinary tract infection)    History of endometrial cancer    TIA (transient ischemic attack)    Stenosis of right middle cerebral artery    Musculoskeletal neck pain    Large bowel obstruction s/p colonic resection by Dr. Ma 12/10/21    Recurrent cancer    Bilateral malignant neoplasm of breast in female, estrogen receptor positive    Slow transit constipation    Visual field defect    Hypokalemia    Anemia    Ocular migraine    Vitamin B12 deficiency    Ptosis of left eyelid    History of  "stroke    Partial small bowel obstruction       Medications, Allergies, Problems List and Past History were reviewed and updated.    Physical Examination    /62 (BP Location: Left arm, Patient Position: Sitting, Cuff Size: Adult)   Pulse 68   Temp 97.7 °F (36.5 °C) (Infrared)   Resp 18   Ht 162.6 cm (64\")   Wt 60.3 kg (133 lb)   LMP  (LMP Unknown)   BMI 22.83 kg/m²       HEENT: Head- Normocephalic Atraumatic. Facies- Within normal limits. Pinnas- Normal texture and shape bilaterally. Canals- Normal bilaterally. TMs- Normal bilaterally. Nares- Patent bilaterally. Nasal Septum- is normal. There is no tenderness to palpation over the frontal or maxillary sinuses. Lids- Normal bilaterally. Conjunctiva- Clear bilaterally. Sclera- Anicteric bilaterally. Oropharynx- Moist with no lesions. Tonsils- No enlargement, erythema or exudate.    Neck: Thyroid- non enlarged, symmetric and has no nodules. No bruits are detected. ROM- Normal Range of Motion with no rigidity.    Lungs: Auscultation- Clear to auscultation bilaterally. There are no retractions, clubbing or cyanosis. The Expiratory to Inspiratory ratio is equal.    Lymph Nodes: Cervical- no enlarged lymph nodes noted. Clavicular- no enlarged supraclavicular lymph nodes noted. Axillary- no enlarged axillary lymph nodes noted. Inguinal- no enlarged inguinal lymph nodes noted.    Cardiovascular: There are no carotid bruits. Heart- Normal Rate with Regular rhythm and no murmurs. There are no gallops. There are no rubs. In the lower extremities there is no edema. The upper extremities do not have edema.      Abdomen: Soft, benign, non-tender with no masses, hernias, organomegaly or scars.    Breast: Normal contours bilaterally with no masses, discharge, skin changes or lumps. There are no scars noted.    Dermatologic: The patient has no worrisome or suspicious skin lesions noted.    Impression and Assessment    Normal Physical Examination.    Vitamin B12 " Deficiency.    Hyperlipidemia.    Essential Hypertension.    Gastroesophageal Reflux Disease.    Plan    Gastroesophageal Reflux Disease Plan: The patient was instructed to continue the current medications.    Hyperlipidemia Plan: The patient was instructed to exercise daily, eat a low fat diet and continue her medications.    Essential Hypertension Plan: The patient was instructed to continue the current medications.    Vitamin B12 Deficiency Plan: The patient was instructed to continue the current medications.    Counseling was provided regarding: Adequate Aerobic Exercise, Dental Visits, Flossing Teeth, Heart Healthy Diet and Seat Belt Utilization.    No screening tests are indicated at this time.       Immunizations Ordered and Administered: None.    Diagnoses and all orders for this visit:    1. Medicare annual wellness visit, subsequent (Primary)    2. Physical exam    3. Essential hypertension  -     CBC & Differential; Future  -     Comprehensive Metabolic Panel; Future  -     TSH; Future  -     POC Urinalysis Dipstick, Automated; Future    4. Hyperlipidemia, unspecified hyperlipidemia type  -     Comprehensive Metabolic Panel; Future  -     Lipid Panel; Future    5. Vitamin B12 deficiency  -     CBC & Differential; Future  -     Vitamin B12; Future    6. Gastroesophageal reflux disease without esophagitis            Return to Office    The patient was instructed to return for follow-up in 6 months. The patient was instructed to return sooner if the condition changes, worsens, or does not resolve.

## 2024-11-20 NOTE — PATIENT INSTRUCTIONS
Medicare Wellness  Personal Prevention Plan of Service     Date of Office Visit:    Encounter Provider:  Jose Carrillo MD  Place of Service:  [facility]  Patient Name: Cindy Sage  :  1945    As part of the Medicare Wellness portion of your visit today, we are providing you with this personalized preventive plan of services (PPPS). This plan is based upon recommendations of the [place] Preventive Services Task Force (USPSTF) and the Advisory Committee on Immunization Practices (ACIP).    This lists the preventive care services that should be considered, and provides dates of when you are due. Items listed as completed are up-to-date and do not require any further intervention.    Health Maintenance   Topic Date Due    TDAP/TD VACCINES (1 - Tdap) Never done    ZOSTER VACCINE (1 of 2) Never done    RSV Vaccine - Adults (1 - 1-dose 75+ series) Never done    COVID-19 Vaccine ( season) 2024    LIPID PANEL  05/15/2025    ANNUAL WELLNESS VISIT  2025    DXA SCAN  2026    COLORECTAL CANCER SCREENING  2033    INFLUENZA VACCINE  Completed    Pneumococcal Vaccine 65+  Completed    MAMMOGRAM  Discontinued       Orders Placed This Encounter   Procedures    Comprehensive Metabolic Panel     Standing Status:   Future     Standing Expiration Date:   2025     Order Specific Question:   Release to patient     Answer:   Routine Release [[id number]]    Lipid Panel     Standing Status:   Future     Standing Expiration Date:   2025     Order Specific Question:   Release to patient     Answer:   Routine Release [[id number]]    TSH     Standing Status:   Future     Standing Expiration Date:   2025     Order Specific Question:   Release to patient     Answer:   Routine Release [[id number]]    Vitamin B12     Standing Status:   Future     Standing Expiration Date:   2025     Order Specific Question:    Release to patient     Answer:   Routine Release [[id number]]    POC Urinalysis Dipstick, Automated     Standing Status:   Future     Order Specific Question:   Release to patient     Answer:   Routine Release [[id number]]    CBC & Differential     Standing Status:   Future     Standing Expiration Date:   11/21/2025     Order Specific Question:   Manual Differential     Answer:   No     Order Specific Question:   Release to patient     Answer:   Routine Release [[id number]]       Return in about 6 months (around 5/20/2025) for Follow-up.

## 2024-11-21 LAB
BACTERIA UR QL AUTO: ABNORMAL /HPF
BILIRUB UR QL STRIP: NEGATIVE
CLARITY UR: ABNORMAL
COLOR UR: ABNORMAL
GLUCOSE UR STRIP-MCNC: NEGATIVE MG/DL
HGB UR QL STRIP.AUTO: ABNORMAL
HYALINE CASTS UR QL AUTO: ABNORMAL /LPF
KETONES UR QL STRIP: NEGATIVE
LEUKOCYTE ESTERASE UR QL STRIP.AUTO: ABNORMAL
NITRITE UR QL STRIP: NEGATIVE
PH UR STRIP.AUTO: 6.5 [PH] (ref 5–8)
PROT UR QL STRIP: ABNORMAL
RBC # UR STRIP: ABNORMAL /HPF
REF LAB TEST METHOD: ABNORMAL
SP GR UR STRIP: 1.02 (ref 1–1.03)
SQUAMOUS #/AREA URNS HPF: ABNORMAL /HPF
UROBILINOGEN UR QL STRIP: ABNORMAL
WBC # UR STRIP: ABNORMAL /HPF

## 2024-11-22 LAB — BACTERIA SPEC AEROBE CULT: NORMAL

## 2024-12-02 ENCOUNTER — OFFICE VISIT (OUTPATIENT)
Dept: INTERNAL MEDICINE | Facility: CLINIC | Age: 79
End: 2024-12-02
Payer: MEDICARE

## 2024-12-02 ENCOUNTER — HOSPITAL ENCOUNTER (OUTPATIENT)
Dept: GENERAL RADIOLOGY | Facility: HOSPITAL | Age: 79
Discharge: HOME | End: 2024-12-02
Payer: MEDICARE

## 2024-12-02 VITALS
RESPIRATION RATE: 18 BRPM | SYSTOLIC BLOOD PRESSURE: 142 MMHG | TEMPERATURE: 97.3 F | WEIGHT: 134 LBS | DIASTOLIC BLOOD PRESSURE: 62 MMHG | HEART RATE: 74 BPM | BODY MASS INDEX: 23 KG/M2

## 2024-12-02 DIAGNOSIS — I10 ESSENTIAL HYPERTENSION: ICD-10-CM

## 2024-12-02 DIAGNOSIS — M79.671 RIGHT FOOT PAIN: Primary | ICD-10-CM

## 2024-12-02 PROCEDURE — 73630 X-RAY EXAM OF FOOT: CPT

## 2024-12-02 PROCEDURE — 99213 OFFICE O/P EST LOW 20 MIN: CPT

## 2024-12-02 PROCEDURE — 1160F RVW MEDS BY RX/DR IN RCRD: CPT

## 2024-12-02 PROCEDURE — 3077F SYST BP >= 140 MM HG: CPT

## 2024-12-02 PROCEDURE — 1125F AMNT PAIN NOTED PAIN PRSNT: CPT

## 2024-12-02 PROCEDURE — 1159F MED LIST DOCD IN RCRD: CPT

## 2024-12-02 PROCEDURE — 3078F DIAST BP <80 MM HG: CPT

## 2024-12-02 NOTE — PROGRESS NOTES
"Chief Complaint  Foot Pain (Fall 12/1/2024)    Subjective          Cindy Sage presents to [facility]  History of Present Illness  Patient presents to the office today with concerns for right foot pain.  She states yesterday she had a fall and tripped over the leg of her chair inside.  She reports she did hit her head but denies any loss of consciousness.  She feels like her toes are numb.  She is able to bear weight, however, she has to go slow.  She relates with a rolling walker.  She denies any tenderness to palpation but feels like her pain is more \"internal.\"  She reports pain in the distal metatarsals.    Objective   Vital Signs:   /62 (BP Location: Left arm, Patient Position: Sitting, Cuff Size: Adult)   Pulse 74   Temp 97.3 °F (36.3 °C) (Temporal)   Resp 18   Wt 60.8 kg (134 lb)   BMI 23.00 kg/m²     Body mass index is 23 kg/m².    Review of Systems   Musculoskeletal:  Positive for arthralgias and gait problem.       Past History:  Medical History: has a past medical history of Bilateral malignant neoplasm of breast in female, estrogen receptor positive (4/13/2022), Drug therapy (2016), Elevated cholesterol, Endometrial cancer (04/2016), GERD (gastroesophageal reflux disease), HTN (hypertension), radiation therapy (2016), and Stroke (02/2016).   Surgical History: has a past surgical history that includes Appendectomy; Foot surgery; Tonsillectomy; total laparoscopic hysterectomy salpingo oophorectomy (Bilateral, 04/19/2016); Oophorectomy; Breast biopsy (Left, 2016); Colostomy (N/A, 12/10/2021); Breast lumpectomy (Bilateral, 2022); and Colon surgery (N/A, 12/10/2021).   Family History: family history includes Alcohol abuse in her father; Arthritis in her mother; Breast cancer in her mother and sister; Cancer in her mother; Osteoporosis in her mother; Stroke in her maternal aunt.   Social History: reports that she has never smoked. She has " been exposed to tobacco smoke. She has never used smokeless tobacco. She reports current alcohol use. She reports that she does not use drugs.      Current Outpatient Medications:     acetaminophen (TYLENOL) 325 MG tablet, Take 2 tablets by mouth Every 6 (Six) Hours As Needed for Mild Pain. (Patient taking differently: Take 2 tablets by mouth Every 6 (Six) Hours As Needed for Mild Pain, Headache or Fever. OTC), Disp: , Rfl:     anastrozole (ARIMIDEX) 1 MG tablet, Take 1 tablet by mouth Daily., Disp: 30 tablet, Rfl: 11    aspirin 81 MG EC tablet, Take 1 tablet by mouth Daily. OTC, Disp: , Rfl:     Cholecalciferol 25 MCG (1000 UT) chewable tablet, Chew 1,000 Units Daily. OTC, Disp: , Rfl:     clopidogrel (PLAVIX) 75 MG tablet, Take 1 tablet by mouth once daily, Disp: 90 tablet, Rfl: 0    hydroCHLOROthiazide 25 MG tablet, Take 1 tablet by mouth once daily, Disp: 90 tablet, Rfl: 0    losartan (COZAAR) 25 MG tablet, Take 1 tablet by mouth once daily, Disp: 90 tablet, Rfl: 1    omeprazole (priLOSEC) 20 MG capsule, Take 1 capsule by mouth Daily. OTC, Disp: , Rfl:     polyethylene glycol (MIRALAX) 17 g packet, Take 17 g by mouth Daily As Needed (Constipation)., Disp: , Rfl:     potassium chloride (KAYCIEL) 20 mEq/15 mL solution, TAKE 15 ML BY MOUTH  ONCE DAILY, Disp: 473 mL, Rfl: 5    simvastatin (ZOCOR) 40 MG tablet, Take 1 tablet by mouth once daily, Disp: 90 tablet, Rfl: 0    vitamin B-12 (CYANOCOBALAMIN) 500 MCG tablet, Take 1 tablet by mouth once daily (Patient taking differently: Take 1 tablet by mouth Daily. OTC), Disp: 90 tablet, Rfl: 1    Allergies: Strawberry extract    Physical Exam  Vitals and nursing note reviewed.   Constitutional:       General: She is not in acute distress.     Appearance: She is not ill-appearing or toxic-appearing.   HENT:      Head: Normocephalic and atraumatic.   Cardiovascular:      Rate and Rhythm: Normal rate and regular rhythm.      Pulses: Normal pulses.      Heart sounds: Normal  "heart sounds. No murmur heard.  Pulmonary:      Effort: Pulmonary effort is normal. No respiratory distress.      Breath sounds: Normal breath sounds.   Musculoskeletal:        Feet:    Feet:      Comments: Denies tenderness with palpation but reports the pain is more \"internal.\"  There is some purple discoloration noted at the site.  Dorsiflexion and plantarflexion intact.  Skin:     General: Skin is dry.   Neurological:      General: No focal deficit present.      Mental Status: She is alert and oriented to person, place, and time. Mental status is at baseline.   Psychiatric:         Mood and Affect: Mood normal.         Behavior: Behavior normal.         Thought Content: Thought content normal.         Judgment: Judgment normal.          Result Review :              XR Foot 3+ View Right (12/02/2024 13:55)      Assessment and Plan    Assessment & Plan  Right foot pain  Patient discussed results of x-ray when the line was disconnected.  Attempted to contact patient x 3 after to continue to discuss results.  Patient's daughter Chitra Stanley who was present at visit and is listed as emergency contact was notified of results.  Discussed that there is subtle ill-defined linear sclerosis and questionable cortical overlap/cortical overhang at the third and fourth metatarsal suspicious for fractures given her pain is reported in the distal metatarsals.  Discussed there is no conspicuous soft tissue swelling however, this can be further evaluated with CT of the foot.    Discussed with daughter that patient can john tape her toes to see if this helps with pain as orthopedic referral may not be needed at this time.  Was able to reach patient later in the afternoon and discussed option of CT of the foot.  She states she has had multiple broken toes in the past and feels like she can tolerate this.  She would like to defer CT of her foot at this time.  She will contact office if her symptoms worsen or fail to improve.  " Recommend that she rest, ice and elevate her foot.  Recommend if symptoms worsen or fail to improve she seek medical attention at ED.  Orders:    XR Foot 3+ View Right    Essential hypertension  Blood pressure elevated in office today at 142/62.  Likely in the setting of pain.  Recommend she continue to monitor this at home and to follow-up with PCP as scheduled.         Patient and patient's daughter were appreciative of call.  They both verbalized understanding of plan of care.    Follow Up   Return for Next scheduled follow up.  Patient was given instructions and counseling regarding her condition or for health maintenance advice. Please see specific information pulled into the AVS if appropriate.     Najma Garcia, APRN

## 2024-12-24 RX ORDER — HYDROCHLOROTHIAZIDE 25 MG/1
TABLET ORAL
Qty: 90 TABLET | Refills: 0 | Status: SHIPPED | OUTPATIENT
Start: 2024-12-24

## 2025-01-07 ENCOUNTER — LAB (OUTPATIENT)
Dept: LAB | Facility: HOSPITAL | Age: 80
End: 2025-01-07
Payer: MEDICARE

## 2025-01-07 ENCOUNTER — OFFICE VISIT (OUTPATIENT)
Dept: GYNECOLOGIC ONCOLOGY | Facility: CLINIC | Age: 80
End: 2025-01-07
Payer: MEDICARE

## 2025-01-07 VITALS
SYSTOLIC BLOOD PRESSURE: 155 MMHG | DIASTOLIC BLOOD PRESSURE: 68 MMHG | OXYGEN SATURATION: 97 % | RESPIRATION RATE: 16 BRPM | HEART RATE: 84 BPM | HEIGHT: 64 IN | WEIGHT: 133.5 LBS | BODY MASS INDEX: 22.79 KG/M2 | TEMPERATURE: 98 F

## 2025-01-07 DIAGNOSIS — C54.1 ENDOMETRIAL CANCER: ICD-10-CM

## 2025-01-07 DIAGNOSIS — C54.1 ENDOMETRIAL CANCER: Primary | ICD-10-CM

## 2025-01-07 DIAGNOSIS — C50.911 BILATERAL MALIGNANT NEOPLASM OF BREAST IN FEMALE, ESTROGEN RECEPTOR POSITIVE, UNSPECIFIED SITE OF BREAST: ICD-10-CM

## 2025-01-07 DIAGNOSIS — C50.912 BILATERAL MALIGNANT NEOPLASM OF BREAST IN FEMALE, ESTROGEN RECEPTOR POSITIVE, UNSPECIFIED SITE OF BREAST: ICD-10-CM

## 2025-01-07 DIAGNOSIS — Z17.0 BILATERAL MALIGNANT NEOPLASM OF BREAST IN FEMALE, ESTROGEN RECEPTOR POSITIVE, UNSPECIFIED SITE OF BREAST: ICD-10-CM

## 2025-01-07 LAB — CANCER AG125 SERPL QL: 10.9 U/ML (ref 0–38.1)

## 2025-01-07 PROCEDURE — 1160F RVW MEDS BY RX/DR IN RCRD: CPT | Performed by: NURSE PRACTITIONER

## 2025-01-07 PROCEDURE — 3077F SYST BP >= 140 MM HG: CPT | Performed by: NURSE PRACTITIONER

## 2025-01-07 PROCEDURE — 1159F MED LIST DOCD IN RCRD: CPT | Performed by: NURSE PRACTITIONER

## 2025-01-07 PROCEDURE — 36415 COLL VENOUS BLD VENIPUNCTURE: CPT

## 2025-01-07 PROCEDURE — 3078F DIAST BP <80 MM HG: CPT | Performed by: NURSE PRACTITIONER

## 2025-01-07 PROCEDURE — 86304 IMMUNOASSAY TUMOR CA 125: CPT

## 2025-01-07 PROCEDURE — 99213 OFFICE O/P EST LOW 20 MIN: CPT | Performed by: NURSE PRACTITIONER

## 2025-01-07 PROCEDURE — 1126F AMNT PAIN NOTED NONE PRSNT: CPT | Performed by: NURSE PRACTITIONER

## 2025-01-07 RX ORDER — ANASTROZOLE 1 MG/1
1 TABLET ORAL DAILY
Qty: 90 TABLET | Refills: 3 | Status: SHIPPED | OUTPATIENT
Start: 2025-01-07

## 2025-01-07 NOTE — PROGRESS NOTES
GYN ONCOLOGY CANCER SURVEILLANCE FOLLOW-UP    Cindy Sage  [id number]  1945    Subjective   Chief Complaint: Surveillance for recurrent endometrial cancer, h/o breast cancer       History of present illness:   Cindy Sage is a 79 y.o. year old female who is here today for ongoing surveillance of Endometrial Cancer, see Cancer History.  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 She is now s/p left breast lumpectomy (Dr TAMMIE Muse) and breast radiation. She is well recovered and pleased with her care. She started maintenance Arimidex for both endometrial cancer and breast cancer in 4/2022 and is doing well on medication thus far. Request medication RF today (pref 90d supply).     Last CT imaging for endometrial cancer with both chest and abd/pelvis was 1/20/2024. She had abd/ pelvis CTs again on 7/31/24 and 8/17/24-- both with no evidence of residual or recurrent cancer.     States she recently received a letter in mail to get scheduled for annual bilateral screening mmg which she plans to do soon.     Today, she is doing well. She denies vaginal bleeding and discharge. She does not have abdominal or pelvic pain. She is tolerating a regular diet and endorses a normal appetite. She denies nausea and vomiting. She denies early satiety and bloating. Denies any CP, SOB, lightheadedness or dizziness. She denies changes in her bowel/bladder. No dysuria, frequency, urgency, hematuria or flank pain. Reports baseline energy levels. Using walker for long distances. She is accompanied by family as usual-- today her daughter Miri White notes that since she has been and continues feeling so well she does not wish to repeat any imaging at this time unless absolutely necessary.     We have been following her CA-125 level which remains low/ normal, last 12.8 on 8/4/23.     Health Maintenance:  -Colonoscopy 4/26/2023 (Dr Verdugo), see report.    -Mammogram diagnostic bilateral 8-9-24: birads2, recommendation annual screening will be due in 6m (~2/2025)   DEXA scan is UTD 2/2024: Osteoporosis of the right femoral neck. Osteopenia of the L1-L4 vertebrae, and total right hip. Compliant with vitamin D supplement.         Cancer History:   Oncology/Hematology History   Endometrial cancer   3/26/2016 Imaging    CT in ER for acute onset postmenopausal bleeding revealed mass in the lower uterine segment. Gyn Oncology called to evaluate.     3/29/2016 Biopsy    Endometrial biopsy in office, pathology revealed complex atypical hyperplasia with stromal breakdown.        4/1/2016 Initial Diagnosis    Endometrial cancer     4/19/2016 Surgery    RATLH, BSO, LND to several millimeters of residual disease.   Stage IIIC2     5/20/2016 - 11/15/2016 Chemotherapy    Carboplatin AUC 6, Paclitaxel 175 mg/m2 x 6 cycles in sandwich fashion with radiation in between cycles #3 and #4      - 9/2/2016 Radiation    Radiation completed. Pelvis and periaortic nodes received a total of 45 Gy in 25 fractions during sandwich therapy. This was followed by vaginal brachytherapy: upper vagina treated utilizing cylinder and high dose rate iridium-192 to 18 Gy in 3 fractions.      11/21/2016 Imaging    Post-treatment PET/CT negative for disease     12/8/2021 Imaging    CT scan chest abdomen and pelvis: Large bowel obstruction, left chest wall mass     12/10/2021 Surgery    Exploratory laparotomy, colonic resection, side-to-side anastomosis for large bowel obstruction    Pathology showed metastatic adenocarcinoma at the colon.          1/10/2022 Molecular Testing    CARIS results:  MMR proficient/MSI stable--level 2 benefit pembrolizumab + lenvima  TMB high--level 2 benefit pembrolizumab alone  ER/MT+--level 3 benefit endocrine therapy  BRCA 1/2 negative     4/29/2022 Imaging    CT abdomen pelvis:  1. No evidence of abdominopelvic metastatic disease  2. Cholelithiasis  3. Colonic  diverticulosis  4. Status post partial colon resection and hysterectomy     1/19/2024 Imaging    CT chest abdomen & pelvis    Impression:     1. Stable nonspecific 6 mm pulmonary nodule at the left lung ankle. No evidence of acute pulmonary process     2. Stable surgical change in the left breast with no change in mild residual fluid or soft tissue density or scarring in the surgical bed     3. No evidence of metastatic disease in the abdomen or pelvis     4. Sigmoid diverticulosis with mild pericolonic fat stranding which may represent scarring or mild acute diverticulitis. Please correlate clinically     5. Additional stable nonemergent findings as above     7/31/2024 Imaging    CT abd/ pelvis    Impression:  1. Postsurgical changes of hysterectomy and bilateral oophorectomy. No evidence of residual or recurrent disease.  2. Postsurgical changes of prior transverse colon resection.  3. Cholelithiasis without findings of acute cholecystitis.  4. Colonic diverticulosis without acute diverticulitis     8/17/2024 Imaging    CT abd/ pelvis (ER):    Impression:     1. Findings suspicious for mild or early developing distal small bowel obstruction, with well-defined transition point in the right lower pelvis.     2. Small gallstone. No visible gallbladder inflammation or biliary ductal dilatation.     3. No other evidence of acute intra-abdominal or intrapelvic disease elsewhere.        Bilateral malignant neoplasm of breast in female, estrogen receptor positive   4/13/2022 Initial Diagnosis    Bilateral malignant neoplasm of breast in female, estrogen receptor positive (HCC)     4/13/2022 Cancer Staged    Staging form: Breast, AJCC 8th Edition  - Pathologic: Stage IA (pT1c, pN0, cM0, G2, ER+, AZ+, HER2-) - Signed by Sofia Fam MD on 4/13/2022 5/12/2022 - 6/2/2022 Radiation    Radiation OncologyTreatment Course:  Cindy Celaya Chu received 4005 cGy in 15 fractions to bilateral breasts via External Beam  "Radiation - EBRT.           The current medication list and allergy list were reviewed and reconciled.     Past Medical History, Past Surgical History, Social History, Family History have been reviewed and are without significant changes except as mentioned.      Review of Systems   Constitutional: Negative.    Gastrointestinal: Negative.    Genitourinary: Negative.    Psychiatric/Behavioral: Negative.             Objective   Physical Exam  Vital Signs: /68   Pulse 84   Temp 98 °F (36.7 °C) (Temporal)   Resp 16   Ht 162.6 cm (64\")   Wt 60.6 kg (133 lb 8 oz)   LMP  (LMP Unknown)   SpO2 97%   BMI 22.92 kg/m²   Vitals:    01/07/25 1023   PainSc: 0-No pain           General Appearance:  alert, cooperative, no apparent distress, appears stated age, and normal weight   Neurologic/Psych: A&O x 3, gait steady, appropriate affect   HEENT:  Normocephalic, without obvious abnormality, mucous membranes moist   Abdomen:   Soft, non-tender, non-distended, and no organomegaly   Lymph nodes: No cervical, supraclavicular, inguinal adenopathy noted   Pelvic: External Genitalia  without lesions or skin changes  Vagina  is pink, moist, without lesions, shortened vaginal vault, and changes consistent with previous radiation. Vaginal Cuff  Female Vaginal Cuff: smooth, intact, and without visible lesions. No blood noted.   Uterus, ovaries, and adnexa are surgically absent and no palpable masses or fullness  Rectovaginal exam deferred.     ECOG score: 1             Result Review :     Last imaging study was 8-17-24.   Tumor marker:     Date Value Ref Range Status   01/07/2025 10.9 0.0 - 38.1 U/mL Final   08/04/2023 12.8 0.0 - 38.1 U/mL Final   05/03/2023 12.9 0.0 - 38.1 U/mL Final   02/03/2023 12.6 0.0 - 38.1 U/mL Final       PHQ-9 Total Score:      Procedure Note:            Assessment and Plan:     This is a 79 y.o. woman with history of recurrent endometrial cancer and history of Stage IA breast cancer presenting for " follow up.     Recurrent endometrial cancer in the setting of stage IA breast cancer, DAVION  -ER positive; Continue anastrazole for maintenance  -  ordered, most recent normal Aug 2023  - CT scan 8/2024. Reasonable to wait until f/u in 6m to repeat as she is doing so well. Repeat CT scans of C/A/P ordered for completion in 6m. Will f/u here for surveillance a day or so later so results are available to discuss face to face on return.    Routine Health Screening  -UTD    -I will touch base about osteoporosis tx when I call with ca125 results. Repeat DEXA due 2/2026.   -Pt aware to call and schedule upcoming bilateral screening mmg due next month (2/2025).  -No repeat recommendations were given from last colonoscopy given pt age, see report for additional findings/ recommendations    Diagnoses and all orders for this visit:    1. Endometrial cancer (Primary)  -     CT Chest With Contrast; Future  -     CT Abdomen Pelvis With Contrast; Future  -     ; Future    2. Bilateral malignant neoplasm of breast in female, estrogen receptor positive, unspecified site of breast  -     anastrozole (ARIMIDEX) 1 MG tablet; Take 1 tablet by mouth Daily.  Dispense: 90 tablet; Refill: 3  -     CT Chest With Contrast; Future  -     CT Abdomen Pelvis With Contrast; Future      Pain assessment was performed today as a part of patient’s care.  For patients with pain related to surgery, gynecologic malignancy or cancer treatment, the plan is as noted in the assessment/plan.  For patients with pain not related to these issues, they are to seek any further needed care from a more appropriate provider, such as PCP.    Follow-up:     Return to clinic in 6 months for repeat imaging and ongoing cancer surveillance.      Electronically signed by CHAPARRO Verma on 01/07/2025

## 2025-01-13 DIAGNOSIS — G45.9 TIA (TRANSIENT ISCHEMIC ATTACK): ICD-10-CM

## 2025-01-13 RX ORDER — CLOPIDOGREL BISULFATE 75 MG/1
75 TABLET ORAL DAILY
Qty: 90 TABLET | Refills: 1 | Status: SHIPPED | OUTPATIENT
Start: 2025-01-13

## 2025-01-21 ENCOUNTER — TRANSCRIBE ORDERS (OUTPATIENT)
Dept: ADMINISTRATIVE | Facility: HOSPITAL | Age: 80
End: 2025-01-21
Payer: MEDICARE

## 2025-01-21 DIAGNOSIS — G45.9 TIA (TRANSIENT ISCHEMIC ATTACK): ICD-10-CM

## 2025-01-21 DIAGNOSIS — I66.01 STENOSIS OF RIGHT MIDDLE CEREBRAL ARTERY: ICD-10-CM

## 2025-01-21 DIAGNOSIS — Z12.31 SCREENING MAMMOGRAM FOR BREAST CANCER: Primary | ICD-10-CM

## 2025-01-21 RX ORDER — SIMVASTATIN 40 MG
TABLET ORAL
Qty: 90 TABLET | Refills: 0 | Status: SHIPPED | OUTPATIENT
Start: 2025-01-21

## 2025-02-03 DIAGNOSIS — I10 ESSENTIAL HYPERTENSION: ICD-10-CM

## 2025-02-03 RX ORDER — LOSARTAN POTASSIUM 25 MG/1
25 TABLET ORAL DAILY
Qty: 90 TABLET | Refills: 0 | Status: SHIPPED | OUTPATIENT
Start: 2025-02-03

## 2025-02-26 DIAGNOSIS — E87.6 HYPOKALEMIA: ICD-10-CM

## 2025-02-26 RX ORDER — POTASSIUM CHLORIDE 20MEQ/15ML
LIQUID (ML) ORAL
Qty: 473 ML | Refills: 0 | Status: SHIPPED | OUTPATIENT
Start: 2025-02-26

## 2025-02-27 ENCOUNTER — APPOINTMENT (OUTPATIENT)
Dept: CT IMAGING | Facility: HOSPITAL | Age: 80
End: 2025-02-27
Payer: MEDICARE

## 2025-02-27 ENCOUNTER — HOSPITAL ENCOUNTER (EMERGENCY)
Facility: HOSPITAL | Age: 80
Discharge: HOME | End: 2025-02-27
Attending: EMERGENCY MEDICINE | Admitting: EMERGENCY MEDICINE
Payer: MEDICARE

## 2025-02-27 VITALS
RESPIRATION RATE: 15 BRPM | TEMPERATURE: 97.8 F | SYSTOLIC BLOOD PRESSURE: 167 MMHG | WEIGHT: 134 LBS | BODY MASS INDEX: 22.88 KG/M2 | OXYGEN SATURATION: 99 % | HEART RATE: 87 BPM | DIASTOLIC BLOOD PRESSURE: 80 MMHG | HEIGHT: 64 IN

## 2025-02-27 DIAGNOSIS — R10.10 PAIN OF UPPER ABDOMEN: Primary | ICD-10-CM

## 2025-02-27 DIAGNOSIS — R19.7 DIARRHEA OF PRESUMED INFECTIOUS ORIGIN: ICD-10-CM

## 2025-02-27 DIAGNOSIS — I10 ELEVATED BLOOD PRESSURE READING WITH DIAGNOSIS OF HYPERTENSION: ICD-10-CM

## 2025-02-27 DIAGNOSIS — N18.31 STAGE 3A CHRONIC KIDNEY DISEASE: ICD-10-CM

## 2025-02-27 LAB
ADV 40+41 DNA STL QL NAA+NON-PROBE: NOT DETECTED
ALBUMIN SERPL-MCNC: 4.8 G/DL (ref 3.5–5.2)
ALBUMIN/GLOB SERPL: 1.8 G/DL
ALP SERPL-CCNC: 128 U/L (ref 39–117)
ALT SERPL W P-5'-P-CCNC: 18 U/L (ref 1–33)
ANION GAP SERPL CALCULATED.3IONS-SCNC: 17 MMOL/L (ref 5–15)
AST SERPL-CCNC: 29 U/L (ref 1–32)
ASTRO TYP 1-8 RNA STL QL NAA+NON-PROBE: NOT DETECTED
BACTERIA UR QL AUTO: ABNORMAL /HPF
BASOPHILS # BLD AUTO: 0.01 10*3/MM3 (ref 0–0.2)
BASOPHILS NFR BLD AUTO: 0.1 % (ref 0–1.5)
BILIRUB SERPL-MCNC: 0.7 MG/DL (ref 0–1.2)
BILIRUB UR QL STRIP: NEGATIVE
BUN SERPL-MCNC: 11 MG/DL (ref 8–23)
BUN/CREAT SERPL: 11.5 (ref 7–25)
C CAYETANENSIS DNA STL QL NAA+NON-PROBE: NOT DETECTED
C COLI+JEJ+UPSA DNA STL QL NAA+NON-PROBE: NOT DETECTED
CALCIUM SPEC-SCNC: 10 MG/DL (ref 8.6–10.5)
CHLORIDE SERPL-SCNC: 90 MMOL/L (ref 98–107)
CLARITY UR: ABNORMAL
CO2 SERPL-SCNC: 25 MMOL/L (ref 22–29)
COLOR UR: YELLOW
CREAT SERPL-MCNC: 0.96 MG/DL (ref 0.57–1)
CRYPTOSP DNA STL QL NAA+NON-PROBE: NOT DETECTED
D-LACTATE SERPL-SCNC: 2 MMOL/L (ref 0.5–2)
D-LACTATE SERPL-SCNC: 2.3 MMOL/L (ref 0.5–2)
DEPRECATED RDW RBC AUTO: 45.4 FL (ref 37–54)
E HISTOLYT DNA STL QL NAA+NON-PROBE: NOT DETECTED
EAEC PAA PLAS AGGR+AATA ST NAA+NON-PRB: NOT DETECTED
EC STX1+STX2 GENES STL QL NAA+NON-PROBE: NOT DETECTED
EGFRCR SERPLBLD CKD-EPI 2021: 59.6 ML/MIN/1.73
EOSINOPHIL # BLD AUTO: 0.01 10*3/MM3 (ref 0–0.4)
EOSINOPHIL NFR BLD AUTO: 0.1 % (ref 0.3–6.2)
EPEC EAE GENE STL QL NAA+NON-PROBE: NOT DETECTED
ERYTHROCYTE [DISTWIDTH] IN BLOOD BY AUTOMATED COUNT: 14.3 % (ref 12.3–15.4)
ETEC LTA+ST1A+ST1B TOX ST NAA+NON-PROBE: NOT DETECTED
G LAMBLIA DNA STL QL NAA+NON-PROBE: NOT DETECTED
GLOBULIN UR ELPH-MCNC: 2.7 GM/DL
GLUCOSE SERPL-MCNC: 130 MG/DL (ref 65–99)
GLUCOSE UR STRIP-MCNC: NEGATIVE MG/DL
HCT VFR BLD AUTO: 37.6 % (ref 34–46.6)
HGB BLD-MCNC: 12.3 G/DL (ref 12–15.9)
HGB UR QL STRIP.AUTO: ABNORMAL
HOLD SPECIMEN: NORMAL
HYALINE CASTS UR QL AUTO: ABNORMAL /LPF
IMM GRANULOCYTES # BLD AUTO: 0.03 10*3/MM3 (ref 0–0.05)
IMM GRANULOCYTES NFR BLD AUTO: 0.4 % (ref 0–0.5)
KETONES UR QL STRIP: ABNORMAL
LEUKOCYTE ESTERASE UR QL STRIP.AUTO: ABNORMAL
LIPASE SERPL-CCNC: 40 U/L (ref 13–60)
LYMPHOCYTES # BLD AUTO: 0.43 10*3/MM3 (ref 0.7–3.1)
LYMPHOCYTES NFR BLD AUTO: 5.5 % (ref 19.6–45.3)
MCH RBC QN AUTO: 28.6 PG (ref 26.6–33)
MCHC RBC AUTO-ENTMCNC: 32.7 G/DL (ref 31.5–35.7)
MCV RBC AUTO: 87.4 FL (ref 79–97)
MONOCYTES # BLD AUTO: 0.35 10*3/MM3 (ref 0.1–0.9)
MONOCYTES NFR BLD AUTO: 4.5 % (ref 5–12)
NEUTROPHILS NFR BLD AUTO: 6.98 10*3/MM3 (ref 1.7–7)
NEUTROPHILS NFR BLD AUTO: 89.4 % (ref 42.7–76)
NITRITE UR QL STRIP: NEGATIVE
NOROVIRUS GI+II RNA STL QL NAA+NON-PROBE: NOT DETECTED
NRBC BLD AUTO-RTO: 0 /100 WBC (ref 0–0.2)
P SHIGELLOIDES DNA STL QL NAA+NON-PROBE: NOT DETECTED
PH UR STRIP.AUTO: 6.5 [PH] (ref 5–8)
PLATELET # BLD AUTO: 284 10*3/MM3 (ref 140–450)
PMV BLD AUTO: 9.2 FL (ref 6–12)
POTASSIUM SERPL-SCNC: 3.1 MMOL/L (ref 3.5–5.2)
PROT SERPL-MCNC: 7.5 G/DL (ref 6–8.5)
PROT UR QL STRIP: ABNORMAL
RBC # BLD AUTO: 4.3 10*6/MM3 (ref 3.77–5.28)
RBC # UR STRIP: ABNORMAL /HPF
REF LAB TEST METHOD: ABNORMAL
RVA RNA STL QL NAA+NON-PROBE: NOT DETECTED
S ENT+BONG DNA STL QL NAA+NON-PROBE: NOT DETECTED
SAPO I+II+IV+V RNA STL QL NAA+NON-PROBE: NOT DETECTED
SHIGELLA SP+EIEC IPAH ST NAA+NON-PROBE: NOT DETECTED
SODIUM SERPL-SCNC: 132 MMOL/L (ref 136–145)
SP GR UR STRIP: 1.02 (ref 1–1.03)
SQUAMOUS #/AREA URNS HPF: ABNORMAL /HPF
URATE CRY URNS QL MICRO: ABNORMAL /HPF
UROBILINOGEN UR QL STRIP: ABNORMAL
V CHOL+PARA+VUL DNA STL QL NAA+NON-PROBE: NOT DETECTED
V CHOLERAE DNA STL QL NAA+NON-PROBE: NOT DETECTED
WBC # UR STRIP: ABNORMAL /HPF
WBC NRBC COR # BLD AUTO: 7.81 10*3/MM3 (ref 3.4–10.8)
WHOLE BLOOD HOLD COAG: NORMAL
WHOLE BLOOD HOLD SPECIMEN: NORMAL
Y ENTEROCOL DNA STL QL NAA+NON-PROBE: NOT DETECTED

## 2025-02-27 PROCEDURE — 25010000002 ONDANSETRON PER 1 MG: Performed by: EMERGENCY MEDICINE

## 2025-02-27 PROCEDURE — 80053 COMPREHEN METABOLIC PANEL: CPT | Performed by: EMERGENCY MEDICINE

## 2025-02-27 PROCEDURE — 74176 CT ABD & PELVIS W/O CONTRAST: CPT

## 2025-02-27 PROCEDURE — 87086 URINE CULTURE/COLONY COUNT: CPT | Performed by: EMERGENCY MEDICINE

## 2025-02-27 PROCEDURE — 83690 ASSAY OF LIPASE: CPT | Performed by: EMERGENCY MEDICINE

## 2025-02-27 PROCEDURE — 96375 TX/PRO/DX INJ NEW DRUG ADDON: CPT

## 2025-02-27 PROCEDURE — 87507 IADNA-DNA/RNA PROBE TQ 12-25: CPT | Performed by: EMERGENCY MEDICINE

## 2025-02-27 PROCEDURE — 36415 COLL VENOUS BLD VENIPUNCTURE: CPT

## 2025-02-27 PROCEDURE — 85025 COMPLETE CBC W/AUTO DIFF WBC: CPT | Performed by: EMERGENCY MEDICINE

## 2025-02-27 PROCEDURE — 83605 ASSAY OF LACTIC ACID: CPT | Performed by: EMERGENCY MEDICINE

## 2025-02-27 PROCEDURE — 96374 THER/PROPH/DIAG INJ IV PUSH: CPT

## 2025-02-27 PROCEDURE — 81001 URINALYSIS AUTO W/SCOPE: CPT | Performed by: EMERGENCY MEDICINE

## 2025-02-27 PROCEDURE — 99284 EMERGENCY DEPT VISIT MOD MDM: CPT

## 2025-02-27 RX ORDER — FAMOTIDINE 10 MG/ML
20 INJECTION, SOLUTION INTRAVENOUS ONCE
Status: COMPLETED | OUTPATIENT
Start: 2025-02-27 | End: 2025-02-27

## 2025-02-27 RX ORDER — ONDANSETRON 2 MG/ML
4 INJECTION INTRAMUSCULAR; INTRAVENOUS ONCE
Status: COMPLETED | OUTPATIENT
Start: 2025-02-27 | End: 2025-02-27

## 2025-02-27 RX ORDER — ONDANSETRON 8 MG/1
8 TABLET, ORALLY DISINTEGRATING ORAL EVERY 8 HOURS PRN
Qty: 15 TABLET | Refills: 0 | Status: SHIPPED | OUTPATIENT
Start: 2025-02-27

## 2025-02-27 RX ORDER — SACCHAROMYCES BOULARDII 250 MG
250 CAPSULE ORAL 2 TIMES DAILY
Qty: 20 CAPSULE | Refills: 0 | Status: SHIPPED | OUTPATIENT
Start: 2025-02-27 | End: 2025-03-09

## 2025-02-27 RX ORDER — DICYCLOMINE HYDROCHLORIDE 10 MG/1
20 CAPSULE ORAL ONCE
Status: DISCONTINUED | OUTPATIENT
Start: 2025-02-27 | End: 2025-02-27

## 2025-02-27 RX ORDER — SODIUM CHLORIDE 9 MG/ML
10 INJECTION, SOLUTION INTRAMUSCULAR; INTRAVENOUS; SUBCUTANEOUS AS NEEDED
Status: DISCONTINUED | OUTPATIENT
Start: 2025-02-27 | End: 2025-02-28 | Stop reason: HOSPADM

## 2025-02-27 RX ADMIN — ONDANSETRON 4 MG: 2 INJECTION INTRAMUSCULAR; INTRAVENOUS at 19:01

## 2025-02-27 RX ADMIN — FAMOTIDINE 20 MG: 10 INJECTION, SOLUTION INTRAVENOUS at 19:01

## 2025-02-27 NOTE — ED PROVIDER NOTES
Subjective   History of Present Illness  Patient is an 80-year-old-year-old female presenting to the emergency department with abdominal bloating and discomfort.  She reports nausea.  She felt like she was going to have a bowel movement but reports she had already had 2 today.  Patient reports that symptoms have improved significantly.  Patient does report having diarrhea twice since arrival here in the emergency department.  Patient denies any fever or chills.  No chest pain or shortness of breath.  No blood in the stool.    History provided by:  Patient      Review of Systems    Past Medical History:   Diagnosis Date    Bilateral malignant neoplasm of breast in female, estrogen receptor positive 4/13/2022    Drug therapy 2016    For endometrial CA in 2016    Elevated cholesterol     Endometrial cancer 04/2016    Stage IIIC2     GERD (gastroesophageal reflux disease)     HTN (hypertension)     Hx of radiation therapy 2016    For endometrial CA 2016    Stroke 02/2016    No residual deficits.  Takes .       Allergies   Allergen Reactions    Strawberry Extract Swelling       Past Surgical History:   Procedure Laterality Date    APPENDECTOMY      Ex lap    BREAST BIOPSY Left 2016    BREAST LUMPECTOMY Bilateral 2022    2-Left; 1-Right    COLON SURGERY N/A 12/10/2021    Exploratory Laparotamy; Transverse Colon resect w/ re-anastimosis.    COLOSTOMY N/A 12/10/2021    Procedure: EXPLORATORY LAPAROTOMY, TRANSVERSE COLONIC RESECTION, REANASTIMOSIS, AND MOBILIZATION OF SPLENIC FLEXURE;  Surgeon: Daja Ma MD;  Location: [facility];  Service: Gynecology Oncology;  Laterality: N/A;    FOOT SURGERY      OOPHORECTOMY      TONSILLECTOMY      TOTAL LAPAROSCOPIC HYSTERECTOMY SALPINGO OOPHORECTOMY Bilateral 04/19/2016    RATLH, BSO, LND       Family History   Problem Relation Age of Onset    Arthritis Mother     Breast cancer Mother         age unknown     Osteoporosis Mother     Cancer Mother     Alcohol abuse Father      Stroke Maternal Aunt     Breast cancer Sister     Ovarian cancer Neg Hx     Endometrial cancer Neg Hx        Social History     Socioeconomic History    Marital status:    Tobacco Use    Smoking status: Never     Passive exposure: Past    Smokeless tobacco: Never   Vaping Use    Vaping status: Never Used   Substance and Sexual Activity    Alcohol use: Yes    Drug use: Never    Sexual activity: Defer           Objective   Physical Exam  Vitals and nursing note reviewed.   Constitutional:       General: She is not in acute distress.     Appearance: She is well-developed. She is not toxic-appearing.   Cardiovascular:      Rate and Rhythm: Normal rate and regular rhythm.   Pulmonary:      Effort: Pulmonary effort is normal. No respiratory distress.      Breath sounds: Normal breath sounds.   Abdominal:      Palpations: Abdomen is soft.      Tenderness: There is generalized abdominal tenderness. There is no guarding.   Neurological:      Mental Status: She is alert and oriented to person, place, and time.   Psychiatric:         Mood and Affect: Mood normal.         Behavior: Behavior normal.         Procedures           ED Course  ED Course as of 02/27/25 2314   Thu Feb 27, 2025 1927 CT Abdomen Pelvis Without Contrast  Personally reviewed the CT scan of the abdomen and pelvis.  On my interpretation there is some fluid-filled loops of bowel, but no clear evidence of acute obstructive pattern. [RS]   2147 Gastrointestinal Panel, PCR - Stool, Per Rectum  GI panel is negative. [RS]   2151 Urinalysis, Microscopic Only - Urine, Clean Catch(!)  Mild inflammatory changes with the urine but no overt evidence of urinary tract infection. [RS]   2157 Patient is resting overall comfortably and reports feeling much better.  Abdomen is benign.  No emergent findings on her evaluation.  Urine culture is pending as the patient does not have any UTI symptoms.  No evidence of acute obstruction. I have reviewed results,  considerations, and diagnosis with the patient and/or their representative. Anticipatory guidance provided. Follow-up plan reviewed. Precautions for acute return for re-evaluations also reviewed. This including potential for worsening of the presenting condition and need for further evaluation, admission, and/or intervention as indicated. Opportunity to as questions provided. I advised them to return for any concerns and stressed the importance of timely follow-up and outpatient services. They verbalized understanding.   [RS]   2105 Note to patient: The 21st Century Cures Act makes medical notes like these available to patients in the interest of transparency. However, be advised this is a medical document. It is intended as peer to peer communication. It is written in medical language and may contain abbreviations or verbiage that are unfamiliar. It may appear blunt or direct. Medical documents are intended to carry relevant information, facts as evident, and the clinical opinion of the physician/NPP.   [RS]      ED Course User Index  [RS] Galindo House MD                                                       Medical Decision Making  Problems Addressed:  Diarrhea of presumed infectious origin: complicated acute illness or injury  Elevated blood pressure reading with diagnosis of hypertension: complicated acute illness or injury  Pain of upper abdomen: complicated acute illness or injury  Stage 3a chronic kidney disease: complicated acute illness or injury    Amount and/or Complexity of Data Reviewed  Independent Historian: caregiver  External Data Reviewed: labs.  Labs: ordered. Decision-making details documented in ED Course.  Radiology: ordered. Decision-making details documented in ED Course.    Risk  OTC drugs.  Prescription drug management.        Final diagnoses:   Pain of upper abdomen   Diarrhea of presumed infectious origin   Elevated blood pressure reading with diagnosis of hypertension   Stage 3a  chronic kidney disease       ED Disposition  ED Disposition       ED Disposition   Discharge    Condition   Stable    Comment   --               Jose Carrillo MD  [address] [facility] [address]  [place] [address]  [phone]    In 3 days  If not better/resolved.    [facility] EMERGENCY DEPARTMENT  [address] [facility] [address] [phone]    As needed, If symptoms worsen or ANY concerns.         Medication List        New Prescriptions      ondansetron ODT 8 MG disintegrating tablet  Commonly known as: ZOFRAN-ODT  Place 1 tablet on the tongue Every 8 (Eight) Hours As Needed for Nausea.     saccharomyces boulardii 250 MG capsule  Commonly known as: FLORASTOR  Take 1 capsule by mouth 2 (Two) Times a Day for 10 days.            Changed      acetaminophen 325 MG tablet  Commonly known as: TYLENOL  Take 2 tablets by mouth Every 6 (Six) Hours As Needed for Mild Pain.  What changed:   reasons to take this  additional instructions     vitamin B-12 500 MCG tablet  Commonly known as: CYANOCOBALAMIN  Take 1 tablet by mouth once daily  What changed: additional instructions               Where to Get Your Medications        These medications were sent to [facility] [address] [place] [address] - [address] [place] [address] - [phone]  - [phone] [place] [id number]      Phone: [phone]   ondansetron ODT 8 MG disintegrating tablet  saccharomyces boulardii 250 MG capsule            Galindo House MD  02/27/25 4416

## 2025-03-01 LAB — BACTERIA SPEC AEROBE CULT: NORMAL

## 2025-03-18 DIAGNOSIS — G45.9 TIA (TRANSIENT ISCHEMIC ATTACK): ICD-10-CM

## 2025-03-18 DIAGNOSIS — I66.01 STENOSIS OF RIGHT MIDDLE CEREBRAL ARTERY: ICD-10-CM

## 2025-03-18 RX ORDER — SIMVASTATIN 40 MG
40 TABLET ORAL DAILY
Qty: 90 TABLET | Refills: 0 | Status: SHIPPED | OUTPATIENT
Start: 2025-03-18

## 2025-03-24 RX ORDER — HYDROCHLOROTHIAZIDE 25 MG/1
25 TABLET ORAL DAILY
Qty: 90 TABLET | Refills: 1 | Status: SHIPPED | OUTPATIENT
Start: 2025-03-24

## 2025-03-27 DIAGNOSIS — E87.6 HYPOKALEMIA: ICD-10-CM

## 2025-03-27 RX ORDER — POTASSIUM CHLORIDE 20MEQ/15ML
LIQUID (ML) ORAL
Qty: 473 ML | Refills: 5 | Status: SHIPPED | OUTPATIENT
Start: 2025-03-27

## 2025-04-15 NOTE — TELEPHONE ENCOUNTER
Caller: Chitra WHITE     Relationship: [unfilled]     Best call back number: [phone]    What is your medical concern? PT HAS WATER BLISTER IN RIGHT TOE NIAL, WAS POPPED AND NOW ITS CLEAR FLUID HAS FILLED BACK UP, PT HAS SOAKED IN  EPSON SALT AND WILL LIKE TO KNOW WHAT DR SUGGEST. TOE NAIL LOOKS RED.    How long has this issue been going on? SINCE YESTERDAY       

## 2025-04-21 DIAGNOSIS — I66.01 STENOSIS OF RIGHT MIDDLE CEREBRAL ARTERY: ICD-10-CM

## 2025-04-21 DIAGNOSIS — G45.9 TIA (TRANSIENT ISCHEMIC ATTACK): ICD-10-CM

## 2025-04-21 DIAGNOSIS — I10 ESSENTIAL HYPERTENSION: ICD-10-CM

## 2025-04-21 RX ORDER — LOSARTAN POTASSIUM 25 MG/1
25 TABLET ORAL DAILY
Qty: 90 TABLET | Refills: 2 | Status: SHIPPED | OUTPATIENT
Start: 2025-04-21

## 2025-04-21 RX ORDER — SIMVASTATIN 40 MG
40 TABLET ORAL DAILY
Qty: 90 TABLET | Refills: 2 | Status: SHIPPED | OUTPATIENT
Start: 2025-04-21

## 2025-05-08 DIAGNOSIS — I10 ESSENTIAL HYPERTENSION: ICD-10-CM

## 2025-05-12 RX ORDER — LOSARTAN POTASSIUM 25 MG/1
25 TABLET ORAL DAILY
Qty: 90 TABLET | Refills: 0 | Status: SHIPPED | OUTPATIENT
Start: 2025-05-12

## 2025-05-20 ENCOUNTER — OFFICE VISIT (OUTPATIENT)
Dept: INTERNAL MEDICINE | Facility: CLINIC | Age: 80
End: 2025-05-20
Payer: MEDICARE

## 2025-05-20 VITALS
TEMPERATURE: 96.7 F | BODY MASS INDEX: 23.48 KG/M2 | SYSTOLIC BLOOD PRESSURE: 128 MMHG | DIASTOLIC BLOOD PRESSURE: 70 MMHG | HEART RATE: 68 BPM | WEIGHT: 136.8 LBS | RESPIRATION RATE: 14 BRPM

## 2025-05-20 DIAGNOSIS — K21.9 GASTROESOPHAGEAL REFLUX DISEASE WITHOUT ESOPHAGITIS: ICD-10-CM

## 2025-05-20 DIAGNOSIS — E53.8 VITAMIN B12 DEFICIENCY: ICD-10-CM

## 2025-05-20 DIAGNOSIS — E78.5 HYPERLIPIDEMIA, UNSPECIFIED HYPERLIPIDEMIA TYPE: ICD-10-CM

## 2025-05-20 DIAGNOSIS — R60.0 LOCALIZED EDEMA: ICD-10-CM

## 2025-05-20 DIAGNOSIS — R82.998 LEUKOCYTES IN URINE: ICD-10-CM

## 2025-05-20 DIAGNOSIS — R31.9 HEMATURIA, UNSPECIFIED TYPE: ICD-10-CM

## 2025-05-20 DIAGNOSIS — I10 ESSENTIAL HYPERTENSION: Primary | ICD-10-CM

## 2025-05-20 LAB
ALBUMIN SERPL-MCNC: 4.2 G/DL (ref 3.5–5.2)
ALBUMIN/GLOB SERPL: 1.7 G/DL
ALP SERPL-CCNC: 112 U/L (ref 39–117)
ALT SERPL W P-5'-P-CCNC: 12 U/L (ref 1–33)
ANION GAP SERPL CALCULATED.3IONS-SCNC: 14 MMOL/L (ref 5–15)
AST SERPL-CCNC: 26 U/L (ref 1–32)
BACTERIA UR QL AUTO: ABNORMAL /HPF
BASOPHILS # BLD AUTO: 0.02 10*3/MM3 (ref 0–0.2)
BASOPHILS NFR BLD AUTO: 0.5 % (ref 0–1.5)
BILIRUB BLD-MCNC: ABNORMAL MG/DL
BILIRUB SERPL-MCNC: 0.6 MG/DL (ref 0–1.2)
BUN SERPL-MCNC: 6 MG/DL (ref 8–23)
BUN/CREAT SERPL: 6.5 (ref 7–25)
CALCIUM SPEC-SCNC: 9.3 MG/DL (ref 8.6–10.5)
CHLORIDE SERPL-SCNC: 100 MMOL/L (ref 98–107)
CHOLEST SERPL-MCNC: 163 MG/DL (ref 0–200)
CLARITY, POC: CLEAR
CO2 SERPL-SCNC: 23 MMOL/L (ref 22–29)
COLOR UR: YELLOW
CREAT SERPL-MCNC: 0.92 MG/DL (ref 0.57–1)
DEPRECATED RDW RBC AUTO: 48.4 FL (ref 37–54)
EGFRCR SERPLBLD CKD-EPI 2021: 62.7 ML/MIN/1.73
EOSINOPHIL # BLD AUTO: 0.11 10*3/MM3 (ref 0–0.4)
EOSINOPHIL NFR BLD AUTO: 2.6 % (ref 0.3–6.2)
ERYTHROCYTE [DISTWIDTH] IN BLOOD BY AUTOMATED COUNT: 14.2 % (ref 12.3–15.4)
EXPIRATION DATE: ABNORMAL
GLOBULIN UR ELPH-MCNC: 2.5 GM/DL
GLUCOSE SERPL-MCNC: 94 MG/DL (ref 65–99)
GLUCOSE UR STRIP-MCNC: NEGATIVE MG/DL
HCT VFR BLD AUTO: 36.2 % (ref 34–46.6)
HDLC SERPL-MCNC: 88 MG/DL (ref 40–60)
HGB BLD-MCNC: 11 G/DL (ref 12–15.9)
HYALINE CASTS UR QL AUTO: ABNORMAL /LPF
IMM GRANULOCYTES # BLD AUTO: 0.02 10*3/MM3 (ref 0–0.05)
IMM GRANULOCYTES NFR BLD AUTO: 0.5 % (ref 0–0.5)
KETONES UR QL: NEGATIVE
LDLC SERPL CALC-MCNC: 56 MG/DL (ref 0–100)
LDLC/HDLC SERPL: 0.6 {RATIO}
LEUKOCYTE EST, POC: ABNORMAL
LYMPHOCYTES # BLD AUTO: 0.56 10*3/MM3 (ref 0.7–3.1)
LYMPHOCYTES NFR BLD AUTO: 13.1 % (ref 19.6–45.3)
Lab: ABNORMAL
MCH RBC QN AUTO: 28.6 PG (ref 26.6–33)
MCHC RBC AUTO-ENTMCNC: 30.4 G/DL (ref 31.5–35.7)
MCV RBC AUTO: 94.3 FL (ref 79–97)
MONOCYTES # BLD AUTO: 0.44 10*3/MM3 (ref 0.1–0.9)
MONOCYTES NFR BLD AUTO: 10.3 % (ref 5–12)
NEUTROPHILS NFR BLD AUTO: 3.11 10*3/MM3 (ref 1.7–7)
NEUTROPHILS NFR BLD AUTO: 73 % (ref 42.7–76)
NITRITE UR-MCNC: NEGATIVE MG/ML
NRBC BLD AUTO-RTO: 0 /100 WBC (ref 0–0.2)
PH UR: 6 [PH] (ref 5–8)
PLATELET # BLD AUTO: 230 10*3/MM3 (ref 140–450)
PMV BLD AUTO: 9.1 FL (ref 6–12)
POTASSIUM SERPL-SCNC: 4.4 MMOL/L (ref 3.5–5.2)
PROT SERPL-MCNC: 6.7 G/DL (ref 6–8.5)
PROT UR STRIP-MCNC: NEGATIVE MG/DL
RBC # BLD AUTO: 3.84 10*6/MM3 (ref 3.77–5.28)
RBC # UR STRIP: ABNORMAL /HPF
RBC # UR STRIP: ABNORMAL /UL
REF LAB TEST METHOD: ABNORMAL
SODIUM SERPL-SCNC: 137 MMOL/L (ref 136–145)
SP GR UR: 1.01 (ref 1–1.03)
SQUAMOUS #/AREA URNS HPF: ABNORMAL /HPF
TRIGL SERPL-MCNC: 110 MG/DL (ref 0–150)
TSH SERPL DL<=0.05 MIU/L-ACNC: 1.8 UIU/ML (ref 0.27–4.2)
UROBILINOGEN UR QL: NORMAL
VIT B12 BLD-MCNC: 1032 PG/ML (ref 211–946)
VLDLC SERPL-MCNC: 19 MG/DL (ref 5–40)
WBC # UR STRIP: ABNORMAL /HPF
WBC NRBC COR # BLD AUTO: 4.26 10*3/MM3 (ref 3.4–10.8)

## 2025-05-20 PROCEDURE — 87086 URINE CULTURE/COLONY COUNT: CPT | Performed by: INTERNAL MEDICINE

## 2025-05-20 PROCEDURE — 87186 SC STD MICRODIL/AGAR DIL: CPT | Performed by: INTERNAL MEDICINE

## 2025-05-20 PROCEDURE — 80061 LIPID PANEL: CPT | Performed by: INTERNAL MEDICINE

## 2025-05-20 PROCEDURE — 80053 COMPREHEN METABOLIC PANEL: CPT | Performed by: INTERNAL MEDICINE

## 2025-05-20 PROCEDURE — 85025 COMPLETE CBC W/AUTO DIFF WBC: CPT | Performed by: INTERNAL MEDICINE

## 2025-05-20 PROCEDURE — 84443 ASSAY THYROID STIM HORMONE: CPT | Performed by: INTERNAL MEDICINE

## 2025-05-20 PROCEDURE — 82607 VITAMIN B-12: CPT | Performed by: INTERNAL MEDICINE

## 2025-05-20 PROCEDURE — 87088 URINE BACTERIA CULTURE: CPT | Performed by: INTERNAL MEDICINE

## 2025-05-20 PROCEDURE — 81015 MICROSCOPIC EXAM OF URINE: CPT | Performed by: INTERNAL MEDICINE

## 2025-05-20 NOTE — PROGRESS NOTES
Chief Complaint   Patient presents with    Follow-up     Six month follow up chronic health problems        History of Present Illness      The patient presents for a follow-up related to Vitamin B12 deficiency. The patient has no symptoms of a dry cough, a wet cough, wheezing, fever, nausea, vomiting, diarrhea, myalgias, abdominal pain, sweats, weight loss, decreased appetite, chills, paresthesias, numbness or memory loss. Her energy level is normal. The patient is taking a vitamin B12 supplement in the form of an oral supplement.    The patient presents for a follow-up related to hypertension. The patient reports that she has had edema but she denies having headaches, chest pain, dyspnea, syncope or blurred vision. She states that she is taking her medication as prescribed. She is not having medication side effects.    The patient presents for a follow-up related to hyperlipidemia. She is following a low fat diet. She reports that she is exercising. She is taking simvastatin. The patient is taking her medication as prescribed. She reports no medication side effects, including muscle cramps, abdominal pain, headaches or weakness. She denies orthopnea, paroxysmal nocturnal dyspnea or dyspnea on exertion.    The patient presents for a follow-up related to GERD. The patient is on omeprazole for her gastroesophageal reflux. The medication is taken on a regular basis and gives complete relief of the symptoms. She reports no belching, dysphagia, early satiety, heartburn, hoarseness, odynophagia or rectal bleeding. The GERD has no known aggravating factors.    The patient presents with complaints of edema of the right leg. The edema is painful. It has been present for one month. She denies excessive sodium intake. The patient denies a personal history of blood clots. There is no a family history of clotting disorders. She does not smoke. She denies situations leading to prolonged immobility. There has been no trauma to  the affected extremity.    Medications      Current Outpatient Medications:     acetaminophen (TYLENOL) 325 MG tablet, Take 2 tablets by mouth Every 6 (Six) Hours As Needed for Mild Pain. (Patient taking differently: Take 2 tablets by mouth Every 6 (Six) Hours As Needed for Mild Pain, Headache or Fever. OTC), Disp: , Rfl:     anastrozole (ARIMIDEX) 1 MG tablet, Take 1 tablet by mouth Daily., Disp: 90 tablet, Rfl: 3    aspirin 81 MG EC tablet, Take 1 tablet by mouth Daily. OTC, Disp: , Rfl:     Cholecalciferol 25 MCG (1000 UT) chewable tablet, Chew 1,000 Units Daily. OTC, Disp: , Rfl:     clopidogrel (PLAVIX) 75 MG tablet, Take 1 tablet by mouth once daily, Disp: 90 tablet, Rfl: 1    hydroCHLOROthiazide 25 MG tablet, Take 1 tablet by mouth once daily, Disp: 90 tablet, Rfl: 1    losartan (COZAAR) 25 MG tablet, Take 1 tablet by mouth once daily, Disp: 90 tablet, Rfl: 0    omeprazole (priLOSEC) 20 MG capsule, Take 1 capsule by mouth Daily. OTC, Disp: , Rfl:     potassium chloride (KAYCIEL) 20 mEq/15 mL solution, TAKE 15 ML BY MOUTH  ONCE DAILY, Disp: 473 mL, Rfl: 5    simvastatin (ZOCOR) 40 MG tablet, Take 1 tablet by mouth once daily, Disp: 90 tablet, Rfl: 2    vitamin B-12 (CYANOCOBALAMIN) 500 MCG tablet, Take 1 tablet by mouth once daily (Patient taking differently: Take 1 tablet by mouth Daily. OTC), Disp: 90 tablet, Rfl: 1    ondansetron ODT (ZOFRAN-ODT) 8 MG disintegrating tablet, Place 1 tablet on the tongue Every 8 (Eight) Hours As Needed for Nausea. (Patient not taking: Reported on 5/20/2025), Disp: 15 tablet, Rfl: 0     Allergies    Allergies   Allergen Reactions    Strawberry Extract Swelling       Problem List    Patient Active Problem List   Diagnosis    Hyperlipidemia    Hypertension    Ischemic stroke    Endometrial cancer    GERD (gastroesophageal reflux disease)    Hx: UTI (urinary tract infection)    History of endometrial cancer    TIA (transient ischemic attack)    Stenosis of right middle cerebral  artery    Musculoskeletal neck pain    Large bowel obstruction s/p colonic resection by Dr. Ma 12/10/21    Recurrent cancer    Bilateral malignant neoplasm of breast in female, estrogen receptor positive    Slow transit constipation    Visual field defect    Hypokalemia    Anemia    Ocular migraine    Vitamin B12 deficiency    Ptosis of left eyelid    History of stroke    Partial small bowel obstruction       Medications, Allergies, Problems List and Past History were reviewed and updated.    Physical Examination    /70 (BP Location: Left arm, Patient Position: Sitting, Cuff Size: Adult)   Pulse 68   Temp 96.7 °F (35.9 °C)   Resp 14   Wt 62.1 kg (136 lb 12.8 oz)   LMP  (LMP Unknown)   BMI 23.48 kg/m²       HEENT: Head- Normocephalic Atraumatic. Facies- Within normal limits. Pinnas- Normal texture and shape bilaterally. Canals- Normal bilaterally. TMs- Normal bilaterally. Nares- Patent bilaterally. Nasal Septum- is normal. There is no tenderness to palpation over the frontal or maxillary sinuses. Lids- Normal bilaterally. Conjunctiva- Clear bilaterally. Sclera- Anicteric bilaterally. Oropharynx- Moist with no lesions. Tonsils- No enlargement, erythema or exudate.    Neck: Thyroid- non enlarged, symmetric and has no nodules. No bruits are detected. ROM- Normal Range of Motion with no rigidity.    Lungs: Auscultation- Clear to auscultation bilaterally. There are no retractions, clubbing or cyanosis. The Expiratory to Inspiratory ratio is equal.    Lymph Nodes: Cervical- no enlarged lymph nodes noted. Clavicular- Deferred. Axillary- Deferred. Inguinal- Deferred.    Cardiovascular: There are no carotid bruits. Heart- Normal Rate with Regular rhythm and no murmurs. There are no gallops. There are no rubs. In the lower extremities there is no edema. The upper extremities do not have edema.    Abdomen: Soft, benign, non-tender with no masses, hernias, organomegaly or scars.    Impression and  Assessment    Vitamin B12 Deficiency.    Essential Hypertension.    Hyperlipidemia.    Gastroesophageal Reflux Disease.    Edema.    Plan    Gastroesophageal Reflux Disease Plan: The patient was instructed to continue the current medications.    Essential Hypertension Plan: The patient was instructed to continue the current medications.    Hyperlipidemia Plan: The patient was instructed to exercise daily, eat a low fat diet and continue her medications.    Edema Plan: She was instructed to wear compression stockings. The patient was encouraged to keep her legs elevated as much as possible. Further plans will be made after testing.    Vitamin B12 Deficiency Plan: The patient was instructed to continue the current medications.    Diagnoses and all orders for this visit:    1. Essential hypertension (Primary)  -     CBC & Differential; Future  -     Comprehensive Metabolic Panel; Future  -     TSH; Future  -     POC Urinalysis Dipstick, Automated; Future  -     POC Urinalysis Dipstick, Automated    2. Vitamin B12 deficiency  -     CBC & Differential; Future  -     Vitamin B12; Future    3. Hyperlipidemia, unspecified hyperlipidemia type  -     Comprehensive Metabolic Panel; Future  -     Lipid Panel; Future    4. Gastroesophageal reflux disease without esophagitis    5. Localized edema  -     Duplex Venous Lower Extremity - Right CAR; Future  -     TSH; Future          Return to Office    The patient was instructed to return for follow-up in 6 months. The patient was instructed to return sooner if the condition changes, worsens, or does not resolve.

## 2025-05-21 ENCOUNTER — HOSPITAL ENCOUNTER (OUTPATIENT)
Dept: CARDIOLOGY | Facility: HOSPITAL | Age: 80
Discharge: HOME | End: 2025-05-21
Admitting: INTERNAL MEDICINE
Payer: MEDICARE

## 2025-05-21 VITALS — BODY MASS INDEX: 23.37 KG/M2 | HEIGHT: 64 IN | WEIGHT: 136.91 LBS

## 2025-05-21 DIAGNOSIS — R60.0 LOCALIZED EDEMA: ICD-10-CM

## 2025-05-21 LAB
BH CV LOW VAS RIGHT VARICOSITY BK VESSEL: 1
BH CV LOWER VASCULAR LEFT COMMON FEMORAL AUGMENT: NORMAL
BH CV LOWER VASCULAR LEFT COMMON FEMORAL PHASIC: NORMAL
BH CV LOWER VASCULAR LEFT COMMON FEMORAL SPONT: NORMAL
BH CV LOWER VASCULAR RIGHT COMMON FEMORAL AUGMENT: NORMAL
BH CV LOWER VASCULAR RIGHT COMMON FEMORAL COMPRESS: NORMAL
BH CV LOWER VASCULAR RIGHT COMMON FEMORAL PHASIC: NORMAL
BH CV LOWER VASCULAR RIGHT COMMON FEMORAL SPONT: NORMAL
BH CV LOWER VASCULAR RIGHT DISTAL FEMORAL AUGMENT: NORMAL
BH CV LOWER VASCULAR RIGHT DISTAL FEMORAL COMPRESS: NORMAL
BH CV LOWER VASCULAR RIGHT DISTAL FEMORAL PHASIC: NORMAL
BH CV LOWER VASCULAR RIGHT DISTAL FEMORAL SPONT: NORMAL
BH CV LOWER VASCULAR RIGHT GASTRONEMIUS COMPRESS: NORMAL
BH CV LOWER VASCULAR RIGHT GREATER SAPH AK COMPRESS: NORMAL
BH CV LOWER VASCULAR RIGHT GREATER SAPH BK COMPRESS: NORMAL
BH CV LOWER VASCULAR RIGHT LESSER SAPH COMPRESS: NORMAL
BH CV LOWER VASCULAR RIGHT MID FEMORAL AUGMENT: NORMAL
BH CV LOWER VASCULAR RIGHT MID FEMORAL COMPRESS: NORMAL
BH CV LOWER VASCULAR RIGHT MID FEMORAL PHASIC: NORMAL
BH CV LOWER VASCULAR RIGHT MID FEMORAL SPONT: NORMAL
BH CV LOWER VASCULAR RIGHT PERONEAL COMPRESS: NORMAL
BH CV LOWER VASCULAR RIGHT POPLITEAL AUGMENT: NORMAL
BH CV LOWER VASCULAR RIGHT POPLITEAL COMPRESS: NORMAL
BH CV LOWER VASCULAR RIGHT POPLITEAL PHASIC: NORMAL
BH CV LOWER VASCULAR RIGHT POPLITEAL SPONT: NORMAL
BH CV LOWER VASCULAR RIGHT POSTERIOR TIBIAL COMPRESS: NORMAL
BH CV LOWER VASCULAR RIGHT PROFUNDA FEMORAL AUGMENT: NORMAL
BH CV LOWER VASCULAR RIGHT PROFUNDA FEMORAL COMPRESS: NORMAL
BH CV LOWER VASCULAR RIGHT PROFUNDA FEMORAL PHASIC: NORMAL
BH CV LOWER VASCULAR RIGHT PROFUNDA FEMORAL SPONT: NORMAL
BH CV LOWER VASCULAR RIGHT PROXIMAL FEMORAL AUGMENT: NORMAL
BH CV LOWER VASCULAR RIGHT PROXIMAL FEMORAL COMPRESS: NORMAL
BH CV LOWER VASCULAR RIGHT PROXIMAL FEMORAL PHASIC: NORMAL
BH CV LOWER VASCULAR RIGHT PROXIMAL FEMORAL SPONT: NORMAL
BH CV LOWER VASCULAR RIGHT SAPHENOFEMORAL JUNCTION AUGMENT: NORMAL
BH CV LOWER VASCULAR RIGHT SAPHENOFEMORAL JUNCTION COMPRESS: NORMAL
BH CV LOWER VASCULAR RIGHT SAPHENOFEMORAL JUNCTION PHASIC: NORMAL
BH CV LOWER VASCULAR RIGHT SAPHENOFEMORAL JUNCTION SPONT: NORMAL
BH CV LOWER VASCULAR RIGHT VARICOSITY BK COMPRESS: NORMAL
BH CV VAS PRELIMINARY FINDINGS SCRIPTING: 1

## 2025-05-21 PROCEDURE — 93971 EXTREMITY STUDY: CPT

## 2025-05-22 LAB — BACTERIA SPEC AEROBE CULT: ABNORMAL

## 2025-05-28 ENCOUNTER — TELEPHONE (OUTPATIENT)
Dept: INTERNAL MEDICINE | Facility: CLINIC | Age: 80
End: 2025-05-28
Payer: MEDICARE

## 2025-05-28 DIAGNOSIS — N30.00 ACUTE CYSTITIS WITHOUT HEMATURIA: Primary | ICD-10-CM

## 2025-05-28 RX ORDER — CEFUROXIME AXETIL 500 MG/1
500 TABLET ORAL 2 TIMES DAILY
Qty: 20 TABLET | Refills: 0 | Status: SHIPPED | OUTPATIENT
Start: 2025-05-28

## 2025-05-28 NOTE — TELEPHONE ENCOUNTER
"Spoke with patient, informed that Dr Carrillo said to let her know   \"I have sent in cefuroxime.\"  Verbalized good understanding, agreement and appreciation.         "

## 2025-05-28 NOTE — TELEPHONE ENCOUNTER
"Attempted to call patient back at [phone], reached call block recording that our # is not accepted.     Left message voicemail for patient daughter, Chitra, (on ELLIE) to please return call regarding her mom.  Ofc # given.     RELAY:  Dr Carrillo said \"She can cut the augmentin.\"    Document if notified.    "

## 2025-05-28 NOTE — TELEPHONE ENCOUNTER
Caller: Cindy Sage    Relationship: Self    Best call back number: [phone]     What was the call regarding: PATIENT STATES THAT THE AMOXIK CLAV IS TOO LARGE. SHE CAN'T SWALLOW IT. SHE WOULD LIKE TO KNOW IF THERE IS AN ALTERNATIVE. SOMETHING SMALLER OR THAT CAN BE CRUSHED.    Is it okay if the provider responds through MyChart: NO

## 2025-05-28 NOTE — TELEPHONE ENCOUNTER
Patient returned call.  Informed that Dr Carrillo said to try cutting the pilling.  States she tried that yesterday and even crushed it last night but that it tore her stomach up all night and that she still feels sick from it.  Would like to see if something else would work instead.  Explained will check with Dr Carrillo and let her know.  Verbalized appreciation.

## 2025-06-02 NOTE — PLAN OF CARE
DISCHARGE INSTRUCTIONS CYSTOSCOPY      For your surgery you had:  General anesthesia (you may have a sore throat for the first 24 hours)  IV sedation  Local anesthesia  Monitored anesthesia care  You received a medicated patch for nausea prevention today (behind your ear). It is recommended that you remove it 24-48 hours post-operatively. It must be removed within 72 hours..   You may experience dizziness, drowsiness, or lightheadedness for several hours following surgery.  Do not stay alone today or tonight.  Limit your activity for 24 hours.  You should not drive, operate machinery, drink alcohol, or sign legally binding documents for 24 hours or while you are taking pain medication.  Resume your diet slowly.  Follow any special dietary instructions you may have been given by your doctor.    Last dose of pain medication given at: tylenol 1000 mg at 7:06 am. May repeat tylenol in 4  hours if needed.  .    NOTIFY YOUR DOCTOR IF YOU EXPERIENCE ANY OF THE FOLLOWING:  Temperature greater than 101 degrees Fahrenheit  Shaking Chills  Redness or excessive drainage from incision  Nausea, vomiting and/or pain that is not controlled by prescribed medications  Increase in bleeding or bleeding that is excessive  Unable to urinate in 6 hours after surgery  If unable to reach your doctor, please go to the closest Emergency Room   Following your cystoscopy exam, you may experience burning upon urination.  You may also pass some bloody urine.  If the burning sensation and/or bloody urine should persist beyond 48 hours, call your doctor.  To encourage kidney and bladder function, you should drink as much fluid as possible.  If you have difficulty urinating, try sitting in a bath tub of warm water.  If you become uncomfortable because you cannot urinate, call your doctor or come to the Emergency Room at the hospital.  Medications per physician instructions as indicated on Discharge Medication Information Sheet.      Goal Outcome Evaluation:  Plan of Care Reviewed With: patient           Outcome Summary: PT eval completed. Pt presents with difficulty walking and decreased functional mobility independence s/p ex-lap. Pt needed maxA to t/f sup > sit EOB with extended time needed due to abdominal pain. Pt amb a short distance in room with RW with CGA, dem slow speed, fwd flexed posture. Distance limited by pain. VSS.

## 2025-06-13 ENCOUNTER — TELEPHONE (OUTPATIENT)
Dept: INTERNAL MEDICINE | Facility: CLINIC | Age: 80
End: 2025-06-13
Payer: MEDICARE

## 2025-06-13 DIAGNOSIS — Z11.1 SCREENING-PULMONARY TB: Primary | ICD-10-CM

## 2025-06-13 NOTE — TELEPHONE ENCOUNTER
Caller: Chitra WHITE    Relationship: Emergency Contact    Best call back number: [phone]     What orders are you requesting (i.e. lab or imaging): TB TEST    In what timeframe would the patient need to come in: AS SOON AS POSSIBLE    Where will you receive your lab/imaging services: OFFICE    Additional notes: STATE IS REQUIRING THEY HAVE AN UPDATED TB TEST.

## 2025-06-13 NOTE — TELEPHONE ENCOUNTER
Spoke with Chitra - daughter and Cindy states [facility] needs her to have a T-Spot or TB skin test as soon as possible.  They prefer a T-spot   They help with their handicapped grandson and the previous one has .  They would like to come in Monday and have this done.    Dr Allen prefers Dr Carrillo order.   Notified Chitra someone would call her back Monday when ordered.   Verbal understanding given

## 2025-06-16 NOTE — TELEPHONE ENCOUNTER
Spoke with Patients emergency contact Chitra and informed her that  entered in the results for the TB blood draws. Informed Chitra that it is a morning draw and they need to have it done before noon. They are going over to the draw station next door to have that completed.   Expressed verbal understanding and thanks.

## 2025-06-17 ENCOUNTER — LAB (OUTPATIENT)
Dept: LAB | Facility: HOSPITAL | Age: 80
End: 2025-06-17
Payer: MEDICARE

## 2025-06-17 DIAGNOSIS — Z11.1 SCREENING-PULMONARY TB: ICD-10-CM

## 2025-06-17 PROCEDURE — 86480 TB TEST CELL IMMUN MEASURE: CPT

## 2025-06-17 PROCEDURE — 36415 COLL VENOUS BLD VENIPUNCTURE: CPT

## 2025-06-19 LAB
GAMMA INTERFERON BACKGROUND BLD IA-ACNC: 0.04 IU/ML
M TB IFN-G BLD-IMP: NEGATIVE
M TB IFN-G CD4+ BCKGRND COR BLD-ACNC: 0.04 IU/ML
M TB IFN-G CD4+CD8+ BCKGRND COR BLD-ACNC: 0.06 IU/ML
MITOGEN IGNF BCKGRD COR BLD-ACNC: 9.26 IU/ML
SERVICE CMNT-IMP: NORMAL

## 2025-06-23 ENCOUNTER — RESULTS FOLLOW-UP (OUTPATIENT)
Dept: LAB | Facility: HOSPITAL | Age: 80
End: 2025-06-23

## 2025-06-23 NOTE — LETTER
Cindy Sage  [address] [place] [id number]    July 24, 2025     Dear Ms. Chu:    Below are the results from your recent visit:    Resulted Orders   QuantiFERON TB Plus Client Incubated   Result Value Ref Range    QuantiFERON Criteria Comment       Comment:      QuantiFERON-TB Gold Plus is a qualitative indirect test for  M tuberculosis infection (including disease) and is intended for use  in conjunction with risk assessment, radiography, and other medical  and diagnostic evaluations. The QuantiFERON-TB Gold Plus result is  determined by subtracting the Nil value from either TB antigen (Ag)  value. The Mitogen tube serves as a control for the test.    QUANTIFERON TB1 AG VALUE 0.04 IU/mL    QUANTIFERON TB2 AG VALUE 0.06 IU/mL    QuantiFERON Nil Value 0.04 IU/mL    QuantiFERON Mitogen Value 9.26 IU/mL    QUANTIFERON-TB GOLD PLUS Negative Negative      Comment:      No response to M tuberculosis antigens detected.  Infection with M tuberculosis is unlikely, but high risk  individuals should be considered for additional testing  (ATS/IDSA/CDC Clinical Practice Guidelines, 2017). The  reference range is an Antigen minus Nil result of <0.35 IU/mL.  The specimen received for QuantiFERON testing was incubated by the  ordering institution. Specific procedures outlined in our Directory  of Services and in the package insert for the QuantiFERON Gold  (In Tube) test must be followed to enable for proper stimulation of  cells for the production of interferon gamma.  Chemiluminescence immunoassay methodology       T  Your recent tuberculosis (TB) screening test, called the QuantiFERON-TB Gold Plus, was negative. This means there was no immune response detected to TB proteins in your blood, and there is no  evidence of TB infection at this time.    This test is a modern blood test used to help detect tuberculosis infection. It is commonly used for routine screening and is reliable when combined with your  overall health history and risk factors.  Ba sed on this result, no further TB testing is needed unless you develop new symptoms or risk factors.    Jose Carrillo MD        If you have any questions or concerns, please don't hesitate to call.         Sincerely,        Jose Carrillo MD

## 2025-07-01 ENCOUNTER — HOSPITAL ENCOUNTER (OUTPATIENT)
Dept: CT IMAGING | Facility: HOSPITAL | Age: 80
Discharge: HOME | End: 2025-07-01
Admitting: NURSE PRACTITIONER
Payer: MEDICARE

## 2025-07-01 DIAGNOSIS — C54.1 ENDOMETRIAL CANCER: ICD-10-CM

## 2025-07-01 DIAGNOSIS — Z17.0 BILATERAL MALIGNANT NEOPLASM OF BREAST IN FEMALE, ESTROGEN RECEPTOR POSITIVE, UNSPECIFIED SITE OF BREAST: ICD-10-CM

## 2025-07-01 DIAGNOSIS — C50.911 BILATERAL MALIGNANT NEOPLASM OF BREAST IN FEMALE, ESTROGEN RECEPTOR POSITIVE, UNSPECIFIED SITE OF BREAST: ICD-10-CM

## 2025-07-01 DIAGNOSIS — C50.912 BILATERAL MALIGNANT NEOPLASM OF BREAST IN FEMALE, ESTROGEN RECEPTOR POSITIVE, UNSPECIFIED SITE OF BREAST: ICD-10-CM

## 2025-07-01 PROCEDURE — 71260 CT THORAX DX C+: CPT

## 2025-07-01 PROCEDURE — 74177 CT ABD & PELVIS W/CONTRAST: CPT

## 2025-07-01 PROCEDURE — 25510000001 IOPAMIDOL 61 % SOLUTION: Performed by: NURSE PRACTITIONER

## 2025-07-01 RX ORDER — IOPAMIDOL 612 MG/ML
85 INJECTION, SOLUTION INTRAVASCULAR
Status: COMPLETED | OUTPATIENT
Start: 2025-07-01 | End: 2025-07-01

## 2025-07-01 RX ADMIN — IOPAMIDOL 85 ML: 612 INJECTION, SOLUTION INTRAVENOUS at 10:35

## 2025-07-08 ENCOUNTER — LAB (OUTPATIENT)
Dept: LAB | Facility: HOSPITAL | Age: 80
End: 2025-07-08
Payer: MEDICARE

## 2025-07-08 ENCOUNTER — OFFICE VISIT (OUTPATIENT)
Dept: GYNECOLOGIC ONCOLOGY | Facility: CLINIC | Age: 80
End: 2025-07-08
Payer: MEDICARE

## 2025-07-08 VITALS
OXYGEN SATURATION: 96 % | HEART RATE: 64 BPM | WEIGHT: 132.4 LBS | DIASTOLIC BLOOD PRESSURE: 65 MMHG | RESPIRATION RATE: 18 BRPM | SYSTOLIC BLOOD PRESSURE: 148 MMHG | HEIGHT: 64 IN | TEMPERATURE: 97.2 F | BODY MASS INDEX: 22.61 KG/M2

## 2025-07-08 DIAGNOSIS — M81.6 LOCALIZED OSTEOPOROSIS WITHOUT CURRENT PATHOLOGICAL FRACTURE: ICD-10-CM

## 2025-07-08 DIAGNOSIS — C54.1 ENDOMETRIAL CANCER: ICD-10-CM

## 2025-07-08 DIAGNOSIS — Z79.890 H/O ONGOING TREATMENT WITH HORMONAL THERAPY: ICD-10-CM

## 2025-07-08 DIAGNOSIS — C50.912 BILATERAL MALIGNANT NEOPLASM OF BREAST IN FEMALE, ESTROGEN RECEPTOR POSITIVE, UNSPECIFIED SITE OF BREAST: ICD-10-CM

## 2025-07-08 DIAGNOSIS — C50.911 BILATERAL MALIGNANT NEOPLASM OF BREAST IN FEMALE, ESTROGEN RECEPTOR POSITIVE, UNSPECIFIED SITE OF BREAST: ICD-10-CM

## 2025-07-08 DIAGNOSIS — C54.1 ENDOMETRIAL CANCER: Primary | ICD-10-CM

## 2025-07-08 DIAGNOSIS — Z17.0 BILATERAL MALIGNANT NEOPLASM OF BREAST IN FEMALE, ESTROGEN RECEPTOR POSITIVE, UNSPECIFIED SITE OF BREAST: ICD-10-CM

## 2025-07-08 LAB — CANCER AG125 SERPL QL: 15.6 U/ML (ref 0–38.1)

## 2025-07-08 PROCEDURE — 86304 IMMUNOASSAY TUMOR CA 125: CPT

## 2025-07-08 PROCEDURE — 36415 COLL VENOUS BLD VENIPUNCTURE: CPT

## 2025-07-08 PROCEDURE — 3078F DIAST BP <80 MM HG: CPT | Performed by: NURSE PRACTITIONER

## 2025-07-08 PROCEDURE — 1126F AMNT PAIN NOTED NONE PRSNT: CPT | Performed by: NURSE PRACTITIONER

## 2025-07-08 PROCEDURE — 3077F SYST BP >= 140 MM HG: CPT | Performed by: NURSE PRACTITIONER

## 2025-07-08 PROCEDURE — 1160F RVW MEDS BY RX/DR IN RCRD: CPT | Performed by: NURSE PRACTITIONER

## 2025-07-08 PROCEDURE — 1159F MED LIST DOCD IN RCRD: CPT | Performed by: NURSE PRACTITIONER

## 2025-07-08 PROCEDURE — 99214 OFFICE O/P EST MOD 30 MIN: CPT | Performed by: NURSE PRACTITIONER

## 2025-07-08 NOTE — PROGRESS NOTES
GYN ONCOLOGY CANCER SURVEILLANCE FOLLOW-UP    Cindy Sage  [id number]  1945    Subjective   Chief Complaint: follow up, recurrent endometrial cancer       History of present illness:   Cindy Sage is a 80 y.o. year old female who is here today for ongoing surveillance of Endometrial Cancer, recurrent, see Cancer History. She is accompanied today by her daughter, Chitra.     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 She is now s/p left breast lumpectomy (Dr TAMMIE Muse) and breast radiation.     She started maintenance Arimidex for both endometrial cancer and breast cancer in 4/2022 and continues to tolerate med well without reported adverse rxn/ side effects. Compliant, prefers 90d supply.    --Last DEXA notable for osteoporosis.  We discussed this today and specifically how it relates to her continuation of AI.  Additionally, long-term use of PPI.  Compliant with vitamin D supplementation.  She is planning to see her dentist soon due to need of several extractions and other teeth problems.  Very hesitant about potentially starting bisphosphonate therapy as one of her very close friends ended up with ONJ.    We have been following her CA-125 level which remains low/ normal, last 10.9 on 1/7/25.    Restaging CTs completed on 7/1/2025 showed no evidence of recurrent or new metastatic disease in the chest, abdomen, or pelvis.    Today, she reports that she continues doing well.  Denies any major changes in health since last visit.  She denies vaginal bleeding and discharge. She does not have abdominal or pelvic pain. She is tolerating a regular diet and endorses a normal appetite. She denies nausea and vomiting. She denies early satiety and bloating. Denies any CP, SOB, lightheadedness or dizziness. She denies changes in her bowel/bladder. No dysuria, frequency, urgency, hematuria or flank pain. Reports baseline energy levels. Using  walker for long distances.       Health Maintenance:  -Colonoscopy 4/26/2023 (Dr Verdugo), see report.   -Mammogram diagnostic bilateral 8-9-24: birads2, recommendation to continue annual screening   -DEXA scan is UTD 2/2024: Osteoporosis of the right femoral neck. Osteopenia of the L1-L4 vertebrae, and total right hip.           Cancer History:   Oncology/Hematology History   Endometrial cancer   3/26/2016 Imaging    CT in ER for acute onset postmenopausal bleeding revealed mass in the lower uterine segment. Gyn Oncology called to evaluate.     3/29/2016 Biopsy    Endometrial biopsy in office, pathology revealed complex atypical hyperplasia with stromal breakdown.        4/1/2016 Initial Diagnosis    Endometrial cancer     4/19/2016 Surgery    RATLH, BSO, LND to several millimeters of residual disease.   Stage IIIC2     5/20/2016 - 11/15/2016 Chemotherapy    Carboplatin AUC 6, Paclitaxel 175 mg/m2 x 6 cycles in sandwich fashion with radiation in between cycles #3 and #4      - 9/2/2016 Radiation    Radiation completed. Pelvis and periaortic nodes received a total of 45 Gy in 25 fractions during sandwich therapy. This was followed by vaginal brachytherapy: upper vagina treated utilizing cylinder and high dose rate iridium-192 to 18 Gy in 3 fractions.      11/21/2016 Imaging    Post-treatment PET/CT negative for disease     12/8/2021 Imaging    CT scan chest abdomen and pelvis: Large bowel obstruction, left chest wall mass     12/10/2021 Surgery    Exploratory laparotomy, colonic resection, side-to-side anastomosis for large bowel obstruction    Pathology showed metastatic adenocarcinoma at the colon.          1/10/2022 Molecular Testing    CARIS results:  MMR proficient/MSI stable--level 2 benefit pembrolizumab + lenvima  TMB high--level 2 benefit pembrolizumab alone  ER/OK+--level 3 benefit endocrine therapy  BRCA 1/2 negative     4/29/2022 Imaging    CT abdomen pelvis:  1. No evidence of abdominopelvic metastatic  disease  2. Cholelithiasis  3. Colonic diverticulosis  4. Status post partial colon resection and hysterectomy     1/19/2024 Imaging    CT chest abdomen & pelvis    Impression:     1. Stable nonspecific 6 mm pulmonary nodule at the left lung ankle. No evidence of acute pulmonary process     2. Stable surgical change in the left breast with no change in mild residual fluid or soft tissue density or scarring in the surgical bed     3. No evidence of metastatic disease in the abdomen or pelvis     4. Sigmoid diverticulosis with mild pericolonic fat stranding which may represent scarring or mild acute diverticulitis. Please correlate clinically     5. Additional stable nonemergent findings as above     7/31/2024 Imaging    CT abd/ pelvis    Impression:  1. Postsurgical changes of hysterectomy and bilateral oophorectomy. No evidence of residual or recurrent disease.  2. Postsurgical changes of prior transverse colon resection.  3. Cholelithiasis without findings of acute cholecystitis.  4. Colonic diverticulosis without acute diverticulitis     8/17/2024 Imaging    CT abd/ pelvis (ER):    Impression:     1. Findings suspicious for mild or early developing distal small bowel obstruction, with well-defined transition point in the right lower pelvis.     2. Small gallstone. No visible gallbladder inflammation or biliary ductal dilatation.     3. No other evidence of acute intra-abdominal or intrapelvic disease elsewhere.        Bilateral malignant neoplasm of breast in female, estrogen receptor positive   4/13/2022 Initial Diagnosis    Bilateral malignant neoplasm of breast in female, estrogen receptor positive (HCC)     4/13/2022 Cancer Staged    Staging form: Breast, AJCC 8th Edition  - Pathologic: Stage IA (pT1c, pN0, cM0, G2, ER+, WA+, HER2-) - Signed by Sofia Fam MD on 4/13/2022 5/12/2022 - 6/2/2022 Radiation    Radiation OncologyTreatment Course:  Cindy Sage received 4005 cGy in 15 fractions to  "bilateral breasts via External Beam Radiation - EBRT.           The current medication list and allergy list were reviewed and reconciled.     Past Medical History, Past Surgical History, Social History, Family History have been reviewed and are without significant changes except as mentioned.      Review of Systems        Objective   Physical Exam  Vital Signs: /65   Pulse 64   Temp 97.2 °F (36.2 °C) (Temporal)   Resp 18   Ht 162.6 cm (64\")   Wt 60.1 kg (132 lb 6.4 oz)   LMP  (LMP Unknown)   SpO2 96%   BMI 22.73 kg/m²   Vitals:    07/08/25 1037   PainSc: 0-No pain           General Appearance:  alert, cooperative, no apparent distress, appears stated age, and normal weight   Neurologic/Psych: A&O x 3, gait steady, appropriate affect   HEENT:  Breasts:  Normocephalic, without obvious abnormality, mucous membranes moist  Symmetrical, and without concerning swelling, bleeding, nipple discharge, mass, skin changes, or tenderness.  Bilateral scarring consistent with previous surgical history.   Abdomen:   Soft, non-tender, non-distended, and no organomegaly   Lymph nodes: No cervical, supraclavicular, inguinal adenopathy noted   Pelvic: External genitalia are free from lesion.  On speculum examination, the vaginal cuff was intact and no lesions were appreciated.  On bimanual examination, no fullness was appreciated.  Uterus, cervix, and adnexa were absent.  There was no significant tenderness.  The rectovaginal exam was deferred.       ECOG score: 1             Result Review :    Narrative & Impression   CT ABDOMEN PELVIS W CONTRAST, CT CHEST W CONTRAST DIAGNOSTIC     Date of Exam: 7/1/2025 10:20 AM EDT     Indication: Neoplasm: pelvic, other primary, recurrence, suspected/known  restaging, h/o recurrent uterine and breast cancer.     Comparison: 8/17/2024, 1/19/2024.     Technique: Axial CT images were obtained of the chest, abdomen and pelvis following the uneventful intravenous administration of 85 mL " Isovue-300. Reconstructed coronal and sagittal images were also obtained. Automated exposure control and iterative   construction methods were used.        Findings:  Chest: There is no pathologic axillary adenopathy or other worrisome body wall soft tissue finding in the chest. There is no pleural or pericardial effusion. There is no pathologic mediastinal adenopathy. Mildly atherosclerotic, nonaneurysmal thoracic   aorta. The pulmonary arteries are well-opacified centrally. Evaluation of the osseous structures demonstrates no evidence of acute fracture or aggressive osseous lesion, with multilevel thoracic spondylosis change present. Evaluation of the lung fields   demonstrates no evidence of acute infectious or inflammatory process. A subcentimeter pulmonary nodule at the left lung apex appears unchanged. There are no distinct new or enlarging suspicious focal pulmonary nodules.     Abdomen and pelvis: The body wall soft tissues demonstrate no acute or suspicious findings. The osseous structures demonstrate no evidence of acute fracture or aggressive osseous lesion. The liver, spleen, pancreas and bilateral adrenal glands   demonstrate homogeneous enhancement without suspicious focal lesion. The kidneys are mildly atrophic, otherwise normal. Small and large bowel loops are nondilated. There is no suspicious focal bowel wall thickening. There is no free fluid or   pneumoperitoneum. Colonic diverticular changes are present, without acute inflammatory signs of diverticulitis. Mildly atherosclerotic, nonaneurysmal thoracic aorta. There is no worrisome retroperitoneal lymphadenopathy. The pelvic viscera demonstrate no   acute or suspicious findings.        IMPRESSION:  Impression:  No evidence of recurrent or new metastatic disease in the chest, abdomen and pelvis. No acute or unexpected findings.           Electronically Signed: Sheldon Joaquin MD    7/1/2025 12:25 PM EDT    Workstation ID: [id number]        Tumor  marker:     Date Value Ref Range Status   07/08/2025 15.6 0.0 - 38.1 U/mL Final   01/07/2025 10.9 0.0 - 38.1 U/mL Final   08/04/2023 12.8 0.0 - 38.1 U/mL Final   05/03/2023 12.9 0.0 - 38.1 U/mL Final              Assessment and Plan:   This is a 80 y.o. woman with history of recurrent endometrial cancer and history of Stage IA breast cancer presenting for 6 month follow up & also to review recent imaging results.     Recurrent endometrial cancer in the setting of stage IA breast cancer, DAVION  -ER positive; Continue anastrazole for maintenance. Will need RF in 1/2026  -  ordered  -Thankfully repeat CTs from earlier this month again without evidence of metastatic ds. Incidental left pulmonary nodule also described as stable.  Reviewed imaging in detail with patient and family member at time of visit.  Plan to repeat in 6 to 12 months, she prefers 6 months.  Orders placed   - She is currently without clinical evidence of disease. Signs of recurrent disease, such as vaginal bleeding, persistent abdominopelvic pain, urinary or bowel changes, and shortness of breath were reviewed.  She was advised to follow up immediately if she develops any of the above symptoms.  She is understanding to call with any changes in pelvic symptoms or general GYN concerns at any time between regularly scheduled visits.      Routine Health Screening  -UTD    -Complete mammogram as scheduled on 8/11/2025.  -No repeat recommendations were given from last colonoscopy given pt age, see report for additional findings/ recommendations   - DEXA due 2/2026, plan to order on return    Osteoporosis  - Discussed DEXA report from 2024 with patient and family during visit.  Ensure you are over-the-counter vitamin D supplement or daily women's 60+ multivitamin contains adequate amounts of vitamin D (~1,000IU qd), ensure adequate daily calcium intake from both diet and vitamin (1,200mg qd), and remain physically active as able with weightbearing  exercises such as walking to promote strong bones.  Med counseling provided on bisphosphonate therapy including Fosamax, zoledronic acid, and denosumab options.  Of course, very important to see your dentist and get clearance as well as complete all pending dental work prior to starting medicine.  See HPI for additional conversation.  For now she is going to ensure supportive measures mentioned above.  Repeat DEXA due 2/2026, we will plan to order on return appointment.  Given her history of CKD, for worsening osteoporosis or initiation/ management of ongoing treatment I would recommend this be done by specialist such as [facility] or of course what ever Dr. Carrillo thinks.        Diagnoses and all orders for this visit:    1. Endometrial cancer (Primary)  -     CT Abdomen Pelvis With Contrast; Future  -     CT Chest With Contrast; Future  -     ; Future    2. H/O ongoing treatment with hormonal therapy    3. Bilateral malignant neoplasm of breast in female, estrogen receptor positive, unspecified site of breast    4. Localized osteoporosis without current pathological fracture    Today I have spent a total of 35 minutes caring for Cindy Sage.  This includes time spent preparing for the visit, reviewing tests, performing a medically appropriate examination and/or evaluation , counseling and educating the patient/family/caregiver, ordering medications, tests, or procedures and documenting information in the medical record.   Pain assessment was performed today as a part of patient’s care.  For patients with pain related to surgery, gynecologic malignancy or cancer treatment, the plan is as noted in the assessment/plan.  For patients with pain not related to these issues, they are to seek any further needed care from a more appropriate provider, such as PCP.    Follow-up:     Return to clinic in 6 months for ongoing cancer surveillance.      Electronically signed by CHAPARRO Verma on  07/08/2025

## 2025-07-12 ENCOUNTER — RESULTS FOLLOW-UP (OUTPATIENT)
Dept: GYNECOLOGIC ONCOLOGY | Facility: CLINIC | Age: 80
End: 2025-07-12
Payer: MEDICARE

## 2025-07-14 DIAGNOSIS — G45.9 TIA (TRANSIENT ISCHEMIC ATTACK): ICD-10-CM

## 2025-07-14 RX ORDER — CLOPIDOGREL BISULFATE 75 MG/1
75 TABLET ORAL DAILY
Qty: 90 TABLET | Refills: 1 | Status: SHIPPED | OUTPATIENT
Start: 2025-07-14

## 2025-07-14 NOTE — TELEPHONE ENCOUNTER
Attempted to contact pt to give results per APRN  No answer no VM set up  Will make second attempt at a later time

## 2025-07-16 NOTE — TELEPHONE ENCOUNTER
Patient notified of providers comments and recommendations for  results with no issue/low and stable.Next appt verified.

## (undated) DEVICE — UNDERGLV SURG BIOGEL INDICAT PF 61/2 GRN

## (undated) DEVICE — HARMONIC HD 1000I SHEARS, 20CM SHAFT LENGTH: Brand: HARMONIC

## (undated) DEVICE — 3M™ STERI-DRAPE™ INSTRUMENT POUCH 1018: Brand: STERI-DRAPE™

## (undated) DEVICE — 1-PIECE DRAINABLE OSTOMY POUCH, FLEXTEND: Brand: PREMIER

## (undated) DEVICE — ADHS SKIN PREMIERPRO EXOFIN TOPICAL HI/VISC .5ML

## (undated) DEVICE — DRAPE,UNDERBUTTOCKS,STERILE: Brand: MEDLINE

## (undated) DEVICE — ANTIBACTERIAL UNDYED BRAIDED (POLYGLACTIN 910), SYNTHETIC ABSORBABLE SUTURE: Brand: COATED VICRYL

## (undated) DEVICE — DRAPE, LAVH, STERILE: Brand: MEDLINE

## (undated) DEVICE — SUT PDS LP 1 TP1 96IN VIO PDP880GA

## (undated) DEVICE — SUT VIC PLS CTD ANTIB 2/0 18IN VCP111G

## (undated) DEVICE — LEX BASIC NO DRAPE: Brand: MEDLINE INDUSTRIES, INC.

## (undated) DEVICE — GLV SURG SENSICARE PI MIC PF SZ6 LF STRL

## (undated) DEVICE — CVR HNDL LIGHT RIGID

## (undated) DEVICE — PROVIDES A STERILE INTERFACE BETWEEN THE OPERATING ROOM SURGICAL LAMPS (NON-STERILE) AND THE SURGEON OR NURSE (STERILE).: Brand: STERION®CLAMP COVER FABRIC

## (undated) DEVICE — PREMIUM DRY TRAY LF: Brand: MEDLINE INDUSTRIES, INC.

## (undated) DEVICE — SUT MONOCRYL PLS ANTIB UND 3/0  PS1 27IN